# Patient Record
Sex: FEMALE | Race: WHITE | NOT HISPANIC OR LATINO | Employment: UNEMPLOYED | URBAN - METROPOLITAN AREA
[De-identification: names, ages, dates, MRNs, and addresses within clinical notes are randomized per-mention and may not be internally consistent; named-entity substitution may affect disease eponyms.]

---

## 2018-08-22 ENCOUNTER — HOSPITAL ENCOUNTER (EMERGENCY)
Facility: CLINIC | Age: 43
Discharge: HOME | End: 2018-08-22
Attending: EMERGENCY MEDICINE | Admitting: EMERGENCY MEDICINE
Payer: MEDICAID

## 2018-08-22 ENCOUNTER — APPOINTMENT (OUTPATIENT)
Dept: MRI IMAGING | Facility: CLINIC | Age: 43
End: 2018-08-22
Attending: EMERGENCY MEDICINE
Payer: MEDICAID

## 2018-08-22 ENCOUNTER — APPOINTMENT (OUTPATIENT)
Dept: CT IMAGING | Facility: CLINIC | Age: 43
End: 2018-08-22
Attending: EMERGENCY MEDICINE
Payer: MEDICAID

## 2018-08-22 VITALS
RESPIRATION RATE: 18 BRPM | OXYGEN SATURATION: 100 % | DIASTOLIC BLOOD PRESSURE: 101 MMHG | WEIGHT: 210 LBS | TEMPERATURE: 97.8 F | SYSTOLIC BLOOD PRESSURE: 144 MMHG

## 2018-08-22 DIAGNOSIS — Z86.73 H/O STROKE WITHIN LAST YEAR: ICD-10-CM

## 2018-08-22 DIAGNOSIS — R20.0 LEFT ARM NUMBNESS: ICD-10-CM

## 2018-08-22 DIAGNOSIS — I10 BENIGN ESSENTIAL HYPERTENSION: ICD-10-CM

## 2018-08-22 PROBLEM — I16.0 HYPERTENSIVE URGENCY: Status: ACTIVE | Noted: 2018-03-28

## 2018-08-22 PROBLEM — I63.9 ACUTE ISCHEMIC STROKE (H): Status: ACTIVE | Noted: 2018-08-07

## 2018-08-22 PROBLEM — N93.9 VAGINAL BLEEDING: Status: ACTIVE | Noted: 2018-03-31

## 2018-08-22 LAB
ANION GAP SERPL CALCULATED.3IONS-SCNC: 11 MMOL/L (ref 3–14)
APTT PPP: 30 SEC (ref 22–37)
BASOPHILS # BLD AUTO: 0.1 10E9/L (ref 0–0.2)
BASOPHILS NFR BLD AUTO: 0.6 %
BUN SERPL-MCNC: 12 MG/DL (ref 7–30)
CALCIUM SERPL-MCNC: 9 MG/DL (ref 8.5–10.1)
CHLORIDE SERPL-SCNC: 104 MMOL/L (ref 94–109)
CO2 SERPL-SCNC: 23 MMOL/L (ref 20–32)
CREAT SERPL-MCNC: 0.94 MG/DL (ref 0.52–1.04)
DIFFERENTIAL METHOD BLD: ABNORMAL
EOSINOPHIL NFR BLD AUTO: 2.5 %
ERYTHROCYTE [DISTWIDTH] IN BLOOD BY AUTOMATED COUNT: 14.6 % (ref 10–15)
GFR SERPL CREATININE-BSD FRML MDRD: 65 ML/MIN/1.7M2
GLUCOSE SERPL-MCNC: 114 MG/DL (ref 70–99)
HCT VFR BLD AUTO: 29.6 % (ref 35–47)
HGB BLD-MCNC: 9.4 G/DL (ref 11.7–15.7)
IMM GRANULOCYTES # BLD: 0.3 10E9/L (ref 0–0.4)
IMM GRANULOCYTES NFR BLD: 1.5 %
INR PPP: 1.05 (ref 0.86–1.14)
LYMPHOCYTES # BLD AUTO: 2.7 10E9/L (ref 0.8–5.3)
LYMPHOCYTES NFR BLD AUTO: 15.7 %
MCH RBC QN AUTO: 26.8 PG (ref 26.5–33)
MCHC RBC AUTO-ENTMCNC: 31.8 G/DL (ref 31.5–36.5)
MCV RBC AUTO: 84 FL (ref 78–100)
MONOCYTES # BLD AUTO: 1.1 10E9/L (ref 0–1.3)
MONOCYTES NFR BLD AUTO: 6.4 %
NEUTROPHILS # BLD AUTO: 12.4 10E9/L (ref 1.6–8.3)
NEUTROPHILS NFR BLD AUTO: 73.3 %
NRBC # BLD AUTO: 0 10*3/UL
NRBC BLD AUTO-RTO: 0 /100
PLATELET # BLD AUTO: 660 10E9/L (ref 150–450)
POTASSIUM SERPL-SCNC: 4.1 MMOL/L (ref 3.4–5.3)
RBC # BLD AUTO: 3.51 10E12/L (ref 3.8–5.2)
SODIUM SERPL-SCNC: 138 MMOL/L (ref 133–144)
TROPONIN I SERPL-MCNC: 0.02 UG/L (ref 0–0.04)
WBC # BLD AUTO: 16.9 10E9/L (ref 4–11)

## 2018-08-22 PROCEDURE — 84484 ASSAY OF TROPONIN QUANT: CPT | Performed by: EMERGENCY MEDICINE

## 2018-08-22 PROCEDURE — 70553 MRI BRAIN STEM W/O & W/DYE: CPT

## 2018-08-22 PROCEDURE — 25000128 H RX IP 250 OP 636: Performed by: EMERGENCY MEDICINE

## 2018-08-22 PROCEDURE — 85610 PROTHROMBIN TIME: CPT | Performed by: EMERGENCY MEDICINE

## 2018-08-22 PROCEDURE — 25000125 ZZHC RX 250: Performed by: EMERGENCY MEDICINE

## 2018-08-22 PROCEDURE — 85730 THROMBOPLASTIN TIME PARTIAL: CPT | Performed by: EMERGENCY MEDICINE

## 2018-08-22 PROCEDURE — 99285 EMERGENCY DEPT VISIT HI MDM: CPT | Mod: 25 | Performed by: EMERGENCY MEDICINE

## 2018-08-22 PROCEDURE — 93010 ELECTROCARDIOGRAM REPORT: CPT | Mod: Z6 | Performed by: EMERGENCY MEDICINE

## 2018-08-22 PROCEDURE — 80048 BASIC METABOLIC PNL TOTAL CA: CPT | Performed by: EMERGENCY MEDICINE

## 2018-08-22 PROCEDURE — 85025 COMPLETE CBC W/AUTO DIFF WBC: CPT | Performed by: EMERGENCY MEDICINE

## 2018-08-22 PROCEDURE — 93005 ELECTROCARDIOGRAM TRACING: CPT | Performed by: EMERGENCY MEDICINE

## 2018-08-22 PROCEDURE — 70496 CT ANGIOGRAPHY HEAD: CPT

## 2018-08-22 PROCEDURE — 96360 HYDRATION IV INFUSION INIT: CPT | Performed by: EMERGENCY MEDICINE

## 2018-08-22 PROCEDURE — 70450 CT HEAD/BRAIN W/O DYE: CPT | Mod: XS

## 2018-08-22 PROCEDURE — A9585 GADOBUTROL INJECTION: HCPCS | Performed by: EMERGENCY MEDICINE

## 2018-08-22 RX ORDER — ASPIRIN 325 MG
325 TABLET ORAL DAILY
COMMUNITY
Start: 2018-08-20

## 2018-08-22 RX ORDER — IOPAMIDOL 755 MG/ML
500 INJECTION, SOLUTION INTRAVASCULAR ONCE
Status: COMPLETED | OUTPATIENT
Start: 2018-08-22 | End: 2018-08-22

## 2018-08-22 RX ORDER — METOPROLOL TARTRATE 50 MG
50 TABLET ORAL
COMMUNITY
Start: 2018-03-31 | End: 2018-08-25

## 2018-08-22 RX ORDER — LISINOPRIL 20 MG/1
20 TABLET ORAL
COMMUNITY
Start: 2018-08-20 | End: 2018-09-17

## 2018-08-22 RX ORDER — GADOBUTROL 604.72 MG/ML
10 INJECTION INTRAVENOUS ONCE
Status: COMPLETED | OUTPATIENT
Start: 2018-08-22 | End: 2018-08-22

## 2018-08-22 RX ORDER — ATORVASTATIN CALCIUM 40 MG/1
40 TABLET, FILM COATED ORAL
COMMUNITY
Start: 2018-08-20 | End: 2018-09-17

## 2018-08-22 RX ADMIN — SODIUM CHLORIDE: 9 INJECTION, SOLUTION INTRAVENOUS at 19:15

## 2018-08-22 RX ADMIN — SODIUM CHLORIDE 100 ML: 9 INJECTION, SOLUTION INTRAVENOUS at 19:38

## 2018-08-22 RX ADMIN — IOPAMIDOL 70 ML: 755 INJECTION, SOLUTION INTRAVENOUS at 19:37

## 2018-08-22 RX ADMIN — SODIUM CHLORIDE, PRESERVATIVE FREE 100 ML: 5 INJECTION INTRAVENOUS at 19:35

## 2018-08-22 RX ADMIN — GADOBUTROL 10 ML: 604.72 INJECTION INTRAVENOUS at 20:18

## 2018-08-22 NOTE — ED AVS SNAPSHOT
[facility] Emergency Department    [address] [place] [address]    Phone:  [phone]    Fax:  [phone]                                       Alicia Penaloza   MRN: [id number]    Department:  [facility] Emergency Department   Date of Visit:  8/22/2018           After Visit Summary Signature Page     I have received my discharge instructions, and my questions have been answered. I have discussed any challenges I see with this plan with the nurse or doctor.    ..........................................................................................................................................  Patient/Patient Representative Signature      ..........................................................................................................................................  Patient Representative Print Name and Relationship to Patient    ..................................................               ................................................  Date                                            Time    ..........................................................................................................................................  Reviewed by Signature/Title    ...................................................              ..............................................  Date                                                            Time

## 2018-08-22 NOTE — ED PROVIDER NOTES
History     Chief Complaint   Patient presents with     Arm Pain     The history is provided by the patient.     Alicia Penaloza is a 43 year old female who presents to the emergency department with concerns of weakness. The patient recently had a stroke on 8/07/18 and was flown to [facility]. On the morning of her stroke she was having weakness and was not able to verbalize anything. She was trying to talk to someone and she was not able to get the words out. She had a clotting stroke and was given TPA. She was told that there was some hemorrhaging after the medication was administered. She was released from the hospital on the 20th and did not have any numbness, tingling, or weakness. The only residual symptoms she was having was some memory issues. The patient is currently having numbness in her left arm, from her elbow to her shoulder. The numbness started in her right arm about an hour ago while she was eating dinner. She was suddenly not able to  her fork so her sister decided to take her here. While they were driving here, her right arm wasn't bothering her as bad, but then her left arm started to become numb. The patient is currently taking 325 mg of aspirin daily. She is not on any other blood thinners. She notes that she has had her period for about 15 days now so she is losing a lot of blood. She is also taking Atorvastatin and Lisinopril. The patient notes that she is having heart burn, but states that she has had this for as long as she can remember.    Problem List:    Patient Active Problem List    Diagnosis Date Noted     Acute CVA (cerebrovascular accident) (H) 08/25/2018     Priority: Medium     Benign essential hypertension 08/22/2018     Priority: Medium     Acute ischemic stroke (H) 08/07/2018     Priority: Medium     Vaginal bleeding 03/31/2018     Priority: Medium     Hypertensive urgency 03/28/2018     Priority: Medium        Past Medical History:    Past Medical History:    Diagnosis Date     Hypertension      Stroke (H) 08/07/2018       Past Surgical History:    History reviewed. No pertinent surgical history.    Family History:    No family history on file.    Social History:  Marital Status:  Single [1]  Social History   Substance Use Topics     Smoking status: Former Smoker     Quit date: 8/7/2018     Smokeless tobacco: Not on file     Alcohol use No        Medications:      No current outpatient prescriptions on file.  Medications include metoprolol, lisinopril, atorvastatin, and 325 mg aspirin.    Review of Systems   All other systems reviewed and are negative.      Physical Exam   BP: (!) 170/102  Heart Rate: 118  Temp: 97.8  F (36.6  C)  Resp: 18  Weight: 95.3 kg (210 lb)  SpO2: 100 %      Physical Exam  BP (!) 144/101  Temp 97.8  F (36.6  C) (Oral)  Resp 18  Wt 95.3 kg (210 lb)  LMP 08/07/2018  SpO2 100%  General: alert, interactive, in no apparent distress.  Mood normal.  Able to give complete and accurate history.  Head: atraumatic  Nose: no rhinorrhea or epistaxis  Eyes: Sclera nonicteric. Conjunctiva noninjected. PERRL, EOMI  Mouth: no tonsillar erythema   Neck: supple, no palp LAD  Lungs: CTAB.  No wheezing.   CV: RRR, S1/S2; peripheral pulses palpable and symmetric  Abdomen: soft, nt, nd, no guarding.  Extremities: no cyanosis or edema  Skin: no rash or diaphoresis  Neuro: CN III-XII grossly intact, strength 5/5 in UE and LEs bilaterally, sensation intact to light touch in UE and LEs bilaterally; finger to nose is normal with no pronator drift.  Reflexes normal in BLE.        ED Course     ED Course     I reviewed hospital course from [facility]:    Hospital Course   LKW 8 AM, discovered 9 AM  CC: Transferred from [facility] after alteplase  HPI: She presented to [place] with aphasia. Administered alteplase after BP control. Still aphasic on arrival        8/8/2018: Developed left sided weakness reported by nursing early this morning. Patient remains mildly  aphasic.      8/9/2018: Sitting up in bed. Sister present with multiple questions. Moving all extremities. Very mild aphasia still present. Neuro stable, waiting for therapy eval and discharge recommendations     8/10/2018: Sitting up in bed. Alert, following commands. Moving all extremities. Neuro stable     8/11/2018: Sitting up in bed, ambulating in room. Neuro stable, waiting for discharge planning     8/12/2018: Sitting up in bed. Ambulating in room. Alert, following commands. Neuro stable     8/13/2018: Sitting up in bed. Increased headache started this am with elevated BP's. Sending for repeat CT head and adjusting BP meds     8/14/2018: Sitting up in bed. Headache improved. Neuro stable, waiting for discharge planning     8/15/2018: Resting in bed. Alert, following commands. Moving all extremities. Neuro stable, waiting for discharge plan     8/16/2018: Sitting up in chair in room, eating lunch. Alert, following commands. No headache now. Neuro stable     8/17/2018: Patient resting in bed. Wakes to verbal command. Moving all extremities and answering questions appropriately. Neuro stable. Waiting for discharge planning     8/18/18: Resting in bed. Neuro stable. She states she is quite tired. She plans to work with therapy soon. Mild headache.      8/19/18: Resting bed. Neuro stable. Family present and supportive. Denies headache     8/20/2018: Resting in bed. Alert, following commands. Neuro stable. Waiting for placement              STROKE REVIEW      Risk Factors:   Smoking status: Yes: Ongoing 1.5 pack-year smoker  Hypertension: treated  BP: 122/88  Diabetes Mellitus: no history  Hyperlipidemia: untreated    Lab Results   Component Value Date      08/08/2018         Sleep apnea: no history  Atrial Fibrillation: no history     Antithrombotic Meds:   Prior to Event: ?  Post Event: aspirin  Now: aspirin (24 hours post Tpa)        NIHSS 8/20/2018 10:26 AM   Level of conciousness: Alert; keenly  responsive = 0  Questions: Oriented to month and age: Answers Both = 0  Commands: Follows 2 commands: Both = 0  Gaze: Normal = 0  Visual fields: No visual loss = 0  Facial palsy: Normal = 0  Motor LUE: No drift = 0  Motor RUE: No drift = 0  Motor LLE: No drift = 0  Motor RLE: No drift = 0  Limb Ataxia: Absent = 0  Sensory: Normal = 0  Best Language: No aphasia = 0  Speech: Normal = 0  Extinction and inattention: No abnormality = 0  Total = 0           Workup:  Initial CT : waiting for upload     MRI :   1. Acute nonhemorrhagic multifocal infarcts within the left occipital,  parietal, and frontal lobes.  There is a focus of susceptibility within the left MCA branch at the sylvian fissure suggestive of thrombus within the MCA branch.  2. Hemorrhage centered within the right thalamus with mild restricted diffusion suggestive of hemorrhagic transformation of an acute infarct.  3. Small amount of intraventricular blood products and trace left temporal subarachnoid hemorrhage.  4. Background of chronic microangiopathic changes and a few chronic deep white matter lacunar infarcts.  5. Chronic mucosal sinus disease including most prominently the right maxillary sinus.     Large Vessel MRA :   1. Loss of normal signal within the left MCA proximal M2 segment along the inferior sylvian fissure likely represents a small amount of focal thrombus given the susceptibility on the MRI of the head in this region.  The left MCA branch vessels remain patent distally within the visualized portions.  2. Near fetal origins of the bilateral PCAs with dominant posterior  communicating arteries with infundibula.  3. Apparent mild narrowing at the proximal right P2 segment.  The right PCA remains patent distal to this.     Cardiac: TTE :   1. Hyperdynamic left ventricular systolic function estimated at 65-70%. No obvious regional wall motion abnormalities are visualized.    2. Mild concentric left ventricular hypertrophy.   3. Normal atrial  size.   4. No significant valvular abnormalities visualized.   5. No pericardial effusion.   6. Inferior vena cava is not well visualized on this study.   7. Negative agitated saline bubble study.    8. Compared to prior study from 3/29/18, there does not appear to be significant change in left ventricular systolic function.  BETH : Not done     EKG : Sinus Rhythm        Presumed Etiology: Cryptogenic Embolic (possible hypercoagulable, see assessment and plan)     Assessment and Plan:  Thrombolytic therapy (t-PA) was administered at another facility.      1. Acute Ischemic Stroke, Multifocal Bilateral Distribution, Embolic Appearing with Asymptomatic Hemorrhagic Transformation of Right BG Infarct and Asymptomatic Right Temporal Lobe SAH and IVH  Continue with neuro admission until discharge planning achieved   ASA daily  Blood pressure goal < 150/90  Hypercoagulable panel completed, see #6  Will defer LINQ at this time until further hematological work up, may consider LINQ if hematology has low suspicion for underlying hypercoag state after repeat testing     2. Right and Left Hemiparesis, Aphasia, Stable  PT/OT and ST     3. Hyperlipidemia  LDL with admission 150  Start Lipitor 40 mg daily for goal LDL < 70     4. Leukocytosis  CBC shows decreased WBC, still elevated  UA and repeat Blood cultures pending     5. Hypertension  BP goal < 150/90 acute phase  Long term SBP goal < 140  Lisinopril 20 mg daily  Resume PTA metoprolol and continue to monitor     6. Elevated Cardiolipin IGM  Resulted Weakly positive  Will plan to repeat at 6 weeks and if still positive, refer to hematology/rheumatology for further work up of underlying hypercoagulability  Continue ASA daily now     7. Headache, resolved  Norco prn  Amitriptyline 25 mg nightly, will discontinue now given increased fatigue and inability to participate      8. Anemia, Secondary to Mensis   Hgb stable  Patient reports this is an ongoing problem and chronic  PTA  Discussed follow up OP with OB to discuss further options         Procedures               EKG Interpretation:      Interpreted by Jose Steele  Time reviewed: 2050  Symptoms at time of EKG: numbness and h/o CVA   Rhythm: normal sinus   Rate: 107  Axis: normal  Ectopy: none  Conduction: normal  ST Segments/ T Waves: No ST-T wave changes  Comparison to prior: No old EKG available    Clinical Impression: normal EKG          Critical Care time:  none     The patient has stroke symptoms:           ED Stroke specific documentation           NIHSS PDF          Protocol PDF     Patient last known well time: 1700  ED Provider first to bedside at: 1835  CT Results received at: dictation  Patient was not treated with TPA due to the following reason(s):  Mild stroke symptoms ( NIHSS < 4 and not globally aphasic)  Isolated mild neurological deficits such as ataxia alone, sensory loss alone, dysarthria alone or minimal weakness ( NIHSS < 4 AND normal language AND visual fields )  Major stroke or head trauma within 3 months or minor stroke within 1 month    National Institutes of Health Stroke Scale (Baseline)  Time Performed: 1835      Score    Level of consciousness: (0)   Alert, keenly responsive    LOC questions: (0)   Answers both questions correctly    LOC commands: (0)   Performs both tasks correctly    Best gaze: (0)   Normal    Visual: (0)   No visual loss    Facial palsy: (0)   Normal symmetrical movements    Motor arm (left): (0)   No drift    Motor arm (right): (0)   No drift    Motor leg (left): (0)   No drift    Motor leg (right): (0)   No drift    Limb ataxia: (0)   Absent    Sensory: (0)   Normal- no sensory loss    Best language: (0)   Normal- no aphasia    Dysarthria: (0)   Normal    Extinction and inattention: (0)   No abnormality        Total Score:  0        Stroke Mimics were considered (including migraine headache, seizure disorder, hypoglycemia (or hyperglycemia), head or spinal trauma, CNS  infection, Toxin ingestion and shock state (e.g. sepsis) .                       No results found for this or any previous visit (from the past 24 hour(s)).    Medications   0.9% sodium chloride BOLUS (0 mLs Intravenous Stopped 8/22/18 2111)   sodium chloride (PF) 0.9% PF flush 3 mL (1,000 mLs Intravenous Given 8/22/18 1914)   iopamidol (ISOVUE-370) solution 500 mL (70 mLs Intravenous Given 8/22/18 1937)   Saline 100mL Bag (100 mLs Intravenous Given 8/22/18 1935)   sodium chloride 0.9 % bag 500mL for CT scan flush use (100 mLs Intravenous Given 8/22/18 1938)   gadobutrol (GADAVIST) injection 10 mL (10 mLs Intravenous Given 8/22/18 2018)       Assessments & Plan (with Medical Decision Making)  43 year old female with past medical history significant for recent acute ischemic stroke which was treated at [facility].  Patient had initially presented on August 8 at an outside facility, and had presented with acute a fascia, and received TPA.  Patient was transferred to [facility] via helicopter and was treated for acute ischemic stroke with multifocal bilateral distribution with embolic appearing hemorrhagic transformation.  Patient had been hospitalized until 2 days ago.  She had been discharged home with home health nursing.  She is currently on 325 mg aspirin daily.  No other blood thinning medication use.  Patient is also on lisinopril, metoprolol, atorvastatin.    Patient presents to the emergency department this evening with her sister.  They were eating dinner this evening when her right hand subsequently and suddenly went weak.  She had difficulty raising the fork to her mouth.  This had resolved, and patient was being driven to the emergency department when she had reports of left hand/arm weakness.  This has subsequently resolved once again, and patient's only complaints are left upper extremity numbness that extends from the shoulder to the elbow.  No distal extremity numbness.  No lower  extremity numbness, or weakness.  No current right-sided arm symptoms.    Patient denies chest pain, fevers, or other recent changes since hospital discharge.  I reviewed the hospital discharge documented above.  Patient with NIH stroke scale upon ED evaluation of 0.  She has recent TPA administration, with recent stroke, and given her low NIH stroke scale patient is not a TPA candidate.  CT with CT angiogram is performed that shows no acute findings of those studies.    MRI is ordered, and shows recent ischemic infarcts, however no acute infarcts are noted.  No significant changes compared to prior results based on my review of the previous radiology reports.    Patient with NIH stroke scale which remains at 0.  Symptoms are unchanged, and potentially improved.  Patient with EKG showing sinus tachycardia, with no other arrhythmia.  Has had prior embolic infarcts, and does have multiple clinic follow-up appointments pending to further evaluate for hypercoagulable cause, or other cause of patient's apparent embolic infarct for which she was recently hospitalized at [facility].    At this point, patient has normal neurologic exam, and will be discharged home with sister.  Encourage follow-up with clinic providers, and return if new, or worsening symptoms develop.         I have reviewed the nursing notes.    I have reviewed the findings, diagnosis, plan and need for follow up with the patient.       Discharge Medication List as of 8/22/2018  9:11 PM          Final diagnoses:   Left arm numbness   H/O stroke within last year   Benign essential hypertension     This document serves as a record of services personally performed by Jose Steele MD. It was created on their behalf by Noemi Bal, a trained medical scribe. The creation of this record is based on the provider's personal observations and the statements of the patient. This document has been checked and approved by the attending provider.  Note:  Chart documentation done in part with Dragon Voice Recognition software. Although reviewed after completion, some word and grammatical errors may remain.  8/22/2018   [facility] EMERGENCY DEPARTMENT     Jose Steele MD  08/22/18 2106       Jose Steele MD  08/27/18 0848

## 2018-08-22 NOTE — ED TRIAGE NOTES
Pt presents with concerns of left arm pain and neck pain.  Pt states that she had a stroke on 08/07/18 was in [facility] then flown to [facility].  Pt started having right sided arm pain at about 1730.  Pt had difficulty grasping a fork with her right hand.  Pt states that the pain shifted to the left arm and neck on the way to the hospital.  Pt is concerned with her recent history.  Pt denies any chest pain, shortness of breath, or nausea.

## 2018-08-22 NOTE — ED AVS SNAPSHOT
[facility]    [address] [place] [address]    Phone:  [phone]    Fax:  [phone]                                       Alicia Penaloza   MRN: [id number]    Department:  [facility] Emergency Department   Date of Visit:  8/22/2018           Patient Information     Date Of Birth          1975        Your diagnoses for this visit were:     Left arm numbness     H/O stroke within last year     Benign essential hypertension        You were seen by Jose Steele MD.        Discharge Instructions       Follow up with your providers as planned.   Return if new or worsening symptoms.    Continue home medications.          Understanding the Link Between High Blood Pressure and Stroke  Each day that your blood pressure is too high, your chances of having a stroke are increased. Normal blood pressure is considered to be less than 120 over less than 80 millimeters of mercury (mmHg) or 120/80 mmHg. A stroke is a loss of brain function caused by a lack of blood to the brain. Stroke can result from the damage that ongoing high blood pressure causes in your vessels. If the affected vessel stops supplying blood to the brain, a stroke results.  High blood pressure damages blood vessels    Vessels thicken  When blood presses against a vessel wall with too much force, muscles in the wall lose their ability to stretch. This causes the wall to thicken, which narrows the vessel passage and reduces blood flow.   Clots form  When blood pressure is too high, it can damage blood vessel walls which results in scar tissue. Fat and cholesterol (plaque) collect in the damaged spots. Blood cells stick to the plaque, forming a mass called a clot. A clot can block blood flow in the vessel.   Vessels break  Sometimes blood flows with enough force to weaken a vessel wall. If the vessel is small or damaged, the wall can break. When this happens, blood leaks into nearby tissue and  kills cells. Other cells may die because blood cannot reach them.   Know the symptoms of stroke  During a stroke, blood supply to the brain is cut off. But with prompt medical help, a better recovery is more likely. Don t wait. Call 911 if you have any of the symptoms below:    Sudden weakness or numbness on one side of the face or body, including a leg or an arm    Sudden trouble seeing with one or both eyes    Sudden double vision    Sudden trouble talking, such as slurred speech    Sudden severe headache    Sudden problems using or understanding words    Sudden dizziness or loss of balance    Seizures for the first time     Any of these symptoms that occur and then resolve    Date Last Reviewed: 5/1/2017 2000-2017 [employer]. [address] [place] [address], [place] [address]. All rights reserved. This information is not intended as a substitute for professional medical care. Always follow your healthcare professional's instructions.          Stroke and Heart Disease  Every part of your body, including your heart and brain, needs oxygen to work. Oxygen is carried in the blood. Blood vessels called arteries carry oxygen-rich blood throughout the body. Both heart attack and stroke are due to problems in the arteries. The same factors that cause heart disease can make you more likely to have a stroke.    Heart attack. A heart attack is caused by blockage in an artery that carries blood to the heart muscle. If blood is blocked, that part of the heart muscle is damaged or dies.    Stroke. If an artery supplying the brain is blocked, a stroke may result. This is called an ischemic stroke. It is caused by a piece of plaque breaking loose from an artery (such as a carotid artery in the neck) or from the heart and lodging in the brain. A stroke caused by the rupture of a weakened blood vessel is called a hemorrhagic stroke.  Both heart attack and stroke are medical emergencies that can lead to serious health  problems. They can even be fatal.      Healthy artery  A healthy artery is a tube with flexible walls and a smooth inner lining. Blood flows freely through it.  Unhealthy artery  Artery problems start when the inner lining gets damaged. This is often because of risk factors such as smoking and high blood pressure. These can make the artery walls stiff. Plaque, a fatty mix of cholesterol and other material, forms in the lining. This narrows the channel. Plaque can break, restricting blood flow further. It can also cause a blood clot to form. A blood clot may block the artery s channel completely.   Reducing your risk  Making changes that make your arteries healthier will help lower your risk for both heart attack and stroke. If you have heart disease, you may need to work on a few aspects of your lifestyle. But remember that the things that are good for your arteries, heart, and brain are also good for the rest of your body.  Your healthcare provider will work with you to make lifestyle changes as needed to help prevent progression of atherosclerotic cardiovascular disease. This can lead to heart attack or stroke. Factors you may need to work on include:    Diet. Your healthcare provider will give you information on dietary changes that you may need to make based on your situation. Your provider may recommend that you see a registered dietitian for help with diet changes. Changes may include:  ? Reducing fat and cholesterol intake  ? Reducing sodium (salt) intake, especially if you have high blood pressure  ? Increasing your intake of fresh vegetables and fruits  ? Eating lean proteins, such as fish, poultry, and legumes (beans and peas) and eating less red meat and processed meats  ? Using low- or no-fat dairy products  ? Using vegetable and nut oils in limited amounts  ? Limiting sweets and processed foods such as chips, cookies, and baked goods    Physical activity. Your healthcare provider may recommend that you  increase your physical activity if you have not been as active as possible. Depending on your situation, your provider may advise you to include moderate to vigorous intensity activity for at least 40 minutes each day for at least 3 to 4 days per week. Examples of moderate to vigorous activity include:  ? Walking at a brisk pace, about 3 to 4 miles per hour  ? Jogging or running  ? Swimming or water aerobics  ? Hiking  ? Dancing  ? Martial arts  ? Tennis  ? Riding a bike or a stationary bike    Weight management. If you are overweight or obese, your healthcare provider will work with you to lose weight and lower your BMI (body mass image) to a normal or near-normal level. Making dietary changes and increasing physical activity can help.    Smoking. If you smoke, break the smoking habit. Enroll in a stop-smoking program to improve your chances of success.    Stress. Learn stress management techniques to help you deal with stress in your home and work life.  Date Last Reviewed: 7/1/2017 2000-2017 [employer]. [address] [place] [address]. All rights reserved. This information is not intended as a substitute for professional medical care. Always follow your healthcare professional's instructions.          Your next 10 appointments already scheduled     Aug 27, 2018  4:00 PM CDT   Office Visit with Flori Beyer MD   [facility] ([address] [id number] [phone]           Bring a current list of meds and any records pertaining to this visit. For Physicals, please bring immunization records and any forms needing to be filled out. Please arrive 10 minutes early to complete paperwork.            Sep 05, 2018  1:00 PM CDT   Office Visit with Augusto Thomas MD   [facility] ([address] [address] [phone]           Bring a current list of meds  and any records pertaining to this visit. For Physicals, please bring immunization records and any forms needing to be filled out. Please arrive 10 minutes early to complete paperwork.              24 Hour Appointment Hotline       To make an appointment at any [facility], call [phone] ([phone]). If you don't have a family doctor or clinic, we will help you find one. [facility] are conveniently located to serve the needs of you and your family.             Review of your medicines      Our records show that you are taking the medicines listed below. If these are incorrect, please call your family doctor or clinic.        Dose / Directions Last dose taken    aspirin 325 MG tablet   Dose:  325 mg        Take 325 mg by mouth   Refills:  0        atorvastatin 40 MG tablet   Commonly known as:  LIPITOR   Dose:  40 mg        Take 40 mg by mouth   Refills:  0        lisinopril 20 MG tablet   Commonly known as:  PRINIVIL/ZESTRIL   Dose:  20 mg        Take 20 mg by mouth   Refills:  0        metoprolol tartrate 50 MG tablet   Commonly known as:  LOPRESSOR   Dose:  50 mg        Take 50 mg by mouth   Refills:  0                Procedures and tests performed during your visit     Activity: Bedrest    Basic metabolic panel    CBC with platelets differential    CT Head Neck Angio w/o & w Contrast    CT Head w/o Contrast    EKG 12-lead, tracing only    Glucose monitor nursing POCT    INR    MR Brain w/o & w Contrast    Partial thromboplastin time    Pulse oximetry nursing    Troponin I    Vital signs and neuro checks      Orders Needing Specimen Collection     None      Pending Results     Date and Time Order Name Status Description    8/22/2018 1935 MR Brain w/o & w Contrast Preliminary     8/22/2018 1845 CT Head Neck Angio w/o & w Contrast Preliminary     8/22/2018 1845 CT Head w/o Contrast Preliminary             Pending Culture Results     No orders found from 8/20/2018 to 8/23/2018.            Pending  "Results Instructions     If you had any lab results that were not finalized at the time of your Discharge, you can call the ED Lab Result RN at [phone]. You will be contacted by this team for any positive Lab results or changes in treatment. The nurses are available 7 days a week from 10A to 6:30P.  You can leave a message 24 hours per day and they will return your call.        Thank you for choosing [facility]       Thank you for choosing [facility] for your care. Our goal is always to provide you with excellent care. Hearing back from our patients is one way we can continue to improve our services. Please take a few minutes to complete the written survey that you may receive in the mail after you visit with us. Thank you!        [facility] Information     [facility] lets you send messages to your doctor, view your test results, renew your prescriptions, schedule appointments and more. To sign up, go to [email or web address] . Click on \"Log in\" on the left side of the screen, which will take you to the Welcome page. Then click on \"Sign up Now\" on the right side of the page.     You will be asked to enter the access code listed below, as well as some personal information. Please follow the directions to create your username and password.     Your access code is: [email or web address]  Expires: 2018  8:53 PM     Your access code will  in 90 days. If you need help or a new code, please call your [facility] clinic or [phone].        Care EveryWhere ID     This is your Care EveryWhere ID. This could be used by other organizations to access your [facility] medical records  [id number]        Equal Access to Services     [facility]: Hadii helio Noel, waaxda luqadaha, qaybta kaalanjelica pino. So [facility] [phone].    ATENCIÓN: Si habla español, tiene a finnegan disposición servicios gratuitos de asistencia lingüística. Llame al [phone].    We comply with " applicable federal civil rights laws and [place] laws. We do not discriminate on the basis of race, color, national origin, age, disability, sex, sexual orientation, or gender identity.            After Visit Summary       This is your record. Keep this with you and show to your community pharmacist(s) and doctor(s) at your next visit.

## 2018-08-23 NOTE — ED NOTES
Spoke with Gio MELENDEZ about pt and her symptoms in regards to calling a code stroke.  At this time provider stated that there was not a need to call a code stroke.

## 2018-08-23 NOTE — DISCHARGE INSTRUCTIONS
Follow up with your providers as planned.   Return if new or worsening symptoms.    Continue home medications.          Understanding the Link Between High Blood Pressure and Stroke  Each day that your blood pressure is too high, your chances of having a stroke are increased. Normal blood pressure is considered to be less than 120 over less than 80 millimeters of mercury (mmHg) or 120/80 mmHg. A stroke is a loss of brain function caused by a lack of blood to the brain. Stroke can result from the damage that ongoing high blood pressure causes in your vessels. If the affected vessel stops supplying blood to the brain, a stroke results.  High blood pressure damages blood vessels    Vessels thicken  When blood presses against a vessel wall with too much force, muscles in the wall lose their ability to stretch. This causes the wall to thicken, which narrows the vessel passage and reduces blood flow.   Clots form  When blood pressure is too high, it can damage blood vessel walls which results in scar tissue. Fat and cholesterol (plaque) collect in the damaged spots. Blood cells stick to the plaque, forming a mass called a clot. A clot can block blood flow in the vessel.   Vessels break  Sometimes blood flows with enough force to weaken a vessel wall. If the vessel is small or damaged, the wall can break. When this happens, blood leaks into nearby tissue and kills cells. Other cells may die because blood cannot reach them.   Know the symptoms of stroke  During a stroke, blood supply to the brain is cut off. But with prompt medical help, a better recovery is more likely. Don t wait. Call 911 if you have any of the symptoms below:    Sudden weakness or numbness on one side of the face or body, including a leg or an arm    Sudden trouble seeing with one or both eyes    Sudden double vision    Sudden trouble talking, such as slurred speech    Sudden severe headache    Sudden problems using or understanding words    Sudden  dizziness or loss of balance    Seizures for the first time     Any of these symptoms that occur and then resolve    Date Last Reviewed: 5/1/2017 2000-2017 [employer]. [address] [place] [address], [place] [address]. All rights reserved. This information is not intended as a substitute for professional medical care. Always follow your healthcare professional's instructions.          Stroke and Heart Disease  Every part of your body, including your heart and brain, needs oxygen to work. Oxygen is carried in the blood. Blood vessels called arteries carry oxygen-rich blood throughout the body. Both heart attack and stroke are due to problems in the arteries. The same factors that cause heart disease can make you more likely to have a stroke.    Heart attack. A heart attack is caused by blockage in an artery that carries blood to the heart muscle. If blood is blocked, that part of the heart muscle is damaged or dies.    Stroke. If an artery supplying the brain is blocked, a stroke may result. This is called an ischemic stroke. It is caused by a piece of plaque breaking loose from an artery (such as a carotid artery in the neck) or from the heart and lodging in the brain. A stroke caused by the rupture of a weakened blood vessel is called a hemorrhagic stroke.  Both heart attack and stroke are medical emergencies that can lead to serious health problems. They can even be fatal.      Healthy artery  A healthy artery is a tube with flexible walls and a smooth inner lining. Blood flows freely through it.  Unhealthy artery  Artery problems start when the inner lining gets damaged. This is often because of risk factors such as smoking and high blood pressure. These can make the artery walls stiff. Plaque, a fatty mix of cholesterol and other material, forms in the lining. This narrows the channel. Plaque can break, restricting blood flow further. It can also cause a blood clot to form. A blood clot may block the  artery s channel completely.   Reducing your risk  Making changes that make your arteries healthier will help lower your risk for both heart attack and stroke. If you have heart disease, you may need to work on a few aspects of your lifestyle. But remember that the things that are good for your arteries, heart, and brain are also good for the rest of your body.  Your healthcare provider will work with you to make lifestyle changes as needed to help prevent progression of atherosclerotic cardiovascular disease. This can lead to heart attack or stroke. Factors you may need to work on include:    Diet. Your healthcare provider will give you information on dietary changes that you may need to make based on your situation. Your provider may recommend that you see a registered dietitian for help with diet changes. Changes may include:  ? Reducing fat and cholesterol intake  ? Reducing sodium (salt) intake, especially if you have high blood pressure  ? Increasing your intake of fresh vegetables and fruits  ? Eating lean proteins, such as fish, poultry, and legumes (beans and peas) and eating less red meat and processed meats  ? Using low- or no-fat dairy products  ? Using vegetable and nut oils in limited amounts  ? Limiting sweets and processed foods such as chips, cookies, and baked goods    Physical activity. Your healthcare provider may recommend that you increase your physical activity if you have not been as active as possible. Depending on your situation, your provider may advise you to include moderate to vigorous intensity activity for at least 40 minutes each day for at least 3 to 4 days per week. Examples of moderate to vigorous activity include:  ? Walking at a brisk pace, about 3 to 4 miles per hour  ? Jogging or running  ? Swimming or water aerobics  ? Hiking  ? Dancing  ? Martial arts  ? Tennis  ? Riding a bike or a stationary bike    Weight management. If you are overweight or obese, your healthcare  provider will work with you to lose weight and lower your BMI (body mass image) to a normal or near-normal level. Making dietary changes and increasing physical activity can help.    Smoking. If you smoke, break the smoking habit. Enroll in a stop-smoking program to improve your chances of success.    Stress. Learn stress management techniques to help you deal with stress in your home and work life.  Date Last Reviewed: 7/1/2017 2000-2017 [employer]. [address] [place] [address], [place] [address]. All rights reserved. This information is not intended as a substitute for professional medical care. Always follow your healthcare professional's instructions.

## 2018-08-25 ENCOUNTER — HOSPITAL ENCOUNTER (EMERGENCY)
Facility: CLINIC | Age: 43
Discharge: ANOTHER HOSPITAL | End: 2018-08-25
Attending: EMERGENCY MEDICINE | Admitting: EMERGENCY MEDICINE
Payer: MEDICAID

## 2018-08-25 ENCOUNTER — APPOINTMENT (OUTPATIENT)
Dept: GENERAL RADIOLOGY | Facility: CLINIC | Age: 43
End: 2018-08-25
Attending: PSYCHIATRY & NEUROLOGY
Payer: MEDICAID

## 2018-08-25 ENCOUNTER — APPOINTMENT (OUTPATIENT)
Dept: MRI IMAGING | Facility: CLINIC | Age: 43
End: 2018-08-25
Attending: PSYCHIATRY & NEUROLOGY
Payer: MEDICAID

## 2018-08-25 ENCOUNTER — APPOINTMENT (OUTPATIENT)
Dept: CT IMAGING | Facility: CLINIC | Age: 43
End: 2018-08-25
Attending: NURSE PRACTITIONER
Payer: MEDICAID

## 2018-08-25 ENCOUNTER — HOSPITAL ENCOUNTER (INPATIENT)
Facility: CLINIC | Age: 43
LOS: 2 days | Discharge: HOME HEALTH | End: 2018-08-27
Attending: PSYCHIATRY & NEUROLOGY | Admitting: PSYCHIATRY & NEUROLOGY
Payer: MEDICAID

## 2018-08-25 VITALS
SYSTOLIC BLOOD PRESSURE: 151 MMHG | DIASTOLIC BLOOD PRESSURE: 102 MMHG | RESPIRATION RATE: 14 BRPM | OXYGEN SATURATION: 99 % | TEMPERATURE: 97.8 F

## 2018-08-25 DIAGNOSIS — I69.398 PAIN AFTER CEREBROVASCULAR ACCIDENT (CVA): ICD-10-CM

## 2018-08-25 DIAGNOSIS — I10 HYPERTENSION, UNSPECIFIED TYPE: ICD-10-CM

## 2018-08-25 DIAGNOSIS — I63.9 ACUTE ISCHEMIC STROKE (H): Primary | ICD-10-CM

## 2018-08-25 DIAGNOSIS — G45.9 TRANSIENT CEREBRAL ISCHEMIA, UNSPECIFIED TYPE: ICD-10-CM

## 2018-08-25 DIAGNOSIS — R52 PAIN AFTER CEREBROVASCULAR ACCIDENT (CVA): ICD-10-CM

## 2018-08-25 DIAGNOSIS — Z13.31 POSITIVE DEPRESSION SCREENING: ICD-10-CM

## 2018-08-25 LAB
ALBUMIN SERPL-MCNC: 3.3 G/DL (ref 3.4–5)
ALBUMIN UR-MCNC: NEGATIVE MG/DL
ALP SERPL-CCNC: 114 U/L (ref 40–150)
ALT SERPL W P-5'-P-CCNC: 18 U/L (ref 0–50)
ANION GAP SERPL CALCULATED.3IONS-SCNC: 10 MMOL/L (ref 3–14)
APPEARANCE UR: CLEAR
APTT PPP: 34 SEC (ref 22–37)
AST SERPL W P-5'-P-CCNC: 15 U/L (ref 0–45)
BASOPHILS # BLD AUTO: 0.1 10E9/L (ref 0–0.2)
BASOPHILS NFR BLD AUTO: 0.5 %
BILIRUB SERPL-MCNC: 0.2 MG/DL (ref 0.2–1.3)
BILIRUB UR QL STRIP: NEGATIVE
BUN SERPL-MCNC: 12 MG/DL (ref 7–30)
CALCIUM SERPL-MCNC: 8.5 MG/DL (ref 8.5–10.1)
CHLORIDE SERPL-SCNC: 105 MMOL/L (ref 94–109)
CO2 SERPL-SCNC: 25 MMOL/L (ref 20–32)
COLOR UR AUTO: ABNORMAL
CREAT SERPL-MCNC: 0.95 MG/DL (ref 0.52–1.04)
DIFFERENTIAL METHOD BLD: ABNORMAL
EOSINOPHIL NFR BLD AUTO: 2.8 %
ERYTHROCYTE [DISTWIDTH] IN BLOOD BY AUTOMATED COUNT: 14.8 % (ref 10–15)
GFR SERPL CREATININE-BSD FRML MDRD: 64 ML/MIN/1.7M2
GLUCOSE BLDC GLUCOMTR-MCNC: 112 MG/DL (ref 70–99)
GLUCOSE BLDC GLUCOMTR-MCNC: 75 MG/DL (ref 70–99)
GLUCOSE SERPL-MCNC: 95 MG/DL (ref 70–99)
GLUCOSE UR STRIP-MCNC: NEGATIVE MG/DL
HCT VFR BLD AUTO: 31.9 % (ref 35–47)
HGB BLD-MCNC: 9.7 G/DL (ref 11.7–15.7)
HGB UR QL STRIP: NEGATIVE
IMM GRANULOCYTES # BLD: 0.2 10E9/L (ref 0–0.4)
IMM GRANULOCYTES NFR BLD: 1.1 %
INR PPP: 1.03 (ref 0.86–1.14)
KETONES UR STRIP-MCNC: NEGATIVE MG/DL
LEUKOCYTE ESTERASE UR QL STRIP: NEGATIVE
LYMPHOCYTES # BLD AUTO: 2.4 10E9/L (ref 0.8–5.3)
LYMPHOCYTES NFR BLD AUTO: 14.7 %
MCH RBC QN AUTO: 26.2 PG (ref 26.5–33)
MCHC RBC AUTO-ENTMCNC: 30.4 G/DL (ref 31.5–36.5)
MCV RBC AUTO: 86 FL (ref 78–100)
MONOCYTES # BLD AUTO: 1.2 10E9/L (ref 0–1.3)
MONOCYTES NFR BLD AUTO: 7.2 %
NEUTROPHILS # BLD AUTO: 12 10E9/L (ref 1.6–8.3)
NEUTROPHILS NFR BLD AUTO: 73.7 %
NITRATE UR QL: NEGATIVE
NRBC # BLD AUTO: 0 10*3/UL
NRBC BLD AUTO-RTO: 0 /100
PH UR STRIP: 6 PH (ref 5–7)
PLATELET # BLD AUTO: 589 10E9/L (ref 150–450)
PLATELET # BLD AUTO: 683 10E9/L (ref 150–450)
POTASSIUM SERPL-SCNC: 4.2 MMOL/L (ref 3.4–5.3)
PROT SERPL-MCNC: 7.7 G/DL (ref 6.8–8.8)
RBC # BLD AUTO: 3.7 10E12/L (ref 3.8–5.2)
RBC #/AREA URNS AUTO: 2 /HPF (ref 0–2)
SODIUM SERPL-SCNC: 140 MMOL/L (ref 133–144)
SOURCE: ABNORMAL
SP GR UR STRIP: 1.05 (ref 1–1.03)
SQUAMOUS #/AREA URNS AUTO: 3 /HPF (ref 0–1)
TROPONIN I SERPL-MCNC: <0.015 UG/L (ref 0–0.04)
UROBILINOGEN UR STRIP-MCNC: NORMAL MG/DL (ref 0–2)
WBC # BLD AUTO: 16.3 10E9/L (ref 4–11)
WBC #/AREA URNS AUTO: 1 /HPF (ref 0–5)

## 2018-08-25 PROCEDURE — 99285 EMERGENCY DEPT VISIT HI MDM: CPT | Mod: 25 | Performed by: EMERGENCY MEDICINE

## 2018-08-25 PROCEDURE — 25000132 ZZH RX MED GY IP 250 OP 250 PS 637: Performed by: PSYCHIATRY & NEUROLOGY

## 2018-08-25 PROCEDURE — 70450 CT HEAD/BRAIN W/O DYE: CPT | Mod: XS

## 2018-08-25 PROCEDURE — 84484 ASSAY OF TROPONIN QUANT: CPT | Performed by: NURSE PRACTITIONER

## 2018-08-25 PROCEDURE — 12000008 ZZH R&B INTERMEDIATE UMMC

## 2018-08-25 PROCEDURE — 85730 THROMBOPLASTIN TIME PARTIAL: CPT | Performed by: NURSE PRACTITIONER

## 2018-08-25 PROCEDURE — 80053 COMPREHEN METABOLIC PANEL: CPT | Performed by: NURSE PRACTITIONER

## 2018-08-25 PROCEDURE — 25000128 H RX IP 250 OP 636: Performed by: RADIOLOGY

## 2018-08-25 PROCEDURE — 85610 PROTHROMBIN TIME: CPT | Performed by: NURSE PRACTITIONER

## 2018-08-25 PROCEDURE — 25000128 H RX IP 250 OP 636: Performed by: EMERGENCY MEDICINE

## 2018-08-25 PROCEDURE — 71046 X-RAY EXAM CHEST 2 VIEWS: CPT

## 2018-08-25 PROCEDURE — 70553 MRI BRAIN STEM W/O & W/DYE: CPT

## 2018-08-25 PROCEDURE — 93005 ELECTROCARDIOGRAM TRACING: CPT | Performed by: EMERGENCY MEDICINE

## 2018-08-25 PROCEDURE — 00000146 ZZHCL STATISTIC GLUCOSE BY METER IP

## 2018-08-25 PROCEDURE — 36415 COLL VENOUS BLD VENIPUNCTURE: CPT | Performed by: PSYCHIATRY & NEUROLOGY

## 2018-08-25 PROCEDURE — 25000128 H RX IP 250 OP 636: Performed by: PSYCHIATRY & NEUROLOGY

## 2018-08-25 PROCEDURE — 70496 CT ANGIOGRAPHY HEAD: CPT

## 2018-08-25 PROCEDURE — 85025 COMPLETE CBC W/AUTO DIFF WBC: CPT | Performed by: NURSE PRACTITIONER

## 2018-08-25 PROCEDURE — 93010 ELECTROCARDIOGRAM REPORT: CPT | Mod: Z6 | Performed by: EMERGENCY MEDICINE

## 2018-08-25 PROCEDURE — 85049 AUTOMATED PLATELET COUNT: CPT | Performed by: PSYCHIATRY & NEUROLOGY

## 2018-08-25 PROCEDURE — 81001 URINALYSIS AUTO W/SCOPE: CPT | Performed by: PSYCHIATRY & NEUROLOGY

## 2018-08-25 PROCEDURE — A9585 GADOBUTROL INJECTION: HCPCS | Performed by: RADIOLOGY

## 2018-08-25 RX ORDER — AMOXICILLIN 250 MG
1-2 CAPSULE ORAL 2 TIMES DAILY
Status: DISCONTINUED | OUTPATIENT
Start: 2018-08-25 | End: 2018-08-27 | Stop reason: HOSPADM

## 2018-08-25 RX ORDER — ACETAMINOPHEN 650 MG/1
650 SUPPOSITORY RECTAL EVERY 4 HOURS PRN
Status: DISCONTINUED | OUTPATIENT
Start: 2018-08-25 | End: 2018-08-27 | Stop reason: HOSPADM

## 2018-08-25 RX ORDER — ASPIRIN 325 MG
325 TABLET ORAL DAILY
Status: DISCONTINUED | OUTPATIENT
Start: 2018-08-25 | End: 2018-08-27 | Stop reason: HOSPADM

## 2018-08-25 RX ORDER — LABETALOL HYDROCHLORIDE 5 MG/ML
10 INJECTION, SOLUTION INTRAVENOUS EVERY 10 MIN PRN
Status: DISCONTINUED | OUTPATIENT
Start: 2018-08-25 | End: 2018-08-27 | Stop reason: HOSPADM

## 2018-08-25 RX ORDER — IOPAMIDOL 755 MG/ML
500 INJECTION, SOLUTION INTRAVASCULAR ONCE
Status: COMPLETED | OUTPATIENT
Start: 2018-08-25 | End: 2018-08-25

## 2018-08-25 RX ORDER — LIDOCAINE 40 MG/G
CREAM TOPICAL
Status: DISCONTINUED | OUTPATIENT
Start: 2018-08-25 | End: 2018-08-25 | Stop reason: HOSPADM

## 2018-08-25 RX ORDER — ATORVASTATIN CALCIUM 40 MG/1
40 TABLET, FILM COATED ORAL EVERY EVENING
Status: DISCONTINUED | OUTPATIENT
Start: 2018-08-25 | End: 2018-08-27 | Stop reason: HOSPADM

## 2018-08-25 RX ORDER — ACETAMINOPHEN 325 MG/1
650 TABLET ORAL EVERY 4 HOURS PRN
Status: DISCONTINUED | OUTPATIENT
Start: 2018-08-25 | End: 2018-08-27 | Stop reason: HOSPADM

## 2018-08-25 RX ORDER — NALOXONE HYDROCHLORIDE 0.4 MG/ML
.1-.4 INJECTION, SOLUTION INTRAMUSCULAR; INTRAVENOUS; SUBCUTANEOUS
Status: DISCONTINUED | OUTPATIENT
Start: 2018-08-25 | End: 2018-08-27 | Stop reason: HOSPADM

## 2018-08-25 RX ORDER — GADOBUTROL 604.72 MG/ML
0.1 INJECTION INTRAVENOUS ONCE
Status: COMPLETED | OUTPATIENT
Start: 2018-08-25 | End: 2018-08-25

## 2018-08-25 RX ADMIN — IOPAMIDOL 80 ML: 755 INJECTION, SOLUTION INTRAVENOUS at 12:38

## 2018-08-25 RX ADMIN — ENOXAPARIN SODIUM 40 MG: 100 INJECTION SUBCUTANEOUS at 20:26

## 2018-08-25 RX ADMIN — SODIUM CHLORIDE 100 ML: 9 INJECTION, SOLUTION INTRAVENOUS at 12:38

## 2018-08-25 RX ADMIN — SENNOSIDES AND DOCUSATE SODIUM 1 TABLET: 8.6; 5 TABLET ORAL at 20:26

## 2018-08-25 RX ADMIN — ATORVASTATIN CALCIUM 40 MG: 40 TABLET, FILM COATED ORAL at 20:26

## 2018-08-25 RX ADMIN — ASPIRIN 325 MG ORAL TABLET 325 MG: 325 PILL ORAL at 20:26

## 2018-08-25 RX ADMIN — GADOBUTROL 9.5 ML: 604.72 INJECTION INTRAVENOUS at 18:54

## 2018-08-25 ASSESSMENT — VISUAL ACUITY
OU: NORMAL ACUITY
OU: NORMAL ACUITY

## 2018-08-25 ASSESSMENT — ACTIVITIES OF DAILY LIVING (ADL): ADLS_ACUITY_SCORE: 12

## 2018-08-25 NOTE — IP AVS SNAPSHOT
STOP!!! DO NOT PRINT OR REFERENCE THIS AVS!!!  AVS displayed here is NOT the version that was given to the patient at discharge.  GO TO CHART REVIEW to print or review a copy of the AVS that was frozen/printed at time of discharge.                           MRN:[id number]                      After Visit Summary   8/25/2018    Alicia Penaloza    MRN: [id number]           Thank you!     Thank you for choosing [facility] for your care. Our goal is always to provide you with excellent care. Hearing back from our patients is one way we can continue to improve our services. Please take a few minutes to complete the written survey that you may receive in the mail after you visit with us. Thank you!        Patient Information     Date Of Birth          1975        Designated Caregiver       Most Recent Value    Caregiver    Will someone help with your care after discharge? yes    Name of designated caregiver Sharita    Phone number of caregiver     Caregiver address [facility]      About your hospital stay     You were admitted on:  August 25, 2018 You last received care in the:  [facility]    You were discharged on:  August 27, 2018        Reason for your hospital stay       With your developing left arm numbness and weakness, we evaluated you for any new stroke.  You do not have a new stroke.  You also underwent tests to see what could be changed to decrease your chances of having another stroke.                  Who to Call     For medical emergencies, please call 911.  For non-urgent questions about your medical care, please call your primary care provider or clinic, None          Attending Provider     Provider Specialty    Nirav Luna MD Neurology       Primary Care Provider Fax #    Physician No Ref-Primary [phone]      After Care Instructions     Activity       Your activity upon discharge: activity as tolerated.  Do not drive until tested by occupational therapy  about whether you are safe to drive.            Diet       Follow this diet upon discharge: Regular                  Follow-up Appointments     Adult [facility] Follow-up and recommended labs and tests       Please follow up with your primary care provider, Dr. Hawley, in 1-2 weeks for hospital follow-up.  Please clarify which blood pressure medications you are taking.  Please discuss your rheumatological blood tests (tested positive for anti-cardiolipin IgG) and whether you would like repeat testing three months from now.  You were prescribed a low dose of gabapentin: if you feel pain even after you increase to three capsules a day, then please ask your primary care provider whether to increase the dose.    Please keep your stroke neurology appointment with Alicia Langley as scheduled on 10/18/2018.    Please continue to follow up with [facility] for nursing and occupational therapy services.    Please follow up with hematology in 4-6 weeks to discuss the possibility of a blood condition contributing to your stroke.    Appointments on [place] and/or [facility] (with [facility] or [facility] provider or service). Call [phone] if you haven't heard regarding these appointments within 7 days of discharge.                  Your next 10 appointments already scheduled     Sep 05, 2018  1:00 PM CDT   Office Visit with Augusto Thomas MD   [facility] ([address] [id number] [phone]           Bring a current list of meds and any records pertaining to this visit. For Physicals, please bring immunization records and any forms needing to be filled out. Please arrive 10 minutes early to complete paperwork.            Sep 10, 2018  4:00 PM CDT   Office Visit with Flori Beyer MD   [facility] ([address] [id number] [phone]           Bring a current  list of meds and any records pertaining to this visit. For Physicals, please bring immunization records and any forms needing to be filled out. Please arrive 10 minutes early to complete paperwork.              Additional Services     Home care nursing referral       Home Care:  Agency:  [facility]  Tel: [phone]  Fax: [phone]    To RESUME providing: Skilled nurse visits - frequency per home care evaluation or previous orders    RN to assess --- hydration, nutrition and bowel status, signs/symptoms of infection, neurologic status, skin integrity, respiratory and cardiac status, pain level and activity tolerance, vital signs and weight, lab draws if ordered, home safety.     RN to teach/reinforce teaching ---medication, disease, and symptom management    RN to assist with communication to --- Primary Care Provider    ______________________________________________________      Your provider has ordered home care nursing services. If you have not been contacted within 2 days of your discharge please call the [facility] agency number listed above.            Onc/Heme Adult Referral       Hypercoagulability panel positive for anti-cardiolipin IgM.                  Further instructions from your care team       [facility] DISCHARGING RN:  Please fax discharge summary and orders to [facility] Fax: [phone] at time of discharge.      Stroke Education     Please review the items below to help with stroke prevention.  Additional prevention suggestions are in the Understanding Stroke Handbook that you received.    Stroke risk factor changes that can help with stroke prevention:      Blood Pressure: Maintain your blood pressure within the goal the doctor sets with you.    If you are diabetic, work with your doctor to control your blood sugar and monitor your hemoglobin A1C (HbA1c).     Your doctor may recommend a statin medication to help lower your cholesterol  "level.    If you have an irregular heartbeat, speak to your doctor about possible treatments.    Maintain a healthy weight.    Eat a healthy diet. We suggest a Mediterranean or heart-healthy diet, but discuss what's best for you with your doctor.    Limit alcohol consumption.    If you smoke - QUIT.  We encourage you to get support. Start by talking with your doctor. Another option is to call the Quit Plan Program at 1-347.237.8002.    Stroke  Warning Signs:     It s important to know the warning signs of stroke. Call 911 if you experience one or more warning signs like these:      Sudden numbness or weakness of the face, arm or leg, especially on one side of the body    Sudden confusion, trouble speaking or understanding    Sudden trouble seeing in one or both eyes    Sudden trouble walking, dizziness, loss of balance or coordination    Sudden severe headache with no known cause          Pending Results     Date and Time Order Name Status Description    8/27/2018 0000 Cardiolipin Jacqueline IgG and IgM In process             Statement of Approval     Ordered          08/27/18 3730  I have reviewed and agree with all the recommendations and orders detailed in this document.  EFFECTIVE NOW     Approved and electronically signed by:  Parul Rees MD             Admission Information     Date & Time Department Dept. Phone    8/25/2018 [address] [facility] [phone]      Your Vitals Were     Blood Pressure Pulse Temperature Respirations Height Weight    136/94 (BP Location: Left arm) 72 98.2  F (36.8  C) (Oral) 16 1.6 m (5' 3\") 95.6 kg (210 lb 12.2 oz)    Last Period Pulse Oximetry BMI (Body Mass Index)             08/07/2018 96% 37.33 kg/m2         [employer] Information     [employer] lets you send messages to your doctor, view your test results, renew your prescriptions, schedule appointments and more. To sign up, go to [email or web address] . Click on \"Log in\" on the left side of the screen, which will take you to the " "Welcome page. Then click on \"Sign up Now\" on the right side of the page.     You will be asked to enter the access code listed below, as well as some personal information. Please follow the directions to create your username and password.     Your access code is: [email or web address]  Expires: 2018  8:53 PM     Your access code will  in 90 days. If you need help or a new code, please call your [place] clinic or [phone].        Care EveryWhere ID     This is your Care EveryWhere ID. This could be used by other organizations to access your [place] medical records  [id number]        Equal Access to Services     YADI PITTS : Brenda Noel, mayelin infante, radha guerrero, anjelica mercado. So [facility] [phone].    ATENCIÓN: Si habla español, tiene a finnegan disposición servicios gratuitos de asistencia lingüística. Llame al [phone].    We comply with applicable federal civil rights laws and [place] laws. We do not discriminate on the basis of race, color, national origin, age, disability, sex, sexual orientation, or gender identity.               Review of your medicines      START taking        Dose / Directions    FLUoxetine 20 MG capsule   Commonly known as:  PROzac   Used for:  Acute ischemic stroke (H), Positive depression screening        Dose:  20 mg   Take 1 capsule (20 mg) by mouth daily   Quantity:  30 capsule   Refills:  0       gabapentin 300 MG capsule   Commonly known as:  NEURONTIN   Used for:  Acute ischemic stroke (H), Pain after cerebrovascular accident (CVA)        Take one capsule at bedtime for two more days ( and ).  Then take one capsule after lunch and one capsule at bedtime (two capsules per day) for three days (, , and ).  Then, increase to one capsule in the morning, one in the afternoon, and one capsule at bedtime (three capsules per day).  If side effects (sleepiness, dizziness, personality changes) are " worse than your pain, then please decrease the dose or discontinue.   Quantity:  90 capsule   Refills:  0       metoprolol tartrate 50 MG tablet   Commonly known as:  LOPRESSOR   Used for:  Acute ischemic stroke (H), Hypertension, unspecified type        Dose:  50 mg   Take 1 tablet (50 mg) by mouth 2 times daily   Quantity:  60 tablet   Refills:  0         CONTINUE these medicines which have NOT CHANGED        Dose / Directions    aspirin 325 MG tablet        Dose:  325 mg   Take 325 mg by mouth   Refills:  0       atorvastatin 40 MG tablet   Commonly known as:  LIPITOR        Dose:  40 mg   Take 40 mg by mouth   Refills:  0       lisinopril 20 MG tablet   Commonly known as:  PRINIVIL/ZESTRIL        Dose:  20 mg   Take 20 mg by mouth   Refills:  0            Where to get your medicines      Some of these will need a paper prescription and others can be bought over the counter. Ask your nurse if you have questions.     Bring a paper prescription for each of these medications     FLUoxetine 20 MG capsule    gabapentin 300 MG capsule    metoprolol tartrate 50 MG tablet                Protect others around you: Learn how to safely use, store and throw away your medicines at www.disposemymeds.org.             Medication List: This is a list of all your medications and when to take them. Check marks below indicate your daily home schedule. Keep this list as a reference.      Medications           Morning Afternoon Evening Bedtime As Needed    aspirin 325 MG tablet   Take 325 mg by mouth   Last time this was given:  325 mg on 8/26/2018  9:30 PM                                atorvastatin 40 MG tablet   Commonly known as:  LIPITOR   Take 40 mg by mouth   Last time this was given:  40 mg on 8/26/2018  9:30 PM                                FLUoxetine 20 MG capsule   Commonly known as:  PROzac   Take 1 capsule (20 mg) by mouth daily   Last time this was given:  20 mg on 8/27/2018  8:12 AM                                 gabapentin 300 MG capsule   Commonly known as:  NEURONTIN   Take one capsule at bedtime for two more days (8/27 and 8/28).  Then take one capsule after lunch and one capsule at bedtime (two capsules per day) for three days (8/29, 8/30, and 8/31).  Then, increase to one capsule in the morning, one in the afternoon, and one capsule at bedtime (three capsules per day).  If side effects (sleepiness, dizziness, personality changes) are worse than your pain, then please decrease the dose or discontinue.   Last time this was given:  300 mg on 8/26/2018  9:30 PM                                lisinopril 20 MG tablet   Commonly known as:  PRINIVIL/ZESTRIL   Take 20 mg by mouth                                metoprolol tartrate 50 MG tablet   Commonly known as:  LOPRESSOR   Take 1 tablet (50 mg) by mouth 2 times daily   Last time this was given:  50 mg on 8/27/2018  8:12 AM

## 2018-08-25 NOTE — ED PROVIDER NOTES
History     Chief Complaint   Patient presents with     Cerebrovascular Accident     left arm numbness     The history is provided by the patient and a relative.     Alicia Penaloza is a 43 year old female who presents to the emergency department with her sister for concerns of facial droop on the right and left upper extremity numbness along with expressive aphasia. This took place 20 min pta. The facial droop lasted 5 seconds and the aphasia improved. Left arm numbness remains.  Patients sister reports that the patient had a stroke 2 weeks ago (august 7th, was in her hometown of [place])  and her symptoms included aphasia and arm numbness/weakness. She got tpa and was transferred via helicopter to [facility].  She stayed 2 weeks and was discharged to her sisters house.   Her sister reports that patient came home on August 20, 2018 with her.  She explains that on Wednesday (3 days ago) the patient could not hold her fork with her right hand at the dinner table and had more difficulty with word finding..  She did come into the emergency department and had a normal work up.  Patient's sister reports that around 1150 today    From recent hospital stay at [facility]    MRI :   1. Acute nonhemorrhagic multifocal infarcts within the left occipital,  parietal, and frontal lobes.  There is a focus of susceptibility within the left MCA branch at the sylvian fissure suggestive of thrombus within the MCA branch.  2. Hemorrhage centered within the right thalamus with mild restricted diffusion suggestive of hemorrhagic transformation of an acute infarct.  3. Small amount of intraventricular blood products and trace left temporal subarachnoid hemorrhage.  4. Background of chronic microangiopathic changes and a few chronic deep white matter lacunar infarcts.  5. Chronic mucosal sinus disease including most prominently the right maxillary sinus.     Large Vessel MRA :   1. Loss of normal signal within the left MCA proximal M2  segment along the inferior sylvian fissure likely represents a small amount of focal thrombus given the susceptibility on the MRI of the head in this region.  The left MCA branch vessels remain patent distally within the visualized portions.  2. Near fetal origins of the bilateral PCAs with dominant posterior  communicating arteries with infundibula.  3. Apparent mild narrowing at the proximal right P2 segment.  The right PCA remains patent distal to this.     Cardiac: TTE :   1. Hyperdynamic left ventricular systolic function estimated at 65-70%. No obvious regional wall motion abnormalities are visualized.    2. Mild concentric left ventricular hypertrophy.   3. Normal atrial size.   4. No significant valvular abnormalities visualized.   5. No pericardial effusion.   6. Inferior vena cava is not well visualized on this study.   7. Negative agitated saline bubble study.    8. Compared to prior study from 3/29/18, there does not appear to be significant change in left ventricular systolic function.  BETH : Not done     EKG : Sinus Rhythm        Presumed Etiology: Cryptogenic Embolic (possible hypercoagulable, see assessment and plan)     Assessment and Plan:  Thrombolytic therapy (t-PA) was administered at another facility.      1. Acute Ischemic Stroke, Multifocal Bilateral Distribution, Embolic Appearing with Asymptomatic Hemorrhagic Transformation of Right BG Infarct and Asymptomatic Right Temporal Lobe SAH and IVH  Continue with neuro admission until discharge planning achieved   ASA daily  Blood pressure goal < 150/90  Hypercoagulable panel completed, see #6  Will defer LINQ at this time until further hematological work up, may consider LINQ if hematology has low suspicion for underlying hypercoag state after repeat testing     2. Right and Left Hemiparesis, Aphasia, Stable  PT/OT and ST     3. Hyperlipidemia  LDL with admission 150  Start Lipitor 40 mg daily for goal LDL < 70     4. Leukocytosis  CBC shows  decreased WBC, still elevated  UA and repeat Blood cultures pending     5. Hypertension  BP goal < 150/90 acute phase  Long term SBP goal < 140  Lisinopril 20 mg daily  Resume PTA metoprolol and continue to monitor     6. Elevated Cardiolipin IGM  Resulted Weakly positive  Will plan to repeat at 6 weeks and if still positive, refer to hematology/rheumatology for further work up of underlying hypercoagulability  Continue ASA daily now     7. Headache, resolved  Norco prn  Amitriptyline 25 mg nightly, will discontinue now given increased fatigue and inability to participate      8. Anemia, Secondary to Mensis   Hgb stable  Patient reports this is an ongoing problem and chronic PTA  Discussed follow up OP with OB to discuss further options      Problem List:    Patient Active Problem List    Diagnosis Date Noted     Benign essential hypertension 08/22/2018     Priority: Medium     Acute ischemic stroke (H) 08/07/2018     Priority: Medium     Vaginal bleeding 03/31/2018     Priority: Medium     Hypertensive urgency 03/28/2018     Priority: Medium        Past Medical History:    Past Medical History:   Diagnosis Date     Hypertension      Stroke (H) 08/07/2018       Past Surgical History:    No past surgical history on file.    Family History:    No family history on file.    Social History:  Marital Status:  Single [1]  Social History   Substance Use Topics     Smoking status: Former Smoker     Quit date: 8/7/2018     Smokeless tobacco: Not on file     Alcohol use No        Medications:      aspirin 325 MG tablet   atorvastatin (LIPITOR) 40 MG tablet   lisinopril (PRINIVIL/ZESTRIL) 20 MG tablet         Review of Systems   All other systems reviewed and are negative.      Physical Exam   BP: 121/87  Heart Rate: 101  Temp: 97.8  F (36.6  C)  Resp: 14  SpO2: 100 %      Physical Exam   Constitutional: She appears well-developed and well-nourished. No distress.   HENT:   Head: Normocephalic and atraumatic.   Nose: Nose  normal.   Mouth/Throat: Oropharynx is clear and moist. No oropharyngeal exudate.   Eyes: Conjunctivae and EOM are normal. Right eye exhibits no discharge. Left eye exhibits no discharge. No scleral icterus.   Neck: Normal range of motion. Neck supple.   Cardiovascular: Normal rate and normal heart sounds.  Exam reveals no gallop and no friction rub.    No murmur heard.  Pulmonary/Chest: Effort normal and breath sounds normal. No stridor. No respiratory distress.   Abdominal: Soft. She exhibits no distension. There is no tenderness. There is no rebound.   Musculoskeletal: Normal range of motion. She exhibits no edema.   Neurological: She is alert. No cranial nerve deficit. Coordination normal.   See NIH stroke scale   Skin: Skin is warm and dry. No rash noted. She is not diaphoretic. No erythema. No pallor.   Psychiatric: She has a normal mood and affect. Her behavior is normal.   Nursing note and vitals reviewed.    National Institutes of Health Stroke Scale  Exam Interval: on arrival   Score    Level of consciousness: (0)   Alert, keenly responsive    LOC questions: (0)   Answers both questions correctly    LOC commands: (0)   Performs both tasks correctly    Best gaze: (0)   Normal    Visual: (0)   No visual loss    Facial palsy: (0)   Normal symmetrical movements    Motor arm (left): (0)   No drift    Motor arm (right): (0)   No drift    Motor leg (left): (0)   No drift    Motor leg (right): (0)   No drift    Limb ataxia: (0)   Absent    Sensory: (1)   Mild to moderate sensory loss    Best language: (1)   Mild to moderate aphasia    Dysarthria: (0)   Normal    Extinction and inattention: (0)   No abnormality        Total Score:  2         ED Course     ED Course     Procedures               EKG Interpretation:      Interpreted by Segundo Gonzalez  Time reviewed: 1246  Symptoms at time of EK   Rhythm: normal sinus   Rate: normal  Axis: normal  Ectopy: none  Conduction: normal  ST Segments/ T Waves: No ST-T  wave changes  Q Waves: none  Comparison to prior: Unchanged    Clinical Impression: normal EKG                 Results for orders placed or performed during the hospital encounter of 08/25/18 (from the past 24 hour(s))   CBC with platelets differential   Result Value Ref Range    WBC 16.3 (H) 4.0 - 11.0 10e9/L    RBC Count 3.70 (L) 3.8 - 5.2 10e12/L    Hemoglobin 9.7 (L) 11.7 - 15.7 g/dL    Hematocrit 31.9 (L) 35.0 - 47.0 %    MCV 86 78 - 100 fl    MCH 26.2 (L) 26.5 - 33.0 pg    MCHC 30.4 (L) 31.5 - 36.5 g/dL    RDW 14.8 10.0 - 15.0 %    Platelet Count 683 (H) 150 - 450 10e9/L    Diff Method Automated Method     % Neutrophils 73.7 %    % Lymphocytes 14.7 %    % Monocytes 7.2 %    % Eosinophils 2.8 %    % Basophils 0.5 %    % Immature Granulocytes 1.1 %    Nucleated RBCs 0 0 /100    Absolute Neutrophil 12.0 (H) 1.6 - 8.3 10e9/L    Absolute Lymphocytes 2.4 0.8 - 5.3 10e9/L    Absolute Monocytes 1.2 0.0 - 1.3 10e9/L    Absolute Basophils 0.1 0.0 - 0.2 10e9/L    Abs Immature Granulocytes 0.2 0 - 0.4 10e9/L    Absolute Nucleated RBC 0.0    INR   Result Value Ref Range    INR 1.03 0.86 - 1.14   Partial thromboplastin time   Result Value Ref Range    PTT 34 22 - 37 sec   Troponin I   Result Value Ref Range    Troponin I ES <0.015 0.000 - 0.045 ug/L   Comprehensive metabolic panel   Result Value Ref Range    Sodium 140 133 - 144 mmol/L    Potassium 4.2 3.4 - 5.3 mmol/L    Chloride 105 94 - 109 mmol/L    Carbon Dioxide 25 20 - 32 mmol/L    Anion Gap 10 3 - 14 mmol/L    Glucose 95 70 - 99 mg/dL    Urea Nitrogen 12 7 - 30 mg/dL    Creatinine 0.95 0.52 - 1.04 mg/dL    GFR Estimate 64 >60 mL/min/1.7m2    GFR Estimate If Black 77 >60 mL/min/1.7m2    Calcium 8.5 8.5 - 10.1 mg/dL    Bilirubin Total 0.2 0.2 - 1.3 mg/dL    Albumin 3.3 (L) 3.4 - 5.0 g/dL    Protein Total 7.7 6.8 - 8.8 g/dL    Alkaline Phosphatase 114 40 - 150 U/L    ALT 18 0 - 50 U/L    AST 15 0 - 45 U/L   CT Head w/o Contrast    Narrative    CT OF THE HEAD WITHOUT  CONTRAST 8/25/2018 12:29 PM     COMPARISON: Brain MRI 8/22/2018.    HISTORY: Left arm weakness, right-sided facial droop dysarthria.    TECHNIQUE: 5 mm thick axial CT images of the head were acquired  without IV contrast material.    FINDINGS: There is mild diffuse cerebral volume loss. There are subtle  patchy areas of decreased density in the cerebral white matter  bilaterally that are consistent with sequela of chronic small vessel  ischemic disease. An evolving small subacute cortical ischemic infarct  at the temporal occipital junction on the left is again noted. No  definite new infarcts noted.    The ventricles and basal cisterns are within normal limits in  configuration given the degree of cerebral volume loss.  There is no  midline shift. There are no extra-axial fluid collections.    No intracranial hemorrhage or mass.    The visualized paranasal sinuses are well-aerated. There is no  mastoiditis. There are no fractures of the visualized bones.      Impression    IMPRESSION:   1. Evolving small cortical infarct at the temporal occipital junction  on the left again noted. No definite new infarcts noted.  2. Diffuse cerebral volume loss and cerebral white matter changes  consistent with chronic small vessel ischemic disease. No evidence for  acute intracranial pathology.        Radiation dose for this scan was reduced using automated exposure  control, adjustment of the mA and/or kV according to patient size, or  iterative reconstruction technique     CT Head Neck Angio w/o & w Contrast    Narrative    CT ANGIOGRAM OF THE HEAD AND NECK WITHOUT AND WITH CONTRAST  8/25/2018  12:38 PM     COMPARISON: Head and neck CT angiogram 8/22/2018.    HISTORY: Evaluate for dissection/thromboembolism.    TECHNIQUE:  Precontrast localizing scans were followed by CT  angiography with an injection of 80 mL Isovue-370 nonionic intravenous  contrast material with scans through the head and neck.  Images were  transferred to a  separate 3-D workstation where multiplanar  reformations and 3-D images were created.  Estimates of carotid  stenoses are made relative to the distal internal carotid artery  diameters except as noted.      FINDINGS:   Neck CTA: The bilateral common carotid arteries are patent and  unremarkable. There is minimal noncalcified atherosclerotic plaque at  the origin of the right internal carotid artery but does not result in  any stenosis. The cervical internal carotid arteries bilaterally are  patent without stenosis. The vertebral arteries bilaterally are patent  without stenosis.    Head CTA: Tortuosity and mild irregular in contour the basilar artery  persists without change. There is no significant stenosis of the  basilar artery. Minimal calcified atherosclerotic plaque of the  intracranial distal internal carotid arteries on both sides again  noted which does not result in any stenosis on either side. The  bilateral anterior cerebral, right middle cerebral, and left posterior  cerebral arteries remain patent and unremarkable. Short segment mild  narrowing of the P1 segment of the right posterior cerebral artery  persists without change. The right posterior cerebral arteries  otherwise patent and unremarkable. Moderate short segment narrowing of  the proximal aspect of the posterior division branch of the left  middle cerebral artery is again noted without change. The left middle  cerebral artery and its branches are otherwise patent and  unremarkable. The bilateral posterior communicating arteries are  patent and unremarkable.      Impression    IMPRESSION:  1. Atherosclerotic plaque of the proximal right internal carotid and  bilateral distal internal carotid arteries that does not result in any  stenosis.  2. Foci of presumed atherosclerotic narrowing involving the P1 segment  of the right posterior cerebral artery and proximal aspect of a large  branch of the left middle cerebral artery in the left sylvian  fissure  again noted without change.  3. Otherwise, normal neck and head CTA. No change from the recent  comparison study.      Radiation dose for this scan was reduced using automated exposure  control, adjustment of the mA and/or kV according to patient size, or  iterative reconstruction technique       Medications   0.9% sodium chloride BOLUS (not administered)   lidocaine 1 % 1 mL (not administered)   lidocaine (LMX4) kit (not administered)   sodium chloride (PF) 0.9% PF flush 3 mL (not administered)   sodium chloride (PF) 0.9% PF flush 3 mL (not administered)   sodium chloride (PF) 0.9% PF flush 3 mL (3 mLs Intracatheter Given 8/25/18 1237)   iopamidol (ISOVUE-370) solution 500 mL (80 mLs Intravenous Given 8/25/18 1238)   new 100 ml saline bag (100 mLs Intravenous Given 8/25/18 1238)          EKG Interpretation:      Interpreted by Segundo Gonzaelz  Time reviewed:1246   Symptoms at time of EKG: left arm weakness   Rhythm: normal sinus   Rate: normal  Axis: NORMAL  Ectopy: none  Conduction: normal  ST Segments/ T Waves: No ST-T wave changes  Q Waves: none  Comparison to prior: Unchanged    Clinical Impression: normal EKG      Assessments & Plan (with Medical Decision Making)  43-year-old with TIA symptoms that have essentially resolved.  She has had intermittent left arm numbness throughout this course so that is not really far off of baseline.  She no longer has facial droop.  The patient and her sister would like to be transferred to the [facility] if a transfer is indicated.  I do think a transfer is indicated given her stuttering TIA symptoms without distinct etiology I think she will need a neurologist involved.  I discussed the case with Dr. Luna who agrees with transfer and accepts the patient transfer to the neurology service.  He recommended no aspirin TPA or other anticoagulants at this point.  He may end up anticoagulating her given her mild elevation of her anticardiolipin IgM. The  differential diagnosis, treatment options, risks and follow up discussed with a competent patient and/or competent family member who agrees with the plan.       I have reviewed the nursing notes.    I have reviewed the findings, diagnosis, plan and need for follow up with the patient.      New Prescriptions    No medications on file       Final diagnoses:   Transient cerebral ischemia, unspecified type     This document serves as a record of services personally performed by Segundo Gonzalez MD. It was created on their behalf by Florecita Garcia, a trained medical scribe. The creation of this record is based on the provider's personal observations and the statements of the patient. This document has been checked and approved by the attending provider.  Note: Chart documentation done in part with Dragon Voice Recognition software. Although reviewed after completion, some word and grammatical errors may remain.  8/25/2018   [facility] EMERGENCY DEPARTMENT     Segundo Gonzalez MD  08/25/18 1322       Segundo Gonzalez MD  08/25/18 7851

## 2018-08-25 NOTE — IP AVS SNAPSHOT
[address] [address]    Phone:  [phone]                                       After Visit Summary   8/25/2018    Alicia Penaloza    MRN: [id number]           After Visit Summary Signature Page     I have received my discharge instructions, and my questions have been answered. I have discussed any challenges I see with this plan with the nurse or doctor.    ..........................................................................................................................................  Patient/Patient Representative Signature      ..........................................................................................................................................  Patient Representative Print Name and Relationship to Patient    ..................................................               ................................................  Date                                            Time    ..........................................................................................................................................  Reviewed by Signature/Title    ...................................................              ..............................................  Date                                                            Time          22EPIC Rev 08/18

## 2018-08-25 NOTE — ED TRIAGE NOTES
Code Stroke called when pt presented with reports of facial drooping and left arm numbness. Sister states she dropped a cup today, and watched her face droop-difficult time with word finding. PTA. Recent stroke and evaluated four days ago with stroke symptoms as well.  Dr. Gonzalez to evaluate pt upon rooming

## 2018-08-25 NOTE — H&P
[facility], [place]      Neurology Stroke Admission    Patient Name: Alicia Penaloza  : 1975 MRN#: [id number]    STROKE DATA    Stroke Code:  Stroke code not activated.  Time patient seen:  2018 1700  Last known normal (pt's baseline):  2018 1000     TPA treatment:  Not given due to outside the time window.      Stroke Scales      National Institutes of Health Stroke Scale (at presentation)   NIHSS done at:  time patient seen      Score    Level of consciousness:  (0)   Alert, keenly responsive    LOC questions:  (0)   Answers both questions correctly    LOC commands:  (0)   Performs both tasks correctly    Best gaze:  (0)   Normal    Visual:  (0)   No visual loss    Facial palsy:  (0)   Normal symmetrical movements    Motor arm (left):  (0)   No drift    Motor arm (right):  (0)   No drift    Motor leg (left):  (0)   No drift    Motor leg (right):  (0)   No drift    Limb ataxia:  (0)   Absent    Sensory:  (0)   Normal- no sensory loss    Best language:  (0)   Normal- no aphasia    Dysarthria:  (0)   Normal    Extinction and inattention:  (0)   No abnormality        NIHSS Total Score:  0          Dysphagia Screen  Time of screenin2018 1745  Screening results: Passed screening, no dysarthria - Regular Diet with thin liquids     ASSESSMENT & PLAN BY PROBLEM     Work-up for Ischemic Stroke (no TPA)     Acute Ischemic Stroke (without tPA) Plan  - Admit to Neurology  - Neurochecks  - Permissive HTN; labetalol PRN for SBP > 200  - Euthermia, Euglycemia  - Daily aspirin for secondary stroke prevention, may start anticoagulation soon, will need to investigate vaginal bleeding first   - Continue home atorvastatin 40 mg daily   - MRI/MRA Stroke Protocol  - BETH on Monday  - Telemetry, EKG  - Bedside Glucose Monitoring  - A1c, Lipid Panel for the AM  - PM&R  - Stroke Education  - Depression Screen  - Apnea Screen  - Baseline CXR  - Baseline U/A    During initial physical  assessment, the plan of care was discussed and developed with patient.  Plan of care includes: admission for stroke work up.    Patient was admitted via transfer from [facility] ED.    The patient will be admitted to the Neuro Critical Care/Stroke team..     Other Medical Problems:  MRI 8/22 :   1. Acute nonhemorrhagic multifocal infarcts within the left occipital,  parietal, and frontal lobes.  There is a focus of susceptibility within the left MCA branch at the sylvian fissure suggestive of thrombus within the MCA branch.  2. Hemorrhage centered within the right thalamus with mild restricted diffusion suggestive of hemorrhagic transformation of an acute infarct.  3. Small amount of intraventricular blood products and trace left temporal subarachnoid hemorrhage.  4. Background of chronic microangiopathic changes and a few chronic deep white matter lacunar infarcts.  5. Chronic mucosal sinus disease including most prominently the right maxillary sinus.     Large Vessel CTA 8/22 :   1. Normal neck CTA.  2. Short segment foci of vascular narrowing involving the posterior  division branch of the left middle cerebral artery and the right  posterior cerebral artery at the P1-P2 junction. These areas of  narrowing could represent areas of atherosclerotic narrowing, but  narrowing due to vasospasm or vasculitis cannot be excluded.  3. Otherwise, normal [place] CTA.     Cardiac: TTE 8/7 :   1. Hyperdynamic left ventricular systolic function estimated at 65-70%. No obvious regional wall motion abnormalities are visualized.    2. Mild concentric left ventricular hypertrophy.   3. Normal atrial size.   4. No significant valvular abnormalities visualized.   5. No pericardial effusion.   6. Inferior vena cava is not well visualized on this study.   7. Negative agitated saline bubble study.    8. Compared to prior study from 3/29/18, there does not appear to be significant change in left ventricular systolic  function.  BETH : Not done        #Hyperlipidemia  Cont Lipitor 40 mg daily for goal LDL < 70       #Hypertension  Long term SBP goal < 140  Hold off Lisinopril 20 mg daily  Cardiac monitoring      # Elevated Cardiolipin IGM and prolonged menstrual bleeding.   Resulted Weakly positive. APS could be a possibility espeically the distribution of the stroke would suggest either cardio embolic vs hypercoagulable process. She needs further work up for that and possible hematology work up.   -BETH on Monday   -potential need to start warfarin     #Headache, resolved    #Anemia, Secondary to Mensis   Hgb stable        Fluids/Electrolytes/Nutrition  0.9% sodium chloride TKO  Avoid hypotonic fluids.    Nutrition: None      Prophylaxis            For VTE Prevention:  - enoxaparin    For Acid Suppression:  - GI prophylaxis is not indicated    Code Status  Full Code     HPI  Alicia Penaloza is a 43 year old female with PMH sig for HTN on lisinopril, HLD on atorva 40 mg daily, prolonged menstrual bleeding in 3/2018, s/p blood transfusion, stroke 8/7/2018 on asa 81 pw left facial droop, left hand grib weakness, word finding difficulty started between 10-12 this morning, presented to [facility], did CTH with no acute IC pathology, while she was doing CTH was back totally to normal. she was found to have Elevated Cardiolipin IGM.     Noting that, 8/22 the pt pw left arm numbness, dropping things from the right hand, went to ER, CTH normal. Head MRI showed old stroke of right thalamic stroke, left frontal, parietal, occipital old strokes. Hemorrhage centered within the right thalamus with mild restricted diffusion suggestive of hemorrhagic transformation. She was discharged home, continued to have confusion, the sister reported that she received a call from  medical record and she was not able to answer any question. Note ably, the pt was admitted at [facility] in 8/7 when she pw expressive aphagia, MRI showed multifocal strokes,  received tpa. She was admitted from 8/7-8/20 at [facility], then she was discharged for outpatient PT.     She was doing fine till 8/22. Look to the previous paragraph.     The pt reported headache that has been going on for long time, on and off headache, resolves with tylenol. No double vision, no dysphagia, no gait instability, now no numbness, weakness, facial droop. No h/o miscarriages.          Pertinent Past Medical/Surgical History  Past Medical History:   Diagnosis Date     Hypertension      Stroke (H) 08/07/2018       No past surgical history on file.    Medications:   Prescriptions Prior to Admission   Medication Sig Dispense Refill Last Dose     aspirin 325 MG tablet Take 325 mg by mouth   8/24/2018 at 2000     atorvastatin (LIPITOR) 40 MG tablet Take 40 mg by mouth   8/24/2018 at 2000     lisinopril (PRINIVIL/ZESTRIL) 20 MG tablet Take 20 mg by mouth   8/24/2018 at 2000   .    Allergies: No Known Allergies.    Family History: Mother had miscarriage once and sister had history of miscarriage .    Social History: I have reviewed this patient's social history.    Tobacco use: Former smoker.    ROS:  Per HPI.     PHYSICAL EXAMINATION  Vital Signs:  B/P: 151/96,  T: 98.7,  P: Data Unavailable,  R: 16    General:  patient lying in bed without any acute distress    HEENT:  normocephalic/atraumatic  Cardio:  RRR  Pulmonary:  no respiratory distress  Abdomen:  soft  Extremities:  no edema  Skin:  intact     Neurologic  Mental Status:  fully alert, attentive and oriented, follows commands, speech clear and fluent  Cranial Nerves:  visual fields intact, PERRL, EOMI with normal smooth pursuit, facial sensation intact and symmetric, facial movements symmetric, hearing not formally tested but intact to conversation, palate elevation symmetric and uvula midline, no dysarthria, shoulder shrug strong bilaterally, tongue protrusion midline, unable to test (telestroke)  Motor:  no abnormal movements, normal tone throughout,  normal muscle bulk, no pronator drift, normal and symmetric rapid finger tapping, able to move all limbs spontaneously, strength 5/5 throughout upper and lower extremities  Reflexes:  2+ and symmetric throughout, no clonus, toes downgoing  Sensory:  intact/symmetric to light touch and pin prick throughout upper and lower extremities  Coordination:  FNF and HS intact without dysmetria  Station/Gait:  unable to test    Labs  Labs and Imaging reviewed and used in developing the plan; pertinent results included.     Lab Results   Component Value Date    GLC 95 08/25/2018       The patient was discussed with the fellow, Dr. Knight.  The staff is Dr. Luna.    [facility]  Neurology resident   Pager: [phone]

## 2018-08-26 ENCOUNTER — APPOINTMENT (OUTPATIENT)
Dept: OCCUPATIONAL THERAPY | Facility: CLINIC | Age: 43
End: 2018-08-26
Attending: PSYCHIATRY & NEUROLOGY
Payer: MEDICAID

## 2018-08-26 LAB
CHOLEST SERPL-MCNC: 123 MG/DL
GLUCOSE BLDC GLUCOMTR-MCNC: 113 MG/DL (ref 70–99)
GLUCOSE BLDC GLUCOMTR-MCNC: 126 MG/DL (ref 70–99)
GLUCOSE BLDC GLUCOMTR-MCNC: 86 MG/DL (ref 70–99)
GLUCOSE BLDC GLUCOMTR-MCNC: 89 MG/DL (ref 70–99)
GLUCOSE BLDC GLUCOMTR-MCNC: 89 MG/DL (ref 70–99)
GLUCOSE BLDC GLUCOMTR-MCNC: 91 MG/DL (ref 70–99)
HBA1C MFR BLD: 4.8 % (ref 0–5.6)
HDLC SERPL-MCNC: 34 MG/DL
LACTATE BLD-SCNC: 0.9 MMOL/L (ref 0.7–2)
LDLC SERPL CALC-MCNC: 53 MG/DL
NONHDLC SERPL-MCNC: 89 MG/DL
PLATELET # BLD AUTO: 569 10E9/L (ref 150–450)
T4 FREE SERPL-MCNC: 1.11 NG/DL (ref 0.76–1.46)
TRIGL SERPL-MCNC: 183 MG/DL
TSH SERPL DL<=0.005 MIU/L-ACNC: 0.87 MU/L (ref 0.4–4)

## 2018-08-26 PROCEDURE — 25000132 ZZH RX MED GY IP 250 OP 250 PS 637: Performed by: STUDENT IN AN ORGANIZED HEALTH CARE EDUCATION/TRAINING PROGRAM

## 2018-08-26 PROCEDURE — 97165 OT EVAL LOW COMPLEX 30 MIN: CPT | Mod: GO | Performed by: OCCUPATIONAL THERAPIST

## 2018-08-26 PROCEDURE — 12000008 ZZH R&B INTERMEDIATE UMMC

## 2018-08-26 PROCEDURE — 36415 COLL VENOUS BLD VENIPUNCTURE: CPT | Performed by: INTERNAL MEDICINE

## 2018-08-26 PROCEDURE — 40000133 ZZH STATISTIC OT WARD VISIT: Performed by: OCCUPATIONAL THERAPIST

## 2018-08-26 PROCEDURE — 25000128 H RX IP 250 OP 636: Performed by: PSYCHIATRY & NEUROLOGY

## 2018-08-26 PROCEDURE — 83036 HEMOGLOBIN GLYCOSYLATED A1C: CPT | Performed by: PSYCHIATRY & NEUROLOGY

## 2018-08-26 PROCEDURE — 85049 AUTOMATED PLATELET COUNT: CPT | Performed by: PSYCHIATRY & NEUROLOGY

## 2018-08-26 PROCEDURE — 84439 ASSAY OF FREE THYROXINE: CPT | Performed by: PSYCHIATRY & NEUROLOGY

## 2018-08-26 PROCEDURE — 83605 ASSAY OF LACTIC ACID: CPT | Performed by: INTERNAL MEDICINE

## 2018-08-26 PROCEDURE — 84443 ASSAY THYROID STIM HORMONE: CPT | Performed by: PSYCHIATRY & NEUROLOGY

## 2018-08-26 PROCEDURE — 36415 COLL VENOUS BLD VENIPUNCTURE: CPT | Performed by: PSYCHIATRY & NEUROLOGY

## 2018-08-26 PROCEDURE — 97535 SELF CARE MNGMENT TRAINING: CPT | Mod: GO | Performed by: OCCUPATIONAL THERAPIST

## 2018-08-26 PROCEDURE — 00000146 ZZHCL STATISTIC GLUCOSE BY METER IP

## 2018-08-26 PROCEDURE — 80061 LIPID PANEL: CPT | Performed by: PSYCHIATRY & NEUROLOGY

## 2018-08-26 PROCEDURE — 25000132 ZZH RX MED GY IP 250 OP 250 PS 637: Performed by: PSYCHIATRY & NEUROLOGY

## 2018-08-26 RX ORDER — GABAPENTIN 300 MG/1
300 CAPSULE ORAL AT BEDTIME
Status: DISCONTINUED | OUTPATIENT
Start: 2018-08-26 | End: 2018-08-27 | Stop reason: HOSPADM

## 2018-08-26 RX ORDER — METOPROLOL TARTRATE 50 MG
50 TABLET ORAL 2 TIMES DAILY
Status: DISCONTINUED | OUTPATIENT
Start: 2018-08-26 | End: 2018-08-27 | Stop reason: HOSPADM

## 2018-08-26 RX ADMIN — ENOXAPARIN SODIUM 40 MG: 100 INJECTION SUBCUTANEOUS at 21:30

## 2018-08-26 RX ADMIN — ATORVASTATIN CALCIUM 40 MG: 40 TABLET, FILM COATED ORAL at 21:30

## 2018-08-26 RX ADMIN — FLUOXETINE 20 MG: 20 CAPSULE ORAL at 16:31

## 2018-08-26 RX ADMIN — METOPROLOL TARTRATE 50 MG: 50 TABLET, FILM COATED ORAL at 21:30

## 2018-08-26 RX ADMIN — GABAPENTIN 300 MG: 300 CAPSULE ORAL at 21:30

## 2018-08-26 RX ADMIN — SENNOSIDES AND DOCUSATE SODIUM 1 TABLET: 8.6; 5 TABLET ORAL at 08:13

## 2018-08-26 RX ADMIN — ASPIRIN 325 MG ORAL TABLET 325 MG: 325 PILL ORAL at 21:30

## 2018-08-26 ASSESSMENT — ACTIVITIES OF DAILY LIVING (ADL)
ADLS_ACUITY_SCORE: 12
ADLS_ACUITY_SCORE: 12
PREVIOUS_RESPONSIBILITIES: MEAL PREP;HOUSEKEEPING;LAUNDRY;SHOPPING;YARDWORK;MEDICATION MANAGEMENT;FINANCES;DRIVING;WORK
ADLS_ACUITY_SCORE: 12

## 2018-08-26 ASSESSMENT — VISUAL ACUITY
OU: NORMAL ACUITY

## 2018-08-26 NOTE — CONSULTS
Rheumatology Faculty Consult Holding Note:    Received page from [facility] for routine consult.  EPIC reviewed.     Likelihood of underlying APLS as etiology for CVA with isolated IgM Anti Cardiolipin Ab noted at prior facility is low.  She has other potential etiologies based on medical history for her new problem resulting in transfer here.    Appropriate labs for most part already ordered for AM.  Would add RASHEL, C3, C4.    Will staff formally tomorrow.    Ramon Garcia MD

## 2018-08-26 NOTE — PROGRESS NOTES
Care Coordinator Progress Note    Admission Date/Time:  8/25/2018  Attending MD:  Nirav Luna*    Data  Chart reviewed, discussed with interdisciplinary team.   Patient was admitted for: Suspected stroke    Concerns with insurance coverage for discharge needs: No concerns - [place] Medical Assistance.    Current Living Situation: Patient lives with family - sister, in single family home in [place], [place].  Will continue to live with her sister for at least several more weeks.  Eager to return to her home in [place], [place] as soon as possible, but challenging d/t stairs and lack of adequate supervision and assistance.    Support System: Supportive and Involved     Services Involved: Home Care     Agency:  [facility]  Tel: [phone]  Fax: [phone]    Transportation at Discharge: Car and Family or friend will provide  Transportation to Medical Appointments:   - Name of caregiver: Sharita Pino,   Barriers to Discharge: cognitively impaired    Coordination of Care and Referrals:    I met with Alicia and her sister, Sharita, for assessment and discharge planning after receiving RNCC referral as part of stroke order set.  Alicia had an ischemic stroke on 8/7 and was hospitalized at [facility]. Discharged to her sister's home and returned to the hospital with new symptoms yesterday.  No new findings on imaging and symptoms resolved.  Anticipated discharge tomorrow. Patient was tearful at times during our conversation.  She has had difficulty adjusting to loss of independence due to relatively new cognitive deficits.     Home care resumption order entered in Epic.      Assessment  Excellent family support and home care in place. Appropriate for resumption of home care on discharge.      Plan  Anticipated Discharge Date:  8/26/2018  Anticipated Discharge Plan:  Sister's home with home care resumption and family assistance.    Quita Muñiz, RN, PHN, ACM  RN Care  Coordinator   [address] [address]   [email or web address]  [email or web address]   Casual Employee - Weekend Coverage only  To contact weekend RNCC, dial [phone] and enter pager number [phone] at prompt.  This pager can not be contacted by text page or outside line.

## 2018-08-26 NOTE — PLAN OF CARE
Problem: Patient Care Overview  Goal: Plan of Care/Patient Progress Review  ST 6A: Deferral--Orders received on admission. Chart reviewed and spoke with RN. Pt passed swallow screen and is reportedly tolerating a regular diet with thin liquids without difficulties. Word finding difficulties have reportedly resolved. Formal ST evaluation is not indicated at this time. Will sigh off. Please re-consult if change in swallow function or language skills is noted.

## 2018-08-26 NOTE — PLAN OF CARE
Problem: Patient Care Overview  Goal: Plan of Care/Patient Progress Review  Outcome: Improving  Status: Admitted for stroke workup.     VS: Stable on RA. On CCM: NSR this shift. BG stable.  Neuros: Numbness L shoulder to elbow(improving), Left toes (improving), short term memory.  GI: Regular diet  : No BM for about 2 days per pt but said this is her routine. Voiding spontaneously?  IV: R PIV SL'd  Activity: SBA w/ GB, on fall's precaution.  Pain: Denies  Respiratory/Trach: No SOB reported. LS clear.   Skin: dry & intact. Pt refused PCDs overnight, said it keeps her up at night from past experience.  Social: Sister was here during the day.   Plan of care: BETH planned on Monday. BG check every 4 hrs. Stroke education. On CCM. Cont to monitor & with POC.

## 2018-08-26 NOTE — PROGRESS NOTES
NEUROLOGY STROKE PROGRESS NOTE   2018  Alicia Penaloza  : 1975 MRN# [id number]      Subjective :     History is obtained from the patient and/or family and medical record      Alicia Penaloza is a 43 year old female with past medical history including stroke on 2018, hypertension, hyperlipidemia, former smoker (quit 2018), prolonged menstrual bleeding s/p blood transfusion 2018.  Stroke on 2018 presented with aphasia and speech difficulties, no numbness, improved after tPA administration, found to have nonhemorrhagic lesoins in the left MCA territory and hemorrhagic transformation of a right thalamic lesion.  On 2018, presented with left arm numbness and right hand clumsiness.  MRI on 2018 was negative for new stroke, did demonstrate ischemic infarcts again in posterior medial right thalamus and at left frontal lobe at temporal occipital junction.  On 2018, presented with right facial droop, left upper extremity numbness and weakness, and expressive aphasia.    No acute overnight events.  Patient reports that her left upper extremity numbness is about the same.  Reports left occipital neck pain and left shoulder discomfort in the affected area.  She feels like she cannot do as many cognitive tasks as before her stroke.  Endorses feeling depressed, denies suicidal ideation.            Medications:     Current Facility-Administered Medications:      acetaminophen (TYLENOL) tablet 650 mg, 650 mg, Oral, Q4H PRN **OR** acetaminophen (TYLENOL) solution 650 mg, 650 mg, Per NG tube, Q4H PRN **OR** acetaminophen (TYLENOL) Suppository 650 mg, 650 mg, Rectal, Q4H PRN, David Knight MD     aspirin tablet 325 mg, 325 mg, Oral, Daily, David Knight MD, 325 mg at 18     atorvastatin (LIPITOR) tablet 40 mg, 40 mg, Oral, QPM, David Knight MD, 40 mg at 18     enoxaparin (LOVENOX) injection 40 mg, 40 mg, Subcutaneous, Q24H, David Knight MD,  "40 mg at 08/25/18 2026     labetalol (NORMODYNE/TRANDATE) injection 10 mg, 10 mg, Intravenous, Q10 Min PRN, David Knight MD     naloxone (NARCAN) injection 0.1-0.4 mg, 0.1-0.4 mg, Intravenous, Q2 Min PRN, David Knight MD     senna-docusate (SENOKOT-S;PERICOLACE) 8.6-50 MG per tablet 1-2 tablet, 1-2 tablet, Oral, BID, David Knight MD, 1 tablet at 08/26/18 0813  Prescriptions Prior to Admission   Medication Sig Dispense Refill Last Dose     aspirin 325 MG tablet Take 325 mg by mouth   8/24/2018 at 2000     atorvastatin (LIPITOR) 40 MG tablet Take 40 mg by mouth   8/24/2018 at 2000     lisinopril (PRINIVIL/ZESTRIL) 20 MG tablet Take 20 mg by mouth   8/24/2018 at 2000             Physical Exam:   BP (!) 137/109 (BP Location: Left arm)  Pulse 94  Temp 98.2  F (36.8  C) (Oral)  Resp 18  Ht 1.6 m (5' 3\")  Wt 95.6 kg (210 lb 12.2 oz)  LMP 08/07/2018  SpO2 98%  BMI 37.33 kg/m2   Physical Exam:   General: NAD, sitting in bed comfortably  HEENT: sclera anicteric  Resp: Breathing comfortably, no respiratory distress  Skin: warm and dry  Extremities: No edema  Psych: tearful at times, affect full and matches mood    Mental status:  Awake, alert, attentive, oriented to p/p/t. Speech is fluent, comprehension intact, naming intact.  Unable to subtract seven from 100, unable to spell \"world\" backwards.   Cranial nerves: Eyes conjugate, PERRLA, EOMI, visual fields full, face symmetric, facial sensation intact, shoulder shrug strong, palate rise symmetric, tongue/uvula midline, hearing intact to conversation, no dysarthria.  Motor: Tone normal. 5/5 strength in all 4 extremities. No pronator drift.  Reflexes: 3+ reflexic and symmetric biceps, triceps, brachioradialis, patellar, and achilles.  No clonus.  Sensory: Diminished sensation at lateral left shoulder and upper arm.  Otherwise intact to light touch.  No extinction.  Coordination: FNF no dysmetria, HTS normal  Gait: Not assessed    NIHSS - 1         " "Data:   CBC:  Lab Results   Component Value Date    WBC 16.3 08/25/2018     Lab Results   Component Value Date    HGB 9.7 08/25/2018     Lab Results   Component Value Date    HCT 31.9 08/25/2018     Lab Results   Component Value Date     08/26/2018       Last Basic Metabolic Panel:  Lab Results   Component Value Date     08/25/2018      Lab Results   Component Value Date    POTASSIUM 4.2 08/25/2018     Lab Results   Component Value Date    CHLORIDE 105 08/25/2018     Lab Results   Component Value Date    LULY 8.5 08/25/2018     Lab Results   Component Value Date    CO2 25 08/25/2018     Lab Results   Component Value Date    BUN 12 08/25/2018     Lab Results   Component Value Date    CR 0.95 08/25/2018     Lab Results   Component Value Date    GLC 95 08/25/2018       Head CT 8/25/2018:  \"FINDINGS: There is mild diffuse cerebral volume loss. There are subtle  patchy areas of decreased density in the cerebral white matter  bilaterally that are consistent with sequela of chronic small vessel  ischemic disease. An evolving small subacute cortical ischemic infarct  at the temporal occipital junction on the left is again noted. No  definite new infarcts noted.     The ventricles and basal cisterns are within normal limits in  configuration given the degree of cerebral volume loss.  There is no  midline shift. There are no extra-axial fluid collections.     No intracranial hemorrhage or mass.     The visualized paranasal sinuses are well-aerated. There is no  mastoiditis. There are no fractures of the visualized bones.     IMPRESSION:   1. Evolving small cortical infarct at the temporal occipital junction  on the left again noted. No definite new infarcts noted.  2. Diffuse cerebral volume loss and cerebral white matter changes  consistent with chronic small vessel ischemic disease. No evidence for  acute intracranial pathology.\"    Head CTA 8/25/2018:  \"Head CTA: Tortuosity and mild irregular in contour the " "basilar artery  persists without change. There is no significant stenosis of the  basilar artery. Minimal calcified atherosclerotic plaque of the  intracranial distal internal carotid arteries on both sides again  noted which does not result in any stenosis on either side. The  bilateral anterior cerebral, right middle cerebral, and left posterior  cerebral arteries remain patent and unremarkable. Short segment mild  narrowing of the P1 segment of the right posterior cerebral artery  persists without change. The right posterior cerebral arteries  otherwise patent and unremarkable. Moderate short segment narrowing of  the proximal aspect of the posterior division branch of the left  middle cerebral artery is again noted without change. The left middle  cerebral artery and its branches are otherwise patent and  unremarkable. The bilateral posterior communicating arteries are  patent and unremarkable.     IMPRESSION:  1. Atherosclerotic plaque of the proximal right internal carotid and  bilateral distal internal carotid arteries that does not result in any  stenosis.  2. Foci of presumed atherosclerotic narrowing involving the P1 segment  of the right posterior cerebral artery and proximal aspect of a large  branch of the left middle cerebral artery in the left sylvian fissure  again noted without change.  3. Otherwise, normal neck and head CTA. No change from the recent  comparison study.\"    MRI brain 8/25/2018:  \"Findings:   Brain MRI: No significant change in enhancing subacute infarcts within  the posterior left cerebral hemispheric internal/external border  zones, which demonstrate associated mild petechial hemorrhage. There  is associated localized mass effect and sulcal effacement. Stable  additional punctate subacute infarct within the left temporal lobe,  within the left middle cerebral artery territory.     Expected evolution of early infarct in the medial right thalamic  nucleus with new associated " "petechial hemorrhage and diminished  restricted diffusion since 8/22/2018.     No new focus of acute infarction. Chronic lacunar infarcts in the  bilateral basal ganglia, right thalamic nucleus and bilateral frontal  deep white matter. Stable mild-to-moderate leukoaraiosis few scattered  foci of chronic microhemorrhage predominantly centered in the the deep  gray nuclei, brainstem and cerebellar hemispheres, suggestive of  sequelae of chronic hypertension. Patent major intracranial vascular  flow voids.     Stable mild generalized parenchymal volume loss. Ventricles are not  enlarged out of proportion to the cerebral sulci. No midline shift or  herniation. No abnormal extra axial fluid collection. Tiny capillary  telangiectasia within the central edda.     Right maxillary sinus opacification with complex signal debris and  associated osteitis, suggestive of chronic sinusitis. Trace mastoid  effusions. Normal marrow signal. Orbits are unremarkable.     Head MRV: Widely patent major dural sinuses and deep cerebral veins.  Normal variant hypoplastic left transverse sinus. No evidence of sinus  thrombosis or stenosis.     Impression:  1. Normal head MRV. No evidence of sinus thrombosis.   2. Expected evolution of early subacute right thalamic infarct with  associated developing petechial hemorrhage since 8/22/2018.  3. Stable subacute infarcts within the posterior left cerebral  hemispheric internal/external border zones.  4. Moderate chronic small vessel ischemic disease.  5. Chronic right maxillary sinusitis.\"    ASSESSMENT/PLAN  Ms. Penaloza is a 43 year old lady with past medical history including prior stroke on 8/7/2018, hypertension, hyperlipidemia, and history of blood transfusion 3/2018 after prolonged menstrual bleeding.  Prior stroke on 8/7/2018 with admission at [facility] presented with isolated aphasia, treated with tPA.  MRI at that time showed lesions in the left MCA territory and right thalamic " infarct with hemorrhagic transformation.  Hypercoagulability workup on 8/8/2018 was positive for anticardiolipin IgM antibody.  She then presented 8/22/2018 to [facility] emergency department with a transient episode of left arm numbness and weakness along with right arm clumsiness.  MRI at that time snowed no new infarcts.  Presented 8/25/2018 to [facility] emergency department again with right facial droop, left upper extremity numbness, and expressive aphasia.  Given evolution of her symptoms, she was admitted here for further stroke workup.    # Multifocal stroke, unclear etiology  # pain/discomfort at left shoulder likely due to central post-stroke pain  # depression  Stroke risk factors include recent smoker, hypertension, hyperlipidemia.  CTA positive for atherosclerosis in proximal right ICA and bilateral distal ICA.  Prior MRI on 8/7/2018 demonstrated hemorrhagic transformation of right thalamic infarct and was suggestive of thrombus at posterior left MCA branch, noted multifocal infarcts throughout left MCA territory.  MRI 8/25/2018 noted expected evolution of medial right thalamic infarct with new petechial hemorrhage.  LDL 53, A1C 4.8.  Prior hypercoagulability investigation 8/8/2018 was positive for anticardiolipin IgM.  PHQ-9 score 12, sleep apnea screen negative.  - continuous telemetry  - BETH tomorrow  - aspirin 325 mg daily  - continue home atorvastatin 40 mg daily  - restart home metoprolol 50 mg twice daily, will restart home lisinopril 20 tomorrow  - hypercoagulable workup including anticardiolipin antibody, lupus anticoagulant panel, beta 2 glycoprotein, RASHEL, C3, C4  - rheumatology consult given prior positive anticardiolipin IgM level, appreciate recs  - start fluoxetine 20 mg daily for depression  - start gabapentin 300 mg at bedtime for pain, will increase dose in three days if tolerated well  - stroke education tomorrow  - neurochecks q4h    # Hyperlipidemia  - continue home  lipitor 40 mg daily for goal LDL < 70    # Hypertension  - long-term SBP goal < 140  - home meds were held temporarily for permissive hypertension  - Resumed home metoprolol 50mg BID, will restart home lisinopril tomorrow    # Anemia, stable: hemoglobin 9.7, likely due to recent prolonged menstrual bleeding  - monitor CBC    Patient discussed with Dr. Luna, who agrees with the plan.    # FEN    - Diet: regular, NPO after midnight for BETH  # PPX: fully ambulatory  # Code Status: full  #Dispo: likely home tomorrow pending stroke workup    Parul Rees  PGY-1  P: [phone]

## 2018-08-26 NOTE — PROGRESS NOTES
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 [place] following previous CVA 8/7/18, pt discharged to sister's home for easier access to medical care and home care. Sister's home is split entry, sister works during the day, however, parents are also able to stay and provide assist as needed   Self-Care   Dominant Hand right   Usual Activity Tolerance good   Current Activity Tolerance moderate   Regular Exercise no   Equipment Currently Used at Home none   Activity/Exercise/Self-Care Comment Pt requiring no AD following CVA 8/7/18   Functional Level Prior   Ambulation 0-->independent   Transferring 0-->independent   Toileting 0-->independent   Bathing 0-->independent   Dressing 0-->independent   Eating 0-->independent   Communication 2-->difficulty speaking (not related to language barrier)  (word finding)   Swallowing 0-->swallows foods/liquids without difficulty   Cognition 1 - attention or memory deficits   Fall history within last six months no   Which of the above functional risks had a recent onset or change? cognition   Prior Functional Level Comment Prior to CVA 8/7/18, pt ind with all ADLs/IADLs, following CVA, pts sister assisting with IADLs due to cognitive impairment, pt not working following CVA   General Information   Onset of Illness/Injury or Date of Surgery - Date 08/25/18   Referring Physician David Knight MD   Patient/Family Goals Statement pt want to 'get back to normal'   Additional Occupational Profile Info/Pertinent History of Current Problem Per chart: Alicia Penaloza is a 43 year  old female with PMH sig for HTN on lisinopril, HLD on atorva 40 mg daily, prolonged menstrual bleeding in 3/2018, s/p blood transfusion, stroke 8/7/2018 on asa 81 pw left facial droop, left hand grib weakness, word finding difficulty started between 10-12 this morning, presented to [facility], did CTH with no acute IC pathology, while she was doing CTH was back totally to normal. she was found to have Elevated Cardiolipin IGM. Alicia Penaloza is a 43 year old female with PMH sig for HTN on lisinopril, HLD on atorva 40 mg daily, prolonged menstrual bleeding in 3/2018, s/p blood transfusion, stroke 8/7/2018 on asa 81 pw left facial droop, left hand grib weakness, word finding difficulty started between 10-12 this morning, presented to [facility], did CTH with no acute IC pathology, while she was doing CTH was back totally to normal. she was found to have Elevated Cardiolipin IGM.    Precautions/Limitations (tele)   Weight-Bearing Status - LUE full weight-bearing   Weight-Bearing Status - RUE full weight-bearing   Weight-Bearing Status - LLE full weight-bearing   Weight-Bearing Status - RLE full weight-bearing   General Info Comments Activity: bathroom priviledges   Cognitive Status Examination   Orientation orientation to person, place and time   Level of Consciousness alert   Cognitive Comment MoCA 14/30   Visual Perception   Visual Perception Wears glasses   Sensory Examination   Sensory Comments LUE n/t   Pain Assessment   Patient Currently in Pain Yes, see Vital Sign flowsheet  (neck)   Range of Motion (ROM)   ROM Comment BUE WNL   Strength   Strength Comments RUE 5/5, LUE 4+/5   Hand Strength   Hand Strength Comments Slightly decreased on L    Coordination   Upper Extremity Coordination No deficits were identified   Mobility   Bed Mobility Bed mobility skill: Supine to sit   Bed Mobility Skill: Supine to Sit   Level of Jenkins: Supine/Sit independent   Transfer Skill: Sit to Stand   Level of  "Wood: Sit/Stand independent   Transfer Skill: Toilet Transfer   Level of Wood: Toilet independent   Toileting   Level of Wood: Toilet independent   Grooming   Level of Wood: Grooming independent   Eating/Self Feeding   Level of Wood: Eating independent   Instrumental Activities of Daily Living (IADL)   Previous Responsibilities meal prep;housekeeping;laundry;shopping;yardwork;medication management;finances;driving;work  (Prior to CVA 8/7/18)   Activities of Daily Living Analysis   Impairments Contributing to Impaired Activities of Daily Living cognition impaired;pain;sensation decreased   General Therapy Interventions   Planned Therapy Interventions IADL retraining;cognition;neuromuscular re-education;risk factor education   Clinical Impression   Criteria for Skilled Therapeutic Interventions Met yes, treatment indicated   OT Diagnosis Decreased IADL indep   Influenced by the following impairments cognition   Assessment of Occupational Performance 5 or more Performance Deficits   Identified Performance Deficits meal prep, home mgmt, work, driving, med mgmt   Clinical Decision Making (Complexity) Moderate complexity   Therapy Frequency daily   Predicted Duration of Therapy Intervention (days/wks) 3 days   Anticipated Equipment Needs at Discharge (med box)   Anticipated Discharge Disposition Home with Assist;Home with Home Therapy   Risks and Benefits of Treatment have been explained. Yes   Patient, Family & other staff in agreement with plan of care Yes   [facility]-P[facility] TM \"6 Clicks\"   2016, Trustees of [facility], under license to [employer].  All rights reserved.   6 Clicks Short Forms Daily Activity Inpatient Short Form   [facility] AM-PAC  \"6 Clicks\" Daily Activity Inpatient Short Form   1. Putting on and taking off regular lower body clothing? 4 - None   2. Bathing (including washing, rinsing, drying)? 4 - None   3. Toileting, which includes using " toilet, bedpan or urinal? 4 - None   4. Putting on and taking off regular upper body clothing? 4 - None   5. Taking care of personal grooming such as brushing teeth? 4 - None   6. Eating meals? 4 - None   Daily Activity Raw Score (Score out of 24.Lower scores equate to lower levels of function) 24   Total Evaluation Time   Total Evaluation Time (Minutes) 10

## 2018-08-26 NOTE — DISCHARGE INSTRUCTIONS
UNIT 6A DISCHARGING RN:  Please fax discharge summary and orders to [facility] Fax: [phone] at time of discharge.

## 2018-08-26 NOTE — PLAN OF CARE
Problem: Patient Care Overview  Goal: Plan of Care/Patient Progress Review  PT6A: PT holding- pt on bathroom privileges all day today per activity orders and just recently admitted to hospital last night. Will initiate PT eval tomorrow if updated activity orders for increased mobility to allow for comprehensive PT eval.

## 2018-08-26 NOTE — PLAN OF CARE
Problem: Patient Care Overview  Goal: Plan of Care/Patient Progress Review  PT6A: defer- per discussion with OT this pm the pt has no balance deficits and was up SBA to bathroom. OT to continue seeing pt for mobility and cognitive needs during hospitalization as pt with no acute care PT needs at this time. Please re-consult if new needs arise of there is a change in functional mobility.

## 2018-08-26 NOTE — PLAN OF CARE
Problem: Patient Care Overview  Goal: Plan of Care/Patient Progress Review  Outcome: Improving  Status: Admitted last night for stroke workup. R facial droop and L hand weakness has since resolved. Recent hx of stroke on 8/7.   VS: Afebrile. BP elevated but has remained below parameter. CCM NSR. BG WNL.   Neuros: Alert and orientedx4. Speech is clear and logical. Expresses sadness, frustration, and is often tearful throughout the day. EOM intact. Pupils PERRLA. Face symmetric. No pronator drift. Full and equal strength all extremities. Denies dizziness. Forgetfulness not demonstrated, but pt and sister confirm frequent forgetfulness and mild confusion. OT has evaluated and confirmed this. Numbness to left upper arm persists. Pt c/o mild headache and stiff neck this morning.   GI: Denies nausea. Had 2 bowel movements (hold stool softeners). Tolerating regular diet with good appetite. No swallowing difficulty.   : Voiding without difficulty. Pt has recent history of prolonged menstrual bleeding and was bleeding as recently as yesterday, but no bleeding today. ?  IV: PIV SL  Activity: Up with SBA.   Pain: Stiff L neck and LUE/shoulder paresthesia and pain.  Respiratory/Trach: Lung sounds clear. No SOB reported.   Skin: Skin intact. No rash.  Social: Sister is at the bedside. Pt staying with sister who has been advocate and caretaker after stroke on 8/7.   Plan of care: No evidence of new acute stroke. BETH on Monday. Left shoulder paresthesia and pain thought to be thalamic pain syndrome. Rheumatology consult for previous abnormal antibody. Depression screen and treatment. PT and OT evaluation. Stroke class tomorrow at 9am.

## 2018-08-26 NOTE — PLAN OF CARE
Problem: Patient Care Overview  Goal: Plan of Care/Patient Progress Review  Discharge Planner OT   Patient plan for discharge: Sister's home with assist, Home OT/RN  Current status: OT evaluation completed. Pt presenting with moderate cognitive impairment per MoCA 7.2 with score of 14/30 (26/30 typical performance). LUE sensation impaired with slight decrease in strength 4+/5. Pt very tearful and frustrated with current situation. Recently staying at sister's home to have access to home therapy and medical appointments. Educated in current functional performance and need for assist with IADLs given cognitive impairment. Of note: sister reports pt completed MoCA 7.1 with previous CVA scoring 9/30. Pt ambulating bed>bathroom indep with no assistive device, no functional mobility concerns.   Barriers to return to prior living situation: cognition, medical readiness  Recommendations for discharge: Return to sister's home with intermittent supervision, assist with med management, finances, cooking and no driving at this time.  Continue HHOT/RN services.   Rationale for recommendations: Moderate cognitive impairment impacting safety with IADLs.        Entered by: Marva Robles 08/26/2018 12:24 PM

## 2018-08-26 NOTE — PROGRESS NOTES
D/I: Sw order for stroke. Chart reviewed. PT and OT ordered, but on hold d/t BRP's only.   A: No needs identified at this time. Unable to determine d/t no therapy progress made yet.  P: SW will follow peripherally. Please re-consult as needed if needs/concerns arise while hospitalized or needing rehab placement. Please consult RN Care Coordinator if needing home care

## 2018-08-26 NOTE — PLAN OF CARE
Problem: Stroke (Ischemic) (Adult)  Goal: Signs and Symptoms of Listed Potential Problems Will be Absent, Minimized or Managed (Stroke)  Signs and symptoms of listed potential problems will be absent, minimized or managed by discharge/transition of care (reference Stroke (Ischemic) (Adult) CPG).  Outcome: Improving  Admitted from OSH with episode of word finding, right facial droop and numbness of left arm.  Numbness remains left shoulder to elbow.  Facial droop and word finding are resolved.  Known stroke 8/7/2018 with TPA administration.  Neuros intact with exception of short term memory and some cognitive deficits.  MRI and CXR done.  NSR on cardiac monitor.  Passed dysphagia screen and tolerating regular diet and oral fluids.  Plan for BETH with bubble study tomorrow.  Continue POC.

## 2018-08-27 ENCOUNTER — APPOINTMENT (OUTPATIENT)
Dept: CARDIOLOGY | Facility: CLINIC | Age: 43
End: 2018-08-27
Attending: PSYCHIATRY & NEUROLOGY
Payer: MEDICAID

## 2018-08-27 VITALS
WEIGHT: 210.76 LBS | BODY MASS INDEX: 37.34 KG/M2 | DIASTOLIC BLOOD PRESSURE: 94 MMHG | HEART RATE: 72 BPM | TEMPERATURE: 98.2 F | OXYGEN SATURATION: 96 % | SYSTOLIC BLOOD PRESSURE: 136 MMHG | RESPIRATION RATE: 16 BRPM | HEIGHT: 63 IN

## 2018-08-27 LAB
B2 GLYCOPROT1 IGG SERPL IA-ACNC: 0.8 U/ML
B2 GLYCOPROT1 IGM SERPL IA-ACNC: 2.8 U/ML
C3 SERPL-MCNC: 129 MG/DL (ref 76–169)
C4 SERPL-MCNC: 35 MG/DL (ref 15–50)
ERYTHROCYTE [DISTWIDTH] IN BLOOD BY AUTOMATED COUNT: 15.2 % (ref 10–15)
HCT VFR BLD AUTO: 31.2 % (ref 35–47)
HGB BLD-MCNC: 9.5 G/DL (ref 11.7–15.7)
MCH RBC QN AUTO: 26 PG (ref 26.5–33)
MCHC RBC AUTO-ENTMCNC: 30.4 G/DL (ref 31.5–36.5)
MCV RBC AUTO: 86 FL (ref 78–100)
PLATELET # BLD AUTO: 609 10E9/L (ref 150–450)
RBC # BLD AUTO: 3.65 10E12/L (ref 3.8–5.2)
WBC # BLD AUTO: 10.8 10E9/L (ref 4–11)

## 2018-08-27 PROCEDURE — 85730 THROMBOPLASTIN TIME PARTIAL: CPT | Performed by: PSYCHIATRY & NEUROLOGY

## 2018-08-27 PROCEDURE — 86146 BETA-2 GLYCOPROTEIN ANTIBODY: CPT | Performed by: PSYCHIATRY & NEUROLOGY

## 2018-08-27 PROCEDURE — 27210995 ZZH RX 272: Performed by: INTERNAL MEDICINE

## 2018-08-27 PROCEDURE — 86160 COMPLEMENT ANTIGEN: CPT | Performed by: PSYCHIATRY & NEUROLOGY

## 2018-08-27 PROCEDURE — 93312 ECHO TRANSESOPHAGEAL: CPT | Mod: 26 | Performed by: INTERNAL MEDICINE

## 2018-08-27 PROCEDURE — 25000128 H RX IP 250 OP 636: Performed by: INTERNAL MEDICINE

## 2018-08-27 PROCEDURE — 86147 CARDIOLIPIN ANTIBODY EA IG: CPT | Performed by: PSYCHIATRY & NEUROLOGY

## 2018-08-27 PROCEDURE — 93325 DOPPLER ECHO COLOR FLOW MAPG: CPT | Mod: 26 | Performed by: INTERNAL MEDICINE

## 2018-08-27 PROCEDURE — B24BZZ4 ULTRASONOGRAPHY OF HEART WITH AORTA, TRANSESOPHAGEAL: ICD-10-PCS | Performed by: PSYCHIATRY & NEUROLOGY

## 2018-08-27 PROCEDURE — 85027 COMPLETE CBC AUTOMATED: CPT | Performed by: PSYCHIATRY & NEUROLOGY

## 2018-08-27 PROCEDURE — 93320 DOPPLER ECHO COMPLETE: CPT | Mod: 26 | Performed by: INTERNAL MEDICINE

## 2018-08-27 PROCEDURE — 25000132 ZZH RX MED GY IP 250 OP 250 PS 637: Performed by: STUDENT IN AN ORGANIZED HEALTH CARE EDUCATION/TRAINING PROGRAM

## 2018-08-27 PROCEDURE — 00000401 ZZHCL STATISTIC THROMBIN TIME NC: Performed by: PSYCHIATRY & NEUROLOGY

## 2018-08-27 PROCEDURE — 36415 COLL VENOUS BLD VENIPUNCTURE: CPT | Performed by: PSYCHIATRY & NEUROLOGY

## 2018-08-27 PROCEDURE — 86038 ANTINUCLEAR ANTIBODIES: CPT | Performed by: PSYCHIATRY & NEUROLOGY

## 2018-08-27 PROCEDURE — 25000132 ZZH RX MED GY IP 250 OP 250 PS 637: Performed by: PSYCHIATRY & NEUROLOGY

## 2018-08-27 PROCEDURE — 85613 RUSSELL VIPER VENOM DILUTED: CPT | Performed by: PSYCHIATRY & NEUROLOGY

## 2018-08-27 PROCEDURE — 99152 MOD SED SAME PHYS/QHP 5/>YRS: CPT

## 2018-08-27 PROCEDURE — 25000125 ZZHC RX 250: Performed by: INTERNAL MEDICINE

## 2018-08-27 PROCEDURE — 93320 DOPPLER ECHO COMPLETE: CPT

## 2018-08-27 PROCEDURE — 00000167 ZZHCL STATISTIC INR NC: Performed by: PSYCHIATRY & NEUROLOGY

## 2018-08-27 RX ORDER — LIDOCAINE 40 MG/G
CREAM TOPICAL
Status: DISCONTINUED | OUTPATIENT
Start: 2018-08-27 | End: 2018-08-27 | Stop reason: HOSPADM

## 2018-08-27 RX ORDER — GABAPENTIN 300 MG/1
CAPSULE ORAL
Qty: 90 CAPSULE | Refills: 0 | Status: SHIPPED | OUTPATIENT
Start: 2018-08-27 | End: 2018-09-17

## 2018-08-27 RX ORDER — NALOXONE HYDROCHLORIDE 0.4 MG/ML
.1-.4 INJECTION, SOLUTION INTRAMUSCULAR; INTRAVENOUS; SUBCUTANEOUS
Status: DISCONTINUED | OUTPATIENT
Start: 2018-08-27 | End: 2018-08-27 | Stop reason: HOSPADM

## 2018-08-27 RX ORDER — LISINOPRIL 20 MG/1
20 TABLET ORAL DAILY
Status: DISCONTINUED | OUTPATIENT
Start: 2018-08-28 | End: 2018-08-27 | Stop reason: HOSPADM

## 2018-08-27 RX ORDER — ACYCLOVIR 200 MG/1
3 CAPSULE ORAL ONCE
Status: COMPLETED | OUTPATIENT
Start: 2018-08-27 | End: 2018-08-27

## 2018-08-27 RX ORDER — FENTANYL CITRATE 50 UG/ML
50-100 INJECTION, SOLUTION INTRAMUSCULAR; INTRAVENOUS
Status: COMPLETED | OUTPATIENT
Start: 2018-08-27 | End: 2018-08-27

## 2018-08-27 RX ORDER — METOPROLOL TARTRATE 50 MG
50 TABLET ORAL 2 TIMES DAILY
Qty: 60 TABLET | Refills: 0 | Status: SHIPPED | OUTPATIENT
Start: 2018-08-27 | End: 2018-09-05

## 2018-08-27 RX ORDER — FLUMAZENIL 0.1 MG/ML
0.2 INJECTION, SOLUTION INTRAVENOUS
Status: DISCONTINUED | OUTPATIENT
Start: 2018-08-27 | End: 2018-08-27 | Stop reason: HOSPADM

## 2018-08-27 RX ORDER — SODIUM CHLORIDE 9 MG/ML
INJECTION, SOLUTION INTRAVENOUS CONTINUOUS PRN
Status: DISCONTINUED | OUTPATIENT
Start: 2018-08-27 | End: 2018-08-27 | Stop reason: HOSPADM

## 2018-08-27 RX ORDER — FENTANYL CITRATE 50 UG/ML
25-50 INJECTION, SOLUTION INTRAMUSCULAR; INTRAVENOUS
Status: DISCONTINUED | OUTPATIENT
Start: 2018-08-27 | End: 2018-08-27 | Stop reason: HOSPADM

## 2018-08-27 RX ADMIN — SODIUM CHLORIDE 3 ML: 9 INJECTION, SOLUTION INTRAMUSCULAR; INTRAVENOUS; SUBCUTANEOUS at 12:02

## 2018-08-27 RX ADMIN — FENTANYL CITRATE 100 MCG: 50 INJECTION INTRAMUSCULAR; INTRAVENOUS at 11:57

## 2018-08-27 RX ADMIN — FLUOXETINE 20 MG: 20 CAPSULE ORAL at 08:12

## 2018-08-27 RX ADMIN — METOPROLOL TARTRATE 50 MG: 50 TABLET, FILM COATED ORAL at 08:12

## 2018-08-27 RX ADMIN — ACETAMINOPHEN 650 MG: 325 TABLET, FILM COATED ORAL at 04:24

## 2018-08-27 RX ADMIN — ACETAMINOPHEN 650 MG: 325 TABLET, FILM COATED ORAL at 08:12

## 2018-08-27 RX ADMIN — TOPICAL ANESTHETIC 0.5 ML: 200 SPRAY DENTAL; PERIODONTAL at 11:38

## 2018-08-27 RX ADMIN — LIDOCAINE HYDROCHLORIDE 30 ML: 20 SOLUTION ORAL; TOPICAL at 11:38

## 2018-08-27 RX ADMIN — MIDAZOLAM 3 MG: 1 INJECTION INTRAMUSCULAR; INTRAVENOUS at 11:57

## 2018-08-27 ASSESSMENT — PATIENT HEALTH QUESTIONNAIRE - PHQ9: SUM OF ALL RESPONSES TO PHQ QUESTIONS 1-9: 12

## 2018-08-27 ASSESSMENT — VISUAL ACUITY
OU: NORMAL ACUITY

## 2018-08-27 ASSESSMENT — ACTIVITIES OF DAILY LIVING (ADL)
ADLS_ACUITY_SCORE: 12

## 2018-08-27 NOTE — PROGRESS NOTES
Social Work was consulted to assess needs of patient following stroke. Chart was reviewed and discussed with interdisciplinary team. Social work needs are not identified at this time and [facility] will continue to follow. Please re-consult social work if additional needs are identified.     ELOY Dhillon  Social Work, 6A  Phone:  [phone]  Pager:  [phone]  8/27/2018

## 2018-08-27 NOTE — CONSULTS
Rheumatology Consultation    Alicia Penaloza MRN# [id number]   Age: 43 year old YOB: 1975     Date of Admission:  8/25/2018    Reason for consult: Prior CVA with hypercoagulable work up positive for anticardiolipin IgM.        Requesting physician: Dr. Rees        Level of consult: Consult and follow for daily recommendations           Impression and Recommendation:   Impression:     Ms. Penaloza is a 43 year female with history of ischemic stroke, found to have positive IgG to cardiolipin, concern for possible anti-phospholipid syndrome.      In the case of this patient anti-phospholipid is a possibility to explain her stroke. Given the positive IgM cardiolipin Ab and the finding of thrombotic event, as well as her symptoms of memory loss. However, she also has more conventional risk factors for stroke including hypertension, smoking, and hyperlipidemia. Furthermore, with anti-cardiolipin IgM positive and not IgG there is a high occurrence of false positives.  Antiphospholipid syndrome is commonly associated with lupus and for this reason it is beneficial to test for RASHEL as well and C3 and C4. Nonetheless, this patient doesn't have any symptoms to suggest that she has an underlying autoimmune disease. Her exam was unrevealing for any rashes (specifically levido reticularis or racemosa, no livedoid vasculopathy), joint pain effusion, erythema, she had no oral lesions or conjunctival irritation. Her echocardiogram was without valvular abnormalities(can be seen in apld). Moreover, the IgG anticardiolipin cancan be positive in other scenarios including secondary to viral or bacterial infections. Medications can also cause this test to be postive. In most settings ot confirm the diagnoiss the tests should be repeated at least 12 weeks from the initial testings. At this time we cannot definitively saw this patient has antiphospholipid syndrome. We will test for possible autoimmune disease  such as lupus. We recommend hematology oncology consult for further recommendations on stroke management in the setting of this mildly positive anticardiolipin IgM titer.             Recommendations:   -management of ischemic stroke per neurology   -recommend inpatient or outpatient hematology consult for further guidance for further management and other testing needed in the setting of positive anti-cardiolipin IgG and CVA   -add on CRP and ESR (given thrombocytosis and to assess for inflammation)   -follow up on results of antiphospholipid lab testing:  RASHEL  C3  C4  Beta-2 Glycoprotein IgG, IgM  Cardiolipin PATRIC IgG, IgM  Lupus Anticoagulant Panel      Patient seen and discussed with Dr. Garcia.     Thank you for the consult, please call or page if you have any questions.    Dany Peng MD  PGY3 Internal Medicine     ATTENDING TIE IN NOTE:    I saw the patient with the resident and fellow.  My exam and recommendations are as described.  She does not fulfill criteria for anti phospholipid antibody syndrome at this time, and I think it is unlikely this isolated lab finding currently is related to her ischemic CVA.    Ramon Garcia MD FACP    Rheumatic and Autoimmune Diseases            Chief Complaint:      History is obtained from the patient and review of the chart          History of Present Illness:   This patient is a 43 year old with a  PMH significant for HTN and recent ischemic stroke, and positive anticardiolipin IgM who presented on 8/25/2018 with reports of word finding difficulties and left arm weakness.    On 8/25/2018, she presented after she had developed word finding difficulties and left arm weakness. She was seen by neurology when she presented and her NIHSS score was zero. She had a CT  Head with which didn't demonstrate any new infarcts, but an evolving small cortical infarct at the temporal occipital junction. She had a CT head and neck demonstrating atherosclerotic plaque  in the right proximal internal carotid artery and foci of arthrosclerotic narrowing involving the P1 segment of the right posterior cerebral artery. MRI of the head w/wo contrast demonstrated similarly to the subacute right thalamic infarct with associated petechial hemorrhage.     Of note, Ms. Penaloza had recent ischemic stroke on 8/7/2018. She presented to OSH with aphasia, left arm numbness. There she had MRI head there demonstrating non--hemorrhagic multifocal infarcts within the left occipital, parietal and frontal lobes as well as hemorrhage in the right thalamus representing hemorrhagic conversion of an acute infarct. Also a left temporal SAH was noted. She was treated with TPA. She had an echocardiogram with a negative bubble study. She subsequently had hypercoagulable work up which demonstrated a positive anti-cardiolipin IgG. She was not started on anticoagulation, there was a plan to recheck anti-cardiolipin testing in 6 weeks and in the mean time to continue aspirin.     She subsequently represented on 8/22/2018 to the ED with reports of numbness of her right left arm, as well as weakness of the right arm. NIH stroke score 0. She had a CT head which was negative for a new stroke. Given her exam and reassuring imaging she was discharged home.     On consultation, she denies inflammation of the eyes or dry eyes, she denies having oral ulcers, she denies having ever had joint swelling, erythema or pain, (exception of back pain while on the hospital bed), no new rashes , as well. She has not had prior pregnancy.        Past Medical History:     Past Medical History:   Diagnosis Date     Hypertension      Stroke (H) 08/07/2018             Past Surgical History:   No past surgical history on file.          Social History:     Social History   Substance Use Topics     Smoking status: Former Smoker     Quit date: 8/7/2018     Smokeless tobacco: Not on file     Alcohol use No   LIves in [place], Prior smoker, since  14 years of age. Alcohol use 2 times monthly 5-7 drinks at a time. Denies other drug use.           Family History:   No family history of lupus, rheumatoid arthritis, thyroid disease, inflammatory bowel disease    Grandmother:prior stroke    Some female family members have had miscarriages.        Immunizations:     There is no immunization history on file for this patient.          Allergies:   No Known Allergies          Medications:     Prescriptions Prior to Admission   Medication Sig Dispense Refill Last Dose     aspirin 325 MG tablet Take 325 mg by mouth   8/24/2018 at 2000     atorvastatin (LIPITOR) 40 MG tablet Take 40 mg by mouth   8/24/2018 at 2000     lisinopril (PRINIVIL/ZESTRIL) 20 MG tablet Take 20 mg by mouth   8/24/2018 at 2000             Review of Systems:   CONSTITUTIONAL:  No fever or chills   EYES:  No dry eyes   HEENT:  No dry mouth, oral ulcers   RESPIRATORY: has a cough occasionally at night when laying in bed   CARDIOVASCULAR:  No chest pain occasional flutters of the heart   GASTROINTESTINAL:  No nausea or vomiting   GENITOURINARY:  No hematuria   HEMATOLOGIC/LYMPHATIC:  No hematuira  MUSCULOSKELETAL:  Unable to say if she has new weakness,but reports she is generally weak because she doesn't exercise   NEUROLOGICAL:  Numbness in her arm, from the elbow to the left shoulder, as well as her left thigh  SKIN: no new rashes           Physical Exam:   Temp: 98.3  F (36.8  C) Temp src: Oral BP: (!) 131/94 Pulse: 90 Heart Rate: 75 Resp: 16 SpO2: 97 % O2 Device: None (Room air)    Eyes:   Pupils equal and reactive, no conjunctival erythema      ENT: no oral lesions appreciated, moist mucous membrnaes   Neck:   Supple, no lymphadenopathy present      Lungs:   {clear lungs no wheezing , rales or rhonchi      Cardiovascular:   R/r/r, no murmurs, rubs or gallops      Abdomen:   Soft, nontender, bowel sounds appreciated,      Skin:   No appreciable rash or lesions on the exposed skin      Joint  Exam:  Hand:   R:normal range of motion, no swelling, erythema or pain in the joints of the hand   L: normal range of motion, no swelling, erythema or pain in the joints of the hand   Wrist:   R:normal passive range of motion,no pain to palpation, no swelling of the wrist or erythema   L: normal passive range or motion, no pain to palpation, no swelling of the wrist or erythema   Elbow:  R: normal passive range of motion, nontender, nonerythematous   L: normal passive range or motion, nontender, nonerythematous not warm   Knee:   R:non erythematous, non-warm, no effusion, normal flexion and extension   L:non erythematous, non-warm, no effusion, normal flexion and extension   Ankle :  R: normal range of motion, no pain to palpation, not warm or erythematous, no edema    L:normal range of motion, no pain to palpation, not warm or erythematous, no edema            Data:     Lab Results   Component Value Date    WBC 10.8 08/27/2018    HGB 9.5 (L) 08/27/2018    HCT 31.2 (L) 08/27/2018     (H) 08/27/2018     08/25/2018    POTASSIUM 4.2 08/25/2018    CHLORIDE 105 08/25/2018    CO2 25 08/25/2018    BUN 12 08/25/2018    CR 0.95 08/25/2018    GLC 95 08/25/2018    TROPI <0.015 08/25/2018    AST 15 08/25/2018    ALT 18 08/25/2018    ALKPHOS 114 08/25/2018    BILITOTAL 0.2 08/25/2018    INR 1.03 08/25/2018 8/8/2018 OSH  Cardiolipin IgM: 15.00  Cardiolipin IgG: <0.50  Homocysteine: 11  MMA: 0.17  Anti-thrombin :> 126 H (%)  Factor V leidin:  negative   Protein S activity: 130 ()  Prothrombin mutation: negative     Imaging :  Reviewed in the Chart     Attestation:  I have reviewed today's vital signs, notes, medications, labs and imaging.    Dany Pegn MD

## 2018-08-27 NOTE — PLAN OF CARE
Problem: Patient Care Overview  Goal: Plan of Care/Patient Progress Review  Outcome: Improving  Status: Admitted for stroke workup. ?   VS: Stable on RA. On CCM: SR/ST, R PVC, R PAC  Neuros: Numbness L shoulder to elbow, Left toes (improved per pt at 0500), short term memory.   GI: NPO since midnight  : No BM for about 2 days per pt but said this is her routine. Voiding spontaneously?  IV: R PIV SL'd  Activity: SBA   Pain: prn Tylenol x1 for c/o of lower back pain  Respiratory/Trach: No SOB reported. LS clear.?  Skin: dry & intact. Pt refused PCDs, said it keeps her up at night from past experience.   Plan of care: BETH & Bubble study today. Stroke education. On CCM. Cont to monitor & with POC

## 2018-08-27 NOTE — PLAN OF CARE
Problem: Patient Care Overview  Goal: Plan of Care/Patient Progress Review  Outcome: Improving  Status: Pt admitted for ischemic stroke  VS: VSS on RA  Neuros: AO4, L shoulder numbness from to elbow. Pt denied L toe numbness. Strength 5/5 in all extremities.    GI: Pt tolerating regular diet well. Last BM yesterday  : Voiding spontaneously  IV: R PIV SL  Activity: Pt up SBA with GB  Pain: Pt has chronic back pain, PRN Tylenol given x 1  Social: Pt sister present to receive stroke education. Pt received PLC this AM at 0900.   Plan of care: Possible DC today pending Rheum consult and BETH results. Continue to monitor    Pts Home [facility] Agency would like to be updated when pt is dc-ing.   [facility] (Alicia)  [phone] phone  [phone] fax

## 2018-08-27 NOTE — PROGRESS NOTES
"PIV removed. Vitals and Neuros unchanged. Discharge education reviewed. All questions answered. Pt discharged home with family. All posessions including clothing and electronics with pt. Pt walked off unit to  prescriptions at pharmacy, to be driven home by family. Wheelchair offered, pt stated \"I would like to walk\". Pt encouraged to maintain follow up appointments.   "

## 2018-08-27 NOTE — PROGRESS NOTES
Pt arrived in ECHO department  for scheduled BETH.   Procedure explained, questions answered and consent signed. Pt's throat sprayed at 1140, therefore pt will not be able to eat or drink until 2 hours after at 1340.  Informed pt of this time and encouraged to start with warm fluids and soft foods.    Pt tolerated procedure well, and was given a total of 100 mcg IV fentanyl and 3 mg IV versed for conscious sedation.  Pt denied throat or chest pain after BETH complete.   BETH probe 51 used for procedure.  Pt denied C.P or throat pain after procedure and was transferred back to 6A after awake and VSS.  Escorted back to 6A on litter.  Report given to 6A RN.

## 2018-08-27 NOTE — PLAN OF CARE
Problem: Patient Care Overview  Goal: Plan of Care/Patient Progress Review  Outcome: No Change  Short term memory and cognitive deficits.  Neuros unchanged.  NPO after midnight for BETH with bubble study.  Also PLC aptointment for stroke teaching tomorrow.  No A-fib on cardiac monitor.  Tolerating regular diet and oral fluids.  Continue POC.

## 2018-08-27 NOTE — PLAN OF CARE
Problem: Patient Care Overview  Goal: Discharge Needs Assessment  08/27/18 6933     Gertrudis Tillman-Registered Nurse (Nursing)  Patient wanted sister to attend class once she arrived. States she forgets information easily since stroke. Reviewed with pt and sister stroke information, specifically controllable risk factors and what she is willing to change. Pt. Identified changing and improving diet, increase exercise, following up with PCP for sleep apnea, and managing B/P, priorities for her.  Will be staying with sister for a month. Is very concerned about obtaining health insurance and losing her job if she can't drive since stroke.

## 2018-08-27 NOTE — DISCHARGE SUMMARY
[facility], [place]    Neurology Stroke Discharge Summary    Date of Admission: 8/25/2018  Date of Discharge: 08/27/2018    Disposition: Discharged to home  Primary Care Physician: Dr. Ward Hawley    Admission Diagnosis:   Ischemic stroke  Hyperlipidemia  Hypertension  Anemia  Leukocytosis    Discharge Diagnosis:   Ischemic Stroke due to undetermined etiology, differential includes vasculitis vs hypercoagulable state vs embolic stroke of unknown source  Hyperlipidemia  Hypertension  Anemia, stable  Leukocytosis, resolved    Problem Leading to Hospitalization (from hospitals):   Ms. Penaloza is a 43 year old female with past medical history including stroke on 8/7/2018 s/p tPA administration, hypertension, hyperlipidemia, former smoker (quit 8/7/2018) who presented 8/25/2018 after an episode of right-sided facial droop, left-sided numbness and weakness.  Workup after her previous stroke on 8/7/2018 demonstrated acute infarcts in the left MCA territory and hemorrhagic transformation of an ischemic region in the right thalamus. Labs were notable for slightly positive anti-cardiolipin IgM antibody levels.  She presented again on 8/22/2018 with left sided numbness, was discharged from the ED after no new stroke was seen on MRI.  She was admitted after the 8/25/2018 episode for further stroke workup.    Please see H&P dated 8/25/2018 for further details about presentation.    Brief Hospital Course:   Patient presented with sudden-onset right-sided facial droop and left-sided numbness.  This presentation was similar to her prior stroke presentation.  MRI imaging was obtained 8/25/2018 to rule out any new stroke.  Imaging was negative for new stroke, did display evidence of prior strokes noted on 8/7/2018.  Rheumatology was consulted given her prior hypercoagulability workup.  Hypercoagulability workup was repeated including anti-cardiolipin antibody panel, lupus anticoagulant panel, beta-2  glycoprotein antibody panel, C3, C4, and RASHEL.  She will need a second test at least twelve weeks apart from the first test in order to diagnose antiphospholipid syndrome.  She will follow up with hematology as an outpatient given her anti-cardiolipin result.  She also underwent a transesophageal echocardiogram to closely evaluate any cardiac sources of stroke, echocardiogram was negative.    Found to have no new stroke.  Imaging demonstrated expected evolution of prior strokes.    IV tPA was not given due to presentation outside of the time window.      Work-up as stated below under Pertinent Investigations.    Etiology is thought to be embolic from unknown source vs vasculitis vs hypercoagulable state.    Rehab evaluation: OT.  Recommended home health OT services.    Smoking Cessation: already quit smoking    BP Long-term Goal: 140/90 or less    Antithrombotic/Anticoagulant Agent: aspirin 325 mg    Statins: Continued home atorvastatin 40 mg daily       Hgb A1C Goal: < 7.0    Complications: None.     Other problems addressed during this hospitalization:  # Hyperlipidemia: continued home lipitor 40 mg daily for goal LDL < 70.  Measured LDL 53 this hospitalization.     # Hypertension: long-term SBP goal < 140.  Home medications were held temporarily for permissive hypertension prior to determination that there was no new stroke.  Resumed home metoprolol 50mg BID and lisinopril 20 mg daily.     # Anemia, stable: hemoglobin 9.5-9.7.  No significant change was seen.    # Leukocytosis, resolved: WBC 16.3 on admission, decreased to 10.8 on day of discharge.  Patient reported that she had a head cold.  MRI brain incidentally noted maxillary sinusitis.  CXR negative, UA negative for infection.    PERTINENT INVESTIGATIONS    Labs  Lipid Panel:   Recent Labs   Lab Test  08/26/18   0543   CHOL  123   HDL  34*   LDL  53   TRIG  183*     A1C:   Lab Results   Component Value Date    A1C 4.8 08/26/2018     INR:     Recent Labs  Lab  "08/25/18  1211 08/22/18  1907   INR 1.03 1.05         Ref. Range 8/27/2018 07:50   Beta 2 Glycoprotein 1 Antibody IgG Latest Ref Range: <7 U/mL 0.8   Beta 2 Glycoprotein 1 Antibody IgM Latest Ref Range: <7 U/mL 2.8   Complement C3 Latest Ref Range: 76 - 169 mg/dL 129   Complement C4 Latest Ref Range: 15 - 50 mg/dL 35       Coag Panel / Hypercoag Workup: Labs sent and pending  Pending test results: antinuclear antibody panel, cardiolipin antibody panel, lupus anticoagulant panel    Transesophageal echocardiogram on 8/27/2018:  \"Interpretation Summary  No cardiac source for embolus identified. The atrial septum is intact as  assessed by color Doppler and agitated dextrose bubble study .  _____________________________________________________________________________  __        Procedure  Transesophageal Echocardiogram with color and spectral Doppler performed.  Procedure location Echo Lab. The procedure was performed in the Echo Lab.  Informed consent for Transesophegeal echo obtained. BETH Probe #51 was used  during the procedure. Patient was sedated using Fentanyl 100 mcg. Patient was  sedated using Versed 3 mg. I determined this patient to be an appropriate  candidate for the planned sedation and procedure and have reassessed the  patient immediately prior to sedation and procedure. Total sedation time: 20  minutes of continuous bedside 1:1 monitoring. The Transducer was inserted  without difficulty . Complications None. ECG shows normal sinus rhythm.     Left Ventricle  LVEF is normal 60-65%. The Ejection Fraction was visually estimated. No  regional wall motion abnormalities are seen. No LV thrombus.     Right Ventricle  Right ventricular function, chamber size, wall motion, and thickness are  normal.     Atria  Both atria appear normal. The left atrial appendage is normal. It is free of  spontaneous echo contrast and thrombus. The atrial septum is intact as  assessed by color Doppler and agitated dextrose bubble " "study .        Mitral Valve  The mitral valve is normal.     Aortic Valve  Aortic valve is normal in structure and function. The aortic valve is  tricuspid.     Tricuspid Valve  The tricuspid valve is normal.     Pulmonic Valve  The pulmonic valve is normal.     Vessels  The thoracic aorta is normal. The aorta root is normal.     Pericardium  No pericardial effusion is present.\"    Imaging:  CT head without contrast 8/25/2018:  \"FINDINGS: There is mild diffuse cerebral volume loss. There are subtle  patchy areas of decreased density in the cerebral white matter  bilaterally that are consistent with sequela of chronic small vessel  ischemic disease. An evolving small subacute cortical ischemic infarct  at the temporal occipital junction on the left is again noted. No  definite new infarcts noted.     The ventricles and basal cisterns are within normal limits in  configuration given the degree of cerebral volume loss.  There is no  midline shift. There are no extra-axial fluid collections.     No intracranial hemorrhage or mass.     The visualized paranasal sinuses are well-aerated. There is no  mastoiditis. There are no fractures of the visualized bones.     IMPRESSION:   1. Evolving small cortical infarct at the temporal occipital junction  on the left again noted. No definite new infarcts noted.  2. Diffuse cerebral volume loss and cerebral white matter changes  consistent with chronic small vessel ischemic disease. No evidence for  acute intracranial pathology.\"    CT head and neck angiogram 8/25/2018:  \"FINDINGS:   Neck CTA: The bilateral common carotid arteries are patent and  unremarkable. There is minimal noncalcified atherosclerotic plaque at  the origin of the right internal carotid artery but does not result in  any stenosis. The cervical internal carotid arteries bilaterally are  patent without stenosis. The vertebral arteries bilaterally are patent  without stenosis.     Head CTA: Tortuosity and mild " "irregular in contour the basilar artery  persists without change. There is no significant stenosis of the  basilar artery. Minimal calcified atherosclerotic plaque of the  intracranial distal internal carotid arteries on both sides again  noted which does not result in any stenosis on either side. The  bilateral anterior cerebral, right middle cerebral, and left posterior  cerebral arteries remain patent and unremarkable. Short segment mild  narrowing of the P1 segment of the right posterior cerebral artery  persists without change. The right posterior cerebral arteries  otherwise patent and unremarkable. Moderate short segment narrowing of  the proximal aspect of the posterior division branch of the left  middle cerebral artery is again noted without change. The left middle  cerebral artery and its branches are otherwise patent and  unremarkable. The bilateral posterior communicating arteries are  patent and unremarkable.         IMPRESSION:  1. Atherosclerotic plaque of the proximal right internal carotid and  bilateral distal internal carotid arteries that does not result in any  stenosis.  2. Foci of presumed atherosclerotic narrowing involving the P1 segment  of the right posterior cerebral artery and proximal aspect of a large  branch of the left middle cerebral artery in the left sylvian fissure  again noted without change.  3. Otherwise, normal neck and head CTA. No change from the recent  comparison study.\"    MRI/MRV  brain with and without contrast 8/25/2018:  \"Findings:   Brain MRI: No significant change in enhancing subacute infarcts within  the posterior left cerebral hemispheric internal/external border  zones, which demonstrate associated mild petechial hemorrhage. There  is associated localized mass effect and sulcal effacement. Stable  additional punctate subacute infarct within the left temporal lobe,  within the left middle cerebral artery territory.     Expected evolution of early infarct in the " "medial right thalamic  nucleus with new associated petechial hemorrhage and diminished  restricted diffusion since 8/22/2018.     No new focus of acute infarction. Chronic lacunar infarcts in the  bilateral basal ganglia, right thalamic nucleus and bilateral frontal  deep white matter. Stable mild-to-moderate leukoaraiosis few scattered  foci of chronic microhemorrhage predominantly centered in the the deep  gray nuclei, brainstem and cerebellar hemispheres, suggestive of  sequelae of chronic hypertension. Patent major intracranial vascular  flow voids.     Stable mild generalized parenchymal volume loss. Ventricles are not  enlarged out of proportion to the cerebral sulci. No midline shift or  herniation. No abnormal extra axial fluid collection. Tiny capillary  telangiectasia within the central edda.     Right maxillary sinus opacification with complex signal debris and  associated osteitis, suggestive of chronic sinusitis. Trace mastoid  effusions. Normal marrow signal. Orbits are unremarkable.     Head MRV: Widely patent major dural sinuses and deep cerebral veins.  Normal variant hypoplastic left transverse sinus. No evidence of sinus  thrombosis or stenosis.       Impression:  1. Normal head MRV. No evidence of sinus thrombosis.   2. Expected evolution of early subacute right thalamic infarct with  associated developing petechial hemorrhage since 8/22/2018.  3. Stable subacute infarcts within the posterior left cerebral  hemispheric internal/external border zones.  4. Moderate chronic small vessel ischemic disease.  5. Chronic right maxillary sinusitis.\"    Endovascular procedure: None     Cardiac Monitoring: Patient had > 24 hrs of cardiac monitor while in hospital.    Findings: No atrial fibrillation was found.    Sleep Apnea Screen:   Questions/Answers      1. Prior to your stroke, have you been told that you snore? Yes.    2. Prior to your stroke, have you been told that you struggle to breath while you " "are sleeping? No.    3. Prior to your stroke, do you feel tired and sleepy even after getting a normal night of sleep? No.    Sleep Apnea Screen Findings: Patient has 0-1 symptoms of sleep apnea.  Further sleep study is not recommended at this time.    PHQ-9 Depression Screen Score: 12, patient was started on fluoxetine 20 mg daily for depression.    Education discussed with: patient and family on cholesterol management, medical management, driving recommendation (NO driving) and follow-up recommendations/plan.    During daily rounds, the plan of care was discussed and developed with patient and family.  Plan of care includes: gabapentin for central post-stroke pain, fluoxetine after positive depression screen, antihypertension medications.    PHYSICAL EXAMINATION  Vital Signs:  B/P: 131/94, T: 98.3, P: 90, R: 16    General:  patient sitting on bed with no acute distress     HEENT:  normocephalic/atraumatic  Pulmonary:  no respiratory distress  Abdomen:  soft, non-tender  Extremities:  no edema  Skin:  intact, warm/dry     Neurologic  Mental Status: Deficit in spatial cognition noted: could not subtract 20-6, could not spell \"earth\" backwards. fully alert, attentive and oriented, follows commands, speech clear and fluent  Cranial Nerves:  visual fields intact, EOMI with normal smooth pursuit, facial sensation intact and symmetric, facial movements symmetric, hearing not formally tested but intact to conversation  Motor:  no abnormal movements, strength 5/5 throughout upper and lower extremities  Sensory: Diminished sensation at lateral left shoulder to light touch.  Otherwise intact/symmetric to light touch in upper and lower extremities without extinction.  Coordination:  FNF and HS intact without dysmetria  Station/Gait:  normal width, stride length, and turn    National Institutes of Health Stroke Scale (on day of discharge)  NIHSS Total Score: 0    Modified Jake Scale (on day of discharge): 2-Slight " disability; unable to carry out all previous activities, but able to look after own affairs    Medications    Current Discharge Medication List      START taking these medications    Details   FLUoxetine (PROZAC) 20 MG capsule Take 1 capsule (20 mg) by mouth daily  Qty: 30 capsule, Refills: 0    Associated Diagnoses: Acute ischemic stroke (H); Positive depression screening      gabapentin (NEURONTIN) 300 MG capsule Take one capsule at bedtime for two more days (8/27 and 8/28).  Then take one capsule after lunch and one capsule at bedtime (two capsules per day) for three days (8/29, 8/30, and 8/31).  Then, increase to one capsule in the morning, one in the afternoon, and one capsule at bedtime (three capsules per day).  If side effects (sleepiness, dizziness, personality changes) are worse than your pain, then please decrease the dose or discontinue.  Qty: 90 capsule, Refills: 0    Associated Diagnoses: Acute ischemic stroke (H); Pain after cerebrovascular accident (CVA)      metoprolol tartrate (LOPRESSOR) 50 MG tablet Take 1 tablet (50 mg) by mouth 2 times daily  Qty: 60 tablet, Refills: 0    Associated Diagnoses: Acute ischemic stroke (H); Hypertension, unspecified type         CONTINUE these medications which have NOT CHANGED    Details   aspirin 325 MG tablet Take 325 mg by mouth      atorvastatin (LIPITOR) 40 MG tablet Take 40 mg by mouth      lisinopril (PRINIVIL/ZESTRIL) 20 MG tablet Take 20 mg by mouth             Additional recommendations and follow up:      Home care nursing referral     Onc/Heme Adult Referral     Reason for your hospital stay   With your developing left arm numbness and weakness, we evaluated you for any new stroke.  You do not have a new stroke.  You also underwent tests to see what could be changed to decrease your chances of having another stroke.     Adult [facility] Follow-up and recommended labs and tests   Please follow up with your primary care provider, Dr. Hawley, in 1-2 weeks  for hospital follow-up.  Please clarify which blood pressure medications you are taking.  Please discuss your rheumatological blood tests (tested positive for anti-cardiolipin IgG) and whether you would like repeat testing three months from now.  You were prescribed a low dose of gabapentin: if you feel pain even after you increase to three capsules a day, then please ask your primary care provider whether to increase the dose.    Please keep your stroke neurology appointment with Alicia Langley as scheduled on 10/18/2018.    Please continue to follow up with [facility] for nursing and occupational therapy services.    Please follow up with hematology in 4-6 weeks to discuss the possibility of a blood condition contributing to your stroke.    Appointments on [place] and/or [facility] (with [facility] or [facility] provider or service). Call [phone] if you haven't heard regarding these appointments within 7 days of discharge.     Activity   Your activity upon discharge: activity as tolerated.  Do not drive until tested by occupational therapy about whether you are safe to drive.     Full Code     Diet   Follow this diet upon discharge: Regular         Patient was seen and discussed with the Attending, Dr. Moeller.    Parul Rees  Pager: [phone]

## 2018-08-28 ENCOUNTER — PATIENT OUTREACH (OUTPATIENT)
Dept: CARE COORDINATION | Facility: CLINIC | Age: 43
End: 2018-08-28

## 2018-08-28 LAB
ANA SER QL IF: NEGATIVE
CARDIOLIPIN ANTIBODY IGG: 1.7 GPL-U/ML (ref 0–19.9)
CARDIOLIPIN ANTIBODY IGM: 13.9 MPL-U/ML (ref 0–19.9)
LA PPP-IMP: NEGATIVE

## 2018-08-28 NOTE — PROGRESS NOTES
Care Coordinator Progress Note    Data  Received call from Alicia at [facility]. Pt discharged last evening; they did not receive DC orders. PR requested I fax DC orders & DC Summary. Fax: [phone]    Coordination of Care & Referrals  Faxed H&P; AVS; DC Summary to [facility].     At 4:25pm called Alicia at [facility] to see if she received info. She did not because their fax is down. She gave me fax# to their Corporate office, fax# [phone]. Faxed info to that number.         Desirae Muse, RN Care Coordinator  [address], [facility]

## 2018-08-28 NOTE — PLAN OF CARE
Problem: Patient Care Overview  Goal: Plan of Care/Patient Progress Review  Occupational Therapy Discharge Summary    Reason for therapy discharge:    Discharged to home with home therapy.    Progress towards therapy goal(s). See goals on Care Plan in [facility] electronic health record for goal details.  Goals partially met.  Barriers to achieving goals:   discharge from facility.    Therapy recommendation(s):    Continued therapy is recommended.  Rationale/Recommendations:  To maximize safety and indep with ADLs .

## 2018-09-05 ENCOUNTER — OFFICE VISIT (OUTPATIENT)
Dept: INTERNAL MEDICINE | Facility: CLINIC | Age: 43
End: 2018-09-05
Payer: MEDICAID

## 2018-09-05 VITALS
DIASTOLIC BLOOD PRESSURE: 74 MMHG | RESPIRATION RATE: 16 BRPM | OXYGEN SATURATION: 100 % | HEART RATE: 60 BPM | BODY MASS INDEX: 37.55 KG/M2 | WEIGHT: 212 LBS | SYSTOLIC BLOOD PRESSURE: 128 MMHG | TEMPERATURE: 97.6 F

## 2018-09-05 DIAGNOSIS — I10 HYPERTENSION, UNSPECIFIED TYPE: ICD-10-CM

## 2018-09-05 DIAGNOSIS — N93.9 VAGINAL BLEEDING: ICD-10-CM

## 2018-09-05 DIAGNOSIS — R40.4 TRANSIENT ALTERATION OF AWARENESS: ICD-10-CM

## 2018-09-05 DIAGNOSIS — D50.0 IRON DEFICIENCY ANEMIA DUE TO CHRONIC BLOOD LOSS: ICD-10-CM

## 2018-09-05 DIAGNOSIS — I69.359 HEMIPARESIS AND SPEECH AND LANGUAGE DEFICIT AS LATE EFFECTS OF CEREBROVASCULAR ACCIDENT (H): Primary | ICD-10-CM

## 2018-09-05 DIAGNOSIS — I69.328 HEMIPARESIS AND SPEECH AND LANGUAGE DEFICIT AS LATE EFFECTS OF CEREBROVASCULAR ACCIDENT (H): Primary | ICD-10-CM

## 2018-09-05 PROCEDURE — 99495 TRANSJ CARE MGMT MOD F2F 14D: CPT | Performed by: INTERNAL MEDICINE

## 2018-09-05 RX ORDER — METOPROLOL TARTRATE 50 MG
50 TABLET ORAL 2 TIMES DAILY
Qty: 180 TABLET | Refills: 3 | Status: SHIPPED | OUTPATIENT
Start: 2018-09-05 | End: 2019-07-01

## 2018-09-05 ASSESSMENT — PAIN SCALES - GENERAL: PAINLEVEL: MODERATE PAIN (5)

## 2018-09-05 NOTE — MR AVS SNAPSHOT
"              After Visit Summary   9/5/2018    Alicia Penaloza    MRN: [id number]           Patient Information     Date Of Birth          1975        Visit Information        Provider Department      9/5/2018 1:00 PM Augusto Thomas MD [facility]         Follow-ups after your visit        Your next 10 appointments already scheduled     Sep 10, 2018  4:00 PM CDT   Office Visit with Flori Beyer [facility]   [facility]    [address] [place] [id number] [phone]           Bring a current list of meds and any records pertaining to this visit. For Physicals, please bring immunization records and any forms needing to be filled out. Please arrive 10 minutes early to complete paperwork.              Who to contact     If you have questions or need follow up information about today's clinic visit or your schedule please contact [facility] directly at [phone].  Normal or non-critical lab and imaging results will be communicated to you by [employer], letter or phone within 4 business days after the clinic has received the results. If you do not hear from us within 7 days, please contact the clinic through [employer] or phone. If you have a critical or abnormal lab result, we will notify you by phone as soon as possible.  Submit refill requests through [employer] or call your pharmacy and they will forward the refill request to us. Please allow 3 business days for your refill to be completed.          Additional Information About Your Visit        [employer] Information     [employer] lets you send messages to your doctor, view your test results, renew your prescriptions, schedule appointments and more. To sign up, go to [email or web address] . Click on \"Log in\" on the left side of the screen, which will take you to the Welcome page. Then click on \"Sign up Now\" on the right side of the page.     You will be asked to enter the access " code listed below, as well as some personal information. Please follow the directions to create your username and password.     Your access code is: [email or web address]  Expires: 2018  8:53 PM     Your access code will  in 90 days. If you need help or a new code, please call your [facility] clinic or [phone].        Care EveryWhere ID     This is your Care EveryWhere ID. This could be used by other organizations to access your [facility] medical records  [id number]        Your Vitals Were     Pulse Temperature Respirations Last Period Pulse Oximetry BMI (Body Mass Index)    60 97.6  F (36.4  C) (Temporal) 16 2018 100% 37.55 kg/m2       Blood Pressure from Last 3 Encounters:   18 128/74   18 (!) 136/94   18 (!) 151/102    Weight from Last 3 Encounters:   18 212 lb (96.2 kg)   18 210 lb 12.2 oz (95.6 kg)   18 210 lb (95.3 kg)              Today, you had the following     No orders found for display       Primary Care Provider Fax #    Physician No Ref-Primary [phone]       No address on file        Equal Access to Services     YADI PITTS : Hadii helio yusufo Sojohannali, waaxda luqadaha, qaybta kaalmada adeegyada, anjelica ariza . So [facility] [phone].    ATENCIÓN: Si habla español, tiene a finnegan disposición servicios gratuitos de asistencia lingüística. Llame al [phone].    We comply with applicable federal civil rights laws and [place] laws. We do not discriminate on the basis of race, color, national origin, age, disability, sex, sexual orientation, or gender identity.            Thank you!     Thank you for choosing [facility]  for your care. Our goal is always to provide you with excellent care. Hearing back from our patients is one way we can continue to improve our services. Please take a few minutes to complete the written survey that you may receive in the mail after your visit with us. Thank you!              Your Updated Medication List - Protect others around you: Learn how to safely use, store and throw away your medicines at www.disposemymeds.org.          This list is accurate as of 9/5/18  1:10 PM.  Always use your most recent med list.                   Brand Name Dispense Instructions for use Diagnosis    aspirin 325 MG tablet      Take 325 mg by mouth        atorvastatin 40 MG tablet    LIPITOR     Take 40 mg by mouth        FLUoxetine 20 MG capsule    PROzac    30 capsule    Take 1 capsule (20 mg) by mouth daily    Acute ischemic stroke (H), Positive depression screening       gabapentin 300 MG capsule    NEURONTIN    90 capsule    Take one capsule at bedtime for two more days (8/27 and 8/28).  Then take one capsule after lunch and one capsule at bedtime (two capsules per day) for three days (8/29, 8/30, and 8/31).  Then, increase to one capsule in the morning, one in the afternoon, and one capsule at bedtime (three capsules per day).  If side effects (sleepiness, dizziness, personality changes) are worse than your pain, then please decrease the dose or discontinue.    Acute ischemic stroke (H), Pain after cerebrovascular accident (CVA)       lisinopril 20 MG tablet    PRINIVIL/ZESTRIL     Take 20 mg by mouth        metoprolol tartrate 50 MG tablet    LOPRESSOR    60 tablet    Take 1 tablet (50 mg) by mouth 2 times daily    Acute ischemic stroke (H), Hypertension, unspecified type

## 2018-09-05 NOTE — LETTER
[address] [address]  Phone: [phone]    September 5, 2018        Alicia Penaloza  [address] [address] [place] [id number]          To whom it may concern:    RE: Alicia Penaloza    Patient was seen and treated today at our clinic and missed work.  She had a severe health injury on August 25th.  She needs another two weeks without work to improve her strength and coordination.  Re-evaluate with therapy input at that time.    Please contact me for questions or concerns.      Sincerely,        Augusto Thomas MD

## 2018-09-05 NOTE — PROGRESS NOTES
SUBJECTIVE:   Alicia Penaloza is a 43 year old female who presents to clinic today for the following health issues:      Hospital Follow-up Visit:    Hospital/Nursing Home/IP Rehab Facility: [facility]  Date of Admission: 8/25/18  Date of Discharge: 8/27/18  Reason(s) for Admission: Ischemic stroke            Problems taking medications regularly:  None       Medication changes since discharge: None       Problems adhering to non-medication therapy:  None    Summary of hospitalization:  [facility] discharge summary reviewed  Diagnostic Tests/Treatments reviewed.  Follow up needed: none  Other Healthcare Providers Involved in Patient s Care:         Homecare  Update since discharge: improved.     Post Discharge Medication Reconciliation: discharge medications reconciled and changed, per note/orders (see AVS).  Plan of care communicated with patient and family        Left sided weakness from a stroke.  [place]ives [address] and staying here now. 8/7 got clot buster TPA.      History of HTN for a long time.      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      Appears to have some recurrence two days ago, some right side drooping face.    Intellectual aspect has improved in the last week.      Left arm is numb for over a year but not pain.      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  She has OT ordered or set up as well.     Past Medical History:   Diagnosis Date     Hypertension      Stroke (H) 08/07/2018     Current Outpatient Prescriptions   Medication     aspirin 325 MG tablet     atorvastatin (LIPITOR) 40 MG tablet     FLUoxetine (PROZAC) 20 MG capsule     gabapentin (NEURONTIN) 300 MG capsule     lisinopril (PRINIVIL/ZESTRIL) 20 MG tablet     metoprolol tartrate (LOPRESSOR) 50 MG tablet     [DISCONTINUED] metoprolol tartrate (LOPRESSOR) 50 MG tablet     No current facility-administered medications for this visit.      Social History    Substance Use Topics     Smoking status: Former Smoker     Quit date: 8/7/2018     Smokeless tobacco: Never Used     Alcohol use No     Review of Systems  Constitutional-No fevers, chills, or weight changes..  ENT-No earpain, sore throat, voice changes or rhinitis.  Cardiac-No chest pain or palpitations.  Respiratory-No cough, sob, or hemoptysis.  GI-No nausea, vomitting, diarrhea, constipation, or blood in the stool.  Psych-easily emotional.  Neuro-Weakness and thinking is slowed.    Physical Exam  /74  Pulse 60  Temp 97.6  F (36.4  C) (Temporal)  Resp 16  Wt 212 lb (96.2 kg)  LMP 08/07/2018  SpO2 100%  BMI 37.55 kg/m2  General Appearance-healthy, alert, no distress  Cardiac-regular rate and rhythm  with normal S1, S2 ; no murmur, rub or gallops  Lungs-clear to auscultation  Extremities-no peripheral edema, peripheral pulses normal  Neurological-Cranial nerves 2-12 intact, motor strength intact, Romberg negative, sensation intact, normal gait    ASSESSMENT:    This is a 43-year-old woman who had a history of hypertension previously she had a stroke in the right side of the brain thalamus which affected her left arm and leg on August 7.  She was flown from [facility] to [facility] and received TPA.  She subsequently had another event on August 22 was seen in the emergency room here.  She was then released home.  On the 25th she had another event came to the emergency room was sent down the [facility] and followed by stroke team.  Her MRI, BETH were normal or negative.  There is question of whether she had a vasculitis as she did not have anything showing to be embolic or ischemic strokes.    The patient is much better she is having good function out of her cranial nerves on the left and right side, her left arm is very similar to her right arm with strength and coordination and left leg is strong as well.  She is mainly concerned about her cognition, lack of memory.  I do think  there is some medication effect since she is on gabapentin 300 mg 3 times a day and was just started on this.  Per the chart it says it was for pain after her stroke but the patient denies pain she has had some chronic numbness in her left arm but no pain due to the lack of pain we will stop the gabapentin to see if this helps to clarify her thinking.    The patient should stay off work for the next 2 weeks to try to get stronger and help her mind clear.  We will reevaluate if she can return to we work at that time.    She also has some anemia and she has had some excessive menstruation she will be seeing OB/GYN on September 10.    Electronically signed by Augusto Thomas MD

## 2018-09-10 ENCOUNTER — OFFICE VISIT (OUTPATIENT)
Dept: OBGYN | Facility: CLINIC | Age: 43
End: 2018-09-10
Payer: MEDICAID

## 2018-09-10 VITALS
DIASTOLIC BLOOD PRESSURE: 88 MMHG | SYSTOLIC BLOOD PRESSURE: 126 MMHG | HEART RATE: 72 BPM | WEIGHT: 210.7 LBS | BODY MASS INDEX: 37.32 KG/M2

## 2018-09-10 DIAGNOSIS — I63.9 ACUTE CVA (CEREBROVASCULAR ACCIDENT) (H): ICD-10-CM

## 2018-09-10 DIAGNOSIS — I10 BENIGN ESSENTIAL HYPERTENSION: ICD-10-CM

## 2018-09-10 DIAGNOSIS — N92.0 EXCESSIVE OR FREQUENT MENSTRUATION: Primary | ICD-10-CM

## 2018-09-10 LAB
FSH SERPL-ACNC: 7.7 IU/L
LH SERPL-ACNC: 7.9 IU/L

## 2018-09-10 PROCEDURE — 36415 COLL VENOUS BLD VENIPUNCTURE: CPT | Performed by: OBSTETRICS & GYNECOLOGY

## 2018-09-10 PROCEDURE — 83002 ASSAY OF GONADOTROPIN (LH): CPT | Performed by: OBSTETRICS & GYNECOLOGY

## 2018-09-10 PROCEDURE — 83001 ASSAY OF GONADOTROPIN (FSH): CPT | Performed by: OBSTETRICS & GYNECOLOGY

## 2018-09-10 PROCEDURE — 99203 OFFICE O/P NEW LOW 30 MIN: CPT | Performed by: OBSTETRICS & GYNECOLOGY

## 2018-09-10 NOTE — MR AVS SNAPSHOT
After Visit Summary   9/10/2018    Alicia Penaloza    MRN: [id number]           Patient Information     Date Of Birth          1975        Visit Information        Provider Department      9/10/2018 4:00 PM Flori Beyer MD [facility]        Today's Diagnoses     Excessive or frequent menstruation    -  1    Morbid obesity (H)           Follow-ups after your visit        Your next 10 appointments already scheduled     Sep 12, 2018  2:50 PM CDT   US PELVIC COMPLETE W TRANSVAGINAL with [id number]   [facility]    [address]  [facility] [address] [phone]           How do I prepare for my exam? (Food and drink instructions) Adults: Drink four 8-ounce glasses of fluid an hour before your exam. If you need to empty your bladder before your exam, try to release only a little urine. Then, drink another glass of fluid.  Children: * Children who are potty trained up to 6 years old should drink at least 2 cups (16 oz) of water/non-carbonated beverage 30 minutes prior to the exam. * Children who are 6-10 years should drink at least 3 cups (24 oz) of water/non-carbonated beverage 45 minutes prior to the exam. * Children who are 10 years or older should drink at least 4 cups (32 oz) of water/non-carbonated beverage 45 minutes prior to the exam.  If your child is very uncomfortable or has an urgent need to pee, please notify a technologist; they will try to find out how much longer the wait may be and provide instructions to help relieve the pressure.  What should I wear: Wear comfortable clothes.  How long does the exam take: Most ultrasounds take 30 to 60 minutes.  What should I bring: Bring a list of your medicines, including vitamins, minerals and over-the-counter drugs. It is safest to leave personal items at home.  Do I need a :  No  is needed.  What do I need to tell my doctor: Tell your doctor about any allergies  "you may have.  What should I do after the exam: No restrictions, You may resume normal activities.  What is this test: An ultrasound uses sound waves to make pictures of the body. Sound waves do not cause pain. The only discomfort may be the pressure of the wand against your skin or full bladder.  Who should I call with questions: If you have any questions, please call the Imaging Department where you will have your exam. Directions, parking instructions, and other information is available on our website, [email or web address].              Future tests that were ordered for you today     Open Future Orders        Priority Expected Expires Ordered    Hemoglobin A1c Routine 10/10/2018 11/9/2018 9/10/2018    US Pelvic Complete w Transvaginal Routine  9/10/2019 9/10/2018            Who to contact     If you have questions or need follow up information about today's clinic visit or your schedule please contact [facility] directly at [phone].  Normal or non-critical lab and imaging results will be communicated to you by MyChart, letter or phone within 4 business days after the clinic has received the results. If you do not hear from us within 7 days, please contact the clinic through [employer] or phone. If you have a critical or abnormal lab result, we will notify you by phone as soon as possible.  Submit refill requests through [employer] or call your pharmacy and they will forward the refill request to us. Please allow 3 business days for your refill to be completed.          Additional Information About Your Visit        [employer] Information     [employer] lets you send messages to your doctor, view your test results, renew your prescriptions, schedule appointments and more. To sign up, go to [email or web address] . Click on \"Log in\" on the left side of the screen, which will take you to the Welcome page. Then click on \"Sign up Now\" on the right side of the page.     You will be asked to enter the access " code listed below, as well as some personal information. Please follow the directions to create your username and password.     Your access code is: [email or web address]  Expires: 2018  8:53 PM     Your access code will  in 90 days. If you need help or a new code, please call your [facility] clinic or [phone].        Care EveryWhere ID     This is your Care EveryWhere ID. This could be used by other organizations to access your [facility] medical records  [id number]        Your Vitals Were     Pulse Last Period BMI (Body Mass Index)             72 2018 37.32 kg/m2          Blood Pressure from Last 3 Encounters:   09/10/18 126/88   18 128/74   18 (!) 136/94    Weight from Last 3 Encounters:   09/10/18 210 lb 11.2 oz (95.6 kg)   18 212 lb (96.2 kg)   18 210 lb 12.2 oz (95.6 kg)              We Performed the Following     Follicle stimulating hormone     Lutropin        Primary Care Provider Fax #    Physician No Ref-Primary [phone]       No address on file        Equal Access to Services     LIZANDRO PITTS : Hadii helio yusufo Bert, waaxda arelis, qaybta kaalmada adesandi, anjelica ariza . So [facility] [phone].    ATENCIÓN: Si habla español, tiene a finnegan disposición servicios gratuitos de asistencia lingüística. Llame al [phone].    We comply with applicable federal civil rights laws and [place] laws. We do not discriminate on the basis of race, color, national origin, age, disability, sex, sexual orientation, or gender identity.            Thank you!     Thank you for choosing [facility]  for your care. Our goal is always to provide you with excellent care. Hearing back from our patients is one way we can continue to improve our services. Please take a few minutes to complete the written survey that you may receive in the mail after your visit with us. Thank you!             Your Updated Medication List - Protect others  around you: Learn how to safely use, store and throw away your medicines at www.disposemymeds.org.          This list is accurate as of 9/10/18  4:26 PM.  Always use your most recent med list.                   Brand Name Dispense Instructions for use Diagnosis    aspirin 325 MG tablet      Take 325 mg by mouth        atorvastatin 40 MG tablet    LIPITOR     Take 40 mg by mouth        FLUoxetine 20 MG capsule    PROzac    30 capsule    Take 1 capsule (20 mg) by mouth daily    Acute ischemic stroke (H), Positive depression screening       gabapentin 300 MG capsule    NEURONTIN    90 capsule    Take one capsule at bedtime for two more days (8/27 and 8/28).  Then take one capsule after lunch and one capsule at bedtime (two capsules per day) for three days (8/29, 8/30, and 8/31).  Then, increase to one capsule in the morning, one in the afternoon, and one capsule at bedtime (three capsules per day).  If side effects (sleepiness, dizziness, personality changes) are worse than your pain, then please decrease the dose or discontinue.    Acute ischemic stroke (H), Pain after cerebrovascular accident (CVA)       lisinopril 20 MG tablet    PRINIVIL/ZESTRIL     Take 20 mg by mouth        metoprolol tartrate 50 MG tablet    LOPRESSOR    180 tablet    Take 1 tablet (50 mg) by mouth 2 times daily    Hypertension, unspecified type

## 2018-09-11 ENCOUNTER — TELEPHONE (OUTPATIENT)
Dept: INTERNAL MEDICINE | Facility: CLINIC | Age: 43
End: 2018-09-11

## 2018-09-11 NOTE — PROGRESS NOTES
SUBJECTIVE:                                                   Alicia Penaloza is a 43 year old female who presents to clinic today for the following health issue(s):  Patient presents with:  Consult: excessive vaginal bleeding      HPI:  Patient is a guerline 43 y.o. G0 who was referred because of menometrorrhagia of long standing but which has become worse in the last two months.  She was told years ago at [facility] practice that her bleeding is probably secondary to anovulation.  Currently she is bleeding about 21 days per month for the last 2 cycles.  Her hx is notable for recent strokes secondary to hypertension and smoking.  She is only on ASA and additional anti-hypertensive medications.  No anticoagulation otherwise.  Recent thyroid lab normal.  Patient is a very poor historian and much of hx obtained from patient's sister.    Patient's last menstrual period was 2018..   Patient is not sexually active, .  Using none for contraception.    reports that she quit smoking about 5 weeks ago. She has never used smokeless tobacco.    STD testing offered?  Declined - not sexually active      Problem list and histories reviewed & adjusted, as indicated.  Additional history: as documented.    Patient Active Problem List   Diagnosis     Acute ischemic stroke (H)     Hypertensive urgency     Vaginal bleeding     Benign essential hypertension     Acute CVA (cerebrovascular accident) (H)     History reviewed. No pertinent surgical history.   Social History   Substance Use Topics     Smoking status: Former Smoker     Quit date: 2018     Smokeless tobacco: Never Used     Alcohol use No      No data available            Current Outpatient Prescriptions   Medication Sig     aspirin 325 MG tablet Take 325 mg by mouth     atorvastatin (LIPITOR) 40 MG tablet Take 40 mg by mouth     FLUoxetine (PROZAC) 20 MG capsule Take 1 capsule (20 mg) by mouth daily     lisinopril (PRINIVIL/ZESTRIL) 20 MG tablet Take 20 mg by  mouth     metoprolol tartrate (LOPRESSOR) 50 MG tablet Take 1 tablet (50 mg) by mouth 2 times daily     gabapentin (NEURONTIN) 300 MG capsule Take one capsule at bedtime for two more days (8/27 and 8/28).  Then take one capsule after lunch and one capsule at bedtime (two capsules per day) for three days (8/29, 8/30, and 8/31).  Then, increase to one capsule in the morning, one in the afternoon, and one capsule at bedtime (three capsules per day).  If side effects (sleepiness, dizziness, personality changes) are worse than your pain, then please decrease the dose or discontinue. (Patient not taking: Reported on 9/10/2018)     No current facility-administered medications for this visit.      No Known Allergies    ROS:  12 point review of systems negative other than symptoms noted below.    OBJECTIVE:     /88 (BP Location: Right arm, Cuff Size: Adult Regular)  Pulse 72  Wt 210 lb 11.2 oz (95.6 kg)  LMP 08/06/2018  BMI 37.32 kg/m2  Body mass index is 37.32 kg/(m^2).    Exam:  Female appearing older than stated age, in NAD  Remainder of exam deferred at this time.    In-Clinic Test Results:  Results for orders placed or performed in visit on 09/10/18 (from the past 24 hour(s))   Follicle stimulating hormone   Result Value Ref Range    FSH 7.7 IU/L   Lutropin   Result Value Ref Range    Lutropin 7.9 IU/L       ASSESSMENT/PLAN:                                                        ICD-10-CM    1. Excessive or frequent menstruation N92.0 Follicle stimulating hormone     Lutropin     US Pelvic Complete w Transvaginal     Hemoglobin A1c   2. Morbid obesity (H) E66.01 Hemoglobin A1c     Will do labs today: LH, FSH, hgA1c. Pelvic ultrasound. Will rtc after ultrasound for endometrial biopsy and to discuss lab results.  Patient with significant risk factors for endometrial hyperplasia/adenocarcinoma.    I spent 30 minutes with patient, greater than one half devoted to coordination of care for diagnosis and plan  above.      Flori Beyer MD  [facility]

## 2018-09-11 NOTE — TELEPHONE ENCOUNTER
Reason for Call: Request for an order or referral:    Order or referral being requested: Verbal order for Speech Therapy.  Also, OT would like to increase visits to 2-3 times a week.    Date needed: as soon as possible    Has the patient been seen by the PCP for this problem? Not Applicable    Additional comments: NA    Phone number Patient can be reached at:  Home number on file [phone] (home)    Best Time:  any    Can we leave a detailed message on this number?  YES    Call taken on 9/11/2018 at 1:06 PM by Marva Elder

## 2018-09-12 ENCOUNTER — RADIANT APPOINTMENT (OUTPATIENT)
Dept: ULTRASOUND IMAGING | Facility: OTHER | Age: 43
End: 2018-09-12
Attending: OBSTETRICS & GYNECOLOGY
Payer: MEDICAID

## 2018-09-12 ENCOUNTER — HOSPITAL ENCOUNTER (EMERGENCY)
Facility: CLINIC | Age: 43
Discharge: HOME | End: 2018-09-12
Attending: FAMILY MEDICINE | Admitting: FAMILY MEDICINE
Payer: MEDICAID

## 2018-09-12 VITALS
WEIGHT: 210 LBS | RESPIRATION RATE: 24 BRPM | BODY MASS INDEX: 37.2 KG/M2 | DIASTOLIC BLOOD PRESSURE: 87 MMHG | HEART RATE: 62 BPM | SYSTOLIC BLOOD PRESSURE: 136 MMHG | OXYGEN SATURATION: 96 % | TEMPERATURE: 97.8 F

## 2018-09-12 DIAGNOSIS — N92.0 EXCESSIVE OR FREQUENT MENSTRUATION: ICD-10-CM

## 2018-09-12 DIAGNOSIS — I10 ESSENTIAL HYPERTENSION: ICD-10-CM

## 2018-09-12 DIAGNOSIS — R20.0 LEFT ARM NUMBNESS: ICD-10-CM

## 2018-09-12 PROCEDURE — 76830 TRANSVAGINAL US NON-OB: CPT

## 2018-09-12 PROCEDURE — 93005 ELECTROCARDIOGRAM TRACING: CPT | Performed by: FAMILY MEDICINE

## 2018-09-12 PROCEDURE — 76856 US EXAM PELVIC COMPLETE: CPT

## 2018-09-12 PROCEDURE — 25000132 ZZH RX MED GY IP 250 OP 250 PS 637: Performed by: FAMILY MEDICINE

## 2018-09-12 PROCEDURE — 93010 ELECTROCARDIOGRAM REPORT: CPT | Mod: Z6 | Performed by: FAMILY MEDICINE

## 2018-09-12 PROCEDURE — 99283 EMERGENCY DEPT VISIT LOW MDM: CPT | Performed by: FAMILY MEDICINE

## 2018-09-12 PROCEDURE — 99284 EMERGENCY DEPT VISIT MOD MDM: CPT | Mod: 25 | Performed by: FAMILY MEDICINE

## 2018-09-12 RX ORDER — METOPROLOL TARTRATE 50 MG
50 TABLET ORAL ONCE
Status: COMPLETED | OUTPATIENT
Start: 2018-09-12 | End: 2018-09-12

## 2018-09-12 RX ADMIN — METOPROLOL TARTRATE 50 MG: 50 TABLET ORAL at 20:07

## 2018-09-12 NOTE — ED AVS SNAPSHOT
[facility]    [address] [place] [address]    Phone:  [phone]    Fax:  [phone]                                       Alicia Penaloza   MRN: [id number]    Department:  [facility]   Date of Visit:  9/12/2018           After Visit Summary Signature Page     I have received my discharge instructions, and my questions have been answered. I have discussed any challenges I see with this plan with the nurse or doctor.    ..........................................................................................................................................  Patient/Patient Representative Signature      ..........................................................................................................................................  Patient Representative Print Name and Relationship to Patient    ..................................................               ................................................  Date                                   Time    ..........................................................................................................................................  Reviewed by Signature/Title    ...................................................              ..............................................  Date                                               Time          22EPIC Rev 08/18

## 2018-09-12 NOTE — ED AVS SNAPSHOT
[facility] Emergency Department    [address] [place] [address]    Phone:  [phone]    Fax:  [phone]                                       Alicia Penaloza   MRN: [id number]    Department:  [facility] Emergency Department   Date of Visit:  9/12/2018           Patient Information     Date Of Birth          1975        Your diagnoses for this visit were:     Left arm numbness     Essential hypertension        You were seen by Gio Leonard MD.      Follow-up Information     Follow up with Augusto Thomas MD In 2 days.    Specialty:  Internal Medicine    Contact information:    [address] [place] [address]  [phone]          Follow up with [facility] Emergency Department.    Specialty:  EMERGENCY MEDICINE    Why:  If symptoms worsen    Contact information:    [address] [place] [address] [address] [place] [address] [phone]    Additional information:    From [address]: Exit at [address] on south side of [place]. Turn right on [address]. Turn left at stoplight on [address]. [facility] will be in view two blocks ahead        Discharge Instructions       Thank you for giving us the opportunity to see you. The impression is that you are having left arm and left thigh numbness and tingling.  This does not seem to be associated with a new stroke, but may be related to your high blood pressure.    Your symptoms seem to be reproduced with the blood pressure cuff squeezing her arm and this suggests more of a peripheral nerve.    Your blood pressure was definitely elevated, and you received an extra dose of metoprolol tonight.    Please purchase a blood pressure monitor and check your blood pressure and heart rate a couple of times a day.    We will try to get you a work in appointment with Dr. Thomas for Friday.    After discharge, please closely monitor for any new or worsening symptoms. Return to the Emergency Department at any time  if your symptoms worsen.        Discharge References/Attachments     PARAESTHESIAS (ENGLISH)      Your next 10 appointments already scheduled     Sep 14, 2018  8:00 AM CDT   Office Visit with Flori Beyer MD   [facility] ([facility]    [address] [address] [phone]           Bring a current list of meds and any records pertaining to this visit. For Physicals, please bring immunization records and any forms needing to be filled out. Please arrive 10 minutes early to complete paperwork.            Sep 14, 2018 12:00 PM CDT   SHORT with Augusto Thomas MD   [facility]    [address] [address] [phone]              24 Hour Appointment Hotline       To make an appointment at any [facility], call [phone] ([phone]). If you don't have a family doctor or clinic, we will help you find one. [facility] are conveniently located to serve the needs of you and your family.             Review of your medicines      Our records show that you are taking the medicines listed below. If these are incorrect, please call your family doctor or clinic.        Dose / Directions Last dose taken    aspirin 325 MG tablet   Dose:  325 mg        Take 325 mg by mouth   Refills:  0        atorvastatin 40 MG tablet   Commonly known as:  LIPITOR   Dose:  40 mg        Take 40 mg by mouth   Refills:  0        FLUoxetine 20 MG capsule   Commonly known as:  PROzac   Dose:  20 mg   Quantity:  30 capsule        Take 1 capsule (20 mg) by mouth daily   Refills:  0        gabapentin 300 MG capsule   Commonly known as:  NEURONTIN   Quantity:  90 capsule        Take one capsule at bedtime for two more days (8/27 and 8/28).  Then take one capsule after lunch and one capsule at bedtime (two capsules per day) for three days (8/29, 8/30, and 8/31).  Then, increase to one capsule in the morning, one in  "the afternoon, and one capsule at bedtime (three capsules per day).  If side effects (sleepiness, dizziness, personality changes) are worse than your pain, then please decrease the dose or discontinue.   Refills:  0        lisinopril 20 MG tablet   Commonly known as:  PRINIVIL/ZESTRIL   Dose:  20 mg        Take 20 mg by mouth   Refills:  0        metoprolol tartrate 50 MG tablet   Commonly known as:  LOPRESSOR   Dose:  50 mg   Quantity:  180 tablet        Take 1 tablet (50 mg) by mouth 2 times daily   Refills:  3                Procedures and tests performed during your visit     EKG 12-lead      Orders Needing Specimen Collection     None      Pending Results     No orders found from 9/10/2018 to 9/13/2018.            Pending Culture Results     No orders found from 9/10/2018 to 9/13/2018.            Pending Results Instructions     If you had any lab results that were not finalized at the time of your Discharge, you can call the ED Lab Result RN at [phone]. You will be contacted by this team for any positive Lab results or changes in treatment. The nurses are available 7 days a week from 10A to 6:30P.  You can leave a message 24 hours per day and they will return your call.        Thank you for choosing [facility]       Thank you for choosing [facility] for your care. Our goal is always to provide you with excellent care. Hearing back from our patients is one way we can continue to improve our services. Please take a few minutes to complete the written survey that you may receive in the mail after you visit with us. Thank you!        [employer] Information     [employer] lets you send messages to your doctor, view your test results, renew your prescriptions, schedule appointments and more. To sign up, go to [email or web address] . Click on \"Log in\" on the left side of the screen, which will take you to the Welcome page. Then click on \"Sign up Now\" on the right side of the page.     You will be asked to enter the " access code listed below, as well as some personal information. Please follow the directions to create your username and password.     Your access code is: [email or web address]  Expires: 2018  8:53 PM     Your access code will  in 90 days. If you need help or a new code, please call your [place] clinic or [phone].        Care EveryWhere ID     This is your Care EveryWhere ID. This could be used by other organizations to access your [place] medical records  [id number]        Equal Access to Services     [facility]: Haddennis Noel, mayelin infante, radha uribealbutch guerrero, anjelica ariza . So [facility] [phone].    ATENCIÓN: Si habla español, tiene a finnegan disposición servicios gratuitos de asistencia lingüística. Llame al [phone].    We comply with applicable federal civil rights laws and [place] laws. We do not discriminate on the basis of race, color, national origin, age, disability, sex, sexual orientation, or gender identity.            After Visit Summary       This is your record. Keep this with you and show to your community pharmacist(s) and doctor(s) at your next visit.

## 2018-09-13 NOTE — ED TRIAGE NOTES
Pt has hx of stroke , pt crying, anxious, states onset of left arm pain from shoulder to elbow, started ten minutes before arrival

## 2018-09-13 NOTE — DISCHARGE INSTRUCTIONS
Thank you for giving us the opportunity to see you. The impression is that you are having left arm and left thigh numbness and tingling.  This does not seem to be associated with a new stroke, but may be related to your high blood pressure.    Your symptoms seem to be reproduced with the blood pressure cuff squeezing her arm and this suggests more of a peripheral nerve.    Your blood pressure was definitely elevated, and you received an extra dose of metoprolol tonight.    Please purchase a blood pressure monitor and check your blood pressure and heart rate a couple of times a day.    We will try to get you a work in appointment with Dr. Thomas for Friday.    After discharge, please closely monitor for any new or worsening symptoms. Return to the Emergency Department at any time if your symptoms worsen.

## 2018-09-13 NOTE — ED PROVIDER NOTES
[facility] ED Provider Note   CC:     Chief Complaint   Patient presents with     Arm Pain     HPI:  Alicia Penaloza is a 43 year old female with a history of recent acute ischemic stroke at the beginning of August who presented to the emergency department with recurrence of left arm numbness and tingling.  Most of the story is obtained from the patient's sister.  Patient had an acute stroke in early August.  She was airlifted to [facility].  She received TPA and had a prolonged hospitalization of about 13 days.  She was discharged home to receive some therapy, and has been living with her sister during this time.  She came back to the emergency department a couple of weeks ago with some possible neuro symptoms and was sent to the [facility].  While she was there she developed the same symptoms of intermittent numbness and tingling in the left arm.  She eventually was sent home with a muscle relaxer, but it made her too sleepy.  She had this medication discontinued but she did not have any further complaints.  Today she has been extremely emotional which is not unusual since she has had her stroke.  She was started on Prozac a week ago for the emotional lability.  She has a history of hypertension and is on 2 blood pressure medications.  She is on a medication for cholesterol and full dose aspirin.  She has not had any new neuro symptoms such as vision disturbance, focal weakness, facial numbness.  She does however have slight numbness in the left thigh which she has never had before.  There is been no change in her gait, swallowing, or speech.    Problem List:  Patient Active Problem List    Diagnosis     Acute CVA (cerebrovascular accident) (H)     Benign essential hypertension     Acute ischemic stroke (H)     Vaginal bleeding     Hypertensive urgency       MEDS:   Discharge Medication List as of 9/12/2018  9:31 PM       CONTINUE these medications which have NOT CHANGED    Details   aspirin 325 MG tablet Take 325 mg by mouth, Historical      atorvastatin (LIPITOR) 40 MG tablet Take 40 mg by mouth, Historical      FLUoxetine (PROZAC) 20 MG capsule Take 1 capsule (20 mg) by mouth daily, Disp-30 capsule, R-0, Local Print      gabapentin (NEURONTIN) 300 MG capsule Take one capsule at bedtime for two more days (8/27 and 8/28).  Then take one capsule after lunch and one capsule at bedtime (two capsules per day) for three days (8/29, 8/30, and 8/31).  Then, increase to one capsule in the morning, one in the afternoon , and one capsule at bedtime (three capsules per day).  If side effects (sleepiness, dizziness, personality changes) are worse than your pain, then please decrease the dose or discontinue., Disp-90 capsule, R-0, Local Print      lisinopril (PRINIVIL/ZESTRIL) 20 MG tablet Take 20 mg by mouth, Historical      metoprolol tartrate (LOPRESSOR) 50 MG tablet Take 1 tablet (50 mg) by mouth 2 times daily, Disp-180 tablet, R-3, E-PrescribeProfile Rx: patient will contact pharmacy when needed             ALLERGIES:  No Known Allergies    History reviewed. No pertinent surgical history.    Social History   Substance Use Topics     Smoking status: Former Smoker     Quit date: 8/7/2018     Smokeless tobacco: Never Used     Alcohol use No         Review of Systems   Except as noted in HPI, all other systems were reviewed and are negative    Physical Exam     Vitals were reviewed  Patient Vitals for the past 8 hrs:   BP Temp Temp src Pulse Resp SpO2 Weight   09/12/18 2040 136/87 - - 62 - 96 % -   09/12/18 2038 (!) 145/97 - - 60 - 98 % -   09/12/18 2000 - - - 66 - - -   09/12/18 1957 (!) 159/109 - - 63 - - -   09/12/18 1956 (!) 181/111 - - - - - -   09/12/18 1919 (!) 182/167 97.8  F (36.6  C) Oral 69 24 100 % 95.3 kg (210 lb)     GENERAL APPEARANCE: Alert and oriented, patient is tearful; she is asking her sister to answer questions for  me  FACE: normal facies; no asymmetry  EYES: Pupils are equal some: Extraocular muscles are intact and appear normal  HENT: normal external exam; tongue protrudes in the midline.  NECK: no adenopathy or asymmetry; no carotid bruits  RESP: normal respiratory effort; clear breath sounds bilaterally  CV: regular rate and rhythm; no significant murmurs, gallops or rubs  ABD: soft, no tenderness; no rebound or guarding; bowel sounds are normal  MS: no gross deformities noted; normal muscle tone.  EXT: No calf tenderness or pitting edema  SKIN: no worrisome rash  NEURO: no facial droop; no focal deficits, speech is normal; patient reports very minimal difference in sensation from right arm to the left arm.        Available Lab/Imaging Results   Labs were deferred    EKG reviewed by me: Sinus bradycardia with heart rate of 58.  Normal WA, QT and QRS intervals.  Normal axis.  No acute ST-T wave changes.             Impression     Final diagnoses:   Left arm numbness   Essential hypertension         ED Course & Medical Decision Making   Alicia Penaloza is a 43 year old female who presented to the emergency department with recurrence of left arm numbness and tingling, and reported new left thigh numbness.  Patient had a bilateral ischemic stroke in August.  There is no obvious source or cause for her ischemic stroke.  She did receive TPA, and a complete neurologic workup at [facility].  She was subsequently discharged home to receive speech and physical therapy, and has been staying with her sister during this time.  She had some intermittent numbness and tingling when she was in the hospital involving the left arm, but it seemed to resolve.  There was some thought that it was due to some muscle tension and she was taking a muscle relaxer but stopped the medication because it was making her sleepy.  Her symptoms seem to have returned over the last 1-2 days but not accompanied by any other neurologic symptom.  Patient was seen  shortly after arrival.  Blood pressure was noted to be quite high at 181/111.  This was checked in the left arm, and in the process of checking her blood pressure, patient reported reproduction of the numbness and tingling.  Her blood pressure was 159/109 in the right arm.  Due to her high blood pressure, I was concerned that this was the possible cause for her paresthesia.  An EKG was done and reveals no acute changes.  Patient received a dose of metoprolol 50 mg, and her blood pressure did improve to 136/87.  I recommended that she purchase a blood pressure monitor and check this more frequently.  Record her blood pressure and heart rate and follow-up with her primary care provider in the next 2-3 days.  We will try to get a work in appointment for her.           Written after-visit summary and instructions were given at the time of discharge.      Discharge Medication List as of 9/12/2018  9:31 PM               This note was dictated using electronic voice recognition software and although reviewed, may contain grammatical and spelling errors.        Gio Leonard MD  09/13/18 0242

## 2018-09-14 ENCOUNTER — OFFICE VISIT (OUTPATIENT)
Dept: OBGYN | Facility: OTHER | Age: 43
End: 2018-09-14
Payer: MEDICAID

## 2018-09-14 VITALS
BODY MASS INDEX: 37.16 KG/M2 | DIASTOLIC BLOOD PRESSURE: 80 MMHG | HEART RATE: 64 BPM | WEIGHT: 209.8 LBS | SYSTOLIC BLOOD PRESSURE: 116 MMHG

## 2018-09-14 DIAGNOSIS — Z12.4 SCREENING FOR CERVICAL CANCER: ICD-10-CM

## 2018-09-14 DIAGNOSIS — N92.0 EXCESSIVE OR FREQUENT MENSTRUATION: Primary | ICD-10-CM

## 2018-09-14 PROBLEM — E66.01 MORBID OBESITY (H): Status: ACTIVE | Noted: 2018-09-14

## 2018-09-14 PROCEDURE — G0145 SCR C/V CYTO,THINLAYER,RESCR: HCPCS | Performed by: OBSTETRICS & GYNECOLOGY

## 2018-09-14 PROCEDURE — 58100 BIOPSY OF UTERUS LINING: CPT | Mod: 52 | Performed by: OBSTETRICS & GYNECOLOGY

## 2018-09-14 PROCEDURE — G0476 HPV COMBO ASSAY CA SCREEN: HCPCS | Performed by: OBSTETRICS & GYNECOLOGY

## 2018-09-14 NOTE — PROGRESS NOTES
INDICATIONS:                                                    Is a pregnancy test required: No.  Was a consent obtained?  Yes    Having endometrial biopsy for abnormal uterine bleeding       PROCEDURE;                                                      A speculum was placed in the vagina, a pap smear was performed, then the cervix was prepped with betadine. A tenaculum was attached to the cervix. A small plastic 5 mm Pipelle syringe curette was inserted into the cervical canal and was unable to pass the internal cervical os even with dilation.  Cervix sounded to 4 cm at that point.  The procedure was aborted. The speculum was removed.    The patient tolerated the attempted endometrial biopsy well and she reported there was no cramping.      POST PROCEDURE;                                                      There  was no cramping at the time of discharge. She  tolerated the procedure well. There were no complications. Patient was discharged in stable condition.    Patient advised to call the clinic if severe pelvic pain, fever or heavy bleeding.  She has been set up for a pre-op consult for hysteroscopy, D & C.  Patient would like to pursue definitive treatment for her menometrorrhagia with a hysterectomy with BSO as she is having a lot of mood lability in moe-menopause.    Flori Beyer MD

## 2018-09-14 NOTE — MR AVS SNAPSHOT
After Visit Summary   9/14/2018    Alicia Penaloza    MRN: [id number]           Patient Information     Date Of Birth          1975        Visit Information        Provider Department      9/14/2018 8:00 AM Flori Beyer MD [facility]        Today's Diagnoses     Pregnancy examination or test, pregnancy unconfirmed    -  1    Screening for cervical cancer           Follow-ups after your visit        Your next 10 appointments already scheduled     Sep 17, 2018  2:45 PM CDT   [facility]    [address] [address] [phone]            Sep 24, 2018  8:00 AM CDT   Office Visit with Yosef Garg MD   [facility]    [address] [address] [phone]           Bring a current list of meds and any records pertaining to this visit. For Physicals, please bring immunization records and any forms needing to be filled out. Please arrive 10 minutes early to complete paperwork.              Who to contact     If you have questions or need follow up information about today's clinic visit or your schedule please contact [facility] directly at [phone].  Normal or non-critical lab and imaging results will be communicated to you by [employer], letter or phone within 4 business days after the clinic has received the results. If you do not hear from us within 7 days, please contact the clinic through [employer] or phone. If you have a critical or abnormal lab result, we will notify you by phone as soon as possible.  Submit refill requests through [employer] or call your pharmacy and they will forward the refill request to us. Please allow 3 business days for your refill to be completed.          Additional Information About Your Visit        [employer] Information     [employer] lets you send messages to your  "doctor, view your test results, renew your prescriptions, schedule appointments and more. To sign up, go to [email or web address] . Click on \"Log in\" on the left side of the screen, which will take you to the Welcome page. Then click on \"Sign up Now\" on the right side of the page.     You will be asked to enter the access code listed below, as well as some personal information. Please follow the directions to create your username and password.     Your access code is: [email or web address]  Expires: 2018  8:53 PM     Your access code will  in 90 days. If you need help or a new code, please call your [place] clinic or [phone].        Care EveryWhere ID     This is your Care EveryWhere ID. This could be used by other organizations to access your [place] medical records  [id number]        Your Vitals Were     Pulse Last Period BMI (Body Mass Index)             64 2018 (Approximate) 37.16 kg/m2          Blood Pressure from Last 3 Encounters:   18 116/80   18 136/87   09/10/18 126/88    Weight from Last 3 Encounters:   18 209 lb 12.8 oz (95.2 kg)   18 210 lb (95.3 kg)   09/10/18 210 lb 11.2 oz (95.6 kg)              We Performed the Following     Pap imaged thin layer screen with HPV - recommended age 30 - 65 years (select HPV order below)        Primary Care Provider Office Phone # Fax #    Augusto Thomas [phone]       [address] [address]        Equal Access to Services     [facility]: Hadii helio Noel, mayelin infante, qaanjelica hudson. So [facility] [phone].    ATENCIÓN: Si habla español, tiene a finnegan disposición servicios gratuitos de asistencia lingüística. Llame al [phone].    We comply with applicable federal civil rights laws and [place] laws. We do not discriminate on the basis of race, color, national origin, age, disability, sex, sexual orientation, or " gender identity.            Thank you!     Thank you for choosing [facility]  for your care. Our goal is always to provide you with excellent care. Hearing back from our patients is one way we can continue to improve our services. Please take a few minutes to complete the written survey that you may receive in the mail after your visit with us. Thank you!             Your Updated Medication List - Protect others around you: Learn how to safely use, store and throw away your medicines at www.disposemymeds.org.          This list is accurate as of 9/14/18  8:39 AM.  Always use your most recent med list.                   Brand Name Dispense Instructions for use Diagnosis    aspirin 325 MG tablet      Take 325 mg by mouth        atorvastatin 40 MG tablet    LIPITOR     Take 40 mg by mouth        FLUoxetine 20 MG capsule    PROzac    30 capsule    Take 1 capsule (20 mg) by mouth daily    Acute ischemic stroke (H), Positive depression screening       gabapentin 300 MG capsule    NEURONTIN    90 capsule    Take one capsule at bedtime for two more days (8/27 and 8/28).  Then take one capsule after lunch and one capsule at bedtime (two capsules per day) for three days (8/29, 8/30, and 8/31).  Then, increase to one capsule in the morning, one in the afternoon, and one capsule at bedtime (three capsules per day).  If side effects (sleepiness, dizziness, personality changes) are worse than your pain, then please decrease the dose or discontinue.    Acute ischemic stroke (H), Pain after cerebrovascular accident (CVA)       lisinopril 20 MG tablet    PRINIVIL/ZESTRIL     Take 20 mg by mouth        metoprolol tartrate 50 MG tablet    LOPRESSOR    180 tablet    Take 1 tablet (50 mg) by mouth 2 times daily    Hypertension, unspecified type

## 2018-09-14 NOTE — LETTER
September 24, 2018    Alicia Penaloza  [address] [address] [address]    Dear Alicia,  We are happy to inform you that your recent Pap smear was normal and the HPV test was negative. No HPV was found at this time.   Therefore we recommend you return in 5 years for your next pap smear and HPV test.  It is still recommended that you return for your annual exam and other preventative testing as directed by your provider.   Please contact the clinic at [phone] with any questions.  Sincerely,    Flori Beyer MD/cmc

## 2018-09-17 ENCOUNTER — OFFICE VISIT (OUTPATIENT)
Dept: INTERNAL MEDICINE | Facility: CLINIC | Age: 43
End: 2018-09-17
Payer: MEDICAID

## 2018-09-17 VITALS
RESPIRATION RATE: 16 BRPM | BODY MASS INDEX: 37.02 KG/M2 | WEIGHT: 209 LBS | OXYGEN SATURATION: 100 % | DIASTOLIC BLOOD PRESSURE: 84 MMHG | SYSTOLIC BLOOD PRESSURE: 126 MMHG | TEMPERATURE: 97.3 F | HEART RATE: 56 BPM

## 2018-09-17 DIAGNOSIS — F43.22 ADJUSTMENT DISORDER WITH ANXIOUS MOOD: ICD-10-CM

## 2018-09-17 DIAGNOSIS — I63.9 ACUTE ISCHEMIC STROKE (H): ICD-10-CM

## 2018-09-17 DIAGNOSIS — N93.9 VAGINAL BLEEDING: ICD-10-CM

## 2018-09-17 DIAGNOSIS — Z13.31 POSITIVE DEPRESSION SCREENING: ICD-10-CM

## 2018-09-17 DIAGNOSIS — I63.9 ACUTE CVA (CEREBROVASCULAR ACCIDENT) (H): Primary | ICD-10-CM

## 2018-09-17 DIAGNOSIS — I10 BENIGN ESSENTIAL HYPERTENSION: ICD-10-CM

## 2018-09-17 PROCEDURE — 99214 OFFICE O/P EST MOD 30 MIN: CPT | Performed by: INTERNAL MEDICINE

## 2018-09-17 RX ORDER — LORAZEPAM 1 MG/1
0.5-1 TABLET ORAL EVERY 8 HOURS PRN
Qty: 12 TABLET | Refills: 0 | Status: SHIPPED | OUTPATIENT
Start: 2018-09-17 | End: 2020-02-18

## 2018-09-17 RX ORDER — LISINOPRIL 20 MG/1
20 TABLET ORAL DAILY
Qty: 90 TABLET | Refills: 1 | Status: SHIPPED | OUTPATIENT
Start: 2018-09-17

## 2018-09-17 RX ORDER — ATORVASTATIN CALCIUM 40 MG/1
40 TABLET, FILM COATED ORAL DAILY
Qty: 90 TABLET | Refills: 1 | Status: SHIPPED | OUTPATIENT
Start: 2018-09-17

## 2018-09-17 ASSESSMENT — PAIN SCALES - GENERAL: PAINLEVEL: NO PAIN (0)

## 2018-09-17 NOTE — MR AVS SNAPSHOT
After Visit Summary   9/17/2018    Alicia Penaloza    MRN: [id number]           Patient Information     Date Of Birth          1975        Visit Information        Provider Department      9/17/2018 2:45 PM Augusto Thomas MD [facility]        Today's Diagnoses     Adjustment disorder with anxious mood    -  1    Vaginal bleeding        Benign essential hypertension        Acute CVA (cerebrovascular accident) (H)        Acute ischemic stroke (H)        Positive depression screening           Follow-ups after your visit        Your next 10 appointments already scheduled     Sep 20, 2018  8:00 AM CDT   CT SOFT TISSUE NECK W CONTRAST with PHCT1   [facility] CT Scan ([facility])    [address] [place] [address] [phone]           How do I prepare for my exam? (Food and drink instructions) **You will have contrast for this exam.** Do not eat or drink for 2 hours before your exam. If you need to take medicine, you may take it with small sips of water. (We may ask you to take liquid medicine as well.)  The day before your exam, drink extra fluids at least six 8-ounce glasses (unless your doctor tells you to restrict your fluids).  How do I prepare for my exam? (Other instructions) Patients over 70 or patients with diabetes or kidney problems: If you haven t had a blood test (creatinine test) within the last 30 days, the Cardiologist/Radiologist may require you to get this test prior to your exam.  What should I wear: Please wear loose clothing, such as a sweat suit or jogging clothes.  Avoid snaps, zippers and other metal. We may ask you to undress and put on a hospital gown.  How long does the exam take: Most scans take less than 20 minutes.  What should I bring: Please bring any scans or X-rays taken at other hospitals, if similar tests were done. Also bring a list of your medicines, including vitamins, minerals and over-the-counter drugs.  It is safest to leave personal items at home.  Do I need a :  No  is needed.  What do I need to tell my doctor? Be sure to tell your doctor: * If you have any allergies. * If there s any chance you are pregnant. * If you are breastfeeding. * If you have diabetes as your medication may need to be adjusted for this exam.  What should I do after the exam: No restrictions, You may resume normal activities.  What is this test: A CT (computed tomography) scan is a series of pictures that allows us to look inside your body. The scanner creates images of the body in cross sections, much like slices of bread. This helps us see any problems more clearly. You may receive contrast (X-ray dye) before or during your scan. Contrast is given through an IV (small needle in your arm).  Who should I call with questions: If you have any questions, please call the Imaging Department where you will have your exam. Directions, parking instructions, and other information is available on our website, [email or web address].            Sep 24, 2018  8:00 AM CDT   Office Visit with [facility]osef [facility]arg MD   [facility]    [address] [facility] [address] [phone]           Bring a current list of meds and any records pertaining to this visit. For Physicals, please bring immunization records and any forms needing to be filled out. Please arrive 10 minutes early to complete paperwork.              Future tests that were ordered for you today     Open Future Orders        Priority Expected Expires Ordered    CT Soft Tissue Neck w Contrast Routine  9/17/2019 9/17/2018            Who to contact     If you have questions or need follow up information about today's clinic visit or your schedule please contact [facility] directly at [phone].  Normal or non-critical lab and imaging results will be communicated to you by MyChart, letter or phone within 4  "business days after the clinic has received the results. If you do not hear from us within 7 days, please contact the clinic through [employer] or phone. If you have a critical or abnormal lab result, we will notify you by phone as soon as possible.  Submit refill requests through [employer] or call your pharmacy and they will forward the refill request to us. Please allow 3 business days for your refill to be completed.          Additional Information About Your Visit        [employer] Information     [employer] lets you send messages to your doctor, view your test results, renew your prescriptions, schedule appointments and more. To sign up, go to [email or web address] . Click on \"Log in\" on the left side of the screen, which will take you to the Welcome page. Then click on \"Sign up Now\" on the right side of the page.     You will be asked to enter the access code listed below, as well as some personal information. Please follow the directions to create your username and password.     Your access code is: [email or web address]  Expires: 2018  8:53 PM     Your access code will  in 90 days. If you need help or a new code, please call your [facility] clinic or [phone].        Care EveryWhere ID     This is your Care EveryWhere ID. This could be used by other organizations to access your [facility] medical records  [id number]        Your Vitals Were     Pulse Temperature Respirations Pulse Oximetry BMI (Body Mass Index)       56 97.3  F (36.3  C) (Temporal) 16 100% 37.02 kg/m2        Blood Pressure from Last 3 Encounters:   18 126/84   18 116/80   18 136/87    Weight from Last 3 Encounters:   18 209 lb (94.8 kg)   18 209 lb 12.8 oz (95.2 kg)   18 210 lb (95.3 kg)                 Today's Medication Changes          These changes are accurate as of 18  3:27 PM.  If you have any questions, ask your nurse or doctor.               Start taking these medicines.        Dose/Directions    " LORazepam 1 MG tablet   Commonly known as:  ATIVAN   Used for:  Adjustment disorder with anxious mood   Started by:  Augusto Thomas MD        Dose:  0.5-1 mg   Take 0.5-1 tablets (0.5-1 mg) by mouth every 8 hours as needed for anxiety Take 30 minutes prior to departure.  Do not operate a vehicle after taking this medication   Quantity:  12 tablet   Refills:  0         These medicines have changed or have updated prescriptions.        Dose/Directions    atorvastatin 40 MG tablet   Commonly known as:  LIPITOR   This may have changed:  when to take this   Used for:  Benign essential hypertension, Acute CVA (cerebrovascular accident) (H)   Changed by:  Augusto Thomas MD        Dose:  40 mg   Take 1 tablet (40 mg) by mouth daily   Quantity:  90 tablet   Refills:  1       lisinopril 20 MG tablet   Commonly known as:  PRINIVIL/ZESTRIL   This may have changed:  when to take this   Used for:  Benign essential hypertension, Acute CVA (cerebrovascular accident) (H)   Changed by:  Augusto Thomas MD        Dose:  20 mg   Take 1 tablet (20 mg) by mouth daily   Quantity:  90 tablet   Refills:  1            Where to get your medicines      These medications were sent to [facility] - [address]   [address] [place] [address]     Phone:  [phone]     atorvastatin 40 MG tablet    FLUoxetine 20 MG capsule    lisinopril 20 MG tablet         Some of these will need a paper prescription and others can be bought over the counter.  Ask your nurse if you have questions.     Bring a paper prescription for each of these medications     LORazepam 1 MG tablet                Primary Care Provider Office Phone # Fax #    Augusto Thomas [phone] [address] [facility] [address]        Equal Access to Services     YADI PITTS AH: rBenda Noel, mayelin infante, anjelica durant. So [facility]  [phone].    ATENCIÓN: Si anitha kelley, tiene a finnegan disposición servicios gratuitos de asistencia lingüística. Jean Carlos dinero [phone].    We comply with applicable federal civil rights laws and [place] laws. We do not discriminate on the basis of race, color, national origin, age, disability, sex, sexual orientation, or gender identity.            Thank you!     Thank you for choosing [facility]  for your care. Our goal is always to provide you with excellent care. Hearing back from our patients is one way we can continue to improve our services. Please take a few minutes to complete the written survey that you may receive in the mail after your visit with us. Thank you!             Your Updated Medication List - Protect others around you: Learn how to safely use, store and throw away your medicines at www.disposemymeds.org.          This list is accurate as of 9/17/18  3:27 PM.  Always use your most recent med list.                   Brand Name Dispense Instructions for use Diagnosis    aspirin 325 MG tablet      Take 325 mg by mouth        atorvastatin 40 MG tablet    LIPITOR    90 tablet    Take 1 tablet (40 mg) by mouth daily    Benign essential hypertension, Acute CVA (cerebrovascular accident) (H)       FLUoxetine 20 MG capsule    PROzac    90 capsule    Take 1 capsule (20 mg) by mouth daily    Acute ischemic stroke (H), Positive depression screening       lisinopril 20 MG tablet    PRINIVIL/ZESTRIL    90 tablet    Take 1 tablet (20 mg) by mouth daily    Benign essential hypertension, Acute CVA (cerebrovascular accident) (H)       LORazepam 1 MG tablet    ATIVAN    12 tablet    Take 0.5-1 tablets (0.5-1 mg) by mouth every 8 hours as needed for anxiety Take 30 minutes prior to departure.  Do not operate a vehicle after taking this medication    Adjustment disorder with anxious mood       metoprolol tartrate 50 MG tablet    LOPRESSOR    180 tablet    Take 1 tablet (50 mg) by mouth 2 times  daily    Hypertension, unspecified type

## 2018-09-17 NOTE — PROGRESS NOTES
SUBJECTIVE:   Alicia Penaloza is a 43 year old female who presents to clinic today for the following health issues:    ED/UC Followup:    Facility:  [facility]  Date of visit: 9/12/18  Reason for visit: Left arm numbness and Htn  Current Status: Follow up     BP and emotions were high before the ER visit.      Left arm still numb from shoulder to elbow.    Left thigh is numb on the left side.    Right sided toes are numb.      BP is good today, she has been following at home and doing well.  120/70 range at home.     Saw OB/GYN did not get her ultrasound results, however the ultrasound shows thickening.  The endometrial biopsy was unable to be done.    Past Medical History:   Diagnosis Date     Hypertension      Stroke (H) 08/07/2018     Current Outpatient Prescriptions   Medication     aspirin 325 MG tablet     atorvastatin (LIPITOR) 40 MG tablet     FLUoxetine (PROZAC) 20 MG capsule     lisinopril (PRINIVIL/ZESTRIL) 20 MG tablet     LORazepam (ATIVAN) 1 MG tablet     metoprolol tartrate (LOPRESSOR) 50 MG tablet     [DISCONTINUED] atorvastatin (LIPITOR) 40 MG tablet     [DISCONTINUED] FLUoxetine (PROZAC) 20 MG capsule     [DISCONTINUED] lisinopril (PRINIVIL/ZESTRIL) 20 MG tablet     No current facility-administered medications for this visit.      Social History   Substance Use Topics     Smoking status: Former Smoker     Quit date: 8/7/2018     Smokeless tobacco: Never Used     Alcohol use No     Review of Systems  Constitutional-No fevers, chills, or weight changes..  ENT-No earpain, sore throat, voice changes or rhinitis.  Cardiac-No chest pain or palpitations.  Respiratory-No cough, sob, or hemoptysis.  GI-No nausea, vomitting, diarrhea, constipation, or blood in the stool.    Physical Exam  /84  Pulse 56  Temp 97.3  F (36.3  C) (Temporal)  Resp 16  Wt 209 lb (94.8 kg)  SpO2 100%  BMI 37.02 kg/m2  General Appearance-healthy, alert, no distress  Cardiac-regular rate and rhythm  with normal S1,  S2 ; no murmur, rub or gallops  Lungs-clear to auscultation  Extremities-no peripheral edema, peripheral pulses normal  Neurological-Cranial nerves 2-12 intact, motor strength intact, sensation decreased in left upper arm    ASSESSMENT:  43-year-old woman who had severe stroke affecting her left arm and leg, and some facial droop she is down the [facility].  She has had some more episodes with emotional lability probably related to her stroke, elevated blood pressure was in the emergency room a negative workup there.  Blood pressure since is been stable but she continues to have some numbness in her left upper arm and left upper thigh.  We are getting her back in to see neurology and the stroke team at the [facility].  She is already on treatment for blood pressure, cholesterol, anticoagulation.    For her emotional lability we tried her on Prozac but I think this is more related to her stroke and something she may have to learn to deal with.  I will give her some Ativan to use only as needed from her sister to call or break these events.    Vaginal bleeding she will be seeing OB/GYN again next week, her ultrasound showed thickening but they are unable to do a uterine biopsy.  She may need a hysteroscopy which I think will be okay but she should not have a surgery such as hysterectomy due to the recent stroke    Electronically signed by Augusto Thomas MD  .

## 2018-09-18 LAB
COPATH REPORT: NORMAL
PAP: NORMAL

## 2018-09-19 LAB
FINAL DIAGNOSIS: NORMAL
HPV HR 12 DNA CVX QL NAA+PROBE: NEGATIVE
HPV16 DNA SPEC QL NAA+PROBE: NEGATIVE
HPV18 DNA SPEC QL NAA+PROBE: NEGATIVE
SPECIMEN DESCRIPTION: NORMAL
SPECIMEN SOURCE CVX/VAG CYTO: NORMAL

## 2018-09-20 ENCOUNTER — HOSPITAL ENCOUNTER (OUTPATIENT)
Dept: CT IMAGING | Facility: CLINIC | Age: 43
Discharge: HOME | End: 2018-09-20
Attending: INTERNAL MEDICINE | Admitting: INTERNAL MEDICINE
Payer: MEDICAID

## 2018-09-20 DIAGNOSIS — I63.9 ACUTE ISCHEMIC STROKE (H): ICD-10-CM

## 2018-09-20 DIAGNOSIS — I63.9 ACUTE CVA (CEREBROVASCULAR ACCIDENT) (H): ICD-10-CM

## 2018-09-20 PROCEDURE — 25000125 ZZHC RX 250: Performed by: RADIOLOGY

## 2018-09-20 PROCEDURE — 70491 CT SOFT TISSUE NECK W/DYE: CPT

## 2018-09-20 PROCEDURE — 25000128 H RX IP 250 OP 636: Performed by: RADIOLOGY

## 2018-09-20 RX ORDER — IOPAMIDOL 755 MG/ML
500 INJECTION, SOLUTION INTRAVASCULAR ONCE
Status: COMPLETED | OUTPATIENT
Start: 2018-09-20 | End: 2018-09-20

## 2018-09-20 RX ADMIN — IOPAMIDOL 80 ML: 755 INJECTION, SOLUTION INTRAVENOUS at 08:07

## 2018-09-20 RX ADMIN — SODIUM CHLORIDE 68 ML: 9 INJECTION, SOLUTION INTRAVENOUS at 08:07

## 2018-09-24 ENCOUNTER — OFFICE VISIT (OUTPATIENT)
Dept: OBGYN | Facility: OTHER | Age: 43
End: 2018-09-24
Payer: MEDICAID

## 2018-09-24 VITALS
HEART RATE: 60 BPM | SYSTOLIC BLOOD PRESSURE: 112 MMHG | DIASTOLIC BLOOD PRESSURE: 78 MMHG | WEIGHT: 205.4 LBS | BODY MASS INDEX: 36.38 KG/M2

## 2018-09-24 DIAGNOSIS — N93.9 ABNORMAL UTERINE BLEEDING (AUB): Primary | ICD-10-CM

## 2018-09-24 PROCEDURE — 88305 TISSUE EXAM BY PATHOLOGIST: CPT | Performed by: OBSTETRICS & GYNECOLOGY

## 2018-09-24 PROCEDURE — 99214 OFFICE O/P EST MOD 30 MIN: CPT | Mod: 25 | Performed by: OBSTETRICS & GYNECOLOGY

## 2018-09-24 PROCEDURE — 58100 BIOPSY OF UTERUS LINING: CPT | Performed by: OBSTETRICS & GYNECOLOGY

## 2018-09-24 PROCEDURE — 88305 TISSUE EXAM BY PATHOLOGIST: CPT | Mod: 26 | Performed by: OBSTETRICS & GYNECOLOGY

## 2018-09-24 RX ORDER — MEDROXYPROGESTERONE ACETATE 10 MG
10 TABLET ORAL DAILY
Qty: 90 TABLET | Refills: 3 | Status: SHIPPED | OUTPATIENT
Start: 2018-09-24 | End: 2018-10-05 | Stop reason: ALTCHOICE

## 2018-09-24 NOTE — PROCEDURES
"Endometrial Biopsy                                                                       Time Out - \"Pause for the Cause\"  Just before the procedure begins, through verbal and active participation of team members, verify:    Initials   Patient Name    sea   Patient Date of Birth sea   Procedure to be performed  sea   Site, laterality, level or multiples, noting patient position   sea   Relevant images or diagnostics available/displayed   sea   Implants, special equipment or special requirements        sea     Consent:  Written consent signed and scanned into medical record.    Indication: metrorhagia    Using a medium Graves speculum, the cervix was visualized. The cervix was prepped with Betadine. A cervical block was placed with 7cc 1% lidocaine in four quadrants. A single tooth tenaculum was applied to the anterior lip of the cervix. An os finder was advanced through the endometrial canal, then removed. The endometrial pipelle was advanced through the cervix without difficulty and a sample collected. One additional pass was made.  The tenaculum was removed from the cervix and the tenaculum site made hemostatic with pressure.    EBL: 5ml  Complications:  none  Pathology: EMB sample was sent to pathology.  Tolerance of Procedure:  Patient did tolerate the procedure well.     Patient was instructed to call if she experiences any heavy bleeding, severe cramping, or abnormal vaginal discharge.     Will notify patient of results.    Yosef Garg MD  September 24, 2018, 2:02 PM        "

## 2018-09-24 NOTE — MR AVS SNAPSHOT
"              After Visit Summary   9/24/2018    Alicia Penaloza    MRN: [id number]           Patient Information     Date Of Birth          1975        Visit Information        Provider Department      9/24/2018 8:00 AM Yosef Garg MD [facility]        Today's Diagnoses     Abnormal uterine bleeding (AUB)    -  1       Follow-ups after your visit        Follow-up notes from your care team     Return in about 4 weeks (around 10/22/2018) for Follow up abnormal bleeding, new medication.      Your next 10 appointments already scheduled     Antonio 15, 2019 11:20 AM CST   (Arrive by 11:05 AM)   New Stroke with Dex Jackson MD   [facility] Neurology ([facility])    [address]  3rd Floor  [facility] [address] [phone]              Who to contact     If you have questions or need follow up information about today's clinic visit or your schedule please contact [facility] directly at [phone].  Normal or non-critical lab and imaging results will be communicated to you by [employer], letter or phone within 4 business days after the clinic has received the results. If you do not hear from us within 7 days, please contact the clinic through [employer] or phone. If you have a critical or abnormal lab result, we will notify you by phone as soon as possible.  Submit refill requests through [employer] or call your pharmacy and they will forward the refill request to us. Please allow 3 business days for your refill to be completed.          Additional Information About Your Visit        [employer] Information     [employer] lets you send messages to your doctor, view your test results, renew your prescriptions, schedule appointments and more. To sign up, go to [email or web address] . Click on \"Log in\" on the left side of the screen, which will take you to the Welcome page. Then click on \"Sign up Now\" on the right side of the page.     You " will be asked to enter the access code listed below, as well as some personal information. Please follow the directions to create your username and password.     Your access code is: [email or web address]  Expires: 2018  8:53 PM     Your access code will  in 90 days. If you need help or a new code, please call your [facility] clinic or [phone].        Care EveryWhere ID     This is your Care EveryWhere ID. This could be used by other organizations to access your [facility] medical records  [id number]        Your Vitals Were     Pulse Last Period BMI (Body Mass Index)             60 (LMP Unknown) 36.38 kg/m2          Blood Pressure from Last 3 Encounters:   18 112/78   18 126/84   18 116/80    Weight from Last 3 Encounters:   18 205 lb 6.4 oz (93.2 kg)   18 209 lb (94.8 kg)   18 209 lb 12.8 oz (95.2 kg)              We Performed the Following     ENDOMETRIAL BIOPSY W/O CERVICAL DILATION     Surgical pathology exam          Today's Medication Changes          These changes are accurate as of 18  8:52 AM.  If you have any questions, ask your nurse or doctor.               Start taking these medicines.        Dose/Directions    medroxyPROGESTERone 10 MG tablet   Commonly known as:  PROVERA   Used for:  Abnormal uterine bleeding (AUB)   Started by:  Yosef Garg MD        Dose:  10 mg   Take 1 tablet (10 mg) by mouth daily   Quantity:  90 tablet   Refills:  3            Where to get your medicines      These medications were sent to [facility] - [address]   [address] [place] [address]     Phone:  [phone]     medroxyPROGESTERone 10 MG tablet                Primary Care Provider Office Phone # Fax #    Augusto Thomas [phone]       [address] [facility] [address]        Equal Access to Services     LIZANDRO PITTS AH: Hadii aad ku hadasho Sograciela, waaxda luqadaalondra, qaybta kaalmaermias  anjelica guerreroyesenia lokidontrell fergusonaan ah. So [facility] [phone].    ATENCIÓN: Si bridgettela warren, tiene a finnegan disposición servicios gratuitos de asistencia lingüística. Jean Carlos al [phone].    We comply with applicable federal civil rights laws and [place] laws. We do not discriminate on the basis of race, color, national origin, age, disability, sex, sexual orientation, or gender identity.            Thank you!     Thank you for choosing [facility]  for your care. Our goal is always to provide you with excellent care. Hearing back from our patients is one way we can continue to improve our services. Please take a few minutes to complete the written survey that you may receive in the mail after your visit with us. Thank you!             Your Updated Medication List - Protect others around you: Learn how to safely use, store and throw away your medicines at www.disposemymeds.org.          This list is accurate as of 9/24/18  8:52 AM.  Always use your most recent med list.                   Brand Name Dispense Instructions for use Diagnosis    aspirin 325 MG tablet      Take 325 mg by mouth        atorvastatin 40 MG tablet    LIPITOR    90 tablet    Take 1 tablet (40 mg) by mouth daily    Benign essential hypertension, Acute CVA (cerebrovascular accident) (H)       FLUoxetine 20 MG capsule    PROzac    90 capsule    Take 1 capsule (20 mg) by mouth daily    Acute ischemic stroke (H), Positive depression screening       lisinopril 20 MG tablet    PRINIVIL/ZESTRIL    90 tablet    Take 1 tablet (20 mg) by mouth daily    Benign essential hypertension, Acute CVA (cerebrovascular accident) (H)       LORazepam 1 MG tablet    ATIVAN    12 tablet    Take 0.5-1 tablets (0.5-1 mg) by mouth every 8 hours as needed for anxiety Take 30 minutes prior to departure.  Do not operate a vehicle after taking this medication    Adjustment disorder with anxious mood       medroxyPROGESTERone 10 MG tablet     PROVERA    90 tablet    Take 1 tablet (10 mg) by mouth daily    Abnormal uterine bleeding (AUB)       metoprolol tartrate 50 MG tablet    LOPRESSOR    180 tablet    Take 1 tablet (50 mg) by mouth 2 times daily    Hypertension, unspecified type

## 2018-09-24 NOTE — PROGRESS NOTES
Gynecology Clinic Note    HPI: Alicia Penaloza is a 43 year old P0 seen for AUB. She was recently seen by Dr. Beyer for this issue and an EMB was attempted and unable to be completed (Please see notes dated 9/10 & 9/14 for details). She has chronic prolonged uterine bleeding, bleeding for up to three weeks each month, with the bleeding worsening over the last two months. Ultrasound notable for fibroids.    Her medical history in notable for a recent stroke, and per Dr. Thomas, she is not currently a good surgical candidate (see note dated 9/17/18).     ROS: Mood has been labile, report left sided numbness ongoing. Remainder of 10 point ROS negative other than noted in HPI.     PMH:   Past Medical History:   Diagnosis Date     Hypertension      Stroke (H) 08/07/2018     PSHx:   History reviewed. No pertinent surgical history.    Medications:   Current Outpatient Prescriptions   Medication     aspirin 325 MG tablet     atorvastatin (LIPITOR) 40 MG tablet     FLUoxetine (PROZAC) 20 MG capsule     lisinopril (PRINIVIL/ZESTRIL) 20 MG tablet     LORazepam (ATIVAN) 1 MG tablet     medroxyPROGESTERone (PROVERA) 10 MG tablet     metoprolol tartrate (LOPRESSOR) 50 MG tablet     No current facility-administered medications for this visit.        Current Outpatient Prescriptions on File Prior to Visit:  aspirin 325 MG tablet Take 325 mg by mouth   atorvastatin (LIPITOR) 40 MG tablet Take 1 tablet (40 mg) by mouth daily   FLUoxetine (PROZAC) 20 MG capsule Take 1 capsule (20 mg) by mouth daily   lisinopril (PRINIVIL/ZESTRIL) 20 MG tablet Take 1 tablet (20 mg) by mouth daily   LORazepam (ATIVAN) 1 MG tablet Take 0.5-1 tablets (0.5-1 mg) by mouth every 8 hours as needed for anxiety Take 30 minutes prior to departure.  Do not operate a vehicle after taking this medication   metoprolol tartrate (LOPRESSOR) 50 MG tablet Take 1 tablet (50 mg) by mouth 2 times daily     No current facility-administered medications on file prior to  visit.     Allergies:    No Known Allergies    Social History:   Social History     Social History     Marital status: Single     Spouse name: N/A     Number of children: N/A     Years of education: N/A     Occupational History     Not on file.     Social History Main Topics     Smoking status: Former Smoker     Quit date: 8/7/2018     Smokeless tobacco: Never Used     Alcohol use No     Drug use: No     Sexual activity: Not Currently     Other Topics Concern     Not on file     Social History Narrative     Physical Exam:   Vitals:    09/24/18 0802   BP: 112/78   BP Location: Right arm   Cuff Size: Adult Regular   Pulse: 60   Weight: 205 lb 6.4 oz (93.2 kg)      Gen: Alert, oriented, appropriately interactive, NAD  CV: RRR, no murmurs, no extra heart sounds, 2+ peripheral pulses  Resp: CTAB, good effort without distress   Abdomen: soft, obese, non tender, non distended, no masses, no hernias. No inguinal lymphadenopathy. No hepatosplenomegaly  Perineum: no lesions; normal appearing external genitalia, bartholins glands, urethra, skenes glands  Vagina : no masses or lesions or discharge, normally rugated.  Cervix: no masses or lesions or discharge   Bimanual exam:   Urethra nontender   Bladder- nontender and without massess  Uterus- midline , anteverted, mobile , no masses, non-tender  Adnexa - no masses or tenderness   No cervical motion tenderness  Lower extremities: non-tender, no edema  Skin: no lesions or rashes    Imaging:   Pelvic US:  Uterus measures 9.8 x 6.2 x 6.8 cm.  There are two heterogeneous masses in the uterus, the largest of which is along the left side and measures up to 4.8 cm.  Endometrium is 13 mm thick.    The ovaries are normal in size. There is a 2.2 cm cyst in the right ovary and a 3.0 cm cyst in the left ovary.  There are no adnexal masses.  There is no free fluid in the cul-de-sac.    IMPRESSION:  Bulky uterine fibroids. Thickened endometrium may be related to secretory phase.  Simple-appearing bilateral ovarian cysts.    A&P: 43 year old P0 seen for AUB, likely secondary to fibroids. However, risk factors for endometrial malignancy including age, nulliparous, obesity. Will sample endometrium today with referral to GynOnc if indicated by pathology. If benign, patient desires definitive management with hysterectomy, however she is not a fit surgical candidate at this time. Will follow up results and proceed as indicated with discussion of hysteroscopy and D&C with hormonal management if pathology benign.     Yosef Garg MD  9/24/2018 1:45 PM

## 2018-09-29 LAB — COPATH REPORT: NORMAL

## 2018-10-02 ENCOUNTER — ONCOLOGY VISIT (OUTPATIENT)
Dept: ONCOLOGY | Facility: CLINIC | Age: 43
End: 2018-10-02
Payer: MEDICAID

## 2018-10-02 VITALS
OXYGEN SATURATION: 92 % | RESPIRATION RATE: 20 BRPM | SYSTOLIC BLOOD PRESSURE: 110 MMHG | BODY MASS INDEX: 37.32 KG/M2 | DIASTOLIC BLOOD PRESSURE: 70 MMHG | HEART RATE: 70 BPM | TEMPERATURE: 97.3 F | WEIGHT: 210.6 LBS | HEIGHT: 63 IN

## 2018-10-02 DIAGNOSIS — D75.839 THROMBOCYTOSIS: Primary | ICD-10-CM

## 2018-10-02 DIAGNOSIS — E78.5 HYPERLIPIDEMIA LDL GOAL <100: ICD-10-CM

## 2018-10-02 DIAGNOSIS — I63.9 ACUTE CVA (CEREBROVASCULAR ACCIDENT) (H): ICD-10-CM

## 2018-10-02 DIAGNOSIS — I10 BENIGN ESSENTIAL HYPERTENSION: ICD-10-CM

## 2018-10-02 LAB
ERYTHROCYTE [SEDIMENTATION RATE] IN BLOOD BY WESTERGREN METHOD: 42 MM/H (ref 0–20)
FERRITIN SERPL-MCNC: 6 NG/ML (ref 8–252)

## 2018-10-02 PROCEDURE — 85652 RBC SED RATE AUTOMATED: CPT | Performed by: INTERNAL MEDICINE

## 2018-10-02 PROCEDURE — 81403 MOPATH PROCEDURE LEVEL 4: CPT | Performed by: INTERNAL MEDICINE

## 2018-10-02 PROCEDURE — 99205 OFFICE O/P NEW HI 60 MIN: CPT | Performed by: INTERNAL MEDICINE

## 2018-10-02 PROCEDURE — 81219 CALR GENE COM VARIANTS: CPT | Performed by: INTERNAL MEDICINE

## 2018-10-02 PROCEDURE — 82728 ASSAY OF FERRITIN: CPT | Performed by: INTERNAL MEDICINE

## 2018-10-02 PROCEDURE — 36415 COLL VENOUS BLD VENIPUNCTURE: CPT | Performed by: INTERNAL MEDICINE

## 2018-10-02 ASSESSMENT — PAIN SCALES - GENERAL: PAINLEVEL: NO PAIN (0)

## 2018-10-02 NOTE — PATIENT INSTRUCTIONS
Please follow up with Dr. Frye with result review from labs today.      Follow Up Date/Time:     If you have any questions or concerns please feel free to call.    If you need to reschedule please call:  Clinic or Lab Appointment - [phone]  Infusion - [phone]  Imaging - [phone]    Mat Heller RN, BSN, OCN  [facility]   Oncology/Hematology Care Coordinator RN  [facility]  [phone]

## 2018-10-02 NOTE — NURSING NOTE
DISCHARGE PLAN:  Next appointments: See patient instruction section  Departure Mode: Ambulatory  Accompanied by: friend  3 minutes for nursing discharge (face to face time)     Alicia DUDLEY Penaloza is here today for Hematology consult.  Writing nurse seen patient after Medical Oncology appointment to address questions/concerns/coordinate care. Patient to have labs today and follow up with results.  Patient ambulated by nurse to  to schedule follow up and/or lab appointments.  Imaging scheduled if ordered.  See patient instructions and Oncologist's Progress note for further details. Questions and concerns addressed to patient's satisfaction. Patient verbalized and demonstrated understanding of plan.  Contact information provided and patient is encouraged to call with any that arise in the interim of care.    Mat Heller, RN, BSN, OCN   Oncology Care Coordinator RN  [facility]  [phone]  10/2/2018, 4:05 PM

## 2018-10-02 NOTE — MR AVS SNAPSHOT
After Visit Summary   10/2/2018    Alicia Penaloza    MRN: [id number]           Patient Information     Date Of Birth          1975        Visit Information        Provider Department      10/2/2018 2:45 PM Katia Frye MD [facility]        Today's Diagnoses     Benign essential hypertension    -  1      Care Instructions      Please follow up with Dr. Frye with result review from labs today.      Follow Up Date/Time:     If you have any questions or concerns please feel free to call.    If you need to reschedule please call:  Clinic or Lab Appointment - [phone]  Infusion - [phone]  Imaging - [phone]    Mat Heller RN, BSN, OCN  [facility]   Oncology/Hematology Care Coordinator RN  [facility]  [phone]              Follow-ups after your visit        Your next 10 appointments already scheduled     Oct 05, 2018  4:30 PM CDT   Office Visit with Yosef Garg MD   [facility] ([facility])    [address] [place] [id number] [phone]           Bring a current list of meds and any records pertaining to this visit. For Physicals, please bring immunization records and any forms needing to be filled out. Please arrive 10 minutes early to complete paperwork.            Antonio 15, 2019 11:20 AM CST   (Arrive by 11:05 AM)   New Stroke with Dex Jackson MD   [facility] ([facility])    [address] [place] [address] [phone]              Who to contact     If you have questions or need follow up information about today's clinic visit or your schedule please contact [facility] directly at [phone].  Normal or non-critical lab and imaging results will be communicated to you by MyChart, letter or phone within 4 business days after the clinic has received the results. If you do not hear from us  "within 7 days, please contact the clinic through [employer] or phone. If you have a critical or abnormal lab result, we will notify you by phone as soon as possible.  Submit refill requests through [employer] or call your pharmacy and they will forward the refill request to us. Please allow 3 business days for your refill to be completed.          Additional Information About Your Visit        [employer]" Information     [employer] lets you send messages to your doctor, view your test results, renew your prescriptions, schedule appointments and more. To sign up, go to [email or web address] . Click on \"Log in\" on the left side of the screen, which will take you to the Welcome page. Then click on \"Sign up Now\" on the right side of the page.     You will be asked to enter the access code listed below, as well as some personal information. Please follow the directions to create your username and password.     Your access code is: [email or web address]  Expires: 2018  8:53 PM     Your access code will  in 90 days. If you need help or a new code, please call your [facility] clinic or [phone].        Care EveryWhere ID     This is your Care EveryWhere ID. This could be used by other organizations to access your [facility] medical records  [id number]        Your Vitals Were     Pulse Temperature Respirations Height Last Period Pulse Oximetry    70 97.3  F (36.3  C) (Temporal) 20 1.6 m (5' 3\") (LMP Unknown) 92%    BMI (Body Mass Index)                   37.31 kg/m2            Blood Pressure from Last 3 Encounters:   10/02/18 110/70   18 112/78   18 126/84    Weight from Last 3 Encounters:   10/02/18 95.5 kg (210 lb 9.6 oz)   18 93.2 kg (205 lb 6.4 oz)   18 94.8 kg (209 lb)              Today, you had the following     No orders found for display       Primary Care Provider Office Phone # Fax #    Augusto Thomas [phone]        [facility] [address]        Equal Access " to Services     YADI PITTS : Brenda Noel, wacamrynda coraqtyson, qaybta kaalmaermias guerrero, anjelica mercado. So [facility] [phone].    ATENCIÓN: Si bridgettela warren, tiene a finnegan disposición servicios gratuitos de asistencia lingüística. Llame al [phone].    We comply with applicable federal civil rights laws and [place] laws. We do not discriminate on the basis of race, color, national origin, age, disability, sex, sexual orientation, or gender identity.            Thank you!     Thank you for choosing [facility]  for your care. Our goal is always to provide you with excellent care. Hearing back from our patients is one way we can continue to improve our services. Please take a few minutes to complete the written survey that you may receive in the mail after your visit with us. Thank you!             Your Updated Medication List - Protect others around you: Learn how to safely use, store and throw away your medicines at www.disposemymeds.org.          This list is accurate as of 10/2/18  3:05 PM.  Always use your most recent med list.                   Brand Name Dispense Instructions for use Diagnosis    aspirin 325 MG tablet      Take 325 mg by mouth        atorvastatin 40 MG tablet    LIPITOR    90 tablet    Take 1 tablet (40 mg) by mouth daily    Benign essential hypertension, Acute CVA (cerebrovascular accident) (H)       FLUoxetine 20 MG capsule    PROzac    90 capsule    Take 1 capsule (20 mg) by mouth daily    Acute ischemic stroke (H), Positive depression screening       lisinopril 20 MG tablet    PRINIVIL/ZESTRIL    90 tablet    Take 1 tablet (20 mg) by mouth daily    Benign essential hypertension, Acute CVA (cerebrovascular accident) (H)       LORazepam 1 MG tablet    ATIVAN    12 tablet    Take 0.5-1 tablets (0.5-1 mg) by mouth every 8 hours as needed for anxiety Take 30 minutes prior to departure.  Do not operate a vehicle after taking this  medication    Adjustment disorder with anxious mood       medroxyPROGESTERone 10 MG tablet    PROVERA    90 tablet    Take 1 tablet (10 mg) by mouth daily    Abnormal uterine bleeding (AUB)       metoprolol tartrate 50 MG tablet    LOPRESSOR    180 tablet    Take 1 tablet (50 mg) by mouth 2 times daily    Hypertension, unspecified type

## 2018-10-02 NOTE — PROGRESS NOTES
DATE OF VISIT: Oct 2, 2018    REASON FOR REFERRAL: Thrombocytosis    CHIEF COMPLAINT:   Chief Complaint   Patient presents with     Oncology Clinic Visit     blood condition contributing to stroke       HISTORY OF PRESENT ILLNESS:   43-year-old female patient who presented today for evaluation for thrombocytosis.  The patient presented on August 7, 2018 with altered mental status.  Further workup revealed stroke.  She subsequently went on to have tPA.  She was found at the time of admission to have elevated blood pressure as well as hyperlipidemia.  She is a former smoker.  She again presented with a right-sided facial dropping, left-sided numbness and weakness.  Workup at that time revealed left MCA territory and hemorrhagic transformation of an ischemic region in the right thalamus.  Further workup revealed mildly elevated IgM antibody level.  Further hypercoagulability workup revealed no abnormalities.  However her platelet count was elevated.  With a platelet count in the 600,000 range.  Workup for anticardiolipin antibodies came back negative.  The patient had a long history of excessive vaginal bleeding.  Her hemoglobin runs around 9-10 g.  It is here today to discuss elevated platelet count.  She is gradually improving without any new headaches or dizzy episodes.      REVIEW OF SYSTEMS:   Constitutional: Negative for fever, chills, and night sweats.  Skin: negative.  Eyes: negative.  Ears/Nose/Throat: negative.  Respiratory: No shortness of breath, dyspnea on exertion, cough, or hemoptysis.  Cardiovascular: negative.  Gastrointestinal: negative.  Genitourinary: negative.  Musculoskeletal: negative.  Neurologic: negative.  Psychiatric: negative.  Hematologic/Lymphatic/Immunologic: negative.  Endocrine: negative.    PAST MEDICAL HISTORY:   Past Medical History:   Diagnosis Date     Hypertension      Stroke (H) 08/07/2018       PAST SURGICAL HISTORY:   History reviewed. No pertinent surgical  "history.    ALLERGIES:   Allergies as of 10/02/2018     (No Known Allergies)       MEDICATIONS:   Current Outpatient Prescriptions   Medication Sig Dispense Refill     aspirin 325 MG tablet Take 325 mg by mouth       atorvastatin (LIPITOR) 40 MG tablet Take 1 tablet (40 mg) by mouth daily 90 tablet 1     FLUoxetine (PROZAC) 20 MG capsule Take 1 capsule (20 mg) by mouth daily 90 capsule 1     lisinopril (PRINIVIL/ZESTRIL) 20 MG tablet Take 1 tablet (20 mg) by mouth daily 90 tablet 1     medroxyPROGESTERone (PROVERA) 10 MG tablet Take 1 tablet (10 mg) by mouth daily 90 tablet 3     metoprolol tartrate (LOPRESSOR) 50 MG tablet Take 1 tablet (50 mg) by mouth 2 times daily 180 tablet 3     LORazepam (ATIVAN) 1 MG tablet Take 0.5-1 tablets (0.5-1 mg) by mouth every 8 hours as needed for anxiety Take 30 minutes prior to departure.  Do not operate a vehicle after taking this medication 12 tablet 0        FAMILY HISTORY:   No family history of stroke or hypercoagulability states.    SOCIAL HISTORY:   Social History     Social History     Marital status: Single     Spouse name: N/A     Number of children: N/A     Years of education: N/A     Social History Main Topics     Smoking status: Former Smoker     Quit date: 8/7/2018     Smokeless tobacco: Never Used     Alcohol use No     Drug use: No     Sexual activity: Not Currently     Other Topics Concern     None     Social History Narrative       PHYSICAL EXAMINATION:   /70  Pulse 70  Temp 97.3  F (36.3  C) (Temporal)  Resp 20  Ht 1.6 m (5' 3\")  Wt 95.5 kg (210 lb 9.6 oz)  LMP  (LMP Unknown)  SpO2 92%  BMI 37.31 kg/m2  Wt Readings from Last 10 Encounters:   10/02/18 95.5 kg (210 lb 9.6 oz)   09/24/18 93.2 kg (205 lb 6.4 oz)   09/17/18 94.8 kg (209 lb)   09/14/18 95.2 kg (209 lb 12.8 oz)   09/12/18 95.3 kg (210 lb)   09/10/18 95.6 kg (210 lb 11.2 oz)   09/05/18 96.2 kg (212 lb)   08/25/18 95.6 kg (210 lb 12.2 oz)   08/22/18 95.3 kg (210 lb)      GENERAL APPEARANCE: " "Healthy, alert and in no acute distress.  HEENT: Sclerae anicteric. PERRLA. Oropharynx without ulcers, lesions, or thrush.  NECK: Supple. No asymmetry or masses.  LYMPHATICS: No palpable cervical, supraclavicular, axillary, or inguinal lymphadenopathy.  RESP: Lungs clear to auscultation bilaterally without rales, rhonchi or wheezes.  CARDIOVASCULAR: Regular rate and rhythm. Normal S1, S2; no S3 or S4. No murmur, gallop, or rub.  ABDOMEN: Soft, nontender. Bowel sounds normal. No palpable organomegaly or masses.  MUSCULOSKELETAL: Extremities without gross deformities noted. No edema of bilateral lower extremities.  SKIN: No suspicious lesions or rashes.  NEURO: Alert and oriented x 3. Cranial nerves II-XII grossly intact.  PSYCHIATRIC: Mentation and affect appear normal.    LABORATORY RESULTS:  Office Visit on 09/24/2018   Component Date Value Ref Range Status     Copath Report 09/24/2018    Final                    Value:Patient Name: ZAIN SEXTON  MR#: [id number]  Specimen #: [id number]  Collected: 9/24/2018  Received: 9/24/2018  Reported: 9/29/2018 11:41  Ordering Phy(s): BRENDA MOYA    For improved result formatting, select 'View Enhanced Report Format' under   Linked Documents section.    SPECIMEN(S):  Endometrial biopsy    FINAL DIAGNOSIS:  Endometrial biopsy:  - Disordered proliferative endometrium without cytologic atypia.    COMMENT:      To view the digital images associated with this report in Epic, select   \"View Enhanced Report Format\" under the  Linked Documents section of this report to view the PDF version of the   [facility].    Electronically signed out by:    GILA Greenberg M.D.    CLINICAL HISTORY:  43-year-old female with abnormal uterine bleeding.    GROSS:  A single specimen container with formalin is received labeled with the   patient's name, date of birth, and  medical record number. Informat                          ion on the requisition slip, container, " "and   associated labels is confirmed.    The specimen is designated \"endometrial biopsy\".  It consists of a 2.3 cm   aggregate of red-brown, hemorrhagic  soft tissue fragments admixed with a scant amount of mucus.  The specimen   is filtered over lens paper,  wrapped, and entirely submitted in one cassette. (Dictated by: Mariya Alonso 9/25/2018 08:52 AM)    MICROSCOPIC:  The sections show cohesive proliferative endometrium with a disordered   distribution of benign proliferative  glands, focally dilated and without cytologic atypia. Occasional areas of   glandular crowding are present,  short of that typical for endometrial hyperplasia. Additional features   commonly seen with dysfunctional  uterine bleeding are not seen (i.e. inflammation, lytic stromal   degeneration, fibrin thrombi and eosinophilic  change). No polyps or other atypical cellular proliferations are   identified. (Dictated by: SORAYA Greenberg MD  09/29/2018)    CPT Codes:  A: 8                          8305-GM5    TESTING LAB LOCATION:  [address] [address] [phone]    COLLECTION SITE:  Client: [facility]  Location: [facility] (P)         IMAGING RESULTS:  Recent Results (from the past 744 hour(s))   US Pelvic Complete w Transvaginal    Narrative    PELVIC ULTRASOUND WITH ENDOVAGINAL TRANSDUCER    9/12/2018 3:08 PM     HISTORY:  Excessive or frequent menstruation.    TECHNIQUE:  Endovaginal sonography was added to the transabdominal  exam to better evaluate the uterus and ovaries because of inadequate  bladder fullness.    COMPARISON: None.    FINDINGS:  Uterus measures 9.8 x 6.2 x 6.8 cm.  There are two  heterogeneous masses in the uterus, the largest of which is along the  left side and measures up to 4.8 cm.  Endometrium is 13 mm thick.      The ovaries are normal in size. There is a 2.2 cm cyst in the right  ovary and a 3.0 cm cyst in the " left ovary.  There are no adnexal  masses.  There is no free fluid in the cul-de-sac.        Impression    IMPRESSION:  Bulky uterine fibroids. Thickened endometrium may be  related to secretory phase. Simple-appearing bilateral ovarian cysts.    LEXIE BISHOP MD   CT Soft Tissue Neck w Contrast    Narrative    CT SCAN OF THE NECK WITH CONTRAST  9/20/2018 8:16 AM     HISTORY:  Acute CVA (cerebrovascular accident) (H). Acute ischemic  stroke (H). Prominent left neck veins.    TECHNIQUE:  Axial images and coronal reformations. Radiation dose for  this scan was reduced using automated exposure control, adjustment of  the mA and/or kV according to patient size, or iterative  reconstruction technique. 80 mL Isovue 370 IV.      COMPARISON: CT angiogram dated 8/22/2018.    FINDINGS: The area in question was marked with a vitamin E capsule.  The capsule overlies the posterior aspect of the left  sternocleidomastoid muscle at the C3 level. No enlarged vessels are  identified in this region. There are several normal-sized lymph nodes  in this region and deep to the left sternocleidomastoid. The largest  node in this region measures about 0.9 cm in axial dimension.    Visualized sinuses, nasopharynx and orbits: The right maxillary sinus  is completely opacified.      Tongue, oral cavity and oropharynx:  Normal.      Hypopharynx: Normal.      Larynx and trachea: Normal.      Thyroid: There is a 1.4 cm nodule in the right lobe of the thyroid  gland.    Submandibular glands: Normal.      Parotid glands: Normal.        Lymph nodes: Normal.      Vasculature: There is thickening of the wall of the right common  carotid artery extending from the C6 level up to the carotid  bifurcation. This is seen on axial images 60-40. There is no luminal  narrowing associated with this wall thickening. No perivascular  inflammation is seen.    Upper mediastinum and lungs: Normal.      Bones: Normal.      Impression    IMPRESSION:     1. No  enlarged veins are seen at the site of the marker in the left  upper neck. There are normal-sized lymph nodes in the subcutaneous  tissues adjacent to this marker.  2. Thickening of the wall the right common carotid artery extending  from the C6 level to the level of the carotid bifurcations. This is  nonspecific, but it could be due to carotidynia. It is not causing any  luminal stenosis.  3. 1.4 cm nodule in the right lobe of the thyroid.  4. Right maxillary sinus completely opacified.      BRET MUJICA MD       ASSESSMENT AND PLAN:    (D47.3) Thrombocytosis (H)  (primary encounter diagnosis)  I reviewed with the patient today causes of elevated platelet count.  Her iron deficiency anemia secondary to excessive vaginal bleeding probably could be responsible for secondary thrombocytosis.  However, essential thrombocytosis is also should be considered.  I am going to arrange for next generation sequencing.  However the patient currently on aspirin 325 mg orally daily.  Update the patient with the results when they are available.    (I10) Benign essential hypertension  Patient currently on lisinopril 20 mg orally daily.  She is also on Lopressor 50 mg orally daily.    (I63.9) Acute CVA (cerebrovascular accident) (H)  Currently on aspirin 325 mg orally daily.  Her symptoms has been gradually improving.    (E78.5) Hyperlipidemia LDL goal <100  Patient currently on Lipitor 40 mg orally daily.    Iron deficiency anemia.  I would recommended to proceed with ferrous sulfate twice daily.    The patient is ready to learn, no apparent learning barriers were identified, Diagnosis and treatment plans were explained to the patient. The patient expressed understanding of the content. The patient questions were answered to her satisfaction.    Katia Frye MD    Chart documentation with Dragon Voice recognition Software. Although reviewed after completion, some words and grammatical errors may remain.

## 2018-10-02 NOTE — LETTER
10/2/2018         RE: Alicia Penaloza  [address] [facility] [address]        Dear Colleague,    Thank you for referring your patient, Alicia Penaloza, to the [facility]. Please see a copy of my visit note below.    DATE OF VISIT: Oct 2, 2018    REASON FOR REFERRAL: Thrombocytosis    CHIEF COMPLAINT:   Chief Complaint   Patient presents with     Oncology Clinic Visit     blood condition contributing to stroke       HISTORY OF PRESENT ILLNESS:   43-year-old female patient who presented today for evaluation for thrombocytosis.  The patient presented on August 7, 2018 with altered mental status.  Further workup revealed stroke.  She subsequently went on to have tPA.  She was found at the time of admission to have elevated blood pressure as well as hyperlipidemia.  She is a former smoker.  She again presented with a right-sided facial dropping, left-sided numbness and weakness.  Workup at that time revealed left MCA territory and hemorrhagic transformation of an ischemic region in the right thalamus.  Further workup revealed mildly elevated IgM antibody level.  Further hypercoagulability workup revealed no abnormalities.  However her platelet count was elevated.  With a platelet count in the 600,000 range.  Workup for anticardiolipin antibodies came back negative.  The patient had a long history of excessive vaginal bleeding.  Her hemoglobin runs around 9-10 g.  It is here today to discuss elevated platelet count.  She is gradually improving without any new headaches or dizzy episodes.      REVIEW OF SYSTEMS:   Constitutional: Negative for fever, chills, and night sweats.  Skin: negative.  Eyes: negative.  Ears/Nose/Throat: negative.  Respiratory: No shortness of breath, dyspnea on exertion, cough, or hemoptysis.  Cardiovascular: negative.  Gastrointestinal: negative.  Genitourinary: negative.  Musculoskeletal: negative.  Neurologic: negative.  Psychiatric:  "negative.  Hematologic/Lymphatic/Immunologic: negative.  Endocrine: negative.    PAST MEDICAL HISTORY:   Past Medical History:   Diagnosis Date     Hypertension      Stroke (H) 08/07/2018       PAST SURGICAL HISTORY:   History reviewed. No pertinent surgical history.    ALLERGIES:   Allergies as of 10/02/2018     (No Known Allergies)       MEDICATIONS:   Current Outpatient Prescriptions   Medication Sig Dispense Refill     aspirin 325 MG tablet Take 325 mg by mouth       atorvastatin (LIPITOR) 40 MG tablet Take 1 tablet (40 mg) by mouth daily 90 tablet 1     FLUoxetine (PROZAC) 20 MG capsule Take 1 capsule (20 mg) by mouth daily 90 capsule 1     lisinopril (PRINIVIL/ZESTRIL) 20 MG tablet Take 1 tablet (20 mg) by mouth daily 90 tablet 1     medroxyPROGESTERone (PROVERA) 10 MG tablet Take 1 tablet (10 mg) by mouth daily 90 tablet 3     metoprolol tartrate (LOPRESSOR) 50 MG tablet Take 1 tablet (50 mg) by mouth 2 times daily 180 tablet 3     LORazepam (ATIVAN) 1 MG tablet Take 0.5-1 tablets (0.5-1 mg) by mouth every 8 hours as needed for anxiety Take 30 minutes prior to departure.  Do not operate a vehicle after taking this medication 12 tablet 0        FAMILY HISTORY:   No family history of stroke or hypercoagulability states.    SOCIAL HISTORY:   Social History     Social History     Marital status: Single     Spouse name: N/A     Number of children: N/A     Years of education: N/A     Social History Main Topics     Smoking status: Former Smoker     Quit date: 8/7/2018     Smokeless tobacco: Never Used     Alcohol use No     Drug use: No     Sexual activity: Not Currently     Other Topics Concern     None     Social History Narrative       PHYSICAL EXAMINATION:   /70  Pulse 70  Temp 97.3  F (36.3  C) (Temporal)  Resp 20  Ht 1.6 m (5' 3\")  Wt 95.5 kg (210 lb 9.6 oz)  LMP  (LMP Unknown)  SpO2 92%  BMI 37.31 kg/m2  Wt Readings from Last 10 Encounters:   10/02/18 95.5 kg (210 lb 9.6 oz)   09/24/18 93.2 kg " "(205 lb 6.4 oz)   09/17/18 94.8 kg (209 lb)   09/14/18 95.2 kg (209 lb 12.8 oz)   09/12/18 95.3 kg (210 lb)   09/10/18 95.6 kg (210 lb 11.2 oz)   09/05/18 96.2 kg (212 lb)   08/25/18 95.6 kg (210 lb 12.2 oz)   08/22/18 95.3 kg (210 lb)      GENERAL APPEARANCE: Healthy, alert and in no acute distress.  HEENT: Sclerae anicteric. PERRLA. Oropharynx without ulcers, lesions, or thrush.  NECK: Supple. No asymmetry or masses.  LYMPHATICS: No palpable cervical, supraclavicular, axillary, or inguinal lymphadenopathy.  RESP: Lungs clear to auscultation bilaterally without rales, rhonchi or wheezes.  CARDIOVASCULAR: Regular rate and rhythm. Normal S1, S2; no S3 or S4. No murmur, gallop, or rub.  ABDOMEN: Soft, nontender. Bowel sounds normal. No palpable organomegaly or masses.  MUSCULOSKELETAL: Extremities without gross deformities noted. No edema of bilateral lower extremities.  SKIN: No suspicious lesions or rashes.  NEURO: Alert and oriented x 3. Cranial nerves II-XII grossly intact.  PSYCHIATRIC: Mentation and affect appear normal.    LABORATORY RESULTS:  Office Visit on 09/24/2018   Component Date Value Ref Range Status     Copath Report 09/24/2018    Final                    Value:Patient Name: ZAIN SEXTON  MR#: [id number]  Specimen #: [id number]  Collected: 9/24/2018  Received: 9/24/2018  Reported: 9/29/2018 11:41  Ordering Phy(s): BRENDA MOYA    For improved result formatting, select 'View Enhanced Report Format' under   Linked Documents section.    SPECIMEN(S):  Endometrial biopsy    FINAL DIAGNOSIS:  Endometrial biopsy:  - Disordered proliferative endometrium without cytologic atypia.    COMMENT:      To view the digital images associated with this report in Epic, select   \"View Enhanced Report Format\" under the  Linked Documents section of this report to view the PDF version of the   original CoPath generated report.    Electronically signed out by:    GILA Greenberg M.D.    CLINICAL " "HISTORY:  43-year-old female with abnormal uterine bleeding.    GROSS:  A single specimen container with formalin is received labeled with the   patient's name, date of birth, and  medical record number. Informat                          ion on the requisition slip, container, and   associated labels is confirmed.    The specimen is designated \"endometrial biopsy\".  It consists of a 2.3 cm   aggregate of red-brown, hemorrhagic  soft tissue fragments admixed with a scant amount of mucus.  The specimen   is filtered over lens paper,  wrapped, and entirely submitted in one cassette. (Dictated by: Mariya Alonso 9/25/2018 08:52 AM)    MICROSCOPIC:  The sections show cohesive proliferative endometrium with a disordered   distribution of benign proliferative  glands, focally dilated and without cytologic atypia. Occasional areas of   glandular crowding are present,  short of that typical for endometrial hyperplasia. Additional features   commonly seen with dysfunctional  uterine bleeding are not seen (i.e. inflammation, lytic stromal   degeneration, fibrin thrombi and eosinophilic  change). No polyps or other atypical cellular proliferations are   identified. (Dictated by: SORAYA Greneberg MD  09/29/2018)    CPT Codes:  A: 8                          8305-GM5    TESTING LAB LOCATION:  [address] [address]  [phone]    COLLECTION SITE:  Client: [facility]  Location: [facility] (P)         IMAGING RESULTS:  Recent Results (from the past 744 hour(s))   US Pelvic Complete w Transvaginal    Narrative    PELVIC ULTRASOUND WITH ENDOVAGINAL TRANSDUCER    9/12/2018 3:08 PM     HISTORY:  Excessive or frequent menstruation.    TECHNIQUE:  Endovaginal sonography was added to the transabdominal  exam to better evaluate the uterus and ovaries because of inadequate  bladder fullness.    COMPARISON: None.    FINDINGS:  Uterus measures " 9.8 x 6.2 x 6.8 cm.  There are two  heterogeneous masses in the uterus, the largest of which is along the  left side and measures up to 4.8 cm.  Endometrium is 13 mm thick.      The ovaries are normal in size. There is a 2.2 cm cyst in the right  ovary and a 3.0 cm cyst in the left ovary.  There are no adnexal  masses.  There is no free fluid in the cul-de-sac.        Impression    IMPRESSION:  Bulky uterine fibroids. Thickened endometrium may be  related to secretory phase. Simple-appearing bilateral ovarian cysts.    LEXIE BISHOP MD   CT Soft Tissue Neck w Contrast    Narrative    CT SCAN OF THE NECK WITH CONTRAST  9/20/2018 8:16 AM     HISTORY:  Acute CVA (cerebrovascular accident) (H). Acute ischemic  stroke (H). Prominent left neck veins.    TECHNIQUE:  Axial images and coronal reformations. Radiation dose for  this scan was reduced using automated exposure control, adjustment of  the mA and/or kV according to patient size, or iterative  reconstruction technique. 80 mL Isovue 370 IV.      COMPARISON: CT angiogram dated 8/22/2018.    FINDINGS: The area in question was marked with a vitamin E capsule.  The capsule overlies the posterior aspect of the left  sternocleidomastoid muscle at the C3 level. No enlarged vessels are  identified in this region. There are several normal-sized lymph nodes  in this region and deep to the left sternocleidomastoid. The largest  node in this region measures about 0.9 cm in axial dimension.    Visualized sinuses, nasopharynx and orbits: The right maxillary sinus  is completely opacified.      Tongue, oral cavity and oropharynx:  Normal.      Hypopharynx: Normal.      Larynx and trachea: Normal.      Thyroid: There is a 1.4 cm nodule in the right lobe of the thyroid  gland.    Submandibular glands: Normal.      Parotid glands: Normal.        Lymph nodes: Normal.      Vasculature: There is thickening of the wall of the right common  carotid artery extending from the C6 level up  to the carotid  bifurcation. This is seen on axial images 60-40. There is no luminal  narrowing associated with this wall thickening. No perivascular  inflammation is seen.    Upper mediastinum and lungs: Normal.      Bones: Normal.      Impression    IMPRESSION:     1. No enlarged veins are seen at the site of the marker in the left  upper neck. There are normal-sized lymph nodes in the subcutaneous  tissues adjacent to this marker.  2. Thickening of the wall the right common carotid artery extending  from the C6 level to the level of the carotid bifurcations. This is  nonspecific, but it could be due to carotidynia. It is not causing any  luminal stenosis.  3. 1.4 cm nodule in the right lobe of the thyroid.  4. Right maxillary sinus completely opacified.      BRET MUJICA MD       ASSESSMENT AND PLAN:    (D47.3) Thrombocytosis (H)  (primary encounter diagnosis)  I reviewed with the patient today causes of elevated platelet count.  Her iron deficiency anemia secondary to excessive vaginal bleeding probably could be responsible for secondary thrombocytosis.  However, essential thrombocytosis is also should be considered.  I am going to arrange for next generation sequencing.  However the patient currently on aspirin 325 mg orally daily.  Update the patient with the results when they are available.    (I10) Benign essential hypertension  Patient currently on lisinopril 20 mg orally daily.  She is also on Lopressor 50 mg orally daily.    (I63.9) Acute CVA (cerebrovascular accident) (H)  Currently on aspirin 325 mg orally daily.  Her symptoms has been gradually improving.    (E78.5) Hyperlipidemia LDL goal <100  Patient currently on Lipitor 40 mg orally daily.    Iron deficiency anemia.  I would recommended to proceed with ferrous sulfate twice daily.    The patient is ready to learn, no apparent learning barriers were identified, Diagnosis and treatment plans were explained to the patient. The patient expressed  understanding of the content. The patient questions were answered to her satisfaction.    Katia Frye MD    Chart documentation with Dragon Voice recognition Software. Although reviewed after completion, some words and grammatical errors may remain.    Again, thank you for allowing me to participate in the care of your patient.        Sincerely,        Katia Frye MD

## 2018-10-04 PROCEDURE — 81403 MOPATH PROCEDURE LEVEL 4: CPT | Performed by: INTERNAL MEDICINE

## 2018-10-04 PROCEDURE — 81403 MOPATH PROCEDURE LEVEL 4: CPT | Mod: 91 | Performed by: INTERNAL MEDICINE

## 2018-10-04 PROCEDURE — 36415 COLL VENOUS BLD VENIPUNCTURE: CPT | Performed by: INTERNAL MEDICINE

## 2018-10-04 PROCEDURE — 81219 CALR GENE COM VARIANTS: CPT | Performed by: INTERNAL MEDICINE

## 2018-10-05 ENCOUNTER — OFFICE VISIT (OUTPATIENT)
Dept: OBGYN | Facility: CLINIC | Age: 43
End: 2018-10-05
Payer: MEDICAID

## 2018-10-05 VITALS — DIASTOLIC BLOOD PRESSURE: 76 MMHG | HEART RATE: 64 BPM | SYSTOLIC BLOOD PRESSURE: 118 MMHG

## 2018-10-05 DIAGNOSIS — N93.9 ABNORMAL UTERINE BLEEDING (AUB): Primary | ICD-10-CM

## 2018-10-05 LAB — COPATH REPORT: NORMAL

## 2018-10-05 PROCEDURE — 99213 OFFICE O/P EST LOW 20 MIN: CPT | Mod: 25 | Performed by: OBSTETRICS & GYNECOLOGY

## 2018-10-05 PROCEDURE — 58300 INSERT INTRAUTERINE DEVICE: CPT | Performed by: OBSTETRICS & GYNECOLOGY

## 2018-10-05 NOTE — PROGRESS NOTES
Clinic Note    Continues to have light daily bleeding, however, emotional lability improved with provera. No new concerns since last visit.     EXAM:  Vitals:    10/05/18 1635   BP: 118/76   BP Location: Right arm   Cuff Size: Adult Regular   Pulse: 64    There is no height or weight on file to calculate BMI.    Gen: Alert, oriented, appropriately interactive, NAD  Abdomen: soft, obese, non tender  Perineum: no lesions; normal appearing external genitalia  Vagina : no masses or lesions or discharge, normally rugated.  Cervix: scant bleeding from os, no masses or lesions or discharge     Labs & Imaging:  Endometrial biopsy:   - Disordered proliferative endometrium without cytologic atypia.     Pelvic US:  Uterus measures 9.8 x 6.2 x 6.8 cm.  There are two heterogeneous masses in the uterus, the largest of which is along the left side and measures up to 4.8 cm.  Endometrium is 13 mm thick.    The ovaries are normal in size. There is a 2.2 cm cyst in the right ovary and a 3.0 cm cyst in the left ovary.  There are no adnexal masses.  There is no free fluid in the cul-de-sac.    IMPRESSION:  Bulky uterine fibroids. Thickened endometrium may be related to secretory phase. Simple-appearing bilateral ovarian cysts.    ASSESSMENT/PLAN: Alicia Penaloza is a 43 year old P0 seen for AUB, likely secondary to fibroids. However, risk factors for endometrial malignancy including age, nulliparous, obesity. Endometrial biopsy without atypia as above. Desires hysterectomy, however not surgical candidate given recent stroke. Discussed long term management with Mirena IUD. She agrees to proceed. Discussed expectation of spotting for 1-3 months, to return if bleeding outside of expectations.     1. Abnormal uterine bleeding (AUB)  - levonorgestrel (MIRENA) 20 MCG/24HR IUD; 1 each (20 mcg) by Intrauterine route once for 1 dose  Dispense: 1 each; Refill: 0    Yosef Garg MD  10/5/2018 4:51 PM

## 2018-10-05 NOTE — MR AVS SNAPSHOT
"              After Visit Summary   10/5/2018    Alicia Penaloza    MRN: [id number]           Patient Information     Date Of Birth          1975        Visit Information        Provider Department      10/5/2018 4:30 PM Yosef Garg MD [facility]        Today's Diagnoses     Abnormal uterine bleeding (AUB)    -  1       Follow-ups after your visit        Your next 10 appointments already scheduled     Oct 09, 2018  8:15 AM CDT   [facility]    [address] [place] [id number] [phone]            Antonio 15, 2019 11:20 AM CST   (Arrive by 11:05 AM)   New Stroke with Dex Jackson MD   [facility] ([email or web address] [place])    [address] [place] [id number] [phone]              Who to contact     If you have questions or need follow up information about today's clinic visit or your schedule please contact [facility] directly at [phone].  Normal or non-critical lab and imaging results will be communicated to you by [employer], letter or phone within 4 business days after the clinic has received the results. If you do not hear from us within 7 days, please contact the clinic through [employer] or phone. If you have a critical or abnormal lab result, we will notify you by phone as soon as possible.  Submit refill requests through [employer] or call your pharmacy and they will forward the refill request to us. Please allow 3 business days for your refill to be completed.          Additional Information About Your Visit        Red ZebraharTapulous Information     [employer] lets you send messages to your doctor, view your test results, renew your prescriptions, schedule appointments and more. To sign up, go to [email or web address] . Click on \"Log in\" on the left side of the screen, which will take you to the Welcome page. Then " "click on \"Sign up Now\" on the right side of the page.     You will be asked to enter the access code listed below, as well as some personal information. Please follow the directions to create your username and password.     Your access code is: [email or web address]  Expires: 2018  8:53 PM     Your access code will  in 90 days. If you need help or a new code, please call your [place] clinic or [phone].        Care EveryWhere ID     This is your Care EveryWhere ID. This could be used by other organizations to access your [place] medical records  [id number]        Your Vitals Were     Pulse Last Period                64 2018           Blood Pressure from Last 3 Encounters:   10/05/18 118/76   10/02/18 110/70   18 112/78    Weight from Last 3 Encounters:   10/02/18 210 lb 9.6 oz (95.5 kg)   18 205 lb 6.4 oz (93.2 kg)   18 209 lb (94.8 kg)              Today, you had the following     No orders found for display         Today's Medication Changes          These changes are accurate as of 10/5/18  5:13 PM.  If you have any questions, ask your nurse or doctor.               Start taking these medicines.        Dose/Directions    levonorgestrel 20 MCG/24HR IUD   Commonly known as:  MIRENA   Used for:  Abnormal uterine bleeding (AUB)   Started by:  Yosef Garg MD        Dose:  1 each   1 each (20 mcg) by Intrauterine route once for 1 dose   Quantity:  1 each   Refills:  0         Stop taking these medicines if you haven't already. Please contact your care team if you have questions.     medroxyPROGESTERone 10 MG tablet   Commonly known as:  PROVERA   Stopped by:  Yosef Gagr MD                Where to get your medicines      Some of these will need a paper prescription and others can be bought over the counter.  Ask your nurse if you have questions.     You don't need a prescription for these medications     levonorgestrel 20 MCG/24HR IUD                Primary Care " Provider Office Phone # Fax #    Augusto Thomas [phone]       [facility] [address]        Equal Access to Services     YADI PITTS : Hadii aad ku hadisidraerasmo Sojohannali, wacamrynda luqadaha, qaybta kaalmada yolanda, anjelica johnstonmartha howellyesenia tavarez to mercado. So [facility] [phone].    ATENCIÓN: Si habla español, tiene a finnegan disposición servicios gratuitos de asistencia lingüística. Llame al [phone].    We comply with applicable federal civil rights laws and [place] laws. We do not discriminate on the basis of race, color, national origin, age, disability, sex, sexual orientation, or gender identity.            Thank you!     Thank you for choosing [facility]  for your care. Our goal is always to provide you with excellent care. Hearing back from our patients is one way we can continue to improve our services. Please take a few minutes to complete the written survey that you may receive in the mail after your visit with us. Thank you!             Your Updated Medication List - Protect others around you: Learn how to safely use, store and throw away your medicines at www.disposemymeds.org.          This list is accurate as of 10/5/18  5:13 PM.  Always use your most recent med list.                   Brand Name Dispense Instructions for use Diagnosis    aspirin 325 MG tablet      Take 325 mg by mouth        atorvastatin 40 MG tablet    LIPITOR    90 tablet    Take 1 tablet (40 mg) by mouth daily    Benign essential hypertension, Acute CVA (cerebrovascular accident) (H)       FLUoxetine 20 MG capsule    PROzac    90 capsule    Take 1 capsule (20 mg) by mouth daily    Acute ischemic stroke (H), Positive depression screening       levonorgestrel 20 MCG/24HR IUD    MIRENA    1 each    1 each (20 mcg) by Intrauterine route once for 1 dose    Abnormal uterine bleeding (AUB)       lisinopril 20 MG tablet    PRINIVIL/ZESTRIL    90 tablet    Take 1 tablet (20 mg) by mouth  daily    Benign essential hypertension, Acute CVA (cerebrovascular accident) (H)       LORazepam 1 MG tablet    ATIVAN    12 tablet    Take 0.5-1 tablets (0.5-1 mg) by mouth every 8 hours as needed for anxiety Take 30 minutes prior to departure.  Do not operate a vehicle after taking this medication    Adjustment disorder with anxious mood       metoprolol tartrate 50 MG tablet    LOPRESSOR    180 tablet    Take 1 tablet (50 mg) by mouth 2 times daily    Hypertension, unspecified type

## 2018-10-05 NOTE — PROCEDURES
"IUD PROCEDURE NOTE  10/5/2018    Alicia Penaloza is a 43 year old P0 here today for Mirena IUD insertion.    Time Out - \"Pause for the Cause\"  Just before the procedure begins, through verbal and active participation of team members, verify:    Initials   Patient Name    JCB   Patient Date of Birth JCB   Procedure to be performed  JCB   Implants, special equipment or special requirements        JCB     Consent:  Risks, benefits of treatment, and no treatment were discussed.  Patient's questions were elicited and answered.  and Written consent signed and scanned into medical record.    Reason for insertion:  abnormal uterine bleeding.    Counseling:  Patient counseled on potential side effects of Mirena, including unpredictable spotting for at least 2-3 months, decreased dysmenorrhea and plan for removal/replacement of IUD in 5 years or when desired.       The cervix was visualized with a Raghav speculum.  The cervix was prepped with Betadine solution.  The uterus was sounded to 8 cm.  The Mirena IUD insertion apparatus was prepared and placed through the cervix without significant resistance and deployed at the fundus in the usual fashion.  The strings were trimmed to mid vagina.      EBL: Minimal  Complications: none    Lot# [id number]  Exp: APR 2021     Tolerance of Procedure:  Patient did tolerate the procedure well.     Patient was instructed to call if she experiences any heavy bleeding, severe cramping, or abnormal vaginal discharge.  May take Ibuprofen 400-800 mg PO TID PRN or Naproxen 500 mg PO BID for cramping. Return to clinic in 4-6 weeks for string check.    A: 43 year old P0 here today for IUD insertion. Completed without difficulty.    P:  -Post insertion instructions given  -Patient to RTC in 4-6 weeks for string check.    Yosef Garg MD  OB/GYN  10/5/2018 5:11 PM              "

## 2018-10-08 ENCOUNTER — TELEPHONE (OUTPATIENT)
Dept: INTERNAL MEDICINE | Facility: CLINIC | Age: 43
End: 2018-10-08

## 2018-10-08 NOTE — TELEPHONE ENCOUNTER
Angelique a Occupational Therapist with [facility] calling. She is needing verbal orders to continue home care OT 2 times a week for 3 weeks. Please call her at [phone].  Thank you,  Jeannette Rosenthal- Pt Rep.

## 2018-10-09 ENCOUNTER — ONCOLOGY VISIT (OUTPATIENT)
Dept: ONCOLOGY | Facility: CLINIC | Age: 43
End: 2018-10-09
Payer: MEDICAID

## 2018-10-09 VITALS
TEMPERATURE: 96.9 F | HEIGHT: 63 IN | SYSTOLIC BLOOD PRESSURE: 105 MMHG | HEART RATE: 65 BPM | RESPIRATION RATE: 18 BRPM | BODY MASS INDEX: 37.32 KG/M2 | WEIGHT: 210.6 LBS | DIASTOLIC BLOOD PRESSURE: 68 MMHG | OXYGEN SATURATION: 99 %

## 2018-10-09 DIAGNOSIS — D50.0 IRON DEFICIENCY ANEMIA DUE TO CHRONIC BLOOD LOSS: ICD-10-CM

## 2018-10-09 PROCEDURE — 99214 OFFICE O/P EST MOD 30 MIN: CPT | Performed by: INTERNAL MEDICINE

## 2018-10-09 ASSESSMENT — PAIN SCALES - GENERAL: PAINLEVEL: NO PAIN (0)

## 2018-10-09 NOTE — MR AVS SNAPSHOT
After Visit Summary   10/9/2018    Alicia Penaloza    MRN: [id number]           Patient Information     Date Of Birth          1975        Visit Information        Provider Department      10/9/2018 8:15 AM Katia Frye MD [facility]        Today's Diagnoses     Iron deficiency anemia due to chronic blood loss          Care Instructions      Please follow up with Dr. Frye as needed with worsening or new symptoms or if instructed by your Primary Care.  Iron Infusion.  Oncology will call with rest of lab results.  Feel free to call next week if you don't hear anything.    Infusion Date/Time:  10/16/18 at 10:00    Follow Up Date/Time: As needed    If you have any questions or concerns please feel free to call.    If you need to reschedule please call:  Clinic or Lab Appointment - [phone]  Infusion - [phone]  Imaging - [phone]    Mat Heller RN, BSN, OCN  [facility]   Oncology/Hematology Care Coordinator RN  [facility]  [phone]              Follow-ups after your visit        Follow-up notes from your care team     Return if symptoms worsen or fail to improve.      Your next 10 appointments already scheduled     Antonio 15, 2019 11:20 AM CST   (Arrive by 11:05 AM)   New Stroke with Dex Jackson MD   [facility] ([facility])    [address] [place] [address] [phone]              Who to contact     If you have questions or need follow up information about today's clinic visit or your schedule please contact [facility] directly at [phone].  Normal or non-critical lab and imaging results will be communicated to you by MyChart, letter or phone within 4 business days after the clinic has received the results. If you do not hear from us within 7 days, please contact the clinic through MyChart or phone. If you have a critical or abnormal lab result,  "we will notify you by phone as soon as possible.  Submit refill requests through [employer] or call your pharmacy and they will forward the refill request to us. Please allow 3 business days for your refill to be completed.          Additional Information About Your Visit        [email or web address] Information     [employer] lets you send messages to your doctor, view your test results, renew your prescriptions, schedule appointments and more. To sign up, go to [email or web address] . Click on \"Log in\" on the left side of the screen, which will take you to the Welcome page. Then click on \"Sign up Now\" on the right side of the page.     You will be asked to enter the access code listed below, as well as some personal information. Please follow the directions to create your username and password.     Your access code is: [email or web address]  Expires: 2018  8:53 PM     Your access code will  in 90 days. If you need help or a new code, please call your [place] clinic or [phone].        Care EveryWhere ID     This is your Care EveryWhere ID. This could be used by other organizations to access your [place] medical records  [id number]        Your Vitals Were     Pulse Temperature Respirations Height Last Period Pulse Oximetry    65 96.9  F (36.1  C) (Temporal) 18 1.6 m (5' 3\") 2018 99%    BMI (Body Mass Index)                   37.31 kg/m2            Blood Pressure from Last 3 Encounters:   10/09/18 105/68   10/05/18 118/76   10/02/18 110/70    Weight from Last 3 Encounters:   10/09/18 95.5 kg (210 lb 9.6 oz)   10/02/18 95.5 kg (210 lb 9.6 oz)   18 93.2 kg (205 lb 6.4 oz)              Today, you had the following     No orders found for display       Primary Care Provider Office Phone # Fax #    Augusto Thomas [phone] [address] [facility] [address]        Equal Access to Services     [facility] Hospital[facility]ILA AH: Brenda Noel, mayelin infante, anjelica durant " jennifer howellyesenia lokidontrell la'aan ah. So [facility] [phone].    ATENCIÓN: Si bridgettela warren, tiene a finnegan disposición servicios gratuitos de asistencia lingüística. Jean Carlos al [phone].    We comply with applicable federal civil rights laws and [place] laws. We do not discriminate on the basis of race, color, national origin, age, disability, sex, sexual orientation, or gender identity.            Thank you!     Thank you for choosing [facility]  for your care. Our goal is always to provide you with excellent care. Hearing back from our patients is one way we can continue to improve our services. Please take a few minutes to complete the written survey that you may receive in the mail after your visit with us. Thank you!             Your Updated Medication List - Protect others around you: Learn how to safely use, store and throw away your medicines at www.disposemymeds.org.          This list is accurate as of 10/9/18  8:22 AM.  Always use your most recent med list.                   Brand Name Dispense Instructions for use Diagnosis    aspirin 325 MG tablet      Take 325 mg by mouth        atorvastatin 40 MG tablet    LIPITOR    90 tablet    Take 1 tablet (40 mg) by mouth daily    Benign essential hypertension, Acute CVA (cerebrovascular accident) (H)       FLUoxetine 20 MG capsule    PROzac    90 capsule    Take 1 capsule (20 mg) by mouth daily    Acute ischemic stroke (H), Positive depression screening       levonorgestrel 20 MCG/24HR IUD    MIRENA    1 each    1 each (20 mcg) by Intrauterine route once for 1 dose    Abnormal uterine bleeding (AUB)       lisinopril 20 MG tablet    PRINIVIL/ZESTRIL    90 tablet    Take 1 tablet (20 mg) by mouth daily    Benign essential hypertension, Acute CVA (cerebrovascular accident) (H)       LORazepam 1 MG tablet    ATIVAN    12 tablet    Take 0.5-1 tablets (0.5-1 mg) by mouth every 8 hours as needed for anxiety Take 30 minutes prior to departure.  Do not  operate a vehicle after taking this medication    Adjustment disorder with anxious mood       metoprolol tartrate 50 MG tablet    LOPRESSOR    180 tablet    Take 1 tablet (50 mg) by mouth 2 times daily    Hypertension, unspecified type

## 2018-10-09 NOTE — LETTER
10/9/2018         RE: Alicia Penaloza  [id number] [facility] [address]        Dear Colleague,    Thank you for referring your patient, Alicia Penaloza, to the [facility]. Please see a copy of my visit note below.    DATE OF VISIT: Oct 9, 2018    Alicia Penaloza is a 43 year old female is seen today for   Chief Complaint   Patient presents with     Hematology     Thrombocytosis & Iron deficiency anemia     Results     labs completed on 10/2/18   .       (D50.0) Iron deficiency anemia due to chronic blood loss  She returning today for follow-up.  Laboratory test revealed iron deficiency anemia with low ferritin.  I have explained to the patient that his elevated platelet count probably secondary to low ferritin.  However next generation testing still not back we will continue to watch for that.  The patient has been using oral iron without significant improvement.  I would recommend to proceed with parenteral iron.  I gave the patient an overview about potential side effects of parenteral iron.  Also talked about increasing dietary intake of iron.    The patient is ready to learn, no apparent learning barriers were identified.  Diagnosis and treatment plans were explained to the patient. The patient expressed understanding of the content. The patient asked appropriate questions. The patient questions were answered to her satisfaction.  Spent 25 minutes more than 50% of the time in counseling and coordination of care including discussion of the event of iron deficiency anemia, thrombocytosis, follow-up and prognosis.  Chart documentation with Dragon Voice recognition Software. Although reviewed after completion, some words and grammatical errors may remain.    Again, thank you for allowing me to participate in the care of your patient.        Sincerely,        Katia Frye MD

## 2018-10-09 NOTE — NURSING NOTE
DISCHARGE PLAN:  Next appointments: See patient instruction section  Departure Mode: Ambulatory  Accompanied by: sister  5 minutes for nursing discharge (face to face time)     Alicia DUDLEY Ca is here today for hematology follow up.  Writing nurse seen patient after Medical Oncology appointment to address questions/concerns/coordinate care. Patient to get Iron, no rush.  Follow up as needed. Patient will be called with rest of lab results.  See patient instructions and Oncologist's Progress note for further details. Questions and concerns addressed to patient's satisfaction. Patient verbalized and demonstrated understanding of plan.  Contact information provided and patient is encouraged to call with any that arise in the interim of care.    Mat Heller, RN, BSN, OCN   Oncology Care Coordinator RN  [facility]  [phone]  10/9/2018, 8:22 AM

## 2018-10-09 NOTE — PATIENT INSTRUCTIONS
Please follow up with Dr. Frye as needed with worsening or new symptoms or if instructed by your Primary Care.  Iron Infusion.  Oncology will call with rest of lab results.  Feel free to call next week if you don't hear anything.    Infusion Date/Time:  10/16/18 at 10:00    Follow Up Date/Time: As needed    If you have any questions or concerns please feel free to call.    If you need to reschedule please call:  Clinic or Lab Appointment - [phone]  Infusion - [phone]  Imaging - [phone]    Mat Heller, RN, BSN, OCN  [facility]   Oncology/Hematology Care Coordinator RN  [facility]  [phone]

## 2018-10-09 NOTE — PROGRESS NOTES
DATE OF VISIT: Oct 9, 2018    Alicia Penaloza is a 43 year old female is seen today for   Chief Complaint   Patient presents with     Hematology     Thrombocytosis & Iron deficiency anemia     Results     labs completed on 10/2/18   .       (D50.0) Iron deficiency anemia due to chronic blood loss  She returning today for follow-up.  Laboratory test revealed iron deficiency anemia with low ferritin.  I have explained to the patient that his elevated platelet count probably secondary to low ferritin.  However next generation testing still not back we will continue to watch for that.  The patient has been using oral iron without significant improvement.  I would recommend to proceed with parenteral iron.  I gave the patient an overview about potential side effects of parenteral iron.  Also talked about increasing dietary intake of iron.    The patient is ready to learn, no apparent learning barriers were identified.  Diagnosis and treatment plans were explained to the patient. The patient expressed understanding of the content. The patient asked appropriate questions. The patient questions were answered to her satisfaction.  Spent 25 minutes more than 50% of the time in counseling and coordination of care including discussion of the event of iron deficiency anemia, thrombocytosis, follow-up and prognosis.  Chart documentation with Dragon Voice recognition Software. Although reviewed after completion, some words and grammatical errors may remain.

## 2018-10-09 NOTE — NURSING NOTE
"Oncology Rooming Note    October 9, 2018 8:05 AM   Alicia Penaloza is a 43 year old female who presents for:    Chief Complaint   Patient presents with     Hematology     Thrombocytosis & Iron deficiency anemia     Results     labs completed on 10/2/18     Initial Vitals: /68 (BP Location: Right arm, Patient Position: Chair, Cuff Size: Adult Large)  Pulse 65  Temp 96.9  F (36.1  C) (Temporal)  Resp 18  Ht 1.6 m (5' 3\")  Wt 95.5 kg (210 lb 9.6 oz)  LMP 09/24/2018  SpO2 99%  BMI 37.31 kg/m2 Estimated body mass index is 37.31 kg/(m^2) as calculated from the following:    Height as of this encounter: 1.6 m (5' 3\").    Weight as of this encounter: 95.5 kg (210 lb 9.6 oz). Body surface area is 2.06 meters squared.  No Pain (0) Comment: Data Unavailable   Patient's last menstrual period was 09/24/2018.  Allergies reviewed: Yes  Medications reviewed: Yes    Medications: Medication refills not needed today.  Pharmacy name entered into [facility]: [facility] - [id number]       8 minutes for nursing intake (face to face time)     Rosemary CASTRO CMA              "

## 2018-10-12 LAB — COPATH REPORT: NORMAL

## 2018-10-16 ENCOUNTER — INFUSION THERAPY VISIT (OUTPATIENT)
Dept: INFUSION THERAPY | Facility: CLINIC | Age: 43
End: 2018-10-16
Attending: INTERNAL MEDICINE
Payer: MEDICAID

## 2018-10-16 VITALS
WEIGHT: 210.9 LBS | TEMPERATURE: 97.9 F | BODY MASS INDEX: 37.37 KG/M2 | HEART RATE: 50 BPM | OXYGEN SATURATION: 100 % | DIASTOLIC BLOOD PRESSURE: 75 MMHG | RESPIRATION RATE: 16 BRPM | SYSTOLIC BLOOD PRESSURE: 123 MMHG | HEIGHT: 63 IN

## 2018-10-16 DIAGNOSIS — D50.0 IRON DEFICIENCY ANEMIA DUE TO CHRONIC BLOOD LOSS: Primary | ICD-10-CM

## 2018-10-16 PROCEDURE — 25000128 H RX IP 250 OP 636: Performed by: INTERNAL MEDICINE

## 2018-10-16 PROCEDURE — 96365 THER/PROPH/DIAG IV INF INIT: CPT

## 2018-10-16 RX ADMIN — FERRIC CARBOXYMALTOSE INJECTION 750 MG: 50 INJECTION, SOLUTION INTRAVENOUS at 10:44

## 2018-10-16 RX ADMIN — SODIUM CHLORIDE 250 ML: 9 INJECTION, SOLUTION INTRAVENOUS at 10:44

## 2018-10-16 NOTE — PATIENT INSTRUCTIONS
Pt to return on 10/30/18 for Injectafer 2 of 2. Copies of medication list and upcoming appointments given prior to discharge.

## 2018-10-16 NOTE — MR AVS SNAPSHOT
After Visit Summary   10/16/2018    Alicia Penaloza    MRN: [id number]           Patient Information     Date Of Birth          1975        Visit Information        Provider Department      10/16/2018 10:00 AM NL INFUSION CHAIR 5 [facility]        Today's Diagnoses     Iron deficiency anemia due to chronic blood loss    -  1      Care Instructions    Pt to return on 10/30/18 for Injectafer 2 of 2. Copies of medication list and upcoming appointments given prior to discharge.             Follow-ups after your visit        Your next 10 appointments already scheduled     Oct 30, 2018  8:00 AM CDT   Level 1 with NL INFUSION CHAIR 4   [facility] ([facility])    [address] [place] [id number] [phone]            Antonio 15, 2019 11:20 AM CST   (Arrive by 11:05 AM)   New Stroke with Dex Jackson MD   [facility] ([facility] [place])    [address] [address] [phone]              Who to contact     If you have questions or need follow up information about today's clinic visit or your schedule please contact [facility] directly at [phone].  Normal or non-critical lab and imaging results will be communicated to you by MyChart, letter or phone within 4 business days after the clinic has received the results. If you do not hear from us within 7 days, please contact the clinic through MyChart or phone. If you have a critical or abnormal lab result, we will notify you by phone as soon as possible.  Submit refill requests through [employer] or call your pharmacy and they will forward the refill request to us. Please allow 3 business days for your refill to be completed.          Additional Information About Your Visit        Care EveryWhere ID     This is your Care EveryWhere ID. This could be used by other organizations to  "access your [place] medical records  [id number]        Your Vitals Were     Pulse Temperature Respirations Height Last Period Pulse Oximetry    50 97.9  F (36.6  C) (Temporal) 16 1.6 m (5' 3\") 09/24/2018 100%    BMI (Body Mass Index)                   37.36 kg/m2            Blood Pressure from Last 3 Encounters:   10/16/18 123/75   10/09/18 105/68   10/05/18 118/76    Weight from Last 3 Encounters:   10/16/18 95.7 kg (210 lb 14.4 oz)   10/09/18 95.5 kg (210 lb 9.6 oz)   10/02/18 95.5 kg (210 lb 9.6 oz)              Today, you had the following     No orders found for display       Primary Care Provider Office Phone # Fax #    Augusto Thomas [phone]       [address] [place] [address]        Equal Access to Services     [facility] Hospital[facility]ILA : Hadii helio suárez hadasho Soomaali, waaxda luqadaha, qaybta kaalmada adeegyada, anjelica ariza . So [facility] [phone].    ATENCIÓN: Si habla español, tiene a finnegan disposición servicios gratuitos de asistencia lingüística. Llame al [phone].    We comply with applicable federal civil rights laws and [place] laws. We do not discriminate on the basis of race, color, national origin, age, disability, sex, sexual orientation, or gender identity.            Thank you!     Thank you for choosing [facility]  for your care. Our goal is always to provide you with excellent care. Hearing back from our patients is one way we can continue to improve our services. Please take a few minutes to complete the written survey that you may receive in the mail after your visit with us. Thank you!             Your Updated Medication List - Protect others around you: Learn how to safely use, store and throw away your medicines at www.disposemymeds.org.          This list is accurate as of 10/16/18  1:29 PM.  Always use your most recent med list.                   Brand Name Dispense Instructions for use Diagnosis    aspirin " 325 MG tablet      Take 325 mg by mouth        atorvastatin 40 MG tablet    LIPITOR    90 tablet    Take 1 tablet (40 mg) by mouth daily    Benign essential hypertension, Acute CVA (cerebrovascular accident) (H)       FLUoxetine 20 MG capsule    PROzac    90 capsule    Take 1 capsule (20 mg) by mouth daily    Acute ischemic stroke (H), Positive depression screening       levonorgestrel 20 MCG/24HR IUD    MIRENA    1 each    1 each (20 mcg) by Intrauterine route once for 1 dose    Abnormal uterine bleeding (AUB)       lisinopril 20 MG tablet    PRINIVIL/ZESTRIL    90 tablet    Take 1 tablet (20 mg) by mouth daily    Benign essential hypertension, Acute CVA (cerebrovascular accident) (H)       LORazepam 1 MG tablet    ATIVAN    12 tablet    Take 0.5-1 tablets (0.5-1 mg) by mouth every 8 hours as needed for anxiety Take 30 minutes prior to departure.  Do not operate a vehicle after taking this medication    Adjustment disorder with anxious mood       metoprolol tartrate 50 MG tablet    LOPRESSOR    180 tablet    Take 1 tablet (50 mg) by mouth 2 times daily    Hypertension, unspecified type

## 2018-10-16 NOTE — PROGRESS NOTES
Infusion Nursing Note:  Alicia DUDLEY Ca presents today for Injectafer 1 of 2.    Patient seen by provider today: No   present during visit today: Not Applicable.    Note: N/A.    Intravenous Access:  Peripheral IV placed.    Treatment Conditions:  Not Applicable.      Post Infusion Assessment:  Patient tolerated infusion without incident.  Patient observed for 30 minutes post infusion per protocol.  Blood return noted pre and post infusion.  No evidence of extravasations.  Access discontinued per protocol.    Discharge Plan:   Discharge instructions reviewed with: Patient and Family.  Patient and/or family verbalized understanding of discharge instructions and all questions answered.  Patient discharged in stable condition accompanied by: self and daughter.  Departure Mode: Ambulatory.    Sheryl Sutton RN

## 2018-10-22 ENCOUNTER — TELEPHONE (OUTPATIENT)
Dept: INTERNAL MEDICINE | Facility: CLINIC | Age: 43
End: 2018-10-22

## 2018-10-22 NOTE — TELEPHONE ENCOUNTER
Reason for Call: Request for an order or referral:    Order or referral being requested: Requesting a verbal order to continue -Speech Therapy 2 times a week for 3 weeks    Date needed: as soon as possible    Has the patient been seen by the PCP for this problem? YES    Additional comments:     Phone number Patient can be reached at:  Other phone number:  [phone]*    Best Time:      Can we leave a detailed message on this number?  YES    Call taken on 10/22/2018 at 1:39 PM by Vanessa Holloway

## 2018-10-26 ENCOUNTER — TELEPHONE (OUTPATIENT)
Dept: INTERNAL MEDICINE | Facility: CLINIC | Age: 43
End: 2018-10-26

## 2018-10-26 DIAGNOSIS — I63.9 ACUTE CVA (CEREBROVASCULAR ACCIDENT) (H): Primary | ICD-10-CM

## 2018-10-26 NOTE — TELEPHONE ENCOUNTER
Reason for Call: Request for an order or referral:    Order or referral being requested: referral to [facility] Fax # [phone]  Date needed: at your convenience    Has the patient been seen by the PCP for this problem? YES    Additional comments: Angelique from [facility][note 2] Occupation Therapist calling. Pt and her sister Sharita- who has been acting as her care giver- requested this. Please call Sharita back at [phone] per Home Care nurse     Phone number Angelique with [facility][note 2] can be reached at: [phone]    Best Time:  Any     Can we leave a detailed message on this number?  YES    Call taken on 10/26/2018 at 9:16 AM by Jeannette Rosenthal

## 2018-10-29 NOTE — TELEPHONE ENCOUNTER
Jamie keen calls to clarify, is this INPATIENT OT and PT that is wanted?  Or is it suppose to be outpatient?  [phone]

## 2018-10-29 NOTE — TELEPHONE ENCOUNTER
Sister is calling back. It is inpatient OT. Please call her back when able to discuss this.  Thank you,  Kavitha Mendes   for [facility]

## 2018-10-30 ENCOUNTER — INFUSION THERAPY VISIT (OUTPATIENT)
Dept: INFUSION THERAPY | Facility: CLINIC | Age: 43
End: 2018-10-30
Attending: INTERNAL MEDICINE
Payer: MEDICAID

## 2018-10-30 VITALS
DIASTOLIC BLOOD PRESSURE: 75 MMHG | BODY MASS INDEX: 36.92 KG/M2 | SYSTOLIC BLOOD PRESSURE: 111 MMHG | RESPIRATION RATE: 16 BRPM | HEART RATE: 57 BPM | TEMPERATURE: 98.1 F | WEIGHT: 208.4 LBS

## 2018-10-30 DIAGNOSIS — D50.0 IRON DEFICIENCY ANEMIA DUE TO CHRONIC BLOOD LOSS: Primary | ICD-10-CM

## 2018-10-30 PROCEDURE — 96365 THER/PROPH/DIAG IV INF INIT: CPT

## 2018-10-30 PROCEDURE — 25000128 H RX IP 250 OP 636: Performed by: INTERNAL MEDICINE

## 2018-10-30 RX ADMIN — FERRIC CARBOXYMALTOSE INJECTION 750 MG: 50 INJECTION, SOLUTION INTRAVENOUS at 08:19

## 2018-10-30 RX ADMIN — SODIUM CHLORIDE 250 ML: 9 INJECTION, SOLUTION INTRAVENOUS at 08:18

## 2018-10-30 ASSESSMENT — PAIN SCALES - GENERAL: PAINLEVEL: MODERATE PAIN (5)

## 2018-10-30 NOTE — TELEPHONE ENCOUNTER
Megan from [facility] calls and asks to speak with Radha regarding a referral that was sent for this pt.

## 2018-10-30 NOTE — MR AVS SNAPSHOT
After Visit Summary   10/30/2018    Alicia Penaloza    MRN: [id number]           Patient Information     Date Of Birth          1975        Visit Information        Provider Department      10/30/2018 8:00 AM NL INFUSION CHAIR 4 [facility]        Today's Diagnoses     Iron deficiency anemia due to chronic blood loss    -  1       Follow-ups after your visit        Follow-up notes from your care team     Return if symptoms worsen or fail to improve.      Your next 10 appointments already scheduled     Antonio 15, 2019 11:20 AM CST   (Arrive by 11:05 AM)   New Stroke with Dex Jackson MD   [facility] ([facility] [place])    [address] [place] [address]  3rd Floor  [facility] [address] [phone]              Who to contact     If you have questions or need follow up information about today's clinic visit or your schedule please contact [facility] INFUSION SERVICES directly at [phone].  Normal or non-critical lab and imaging results will be communicated to you by MyChart, letter or phone within 4 business days after the clinic has received the results. If you do not hear from us within 7 days, please contact the clinic through MyChart or phone. If you have a critical or abnormal lab result, we will notify you by phone as soon as possible.  Submit refill requests through [employer] or call your pharmacy and they will forward the refill request to us. Please allow 3 business days for your refill to be completed.          Additional Information About Your Visit        Care EveryWhere ID     This is your Care EveryWhere ID. This could be used by other organizations to access your [facility] medical records  [id number]        Your Vitals Were     Pulse Temperature Respirations BMI (Body Mass Index)          57 98.1  F (36.7  C) (Temporal) 16 36.92 kg/m2         Blood Pressure from Last 3 Encounters:   10/30/18 111/75   10/16/18 123/75    10/09/18 105/68    Weight from Last 3 Encounters:   10/30/18 94.5 kg (208 lb 6.4 oz)   10/16/18 95.7 kg (210 lb 14.4 oz)   10/09/18 95.5 kg (210 lb 9.6 oz)              Today, you had the following     No orders found for display       Primary Care Provider Office Phone # Fax #    Augusto Thomas [phone]       [address] [facility] [address]        Equal Access to Services     YADI PITTS : Hadii aad ku hadasho Soomaali, waaxda luqadaha, qaybta kaalmada adeegyada, waxay idiin hayaan adeeg khdontrell ariza . So [facility] [phone].    ATENCIÓN: Si habla español, tiene a finnegan disposición servicios gratuitos de asistencia lingüística. Llame al [phone].    We comply with applicable federal civil rights laws and [place] laws. We do not discriminate on the basis of race, color, national origin, age, disability, sex, sexual orientation, or gender identity.            Thank you!     Thank you for choosing [facility] SERVICES  for your care. Our goal is always to provide you with excellent care. Hearing back from our patients is one way we can continue to improve our services. Please take a few minutes to complete the written survey that you may receive in the mail after your visit with us. Thank you!             Your Updated Medication List - Protect others around you: Learn how to safely use, store and throw away your medicines at www.disposemymeds.org.          This list is accurate as of 10/30/18  9:28 AM.  Always use your most recent med list.                   Brand Name Dispense Instructions for use Diagnosis    aspirin 325 MG tablet      Take 325 mg by mouth        atorvastatin 40 MG tablet    LIPITOR    90 tablet    Take 1 tablet (40 mg) by mouth daily    Benign essential hypertension, Acute CVA (cerebrovascular accident) (H)       FLUoxetine 20 MG capsule    PROzac    90 capsule    Take 1 capsule (20 mg) by mouth daily    Acute ischemic stroke (H), Positive  depression screening       levonorgestrel 20 MCG/24HR IUD    MIRENA    1 each    1 each (20 mcg) by Intrauterine route once for 1 dose    Abnormal uterine bleeding (AUB)       lisinopril 20 MG tablet    PRINIVIL/ZESTRIL    90 tablet    Take 1 tablet (20 mg) by mouth daily    Benign essential hypertension, Acute CVA (cerebrovascular accident) (H)       LORazepam 1 MG tablet    ATIVAN    12 tablet    Take 0.5-1 tablets (0.5-1 mg) by mouth every 8 hours as needed for anxiety Take 30 minutes prior to departure.  Do not operate a vehicle after taking this medication    Adjustment disorder with anxious mood       metoprolol tartrate 50 MG tablet    LOPRESSOR    180 tablet    Take 1 tablet (50 mg) by mouth 2 times daily    Hypertension, unspecified type

## 2018-10-30 NOTE — PROGRESS NOTES
Infusion Nursing Note:  Alicia Penaloza presents today for Injectafer.    Patient seen by provider today: No   present during visit today: Not Applicable.    Note: N/A.    Intravenous Access:  Peripheral IV placed.    Treatment Conditions:  Not Applicable.      Post Infusion Assessment:  Patient observed for 30 minutes post Injectafer per protocol.  Site patent and intact, free from redness, edema or discomfort.  No evidence of extravasations.  Access discontinued per protocol.    Discharge Plan:   Discharge instructions reviewed with: Patient.  Patient discharged in stable condition accompanied by: daughter.  Departure Mode: Ambulatory.    Renuka Yates RN

## 2018-10-31 NOTE — TELEPHONE ENCOUNTER
Left message for Angelique to call Megan at [facility] to find out what is going on.

## 2018-11-05 ENCOUNTER — TELEPHONE (OUTPATIENT)
Dept: ONCOLOGY | Facility: CLINIC | Age: 43
End: 2018-11-05

## 2018-11-05 DIAGNOSIS — D50.0 IRON DEFICIENCY ANEMIA DUE TO CHRONIC BLOOD LOSS: Primary | ICD-10-CM

## 2018-11-05 NOTE — TELEPHONE ENCOUNTER
Patient's sister calling to report that patient continues with vaginal bleeding and patient was told that if she stops bleeding that the Iron infusions should be enough.  Sister reports that patient continues to bleed using 2 tampons daily.  They are wondering if patient should have her Iron rechecked.  Will route to Dr. Frye.    Mat Heller RN, BSN, OCN  11/5/2018, 9:14 AM

## 2018-11-06 NOTE — TELEPHONE ENCOUNTER
Left message for sister to call back.  [facility] Rivero spoke with Dr. Garg who would like the patient to get in to see her cardiologist and then follow up with him.  She can have her labs checked anytime (orders are in).     Shilpa Reyes RN. . .  11/6/2018, 11:02 AM

## 2018-11-06 NOTE — TELEPHONE ENCOUNTER
Reviewed with Dr. Frye and ok for patient to get CBC and Ferritin.  Labs pending will call sister to schedule.    Mat Heller RN, BSN, OCN  11/6/2018, 10:08 AM

## 2018-11-06 NOTE — TELEPHONE ENCOUNTER
Spoke with sister, she will get pt in to see cardiology and then Dr. Garg. They will come in for labs soon.     Shilpa Reyes RN. . .  11/6/2018, 11:39 AM

## 2018-11-07 DIAGNOSIS — D50.0 IRON DEFICIENCY ANEMIA DUE TO CHRONIC BLOOD LOSS: ICD-10-CM

## 2018-11-07 LAB
BASOPHILS # BLD AUTO: 0.1 10E9/L (ref 0–0.2)
BASOPHILS NFR BLD AUTO: 0.9 %
DIFFERENTIAL METHOD BLD: ABNORMAL
EOSINOPHIL NFR BLD AUTO: 5.2 %
ERYTHROCYTE [DISTWIDTH] IN BLOOD BY AUTOMATED COUNT: 20.2 % (ref 10–15)
FERRITIN SERPL-MCNC: 676 NG/ML (ref 8–252)
HCT VFR BLD AUTO: 39.6 % (ref 35–47)
HGB BLD-MCNC: 11.9 G/DL (ref 11.7–15.7)
IMM GRANULOCYTES # BLD: 0.1 10E9/L (ref 0–0.4)
IMM GRANULOCYTES NFR BLD: 0.8 %
LYMPHOCYTES # BLD AUTO: 1.7 10E9/L (ref 0.8–5.3)
LYMPHOCYTES NFR BLD AUTO: 16.7 %
MCH RBC QN AUTO: 26.5 PG (ref 26.5–33)
MCHC RBC AUTO-ENTMCNC: 30.1 G/DL (ref 31.5–36.5)
MCV RBC AUTO: 88 FL (ref 78–100)
MONOCYTES # BLD AUTO: 0.9 10E9/L (ref 0–1.3)
MONOCYTES NFR BLD AUTO: 8.6 %
NEUTROPHILS # BLD AUTO: 6.7 10E9/L (ref 1.6–8.3)
NEUTROPHILS NFR BLD AUTO: 67.8 %
NRBC # BLD AUTO: 0 10*3/UL
NRBC BLD AUTO-RTO: 0 /100
PLATELET # BLD AUTO: 376 10E9/L (ref 150–450)
RBC # BLD AUTO: 4.49 10E12/L (ref 3.8–5.2)
WBC # BLD AUTO: 9.9 10E9/L (ref 4–11)

## 2018-11-07 PROCEDURE — 85025 COMPLETE CBC W/AUTO DIFF WBC: CPT | Performed by: INTERNAL MEDICINE

## 2018-11-07 PROCEDURE — 82728 ASSAY OF FERRITIN: CPT | Performed by: INTERNAL MEDICINE

## 2018-11-07 PROCEDURE — 36415 COLL VENOUS BLD VENIPUNCTURE: CPT | Performed by: INTERNAL MEDICINE

## 2018-11-09 ENCOUNTER — TELEPHONE (OUTPATIENT)
Dept: FAMILY MEDICINE | Facility: CLINIC | Age: 43
End: 2018-11-09

## 2018-11-09 NOTE — TELEPHONE ENCOUNTER
Patient sister Sharita returning call to clinic. [facility] and no one was available to take call. Please call Sharita back at [phone] Thank you

## 2018-11-09 NOTE — TELEPHONE ENCOUNTER
Reason for call:  Sister Sharita is calling and she is on the C2C and since pt is going to be having a hysterectomy with ROOSEVELT he suggested her to see the cardiologist to get clearance but sister is confused because she had a stroke not a heart attack. Sharita just wants to make sure she is making the appropriate appointments. Please advise.

## 2018-11-12 NOTE — TELEPHONE ENCOUNTER
Spoke with pt's sister and let her know that pt will have to get clearance for surgery from her PCP before Dr. Garg will consider a surgery.  Pt's sister understood this, she will schedule pt with PCP.  If her PCP clears her for surgery then she will schedule an appt with Dr. Garg to discuss surgery.    Isela Valadez RN

## 2018-11-21 ENCOUNTER — HOSPITAL ENCOUNTER (EMERGENCY)
Facility: CLINIC | Age: 43
Discharge: ANOTHER HOSPITAL | End: 2018-11-21
Attending: EMERGENCY MEDICINE | Admitting: EMERGENCY MEDICINE
Payer: COMMERCIAL

## 2018-11-21 ENCOUNTER — APPOINTMENT (OUTPATIENT)
Dept: GENERAL RADIOLOGY | Facility: CLINIC | Age: 43
End: 2018-11-21
Attending: EMERGENCY MEDICINE
Payer: COMMERCIAL

## 2018-11-21 ENCOUNTER — APPOINTMENT (OUTPATIENT)
Dept: CT IMAGING | Facility: CLINIC | Age: 43
End: 2018-11-21
Attending: EMERGENCY MEDICINE
Payer: COMMERCIAL

## 2018-11-21 VITALS
RESPIRATION RATE: 19 BRPM | DIASTOLIC BLOOD PRESSURE: 104 MMHG | TEMPERATURE: 99.3 F | HEART RATE: 89 BPM | OXYGEN SATURATION: 98 % | SYSTOLIC BLOOD PRESSURE: 162 MMHG

## 2018-11-21 DIAGNOSIS — R41.82 ALTERED MENTAL STATUS, UNSPECIFIED ALTERED MENTAL STATUS TYPE: ICD-10-CM

## 2018-11-21 DIAGNOSIS — F44.9: ICD-10-CM

## 2018-11-21 DIAGNOSIS — R47.01 APHASIA: ICD-10-CM

## 2018-11-21 LAB
ALBUMIN UR-MCNC: 30 MG/DL
AMPHETAMINES UR QL: NOT DETECTED NG/ML
ANION GAP SERPL CALCULATED.3IONS-SCNC: 12 MMOL/L (ref 3–14)
APPEARANCE UR: CLEAR
APTT PPP: 34 SEC (ref 22–37)
BARBITURATES UR QL SCN: NOT DETECTED NG/ML
BASOPHILS # BLD AUTO: 0.1 10E9/L (ref 0–0.2)
BASOPHILS NFR BLD AUTO: 0.5 %
BENZODIAZ UR QL SCN: NOT DETECTED NG/ML
BILIRUB UR QL STRIP: NEGATIVE
BUN SERPL-MCNC: 10 MG/DL (ref 7–30)
BUPRENORPHINE UR QL: NOT DETECTED NG/ML
CALCIUM SERPL-MCNC: 8.5 MG/DL (ref 8.5–10.1)
CANNABINOIDS UR QL: NOT DETECTED NG/ML
CHLORIDE SERPL-SCNC: 110 MMOL/L (ref 94–109)
CO2 SERPL-SCNC: 21 MMOL/L (ref 20–32)
COCAINE UR QL SCN: NOT DETECTED NG/ML
COLOR UR AUTO: YELLOW
CREAT SERPL-MCNC: 0.9 MG/DL (ref 0.52–1.04)
D-METHAMPHET UR QL: NOT DETECTED NG/ML
DIFFERENTIAL METHOD BLD: ABNORMAL
EOSINOPHIL NFR BLD AUTO: 0.3 %
ERYTHROCYTE [DISTWIDTH] IN BLOOD BY AUTOMATED COUNT: 19.8 % (ref 10–15)
GFR SERPL CREATININE-BSD FRML MDRD: 67 ML/MIN/1.7M2
GLUCOSE BLDC GLUCOMTR-MCNC: 88 MG/DL (ref 70–99)
GLUCOSE SERPL-MCNC: 105 MG/DL (ref 70–99)
GLUCOSE UR STRIP-MCNC: NEGATIVE MG/DL
HCT VFR BLD AUTO: 41.3 % (ref 35–47)
HGB BLD-MCNC: 13.5 G/DL (ref 11.7–15.7)
HGB UR QL STRIP: NEGATIVE
IMM GRANULOCYTES # BLD: 0.1 10E9/L (ref 0–0.4)
IMM GRANULOCYTES NFR BLD: 0.5 %
INR PPP: 1.07 (ref 0.86–1.14)
KETONES UR STRIP-MCNC: 20 MG/DL
LACTATE BLD-SCNC: 1.7 MMOL/L (ref 0.7–2)
LEUKOCYTE ESTERASE UR QL STRIP: NEGATIVE
LYMPHOCYTES # BLD AUTO: 1.3 10E9/L (ref 0.8–5.3)
LYMPHOCYTES NFR BLD AUTO: 10.5 %
MCH RBC QN AUTO: 27.7 PG (ref 26.5–33)
MCHC RBC AUTO-ENTMCNC: 32.7 G/DL (ref 31.5–36.5)
MCV RBC AUTO: 85 FL (ref 78–100)
METHADONE UR QL SCN: NOT DETECTED NG/ML
MONOCYTES # BLD AUTO: 0.8 10E9/L (ref 0–1.3)
MONOCYTES NFR BLD AUTO: 6 %
MUCOUS THREADS #/AREA URNS LPF: PRESENT /LPF
NEUTROPHILS # BLD AUTO: 10.5 10E9/L (ref 1.6–8.3)
NEUTROPHILS NFR BLD AUTO: 82.2 %
NITRATE UR QL: NEGATIVE
NRBC # BLD AUTO: 0 10*3/UL
NRBC BLD AUTO-RTO: 0 /100
OPIATES UR QL SCN: NOT DETECTED NG/ML
OXYCODONE UR QL SCN: NOT DETECTED NG/ML
PCP UR QL SCN: NOT DETECTED NG/ML
PH UR STRIP: 5 PH (ref 5–7)
PLATELET # BLD AUTO: 389 10E9/L (ref 150–450)
POTASSIUM SERPL-SCNC: 3.4 MMOL/L (ref 3.4–5.3)
PROPOXYPH UR QL: NOT DETECTED NG/ML
RBC # BLD AUTO: 4.87 10E12/L (ref 3.8–5.2)
RBC #/AREA URNS AUTO: 0 /HPF (ref 0–2)
SODIUM SERPL-SCNC: 143 MMOL/L (ref 133–144)
SOURCE: ABNORMAL
SP GR UR STRIP: 1.03 (ref 1–1.03)
TRICYCLICS UR QL SCN: NOT DETECTED NG/ML
TROPONIN I SERPL-MCNC: <0.015 UG/L (ref 0–0.04)
UROBILINOGEN UR STRIP-MCNC: 0 MG/DL (ref 0–2)
WBC # BLD AUTO: 12.8 10E9/L (ref 4–11)
WBC #/AREA URNS AUTO: 1 /HPF (ref 0–5)

## 2018-11-21 PROCEDURE — 25000125 ZZHC RX 250: Performed by: EMERGENCY MEDICINE

## 2018-11-21 PROCEDURE — 93005 ELECTROCARDIOGRAM TRACING: CPT | Performed by: EMERGENCY MEDICINE

## 2018-11-21 PROCEDURE — 85730 THROMBOPLASTIN TIME PARTIAL: CPT | Performed by: EMERGENCY MEDICINE

## 2018-11-21 PROCEDURE — 93010 ELECTROCARDIOGRAM REPORT: CPT | Mod: Z6 | Performed by: EMERGENCY MEDICINE

## 2018-11-21 PROCEDURE — 87086 URINE CULTURE/COLONY COUNT: CPT | Performed by: EMERGENCY MEDICINE

## 2018-11-21 PROCEDURE — 99291 CRITICAL CARE FIRST HOUR: CPT | Mod: 25 | Performed by: EMERGENCY MEDICINE

## 2018-11-21 PROCEDURE — 81001 URINALYSIS AUTO W/SCOPE: CPT | Performed by: EMERGENCY MEDICINE

## 2018-11-21 PROCEDURE — 84484 ASSAY OF TROPONIN QUANT: CPT | Performed by: EMERGENCY MEDICINE

## 2018-11-21 PROCEDURE — 25000128 H RX IP 250 OP 636: Performed by: EMERGENCY MEDICINE

## 2018-11-21 PROCEDURE — 85025 COMPLETE CBC W/AUTO DIFF WBC: CPT | Performed by: EMERGENCY MEDICINE

## 2018-11-21 PROCEDURE — 80306 DRUG TEST PRSMV INSTRMNT: CPT | Performed by: EMERGENCY MEDICINE

## 2018-11-21 PROCEDURE — 96360 HYDRATION IV INFUSION INIT: CPT | Performed by: EMERGENCY MEDICINE

## 2018-11-21 PROCEDURE — 51702 INSERT TEMP BLADDER CATH: CPT | Performed by: EMERGENCY MEDICINE

## 2018-11-21 PROCEDURE — 70450 CT HEAD/BRAIN W/O DYE: CPT

## 2018-11-21 PROCEDURE — 83605 ASSAY OF LACTIC ACID: CPT | Performed by: EMERGENCY MEDICINE

## 2018-11-21 PROCEDURE — 71045 X-RAY EXAM CHEST 1 VIEW: CPT | Mod: TC

## 2018-11-21 PROCEDURE — 00000146 ZZHCL STATISTIC GLUCOSE BY METER IP

## 2018-11-21 PROCEDURE — 99285 EMERGENCY DEPT VISIT HI MDM: CPT | Mod: 25 | Performed by: EMERGENCY MEDICINE

## 2018-11-21 PROCEDURE — 80048 BASIC METABOLIC PNL TOTAL CA: CPT | Performed by: EMERGENCY MEDICINE

## 2018-11-21 PROCEDURE — 85610 PROTHROMBIN TIME: CPT | Performed by: EMERGENCY MEDICINE

## 2018-11-21 PROCEDURE — 70498 CT ANGIOGRAPHY NECK: CPT

## 2018-11-21 RX ORDER — LIDOCAINE 40 MG/G
CREAM TOPICAL
Status: DISCONTINUED | OUTPATIENT
Start: 2018-11-21 | End: 2018-11-22 | Stop reason: HOSPADM

## 2018-11-21 RX ORDER — IOPAMIDOL 755 MG/ML
500 INJECTION, SOLUTION INTRAVASCULAR ONCE
Status: COMPLETED | OUTPATIENT
Start: 2018-11-21 | End: 2018-11-21

## 2018-11-21 RX ADMIN — SODIUM CHLORIDE 100 ML: 9 INJECTION, SOLUTION INTRAVENOUS at 18:52

## 2018-11-21 RX ADMIN — SODIUM CHLORIDE 500 ML: 9 INJECTION, SOLUTION INTRAVENOUS at 19:05

## 2018-11-21 RX ADMIN — IOPAMIDOL 80 ML: 755 INJECTION, SOLUTION INTRAVENOUS at 18:53

## 2018-11-22 ENCOUNTER — APPOINTMENT (OUTPATIENT)
Dept: PHYSICAL THERAPY | Facility: CLINIC | Age: 43
DRG: 886 | End: 2018-11-22
Attending: INTERNAL MEDICINE
Payer: COMMERCIAL

## 2018-11-22 ENCOUNTER — APPOINTMENT (OUTPATIENT)
Dept: OCCUPATIONAL THERAPY | Facility: CLINIC | Age: 43
DRG: 886 | End: 2018-11-22
Attending: INTERNAL MEDICINE
Payer: COMMERCIAL

## 2018-11-22 ENCOUNTER — APPOINTMENT (OUTPATIENT)
Dept: SPEECH THERAPY | Facility: CLINIC | Age: 43
DRG: 886 | End: 2018-11-22
Attending: INTERNAL MEDICINE
Payer: COMMERCIAL

## 2018-11-22 ENCOUNTER — HOSPITAL ENCOUNTER (INPATIENT)
Facility: CLINIC | Age: 43
LOS: 7 days | Discharge: NURSING, REHAB OR LONG-TERM CARE | DRG: 886 | End: 2018-11-29
Attending: INTERNAL MEDICINE | Admitting: PSYCHIATRY & NEUROLOGY
Payer: COMMERCIAL

## 2018-11-22 DIAGNOSIS — R41.89 COGNITIVE DECLINE: ICD-10-CM

## 2018-11-22 DIAGNOSIS — F32.1 CURRENT MODERATE EPISODE OF MAJOR DEPRESSIVE DISORDER, UNSPECIFIED WHETHER RECURRENT (H): Primary | ICD-10-CM

## 2018-11-22 PROBLEM — R47.01 APHASIA: Status: ACTIVE | Noted: 2018-11-22

## 2018-11-22 LAB
ANION GAP SERPL CALCULATED.3IONS-SCNC: 10 MMOL/L (ref 3–14)
BASOPHILS # BLD AUTO: 0 10E9/L (ref 0–0.2)
BASOPHILS NFR BLD AUTO: 0.4 %
BUN SERPL-MCNC: 9 MG/DL (ref 7–30)
CALCIUM SERPL-MCNC: 8.2 MG/DL (ref 8.5–10.1)
CHLORIDE SERPL-SCNC: 113 MMOL/L (ref 94–109)
CO2 SERPL-SCNC: 19 MMOL/L (ref 20–32)
CREAT SERPL-MCNC: 0.83 MG/DL (ref 0.52–1.04)
CRP SERPL-MCNC: 5.5 MG/L (ref 0–8)
DIFFERENTIAL METHOD BLD: ABNORMAL
EOSINOPHIL # BLD AUTO: 0.1 10E9/L (ref 0–0.7)
EOSINOPHIL NFR BLD AUTO: 0.6 %
ERYTHROCYTE [DISTWIDTH] IN BLOOD BY AUTOMATED COUNT: 20.3 % (ref 10–15)
ERYTHROCYTE [SEDIMENTATION RATE] IN BLOOD BY WESTERGREN METHOD: 23 MM/H (ref 0–20)
GFR SERPL CREATININE-BSD FRML MDRD: 74 ML/MIN/1.7M2
GLUCOSE BLDC GLUCOMTR-MCNC: 109 MG/DL (ref 70–99)
GLUCOSE BLDC GLUCOMTR-MCNC: 83 MG/DL (ref 70–99)
GLUCOSE BLDC GLUCOMTR-MCNC: 84 MG/DL (ref 70–99)
GLUCOSE SERPL-MCNC: 85 MG/DL (ref 70–99)
HBA1C MFR BLD: 4.7 % (ref 0–5.6)
HCT VFR BLD AUTO: 38.5 % (ref 35–47)
HGB BLD-MCNC: 12.2 G/DL (ref 11.7–15.7)
IMM GRANULOCYTES # BLD: 0 10E9/L (ref 0–0.4)
IMM GRANULOCYTES NFR BLD: 0.3 %
LYMPHOCYTES # BLD AUTO: 1.9 10E9/L (ref 0.8–5.3)
LYMPHOCYTES NFR BLD AUTO: 17 %
MCH RBC QN AUTO: 27.9 PG (ref 26.5–33)
MCHC RBC AUTO-ENTMCNC: 31.7 G/DL (ref 31.5–36.5)
MCV RBC AUTO: 88 FL (ref 78–100)
MONOCYTES # BLD AUTO: 1.1 10E9/L (ref 0–1.3)
MONOCYTES NFR BLD AUTO: 9.5 %
NEUTROPHILS # BLD AUTO: 8.2 10E9/L (ref 1.6–8.3)
NEUTROPHILS NFR BLD AUTO: 72.2 %
NRBC # BLD AUTO: 0 10*3/UL
NRBC BLD AUTO-RTO: 0 /100
PLATELET # BLD AUTO: 340 10E9/L (ref 150–450)
POTASSIUM SERPL-SCNC: 3.2 MMOL/L (ref 3.4–5.3)
POTASSIUM SERPL-SCNC: 4.5 MMOL/L (ref 3.4–5.3)
RBC # BLD AUTO: 4.37 10E12/L (ref 3.8–5.2)
SODIUM SERPL-SCNC: 142 MMOL/L (ref 133–144)
WBC # BLD AUTO: 11.3 10E9/L (ref 4–11)

## 2018-11-22 PROCEDURE — 86160 COMPLEMENT ANTIGEN: CPT | Performed by: PSYCHIATRY & NEUROLOGY

## 2018-11-22 PROCEDURE — 40000225 ZZH STATISTIC SLP WARD VISIT

## 2018-11-22 PROCEDURE — 97116 GAIT TRAINING THERAPY: CPT | Mod: GP

## 2018-11-22 PROCEDURE — 25000132 ZZH RX MED GY IP 250 OP 250 PS 637: Performed by: STUDENT IN AN ORGANIZED HEALTH CARE EDUCATION/TRAINING PROGRAM

## 2018-11-22 PROCEDURE — 25000132 ZZH RX MED GY IP 250 OP 250 PS 637: Performed by: PSYCHIATRY & NEUROLOGY

## 2018-11-22 PROCEDURE — 92610 EVALUATE SWALLOWING FUNCTION: CPT | Mod: GN

## 2018-11-22 PROCEDURE — 40000133 ZZH STATISTIC OT WARD VISIT

## 2018-11-22 PROCEDURE — 25000125 ZZHC RX 250: Performed by: STUDENT IN AN ORGANIZED HEALTH CARE EDUCATION/TRAINING PROGRAM

## 2018-11-22 PROCEDURE — 00000401 ZZHCL STATISTIC THROMBIN TIME NC: Performed by: PSYCHIATRY & NEUROLOGY

## 2018-11-22 PROCEDURE — 85025 COMPLETE CBC W/AUTO DIFF WBC: CPT | Performed by: PSYCHIATRY & NEUROLOGY

## 2018-11-22 PROCEDURE — 97161 PT EVAL LOW COMPLEX 20 MIN: CPT | Mod: GP

## 2018-11-22 PROCEDURE — 97530 THERAPEUTIC ACTIVITIES: CPT | Mod: GP

## 2018-11-22 PROCEDURE — 85652 RBC SED RATE AUTOMATED: CPT | Performed by: PSYCHIATRY & NEUROLOGY

## 2018-11-22 PROCEDURE — 00000146 ZZHCL STATISTIC GLUCOSE BY METER IP

## 2018-11-22 PROCEDURE — 80048 BASIC METABOLIC PNL TOTAL CA: CPT | Performed by: PSYCHIATRY & NEUROLOGY

## 2018-11-22 PROCEDURE — 97535 SELF CARE MNGMENT TRAINING: CPT | Mod: GO

## 2018-11-22 PROCEDURE — 97166 OT EVAL MOD COMPLEX 45 MIN: CPT | Mod: GO

## 2018-11-22 PROCEDURE — 36415 COLL VENOUS BLD VENIPUNCTURE: CPT | Performed by: PSYCHIATRY & NEUROLOGY

## 2018-11-22 PROCEDURE — 40000193 ZZH STATISTIC PT WARD VISIT

## 2018-11-22 PROCEDURE — 84132 ASSAY OF SERUM POTASSIUM: CPT | Performed by: PSYCHIATRY & NEUROLOGY

## 2018-11-22 PROCEDURE — 25000128 H RX IP 250 OP 636: Performed by: STUDENT IN AN ORGANIZED HEALTH CARE EDUCATION/TRAINING PROGRAM

## 2018-11-22 PROCEDURE — 00000167 ZZHCL STATISTIC INR NC: Performed by: PSYCHIATRY & NEUROLOGY

## 2018-11-22 PROCEDURE — 86255 FLUORESCENT ANTIBODY SCREEN: CPT | Performed by: PSYCHIATRY & NEUROLOGY

## 2018-11-22 PROCEDURE — 12000008 ZZH R&B INTERMEDIATE UMMC

## 2018-11-22 PROCEDURE — 85730 THROMBOPLASTIN TIME PARTIAL: CPT | Performed by: PSYCHIATRY & NEUROLOGY

## 2018-11-22 PROCEDURE — 85613 RUSSELL VIPER VENOM DILUTED: CPT | Performed by: PSYCHIATRY & NEUROLOGY

## 2018-11-22 PROCEDURE — 86140 C-REACTIVE PROTEIN: CPT | Performed by: PSYCHIATRY & NEUROLOGY

## 2018-11-22 PROCEDURE — 83036 HEMOGLOBIN GLYCOSYLATED A1C: CPT | Performed by: PSYCHIATRY & NEUROLOGY

## 2018-11-22 RX ORDER — POTASSIUM CHLORIDE 29.8 MG/ML
20 INJECTION INTRAVENOUS
Status: DISCONTINUED | OUTPATIENT
Start: 2018-11-22 | End: 2018-11-29 | Stop reason: HOSPADM

## 2018-11-22 RX ORDER — LABETALOL HYDROCHLORIDE 5 MG/ML
10-20 INJECTION, SOLUTION INTRAVENOUS EVERY 10 MIN PRN
Status: DISCONTINUED | OUTPATIENT
Start: 2018-11-22 | End: 2018-11-29 | Stop reason: HOSPADM

## 2018-11-22 RX ORDER — ATORVASTATIN CALCIUM 40 MG/1
40 TABLET, FILM COATED ORAL DAILY
Status: DISCONTINUED | OUTPATIENT
Start: 2018-11-22 | End: 2018-11-29 | Stop reason: HOSPADM

## 2018-11-22 RX ORDER — ASPIRIN 325 MG
325 TABLET ORAL ONCE
Status: DISCONTINUED | OUTPATIENT
Start: 2018-11-22 | End: 2018-11-22

## 2018-11-22 RX ORDER — LISINOPRIL 20 MG/1
20 TABLET ORAL DAILY
Status: DISCONTINUED | OUTPATIENT
Start: 2018-11-22 | End: 2018-11-29 | Stop reason: HOSPADM

## 2018-11-22 RX ORDER — POTASSIUM CL/LIDO/0.9 % NACL 10MEQ/0.1L
10 INTRAVENOUS SOLUTION, PIGGYBACK (ML) INTRAVENOUS
Status: DISCONTINUED | OUTPATIENT
Start: 2018-11-22 | End: 2018-11-29 | Stop reason: HOSPADM

## 2018-11-22 RX ORDER — ASPIRIN 325 MG
325 TABLET ORAL DAILY
Status: DISCONTINUED | OUTPATIENT
Start: 2018-11-23 | End: 2018-11-29 | Stop reason: HOSPADM

## 2018-11-22 RX ORDER — ONDANSETRON 2 MG/ML
4 INJECTION INTRAMUSCULAR; INTRAVENOUS EVERY 6 HOURS PRN
Status: DISCONTINUED | OUTPATIENT
Start: 2018-11-22 | End: 2018-11-29 | Stop reason: HOSPADM

## 2018-11-22 RX ORDER — POTASSIUM CHLORIDE 1.5 G/1.58G
20-40 POWDER, FOR SOLUTION ORAL
Status: DISCONTINUED | OUTPATIENT
Start: 2018-11-22 | End: 2018-11-29 | Stop reason: HOSPADM

## 2018-11-22 RX ORDER — POTASSIUM CHLORIDE 750 MG/1
20-40 TABLET, EXTENDED RELEASE ORAL
Status: DISCONTINUED | OUTPATIENT
Start: 2018-11-22 | End: 2018-11-29 | Stop reason: HOSPADM

## 2018-11-22 RX ORDER — METOPROLOL TARTRATE 50 MG
50 TABLET ORAL 2 TIMES DAILY
Status: DISCONTINUED | OUTPATIENT
Start: 2018-11-22 | End: 2018-11-22 | Stop reason: ALTCHOICE

## 2018-11-22 RX ORDER — POTASSIUM CHLORIDE 7.45 MG/ML
10 INJECTION INTRAVENOUS
Status: DISCONTINUED | OUTPATIENT
Start: 2018-11-22 | End: 2018-11-29 | Stop reason: HOSPADM

## 2018-11-22 RX ORDER — ONDANSETRON 4 MG/1
4 TABLET, ORALLY DISINTEGRATING ORAL EVERY 6 HOURS PRN
Status: DISCONTINUED | OUTPATIENT
Start: 2018-11-22 | End: 2018-11-29 | Stop reason: HOSPADM

## 2018-11-22 RX ORDER — METOPROLOL TARTRATE 1 MG/ML
5 INJECTION, SOLUTION INTRAVENOUS EVERY 12 HOURS
Status: DISCONTINUED | OUTPATIENT
Start: 2018-11-22 | End: 2018-11-22

## 2018-11-22 RX ORDER — SODIUM CHLORIDE 9 MG/ML
INJECTION, SOLUTION INTRAVENOUS CONTINUOUS
Status: DISCONTINUED | OUTPATIENT
Start: 2018-11-22 | End: 2018-11-23

## 2018-11-22 RX ORDER — ASPIRIN 300 MG/1
300 SUPPOSITORY RECTAL DAILY
Status: DISCONTINUED | OUTPATIENT
Start: 2018-11-22 | End: 2018-11-22

## 2018-11-22 RX ORDER — METOPROLOL TARTRATE 50 MG
50 TABLET ORAL 2 TIMES DAILY
Status: DISCONTINUED | OUTPATIENT
Start: 2018-11-22 | End: 2018-11-29 | Stop reason: HOSPADM

## 2018-11-22 RX ADMIN — POTASSIUM CHLORIDE 20 MEQ: 750 TABLET, EXTENDED RELEASE ORAL at 16:03

## 2018-11-22 RX ADMIN — LISINOPRIL 20 MG: 20 TABLET ORAL at 11:51

## 2018-11-22 RX ADMIN — FLUOXETINE 20 MG: 20 CAPSULE ORAL at 14:31

## 2018-11-22 RX ADMIN — SODIUM CHLORIDE, PRESERVATIVE FREE: 5 INJECTION INTRAVENOUS at 20:57

## 2018-11-22 RX ADMIN — ATORVASTATIN CALCIUM 40 MG: 40 TABLET, FILM COATED ORAL at 11:51

## 2018-11-22 RX ADMIN — POTASSIUM CHLORIDE 40 MEQ: 750 TABLET, EXTENDED RELEASE ORAL at 11:50

## 2018-11-22 RX ADMIN — METOPROLOL TARTRATE 50 MG: 50 TABLET, FILM COATED ORAL at 20:39

## 2018-11-22 RX ADMIN — ENOXAPARIN SODIUM 40 MG: 40 INJECTION SUBCUTANEOUS at 11:50

## 2018-11-22 RX ADMIN — FLUOXETINE 20 MG: 20 CAPSULE ORAL at 11:51

## 2018-11-22 RX ADMIN — SODIUM CHLORIDE, PRESERVATIVE FREE: 5 INJECTION INTRAVENOUS at 05:05

## 2018-11-22 RX ADMIN — METOPROLOL TARTRATE 5 MG: 1 INJECTION, SOLUTION INTRAVENOUS at 03:37

## 2018-11-22 RX ADMIN — ASPIRIN 300 MG: 300 SUPPOSITORY RECTAL at 03:37

## 2018-11-22 ASSESSMENT — VISUAL ACUITY
OU: NORMAL ACUITY

## 2018-11-22 ASSESSMENT — ACTIVITIES OF DAILY LIVING (ADL)
PREVIOUS_RESPONSIBILITIES: MEAL PREP;HOUSEKEEPING;LAUNDRY;SHOPPING;YARDWORK;MEDICATION MANAGEMENT;FINANCES
TOILETING: 3-->ASSISTIVE EQUIPMENT AND PERSON
FALL_HISTORY_WITHIN_LAST_SIX_MONTHS: NO
ADLS_ACUITY_SCORE: 14
ADLS_ACUITY_SCORE: 17
ADLS_ACUITY_SCORE: 13
RETIRED_COMMUNICATION: 0-->UNDERSTANDS/COMMUNICATES WITHOUT DIFFICULTY
SWALLOWING: 0-->SWALLOWS FOODS/LIQUIDS WITHOUT DIFFICULTY
RETIRED_EATING: 2-->ASSISTIVE PERSON
TRANSFERRING: 3-->ASSISTIVE EQUIPMENT AND PERSON
ADLS_ACUITY_SCORE: 26
WHICH_OF_THE_ABOVE_FUNCTIONAL_RISKS_HAD_A_RECENT_ONSET_OR_CHANGE?: COMMUNICATION/SPEECH
DRESS: 3-->ASSISTIVE EQUIPMENT AND PERSON
ADLS_ACUITY_SCORE: 14
AMBULATION: 3-->ASSISTIVE EQUIPMENT AND PERSON
COGNITION: 1 - ATTENTION OR MEMORY DEFICITS
BATHING: 3-->ASSISTIVE EQUIPMENT AND PERSON

## 2018-11-22 NOTE — IP AVS SNAPSHOT
"` `           [address] [facility]: [phone]                                              INTERAGENCY TRANSFER FORM - NURSING   2018                    Hospital Admission Date: 2018  ZAIN SEXTON   : 1975  Sex: Female        Attending Provider: Nirav Luna MD     Allergies:  No Known Allergies    Infection:  None   Service:  GENERAL MEDI    Ht:  1.6 m (5' 3\")   Wt:  92.5 kg (203 lb 14.4 oz)   Admission Wt:  97.1 kg (214 lb)    BMI:  36.12 kg/m 2   BSA:  2.03 m 2            Patient PCP Information     Provider PCP Type    Augusto Thomas MD General      Current Code Status     Date Active Code Status Order ID Comments User Context       Prior      Code Status History     Date Active Date Inactive Code Status Order ID Comments User Context    2018  3:36 PM  Full Code [id number]  Regina Farris MD Outpatient    2018  1:35 AM 2018  3:36 PM Full Code [id number]  Jitendra Messina MD Inpatient    2018  3:45 PM 2018  1:35 AM Full Code [id number]  Parul Rees MD Outpatient    2018  5:26 PM 2018  3:45 PM Full Code [id number]  David Knight MD Inpatient      Advance Directives        Scanned docmt in ACP Activity?           No scanned doc        Hospital Problems as of 2018              Priority Class Noted POA    Aphasia Medium  2018 Yes      Non-Hospital Problems as of 2018              Priority Class Noted    Hypertensive urgency Medium  3/28/2018    Vaginal bleeding Medium  3/31/2018    Acute ischemic stroke (H) Medium  2018    Benign essential hypertension Medium  2018    Acute CVA (cerebrovascular accident) (H) Medium  2018    Obesity (BMI 35.0-39.9) with comorbidity (H) Medium  2018    Hyperlipidemia LDL goal <100 Medium  10/2/2018    Iron deficiency anemia due to chronic blood loss Medium  10/9/2018      Immunizations     None         END      ASSESSMENT     Discharge Profile Flowsheet " "    DISCHARGE NEEDS ASSESSMENT     Full except areas not inspected   Coccyx;Sacrum;Buttock, right;Buttock, left;Hip, right;Hip, left;Spine 11/29/18 0054    Equipment Currently Used at Home  none 11/22/18 1345   Inspection under devices  Full except (identify device(s) not inspected) 11/29/18 0054    Transportation Available  car;family or friend will provide 11/22/18 1345   Skin WDL  WDL 11/29/18 0054    GASTROINTESTINAL (ADULT,PEDIATRIC,OB)     Skin Temperature  warm 11/29/18 0054    GI WDL  WDL 11/29/18 0054   Skin Moisture  dry 11/29/18 0054    Last Bowel Movement  11/28/18 11/28/18 1818   Skin Elasticity  quick return to original state 11/28/18 1818    Passing flatus  yes 11/28/18 1818   Skin Integrity  intact 11/29/18 0054    COMMUNICATION ASSESSMENT     Not Inspected under devices  IV/art line hub 11/29/18 0054    Patient's communication style  spoken language (English or Bilingual) 09/12/18 1919   SAFETY      SKIN     Safety WDL  WDL 11/29/18 0054    Inspection of bony prominences  Full except (identify areas not inspected) 11/29/18 0054   All Alarms  alarm(s) activated and audible 11/28/18 1818                 Assessment WDL (Within Defined Limits) Definitions           Safety WDL     Effective: 09/28/15    Row Information: <b>WDL Definition:</b> Bed in low position, wheels locked; call light in reach; upper side rails up x 2; ID band on<br> <font color=\"gray\"><i>Item=AS safety wdl>>List=AS safety wdl>>Version=F14</i></font>      Skin WDL     Effective: 09/28/15    Row Information: <b>WDL Definition:</b> Warm; dry; intact; elastic; without discoloration; pressure points without redness<br> <font color=\"gray\"><i>Item=AS skin wdl>>List=AS skin wdl>>Version=F14</i></font>      Vitals     Vital Signs Flowsheet     VITAL SIGNS     Best Eye Response  4-->(E4) spontaneous 11/28/18 1818    Temp  98.2  F (36.8  C) 11/29/18 0410   Best Motor Response  6-->(M6) obeys commands 11/28/18 1818    Temp src  Oral 11/29/18 " "0410   Best Verbal Response  5-->(V5) oriented 11/28/18 1818    Resp  16 11/29/18 0410   Esdras Coma Scale Score  15 11/28/18 1818    Pulse  60 11/27/18 2325   HEIGHT AND WEIGHT      Heart Rate  58 11/29/18 0413   Height  1.6 m (5' 3\") 11/22/18 0225    Pulse/Heart Rate Source  Monitor 11/28/18 2311   Height Method  Estimated 11/22/18 0225    BP  110/85 11/29/18 0413   Weight  92.5 kg (203 lb 14.4 oz) 11/24/18 1259    BP Location  Left arm 11/28/18 2311   Weight Method  Bed scale 11/22/18 0132    OXYGEN THERAPY     BSA (Calculated - sq m)  2.08 11/22/18 0225    SpO2  96 % 11/29/18 0413   BMI (Calculated)  37.99 11/22/18 0225    O2 Device  None (Room air) 11/29/18 0413   ECG      PAIN/COMFORT     ECG Rhythm  Normal sinus rhythm;Atrial tachycardia 11/21/18 2219    Patient Currently in Pain  denies 11/28/18 1632   Ectopy  None 11/21/18 2219    Preferred Pain Scale  CAPA (Clinically Aligned Pain Assessment) ([facility] Adults Only) 11/28/18 1632   Lead Monitored  Lead II 11/21/18 2219    Pain Location  Chest 11/25/18 0019   RESPIRATORY MONITORING      Pain Orientation  Mid;Left 11/25/18 0019   Respiratory Monitoring (EtCO2)  0 mmHg 11/21/18 2217    Pain Descriptors  Other (comment) (heartburn) 11/25/18 0019   POSITIONING      CLINICALLY ALIGNED PAIN ASSESSMENT (CAPA) ([facility] ADULTS ONLY)     Body Position  independently positioning 11/29/18 0054    Comfort  negligible pain 11/28/18 0037   Head of Bed (HOB)  HOB flat 11/28/18 1230    Change in Pain  getting better 11/22/18 1801   Chair  Upright in chair 11/28/18 1818    Pain Control  fully effective 11/22/18 1801   Positioning/Transfer Devices  pillows;in use 11/28/18 1818    Functioning  can do everything I need to 11/22/18 1801   DAILY CARE      Sleep  normal sleep 11/22/18 1801   Activity Management  activity adjusted per tolerance;dorsiflexion, plantar flexion encouraged 11/29/18 0054    ESDRAS COMA SCALE     Activity Assistance " Provided  assistance, stand-by 11/29/18 0054            Patient Lines/Drains/Airways Status    Active LINES/DRAINS/AIRWAYS     Name: Placement date: Placement time: Site: Days: Last dressing change:    Peripheral IV 11/26/18 Right Upper arm 11/26/18   0114   Upper arm   3             Patient Lines/Drains/Airways Status    Active PICC/CVC     None            Intake/Output Detail Report     Date Intake     Output Net    Shift P.O. I.V. IV Piggyback Total Urine Total       Day 11/28/18 0000 - 11/28/18 0659 160 -- -- 160 0 -- 160    Cori 11/28/18 0700 - 11/28/18 1459 520 -- -- 520 -- -- 520    Noc 11/28/18 1500 - 11/28/18 2359 -- -- -- -- -- -- 0    Day 11/29/18 0000 - 11/29/18 0659 -- -- -- -- -- -- 0    Cori 11/29/18 0700 - 11/29/18 1459 -- -- -- -- -- -- 0      Last Void/BM       Most Recent Value    Urine Occurrence 1 at 11/28/2018 1300    Stool Occurrence 1 at 11/28/2018 1300      Case Management/Discharge Planning     Case Management/Discharge Planning Flowsheet     REFERRAL INFORMATION     Equipment Currently Used at Home  none 11/22/18 1345    Admission Type  inpatient 11/23/18 1126   / CAREGIVER      LIVING ENVIRONMENT     Filed Complexity Screen Score  10 11/23/18 1126    Lives With  sibling(s) 11/22/18 1300   ABUSE RISK SCREEN      Living Arrangements  house 11/22/18 1300   QUESTION TO PATIENT:  Has a member of your family or a partner(now or in the past) intimidated, hurt, manipulated, or controlled you in any way?  no 11/22/18 1755    COPING/STRESS     QUESTION TO PATIENT: Do you feel safe going back to the place where you are living?  yes 11/22/18 1755    Major Change/Loss/Stressor  illness 11/22/18 1756   OBSERVATION: Is there reason to believe there has been maltreatment of a vulnerable adult (ie. Physical/Sexual/Emotional abuse, self neglect, lack of adequate food, shelter, medical care, or financial exploitation)?  no 11/22/18 1755    DISCHARGE PLANNING     OTHER      Transportation Available   car;family or friend will provide 11/22/18 4817   Are you depressed or being treated for depression?  Yes 11/22/18 1800    FINAL RESOURCES

## 2018-11-22 NOTE — LETTER
Transition Communication Hand-off for Care Transitions to Next Level of Care Provider    Name: Alicia Penaloza  : 1975  MRN #: [id number]  Primary Care Provider: Augusto Thomas     Primary Clinic: [address] [place] [id number]     Reason for Hospitalization:  altered mental status  Aphasia  Admit Date/Time: 2018  1:12 AM  Discharge Date: 18  Payor Source: Payor: [employer] / Plan: UC[employer] MA / Product Type: HMO /              Reason for Communication Hand-off Referral:     Discharge Plan:    Social Work Services Discharge Note     Patient Name:  Alicia Penaloza     Anticipated Discharge Date:  18     Discharge Disposition:   [facility] [address] [place] [address]  [phone]     Following MD:  Facility Assignment     Pre-Admission Screening (PAS) online form has been completed.  The Level of Care (LOC) is:  Determined  Confirmation Code is:  [id number].  Patient/caregiver informed of referral to [facility] [place] [facility] for Pre-Admission Screening for skilled nursing facility (SNF) placement and to expect a phone call post discharge from SNF.     MARIA ANTONIA spoke with [facility] Level 2 screener (Hayley) at 4:19pm and Hayley confirmed completion of the Level 2 screen.     Additional Services/Equipment Arranged:  MARIA ANTONIA confirmed readiness for discharge with Dr. Farris.  MARIA ANTONIA confirmed acceptance for admit to [facility] with the Director of Nursing, on 18.  MARIA ANTONIA arranged for [facility] ([place] [phone]) to provide w/c transport at 10am.     Patient / Family response to discharge plan:  Pt and sister (Sharita) voice understanding of the discharge plan and agreement with the discharge plan.     Persons notified of above discharge plan:  Pt, sister, 6A nursing and Dr. Farris     Staff Discharge Instructions:  Please fax discharge orders and signed hard scripts for any controlled substances (MARIA ANTONIA will complete this  task).  Please print a packet and send with patient.      CTS Handoff completed:  YES     Medicare Notice of Rights provided to the patient/family:  NO, as per Admissions Facesheet, pt is not on medicare.     ELOY Dhillon  Social Work, 6A  Phone:  [phone]  Pager:  [phone]  11/28/2018

## 2018-11-22 NOTE — IP AVS SNAPSHOT
"    [facility]: [phone]                                              INTERAGENCY TRANSFER FORM - LAB / IMAGING / EKG / EMG RESULTS   2018                    Hospital Admission Date: 2018  ZAIN SEXTON   : 1975  Sex: Female        Attending Provider: Nirav Luna MD     Allergies:  No Known Allergies    Infection:  None   Service:  GENERAL MEDI    Ht:  1.6 m (5' 3\")   Wt:  92.5 kg (203 lb 14.4 oz)   Admission Wt:  97.1 kg (214 lb)    BMI:  36.12 kg/m 2   BSA:  2.03 m 2            Patient PCP Information     Provider PCP Type    Augusto Thomas MD General         Lab Results - 3 Days      Basic metabolic panel [[id number]] (Abnormal)  Resulted: 18, Result status: Final result    Ordering provider: Nirav Luna MD  18 0000 Resulting lab: [facility]    Specimen Information    Type Source Collected On   Blood  18 0630          Components       Value Reference Range Flag Lab   Sodium 139 133 - 144 mmol/L  51   Potassium 3.6 3.4 - 5.3 mmol/L  51   Chloride 110 94 - 109 mmol/L H 51   Carbon Dioxide 24 20 - 32 mmol/L  51   Anion Gap 5 3 - 14 mmol/L  51   Glucose 82 70 - 99 mg/dL  51   Urea Nitrogen 18 7 - 30 mg/dL  51   Creatinine 0.87 0.52 - 1.04 mg/dL  51   GFR Estimate 71 >60 mL/min/1.7m2  51   Comment:  Non  GFR Calc   GFR Estimate If Black 85 >60 mL/min/1.7m2  51   Comment:  African American GFR Calc   Calcium 8.1 8.5 - 10.1 mg/dL L 51            CBC with platelets [[id number]] (Abnormal)  Resulted: 18 07, Result status: Final result    Ordering provider: Nirav Luna MD  18 0000 Resulting lab: [facility]    Specimen Information    Type Source Collected On   Blood  1830          Components       Value Reference Range Flag Lab   WBC 7.1 4.0 - 11.0 10e9/L  51   RBC Count 4.47 3.8 - 5.2 10e12/L  51   Hemoglobin 12.4 " 11.7 - 15.7 g/dL  51   Hematocrit 40.0 35.0 - 47.0 %  51   MCV 90 78 - 100 fl  51   MCH 27.7 26.5 - 33.0 pg  51   MCHC 31.0 31.5 - 36.5 g/dL L 51   RDW 20.2 10.0 - 15.0 % H 51   Platelet Count 294 150 - 450 10e9/L  51            Basic metabolic panel [[id number]] (Abnormal)  Resulted: 11/26/18 1159, Result status: Final result    Ordering provider: Regina Farris MD  11/26/18 1119 Resulting lab: [facility]    Specimen Information    Type Source Collected On   Blood  11/26/18 1048          Components       Value Reference Range Flag Lab   Sodium 139 133 - 144 mmol/L  51   Potassium 3.8 3.4 - 5.3 mmol/L  51   Chloride 110 94 - 109 mmol/L H 51   Carbon Dioxide 22 20 - 32 mmol/L  51   Anion Gap 7 3 - 14 mmol/L  51   Glucose 106 70 - 99 mg/dL H 51   Urea Nitrogen 15 7 - 30 mg/dL  51   Creatinine 0.89 0.52 - 1.04 mg/dL  51   GFR Estimate 68 >60 mL/min/1.7m2  51   Comment:  Non  GFR Calc   GFR Estimate If Black 83 >60 mL/min/1.7m2  51   Comment:  African American GFR Calc   Calcium 8.6 8.5 - 10.1 mg/dL  51            CBC (AM Draw) [[id number]] (Abnormal)  Resulted: 11/26/18 1100, Result status: Final result    Ordering provider: Nirav Luna MD  11/26/18 0850 Resulting lab: [facility]    Specimen Information    Type Source Collected On   Blood  11/26/18 1048          Components       Value Reference Range Flag Lab   WBC 9.3 4.0 - 11.0 10e9/L  51   RBC Count 4.85 3.8 - 5.2 10e12/L  51   Hemoglobin 13.4 11.7 - 15.7 g/dL  51   Hematocrit 42.9 35.0 - 47.0 %  51   MCV 89 78 - 100 fl  51   MCH 27.6 26.5 - 33.0 pg  51   MCHC 31.2 31.5 - 36.5 g/dL L 51   RDW 20.4 10.0 - 15.0 % H 51   Platelet Count 330 150 - 450 10e9/L  51            Testing Performed By     Lab - Abbreviation Name Director Address Valid Date Range    51 - Unknown [facility] Unknown [address] [facility] [address]  "12/31/14 1010 - Present            Unresulted Labs (24h ago through future)    Start       Ordered    Unscheduled  Potassium  (Potassium Replacement - \"Standard\" - For K levels less than 3.4 mmol/L - UU,UR,UA,RH,SH,PH,WY )  CONDITIONAL (SPECIFY),   Routine     Comments:  Obtain Potassium Level for these conditions:  *IF no potassium result within 24 hours before initiation of order set, draw potassium level with next lab collect.    *2 HOURS AFTER last IV potassium replacement dose and 4 hours after an oral replacement dose.  *Next morning after potassium dose.     Repeat Potassium Replacement if necessary.    11/22/18 1052      Encounter-Level Documents:     There are no encounter-level documents.      Order-Level Documents:     There are no order-level documents.      "

## 2018-11-22 NOTE — PLAN OF CARE
Problem: Patient Care Overview  Goal: Plan of Care/Patient Progress Review  Discharge Planner SLP   Patient plan for discharge: Unknown  Current status: Clinical swallow eval completed per MD order. Pt presents with functional oropharyngeal swallow mechanism. Oral mech exam unremarkable. Pt verbal and responding appropriately to questions during evaluation, no word finding difficulty noted. Decreased vocal intensity observed. Assessed pt with thin liquids via cup and straw, puree, and higher texture solids. Oral phase WFL. Pharyngeal phase WFL, no overt s/sx of aspiration with any consistency trialed. Recommend regular diet/thin liquids. Pt to sit upright for all PO, taking small bites/sips at slow rate. No additional SLP services indicated.   Barriers to return to prior living situation: None from ST perspective  Recommendations for discharge: Defer to med team/PT/OT  Rationale for recommendations: Functional oropharyngeal swallow mechanism       Entered by: Silvia Keating 11/22/2018 11:36 AM

## 2018-11-22 NOTE — IP AVS SNAPSHOT
` ` Patient Information     Patient Name Sex Alicia Sheehan ([id number]) Female 1975       Room Bed    6210 9910-40      Patient Demographics     Address Phone    [id number] [place] [id number] [phone] (Home)  [phone] (Mobile)      Patient Ethnicity & Race     Ethnic Group Patient Race    American White      Emergency Contact(s)     Name Relation Home Work Mobile    Emily Britt Sister [phone]      [place] UMANA Mother [phone]      Sharita Pino Sister [phone]      Documents on File        Status Date Received Description       Documents for the Patient    Affiliate Privacy placeholder   phase3    HIM OWEN Authorization  18 [facility]    Consent for EHR Access       Insurance Card       Patient ID Received 18 exp 2019 [place] KAY[place]     [facility] Specified Other       Privacy Notice - [place] Received 18     Consent for Services - Hospital and Clinic Received 18     HIE Auth Received 18     Consent for EHR Access Received 18     Consent for Services - RUST       Consent to Communicate  10/09/18 AUTHORIZATION TO DISCUSS PROTECTED  HEALTH INFORMATION 10/9/18    Consent to Communicate  10/15/18 AUTHORIZATION TO DISCUSS PROTECTED  HEALTH INFORMATION 10/9/18    HIM OWEN [facility]  [facility]    Insurance Card Received 18 [facility]       Documents for the Encounter    CMS IM for Patient Signature         Admission Information     Attending Provider Admitting Provider Admission Type Admission Date/Time    Nirav Luna MD Ezzeddine, Mustapha Ahmad, MD Urgent 18  0112    Discharge Date Hospital Service Auth/Cert Status Service Area     General Medicine [facility]    Unit Room/Bed Admission Status       UU U6A 6210/6210-01 Admission (Confirmed)       Admission     Complaint    altered mental status, Aphasia      Hospital Account     Name Acct ID Class Status  Primary Coverage    Alicia Penaloza [id number] Inpatient Open [place]            Guarantor Account (for Hospital Account #[id number])     Name Relation to Pt Service Area Active? Acct Type    Alicia Penaloza Self FCS Yes Personal/Family    Address Phone          [address] [address] [phone](H)              Coverage Information (for Hospital Account #[id number])     F/O Payor/Plan Precert #    SANTA/[place]     Subscriber Subscriber #    Alicia Penaloza [id number]    Address Phone    [address]  [place] [address] [phone]

## 2018-11-22 NOTE — PLAN OF CARE
Problem: Patient Care Overview  Goal: Discharge Needs Assessment  Neuro- difficult to assess as pt waxes and wanes on speech, word finding, cooperative behaviors, and confusion. Strengths 5/5, no facial droop noted, pupils appear equal. Pt very withdrawn, does not use call light, refuses hygienic cares, flat affect mostly throughout the day  CV- htn within pararmeters  Pulm- on RA with O2 sats in upper 90s, LS clear  GI- Tolerates regular diet with minimal intake throughout the day, BM today, small and brown  - hydrating adequately with PO intake, voiding with continence and bloody discharge from baseline vaginal bleeding  Skin- intact ex LUE bruising  Gtts- PIV saline locked  ID- n/a  Labs- WNL ex K replaced, awaiting redraw  Pain- denied by pt  Activity- up with SBA and GB, needs constant encouragement and cueing  Drains- n/a  Social- family at bedside, sister Sharita is primary caregiver, please call her for updates  CRRT- n/a  See flow sheets for further details and assessments.  A: pt still awaiting MRI, EEG possibly in POC, and psyche consult  P: continue to monitor, notify MD of significant changes.

## 2018-11-22 NOTE — LETTER
Transition Communication Hand-off for Care Transitions to Next Level of Care Provider    Name: Alicia Penaloza  : 1975  MRN #: [id number]  Primary Care Provider: Augusto Thomas     Primary Clinic: [address] [place] [address]     Reason for Hospitalization:  altered mental status  Aphasia  Admit Date/Time: 2018  1:12 AM  Discharge Date: 18  Payor Source: Payor: UCARE / Plan: [place]ARE [place] / Product Type: HMO /     Plan of Care Goals/Milestone Events:   Return to her home in [place], [place].     Discharge Plan:   TCU then home to [place], [place]     Concern for non-adherence with plan of care:   No - patient is committed to being able to return home after rehab placement    Discharge Needs Assessment:  Needs       Most Recent Value    Equipment Currently Used at Home none    Transportation Available car, family or friend will provide        Follow-up plan:  Future Appointments  Date Time Provider Department Center   2018 6:00 AM UU PT OVERFLOW [facility] O   12/3/2018 2:45 PM Augusto Thomas MD [facility]   1/15/2019 11:20 AM Dex Jackson MD [facility]       On 18 the  on 6A can be reached at [phone].    Patient's home address is [address] [place] [address]  [address] - address on face sheet is where she was living with her sister.    Please call 6A with any nursing questions at [phone].    Patient has [employer] HOOD [place] and will require a prior auth on 18

## 2018-11-22 NOTE — PROGRESS NOTES
11/22/18 1259   Living Environment   Lives With sibling(s)   Living Arrangements house   Home Accessibility bed and bath on same level   Number of Stairs to Enter Home 0   Number of Stairs Within Home 12  (split level)   Stair Railings at Home inside, present on right side   Transportation Available car;family or friend will provide   Living Environment Comment Pt a poor historian and no family or friends present. Per chart review, pt lives with sister and brother-in-law. Per interdisciplinary discussion, pt is home alone for majority of day as pt's sister works full time.   Self-Care   Dominant Hand right   Usual Activity Tolerance fair   Current Activity Tolerance fair   Regular Exercise no   Equipment Currently Used at Home none   Activity/Exercise/Self-Care Comment Pt a poor historian, therefore uncertain of accuracy of information.   Functional Level Prior   Ambulation 0-->independent   Transferring 0-->independent   Toileting 0-->independent   Bathing 0-->independent   Dressing 0-->independent   Eating 0-->independent   Communication 0-->understands/communicates without difficulty   Swallowing 0-->swallows foods/liquids without difficulty   Cognition 1 - attention or memory deficits   Fall history within last six months no   Which of the above functional risks had a recent onset or change? ambulation;cognition;communication/speech;bathing;toileting;dressing;transferring   Prior Functional Level Comment Pt reports prior independent with functional mobility and ADLs   General Information   Onset of Illness/Injury or Date of Surgery - Date 11/22/18   Referring Physician Jitendra Messina MD   Patient/Family Goals Statement Not stated   Pertinent History of Current Problem (include personal factors and/or comorbidities that impact the POC) 43F hx of ischemic strokes 8/2018, vaginal bleeding, HLD, HTN, prior smoker p/w expressive aphasia. She was admitted here in 8/2018 with R thalamic, L frontal white matter, and  temporo-occipital lobe strokes. Her strokes were deemed embolic, for which she had a large workup that was all negative including BETH and hypercoagulable workup. She was sent home on aspirin. Since her stroke, she has had multiple presentations for intermittent neurologic symptoms such as LUE numbness, R hand weakness, etc with subsequent MRI showing no new strokes. Her last presentation here was 9/12. Family says her mood has been very labile since the stroke as well. She was recently begun on an serotonin specific reuptake inhibitor for this. Apparently, since Sunday, she has been progressively not speaking. Yesterday, she stopped speaking completely and only grunts or moans. She came to an OSH and had a CTA showing unchanged L M2 stenosis with no LVO. She was transferred here for further workup.    Precautions/Limitations fall precautions   General Observations 43-year-old female resting in bed upon arrival   General Info Comments Activity orders: up with assist   Cognitive Status Examination   Orientation person   Level of Consciousness confused   Follows Commands and Answers Questions 25% of the time;unable to follow multi-step instructions   Personal Safety and Judgment impaired   Memory impaired   Cognitive Comment Pt very confused throughout session with poor command following. Pt unable to state month, day of week, or current location even after extensive cueing,   Pain Assessment   Patient Currently in Pain No   Integumentary/Edema   Integumentary/Edema no deficits were identifed   Posture    Posture Forward head position;Protracted shoulders   Range of Motion (ROM)   ROM Comment B LE ROM WFL   Strength   Strength Comments B LE strength at least 3/5 per functional status   Bed Mobility   Bed Mobility Comments ind supine>sit   Transfer Skills   Transfer Comments SBA sit<>stand   Gait   Gait Comments CGA x50ft with no AD, slow gait speed and short step length   Balance   Balance Comments Good sitting  "balance, CGA for safety for standing balance   Sensory Examination   Sensory Perception no deficits were identified   General Therapy Interventions   Planned Therapy Interventions balance training;gait training;neuromuscular re-education;strengthening;home program guidelines;progressive activity/exercise   Clinical Impression   Criteria for Skilled Therapeutic Intervention yes, treatment indicated   PT Diagnosis Impaired functional mobility   Influenced by the following impairments impaired cognition, decreased strength and endurance, impaired balance   Functional limitations due to impairments Pt requires CGA-SBA for functional mobility   Clinical Presentation Stable/Uncomplicated   Clinical Presentation Rationale PMH and clinical judgment   Clinical Decision Making (Complexity) Low complexity   Therapy Frequency` 3 times/week   Predicted Duration of Therapy Intervention (days/wks) 5 days   Anticipated Equipment Needs at Discharge (TBD)   Anticipated Discharge Disposition Transitional Care Facility;Other (see comments)  (IP psych)   Risk & Benefits of therapy have been explained Yes   Patient, Family & other staff in agreement with plan of care Yes   Clinical Impression Comments PT eval complete, treatment initiated. Pt primarily limited by cognition at this time which affects overall mobility. Pt currently not safe to return home, strongly recommend psych consult for further assessment of mental health status.   [facility] [employer] TM \"6 Clicks\"   2016, Trustees of [facility], under license to [employer].  All rights reserved.   6 Clicks Short Forms Basic Mobility Inpatient Short Form   [facility] AM-PAC  \"6 Clicks\" V.2 Basic Mobility Inpatient Short Form   1. Turning from your back to your side while in a flat bed without using bedrails? 4 - None   2. Moving from lying on your back to sitting on the side of a flat bed without using bedrails? 4 - None   3. Moving to and from a bed to a chair " (including a wheelchair)? 3 - A Little   4. Standing up from a chair using your arms (e.g., wheelchair, or bedside chair)? 3 - A Little   5. To walk in hospital room? 3 - A Little   6. Climbing 3-5 steps with a railing? 3 - A Little   Basic Mobility Raw Score (Score out of 24.Lower scores equate to lower levels of function) 20   Total Evaluation Time   Total Evaluation Time (Minutes) 8

## 2018-11-22 NOTE — IP AVS SNAPSHOT
"    UNIT 6A [facility]: [phone]                                              INTERAGENCY TRANSFER FORM - PHYSICIAN ORDERS   2018                    Hospital Admission Date: 2018  ZAIN SEXTON   : 1975  Sex: Female        Attending Provider: Nirav Luna MD     Allergies:  No Known Allergies    Infection:  None   Service:  GENERAL MEDI    Ht:  1.6 m (5' 3\")   Wt:  92.5 kg (203 lb 14.4 oz)   Admission Wt:  97.1 kg (214 lb)    BMI:  36.12 kg/m 2   BSA:  2.03 m 2            Patient PCP Information     Provider PCP Type    Augusto Thomas MD General      ED Clinical Impression     Diagnosis Description Comment Added By Time Added    Current moderate episode of major depressive disorder, unspecified whether recurrent (H) [F32.1] Current moderate episode of major depressive disorder, unspecified whether recurrent (H) [F32.1]  Regina Farris MD 2018  3:22 PM    Cognitive decline [R41.89] Cognitive decline [R41.89]  Regina Farris MD 2018  3:36 PM      Hospital Problems as of 2018              Priority Class Noted POA    Aphasia Medium  2018 Yes      Non-Hospital Problems as of 2018              Priority Class Noted    Hypertensive urgency Medium  3/28/2018    Vaginal bleeding Medium  3/31/2018    Acute ischemic stroke (H) Medium  2018    Benign essential hypertension Medium  2018    Acute CVA (cerebrovascular accident) (H) Medium  2018    Obesity (BMI 35.0-39.9) with comorbidity (H) Medium  2018    Hyperlipidemia LDL goal <100 Medium  10/2/2018    Iron deficiency anemia due to chronic blood loss Medium  10/9/2018      Code Status History     Date Active Date Inactive Code Status Order ID Comments User Context    2018  3:36 PM  Full Code [id number]  Regina Farris MD Outpatient    2018  1:35 AM 2018  3:36 PM Full Code [id number]  Jitendra Messina MD Inpatient    2018  " 3:45 PM 11/22/2018  1:35 AM Full Code [id number]  Parul Rees MD Outpatient    8/25/2018  5:26 PM 8/27/2018  3:45 PM Full Code [id number]  David Knight MD Inpatient         Medication Review      CONTINUE these medications which may have CHANGED, or have new prescriptions. If we are uncertain of the size of tablets/capsules you have at home, strength may be listed as something that might have changed.        Dose / Directions Comments    FLUoxetine 40 MG capsule   Commonly known as:  PROzac   This may have changed:    - medication strength  - how much to take  - how to take this   Used for:  Current moderate episode of major depressive disorder, unspecified whether recurrent (H)        Dose:  40 mg   1 capsule (40 mg) by Oral or Feeding Tube route daily   Quantity:  30 capsule   Refills:  0          CONTINUE these medications which have NOT CHANGED        Dose / Directions Comments    aspirin 325 MG tablet   Commonly known as:  ASA        Dose:  325 mg   Take 325 mg by mouth   Refills:  0        atorvastatin 40 MG tablet   Commonly known as:  LIPITOR   Used for:  Benign essential hypertension, Acute CVA (cerebrovascular accident) (H)        Dose:  40 mg   Take 1 tablet (40 mg) by mouth daily   Quantity:  90 tablet   Refills:  1        levonorgestrel 20 MCG/24HR IUD   Commonly known as:  MIRENA        Dose:  20 mcg   20 mcg by Intrauterine route   Refills:  0        lisinopril 20 MG tablet   Commonly known as:  PRINIVIL/ZESTRIL   Used for:  Benign essential hypertension, Acute CVA (cerebrovascular accident) (H)        Dose:  20 mg   Take 1 tablet (20 mg) by mouth daily   Quantity:  90 tablet   Refills:  1        LORazepam 1 MG tablet   Commonly known as:  ATIVAN   Used for:  Adjustment disorder with anxious mood        Dose:  0.5-1 mg   Take 0.5-1 tablets (0.5-1 mg) by mouth every 8 hours as needed for anxiety Take 30 minutes prior to departure.  Do not operate a vehicle after taking this medication   Quantity:   12 tablet   Refills:  0        metoprolol tartrate 50 MG tablet   Commonly known as:  LOPRESSOR   Used for:  Hypertension, unspecified type        Dose:  50 mg   Take 1 tablet (50 mg) by mouth 2 times daily   Quantity:  180 tablet   Refills:  3    Profile Rx: patient will contact pharmacy when needed               Summary of Visit     Reason for your hospital stay       43F hx of ischemic strokes 8/2018, vaginal bleeding, HLD, HTN, prior smoker p/w sudden onset of not speaking but was following commands and was able to walk around on her own with no other neurological deficit. She was admitted OSH in 8/7/2018 with R thalamic, L frontal white matter, and temporo-occipital lobe strokes. Her strokes were deemed embolic, for which she had a large workup that was all negative including BETH and hypercoagulable workup. She was sent home on aspirin. Since her stroke, she has had multiple presentations for intermittent neurologic symptoms such as LUE numbness, R hand weakness, etc with subsequent MRI showing no new strokes. Her last presentation here was 9/12. Family says her mood has been very labile since the stroke as well. She was recently begun on flouxetine 20 mg for depression. Apparently, over 4 days prior to admission, she has been progressively not speaking. The day prior to admission she stopped speaking completely and only grunts or moans. She came to an OSH and had a CTA showing unchanged L M2 stenosis with no LVO. She was transferred here for further workup. The next day of her admission she was talking with no deficit. She endorsed she is profoundly depressed and cries without cause, but she is not thinking or planning to harm herself. She is not feeling safe.      We do not think she had aphasia. 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 Her speech has been progressively worse prior to admission suggesting it is less likely to be stroke. She was admitted to get MRI and complete stroke work up.  Psychiatry consulted and recommended neuropsych testing to assess her cognitive status and  TCU disposition then she needs to be seen by psychiatrist as an outpatient. Her sister endorsed that she is doing dangerous behaviors involving the gas stove. Her sister can't watch her 24/7 so the OT, PT and psychiatry doctor recommended TCU. The psychiatry doctor does not think she is candidate for inpatient psychiatry transfer             After Care     Activity - Ambulate in hallway       Every shift       Diet       Follow this diet upon discharge: Orders Placed This Encounter          Regular Diet Adult       General info for SNF       Length of Stay Estimate: Short Term Care: Estimated # of Days 31-90  Condition at Discharge: Improving  Level of care:skilled   Rehabilitation Potential: Good  Admission H&P remains valid and up-to-date: Yes  Recent Chemotherapy: N/A  Use Nursing Home Standing Orders: N/A             Procedures     NEUROPSYCH TESTING, PER HR/PSYCHOLOGIST                 Referrals     MENTAL HEALTH REFERRAL  - Adult; Psychiatry and Medication Management; Psychiatry; [facility]: Psychiatry Clinic [phone]; We will contact you to schedule the appointment or please call with any questions       All scheduling is subject to the client's specific insurance plan & benefits, provider/location availability, and provider clinical specialities.  Please arrive 15 minutes early for your first appointment and bring your completed paperwork.    Please be aware that coverage of these services is subject to the terms and limitations of your health insurance plan.  Call member services at your health plan with any benefit or coverage questions.                       Your next 10 appointments already scheduled     Dec 03, 2018  2:45 PM CST   [facility]    [address] [place] [address] [phone]           Bring a current  list of meds and any records pertaining to this visit. For Physicals, please bring immunization records and any forms needing to be filled out. Please arrive 10 minutes early to complete paperwork.            Antonio 15, 2019 11:20 AM CST   (Arrive by 11:05 AM)   New Stroke with Dex Jackson MD   [facility] ([facility] [place])    [address] [facility]  [address] [facility] [address] [phone]              Follow-Up Appointment Instructions     Future Labs/Procedures    Adult [facility] Follow-up and recommended labs and tests     Comments:    Follow up with primary care provider, Augusto Thomas, within one month to follow blood pressure, diabetes control     Follow up with outpatient psychiatrist in 1-2 weeks     Appointments on [facility] and/or [facility] (with [facility] or [facility] provider or service). Call [phone] if you haven't heard regarding these appointments within 7 days of discharge.      Follow-Up Appointment Instructions     Adult [facility] Follow-up and recommended labs and tests       Follow up with primary care provider, Augusto Thomas, within one month to follow blood pressure, diabetes control     Follow up with outpatient psychiatrist in 1-2 weeks     Appointments on [facility] and/or [facility] (with [facility] or [facility] provider or service). Call [phone] if you haven't heard regarding these appointments within 7 days of discharge.             Statement of Approval     Ordered          11/29/18 0842  I have reviewed and agree with all the recommendations and orders detailed in this document.  EFFECTIVE NOW     Approved and electronically signed by:  Shanika Venegas APRN CNP

## 2018-11-22 NOTE — ED NOTES
Ordonez placed w/o difficulty.  Pt has vaginal bleeding with clots noted upon insertion.  Swathi-care done.  Brother-in-law states that pt is being followed by Gyn for vag bleeding at this time.

## 2018-11-22 NOTE — H&P
[facility], [place]      Neurology Stroke Admission    Patient Name: Alicia Penaloza  : 1975 MRN#: [id number]    STROKE DATA    Stroke Code:  Stroke code not activated.  Time patient seen:  2018 0001  Last known normal (pt's baseline):  unknown     TPA treatment:  Not given due to outside the time window.      Stroke Scales      National Institutes of Health Stroke Scale (at presentation)   NIHSS done at:  time patient seen      Score    Level of consciousness:  (0)   Alert, keenly responsive    LOC questions:  (2)   Answers neither question correctly    LOC commands:  (1)   Performs one task correctly    Best gaze:  (0)   Normal    Visual:  (0)   No visual loss    Facial palsy:  (0)   Normal symmetrical movements    Motor arm (left):  (0)   No drift    Motor arm (right):  (0)   No drift    Motor leg (left):  (0)   No drift    Motor leg (right):  (0)   No drift    Limb ataxia:  (0)   Absent    Sensory:  (0)   Normal- no sensory loss    Best language:  (3)   Mute- global aphasia    Dysarthria:  (0)   Normal    Extinction and inattention:  (0)   No abnormality        NIHSS Total Score:  6          Dysphagia Screen  Time of screenin2018 0001  Screening results: Failed screening - strict NPO pending SLP evaluation      HPI  43F hx of ischemic strokes 2018, vaginal bleeding, HLD, HTN, prior smoker p/w expressive aphasia. She was admitted here in 2018 with R thalamic, L frontal white matter, and temporo-occipital lobe strokes. Her strokes were deemed embolic, for which she had a large workup that was all negative including BETH and hypercoagulable workup. She was sent home on aspirin. Since her stroke, she has had multiple presentations for intermittent neurologic symptoms such as LUE numbness, R hand weakness, etc with subsequent MRI showing no new strokes. Her last presentation here was . Family says her mood has been very labile since the stroke as well. She  was recently begun on an serotonin specific reuptake inhibitor for this. Apparently, since Sunday, she has been progressively not speaking. Yesterday, she stopped speaking completely and only grunts or moans. She came to an OSH and had a CTA showing unchanged L M2 stenosis with no LVO. She was transferred here for further workup.     Pertinent Past Medical/Surgical History  Past Medical History:   Diagnosis Date     Hypertension      Stroke (H) 08/07/2018       No past surgical history on file.    Medications:   No current facility-administered medications for this encounter.    .    Allergies: No Known Allergies.    Family History: No family history on file..    Social History:   Social History   Substance Use Topics     Smoking status: Former Smoker     Quit date: 8/7/2018     Smokeless tobacco: Never Used     Alcohol use No   .    Tobacco use: Former smoker    ROS:  The 10 point Review of Systems is negative other than noted in the HPI or here.     PHYSICAL EXAMINATION  Vital Signs:  B/P: Data Unavailable,  T: Data Unavailable,  P: Data Unavailable,  R: Data Unavailable    General:  patient lying in bed without any acute distress    HEENT:  normocephalic/atraumatic  Cardio:  RRR  Pulmonary:  no respiratory distress  Abdomen:  soft  Extremities:  no edema  Skin:  intact     Neurologic  Mental status: awake, alert, follows almost all commands. Minimal speech produced but does nod correctly most of the time to questions. Mumbles occasionally but it is unintelligible.   Cranial nerves: EOMI, face symmetric, VFF, equal to LT  Motor: no drift in any extremity  Sensory: equal to LT  Coordination: FTN intact BL  Gait: defer      Labs  Labs and Imaging reviewed and used in developing the plan; pertinent results included.     Lab Results   Component Value Date     (H) 11/21/2018      ASSESSMENT & PLAN BY PROBLEM     43F hx of ischemic strokes 8/2018, vaginal bleeding, HLD, HTN, prior smoker p/w expressive aphasia.      ## expressive aphasia: is at risk for aphasia given severe M2 stenosis, which is still patent per CTA done last night. Does have a recent history of ischemic infarcts in 8/2018 as well. Will admit tonight and obtain MRI in the AM. Per report she has been having labile mood issues since her stroke. There was some concern raised at OSH of her not speaking being due to conversion disorder or a mood issue.   --MRI brain in AM.  If new stroke, will initiate full stroke workup.   --hold pta aspirin 325mg. MI aspirin 300mg  --inflammatory labs  --NPO except for meds (unable to make vocalizations after water as per bedside swallowing protocol)  --PT/OT/SLP in the AM.   --repeat antiphospholipid antibody as was recommended at last visit  --if MRI negative, consider EEG in the AM.     ## HTN: out of window for permissive HTN given symptoms began Sunday, per report  --hold pta lisinopril 20mg every day  --hold pta metoprolol 50mg bid  --metoprolol IV bid tonight until SLP evaluation    ## HLD:  --hold pta atorvastatin 40mg every day    ## vaginal bleeding:  --will monitor w/ cbc  --cares per nursing    ## mood disorder: new since her stroke in 8/2018.  --hold pta fluoxetine 20mg qd    Diet: NPO. NS@100/h  PPx: enoxaparin  Code: full  Dispo: pending MRI and workup    The patient was discussed with Dr. Fischer.    Robert Messina  PGY4 Neurology  [phone]

## 2018-11-22 NOTE — ED TRIAGE NOTES
Pt w/hx of stroke -8/7/2018- [facility] in hospital x 3 wks.   Brought in by brother in law by car.  He states that pt has not been able to speak since 1630 last afternoon.  2000 last evening having trouble speaking/out of it.  Never has had difficulty speaking other than her stroke.   Not a lytic candidate per MD>

## 2018-11-22 NOTE — PLAN OF CARE
Problem: Patient Care Overview  Goal: Plan of Care/Patient Progress Review  Discharge Planner OT   Patient plan for discharge: not discussed   Current status: OT eval completed, tx indicated. Discussed OT role and POC, (friend present). Pt demos confusion throughout session today and eye contact is limited, friend provides input during evaluation. Pt requesting to use toilet, requiring Max VC's (1 step cues most effective) to complete bed mobility, able to complete sit<>stand with Sami and tactile cues and ambulates with close CGA without assistive device, though demos confusion about location of bathroom in room, pt the completed toilet transfer with Sami 2/2 difficulty with command following and requires MaxA for pericare mgmt and to marino clean brief though tolerates standing. Patient requires Max VC's to utilize grab bars to ensure safety. Pt then agreeable to ambulate in hallway with CGA, ambulated ~ 125 feet.   Barriers to return to prior living situation: medical status, cognition, deconditioning, level of assist required to maintain safety   Recommendations for discharge: TCU vs Inpatient Psych, pt would not be safe to discharge home at this time based on functional performance today   Rationale for recommendations: Pt is SBA for mobility though she requires extensive cueing (verbal and tactile) to follow commands and maintain safety. As patient presents today, she will require 24 hour care to maintain safety and may benefit from psych consult to assess how mental health is effecting function. Per discussion with patients' friend, depression has been a significant limiting factor to optimal function at home, reports extended time in bed 2/2 depressive mood.        Entered by: Kaitlynn Merino 11/22/2018 11:49 AM

## 2018-11-22 NOTE — LETTER
Transition Communication Hand-off for Care Transitions to Next Level of Care Provider    Name: Alicia Penaloza  : 1975  MRN #: [id number]  Primary Care Provider: Augusto Thomas     Primary Clinic: [address] [place] [id number]     Reason for Hospitalization:  altered mental status  Aphasia  Admit Date/Time: 2018  1:12 AM  Discharge Date: 18  Payor Source: Payor: [employer] / Plan: [employer] [place] / Product Type: HMO /     Plan of Care Goals/Milestone Events:   Return to her home in [place], [place].     Discharge Plan:   TCU then home to [place], [place]     Concern for non-adherence with plan of care:   No - patient is committed to being able to return home after rehab placement    Discharge Needs Assessment:  Needs       Most Recent Value    Equipment Currently Used at Home none    Transportation Available car, family or friend will provide        Follow-up plan:  Future Appointments  Date Time Provider Department Center   2018 6:00 AM [facility] PT OVERFLOW [facility] O   12/3/2018 2:45 PM Augusto Thomas MD [facility]   1/15/2019 11:20 AM Dex Jackson MD [facility]

## 2018-11-22 NOTE — PLAN OF CARE
Problem: Patient Care Overview  Goal: Plan of Care/Patient Progress Review  Discharge Planner PT  6A  Patient plan for discharge: Pt states home  Current status: PT eval complete, treatment initiated. Pt with significant confusion and poor command following throughout session, often needing multiple one step cues to successfully complete task. Pt independent with bed mobility and SBA for sit<>stand, though multiple cues required. Pt ambulates 450ft with CGA, ascends/descends 12 stairs with 1 hand rail and CGA. Occasional unsteadiness though no LOB or extra assist needed, pt often not following commands during gait such as turn left or right. Significant difficulty with pathfinding to room even with multiple cues from therapist. Pt occasionally tearful throughout session though does not elaborate feelings with therapist, often avoids eye contact.  Barriers to return to prior living situation: Medical status, cognition, safety concerns  Recommendations for discharge: TCU vs IP psych   Rationale for recommendations: Pt currently not safe to return home per cognitive status. Pt overall SBA for functional mobility, though requires significant cueing for safety and command following. Currently, pt requires 24/7 assist to maintain safety and pt does not receive this at home. Per interdisciplinary discussion and observation of pt's cognition today, pt would benefit from psych consult to further assess mental health function.        Entered by: Conchita Miller 11/22/2018 1:29 PM

## 2018-11-22 NOTE — ED PROVIDER NOTES
"  History     Chief Complaint   Patient presents with     Altered Mental Status     The history is provided by a relative.     Alicia Penaloza is a 43 year old female who presents to the ED with her brother-in-law complaining of an altered mental status. Patient's brother-in-law reports Alicia had a stroke back in August of this year. She was hospitalized for 3 weeks at that time and has lived with him ever since as she \"hasn't been herself\". She had a speech impairment at that time which she is also currently exhibiting. His wife who also lives in the same house noticed a difference in Alicia's ability to speak on Sunday night that has progressively worsened. Last night, he noticed that her responses were grunts or one-worded. Her speech has turned into moaning and grunting as of 1630 today. Upon arrival to the ED, the nurses note that she is not following commands. Brother-in-law denies known weakness to her right leg. Patient has a vaginal bleeding issue and is seeing clinical treatments for it.    Problem List:    Patient Active Problem List    Diagnosis Date Noted     Iron deficiency anemia due to chronic blood loss 10/09/2018     Priority: Medium     Hyperlipidemia LDL goal <100 10/02/2018     Priority: Medium     Obesity (BMI 35.0-39.9) with comorbidity (H) 09/14/2018     Priority: Medium     Acute CVA (cerebrovascular accident) (H) 08/25/2018     Priority: Medium     Benign essential hypertension 08/22/2018     Priority: Medium     Acute ischemic stroke (H) 08/07/2018     Priority: Medium     Vaginal bleeding 03/31/2018     Priority: Medium     Hypertensive urgency 03/28/2018     Priority: Medium        Past Medical History:    Past Medical History:   Diagnosis Date     Hypertension      Stroke (H) 08/07/2018       Past Surgical History:    History reviewed. No pertinent surgical history.    Family History:    No family history on file.    Social History:  Marital Status:  Single [1]  Social History "   Substance Use Topics     Smoking status: Former Smoker     Quit date: 8/7/2018     Smokeless tobacco: Never Used     Alcohol use No        Medications:      levonorgestrel (MIRENA) 20 MCG/24HR IUD   aspirin 325 MG tablet   atorvastatin (LIPITOR) 40 MG tablet   FLUoxetine (PROZAC) 20 MG capsule   lisinopril (PRINIVIL/ZESTRIL) 20 MG tablet   LORazepam (ATIVAN) 1 MG tablet   metoprolol tartrate (LOPRESSOR) 50 MG tablet   [DISCONTINUED] levonorgestrel (MIRENA) 20 MCG/24HR IUD         Review of Systems   All other systems reviewed and are negative.      Physical Exam   BP: (!) 187/122  Pulse: 89  Heart Rate: 89  Temp: 98.3  F (36.8  C)  Resp: 21  SpO2: 99 %      Physical Exam   Constitutional: She appears well-developed and well-nourished. No distress.   Eyes closed upon arrival but will open them to verbal command.  Withdrawn.   HENT:   Head: Normocephalic and atraumatic.   Mouth/Throat: Oropharynx is clear and moist.   Eyes: Pupils are equal, round, and reactive to light. No scleral icterus.   Neck: Normal range of motion. Neck supple.   Cardiovascular: Normal rate, regular rhythm, normal heart sounds and intact distal pulses.    No murmur heard.  Pulmonary/Chest: No stridor. No respiratory distress. She has no wheezes. She has no rales.   Abdominal: Soft. There is no tenderness.   Musculoskeletal: She exhibits no edema or tenderness.   Neurological: She is alert. No cranial nerve deficit.   Moves all extremities.  No verbal response other than grunting.   Skin: Skin is warm and dry. No rash noted. She is not diaphoretic. No erythema. No pallor.   Psychiatric: She is slowed and withdrawn. She exhibits a depressed mood. She is noncommunicative.   Nursing note and vitals reviewed.      ED Course     ED Course     Procedures           EKG reveals normal sinus rhythm at 80 bpm.  No acute ST segment or T wave changes noted.  Some diffuse ST depression.  Interpreted by myself.       Critical Care time:  was 60 minutes for  this patient excluding procedures.               Results for orders placed or performed during the hospital encounter of 11/21/18 (from the past 24 hour(s))   Glucose by meter   Result Value Ref Range    Glucose 88 70 - 99 mg/dL   CBC with platelets differential   Result Value Ref Range    WBC 12.8 (H) 4.0 - 11.0 10e9/L    RBC Count 4.87 3.8 - 5.2 10e12/L    Hemoglobin 13.5 11.7 - 15.7 g/dL    Hematocrit 41.3 35.0 - 47.0 %    MCV 85 78 - 100 fl    MCH 27.7 26.5 - 33.0 pg    MCHC 32.7 31.5 - 36.5 g/dL    RDW 19.8 (H) 10.0 - 15.0 %    Platelet Count 389 150 - 450 10e9/L    Diff Method Automated Method     % Neutrophils 82.2 %    % Lymphocytes 10.5 %    % Monocytes 6.0 %    % Eosinophils 0.3 %    % Basophils 0.5 %    % Immature Granulocytes 0.5 %    Nucleated RBCs 0 0 /100    Absolute Neutrophil 10.5 (H) 1.6 - 8.3 10e9/L    Absolute Lymphocytes 1.3 0.8 - 5.3 10e9/L    Absolute Monocytes 0.8 0.0 - 1.3 10e9/L    Absolute Basophils 0.1 0.0 - 0.2 10e9/L    Abs Immature Granulocytes 0.1 0 - 0.4 10e9/L    Absolute Nucleated RBC 0.0    Basic metabolic panel   Result Value Ref Range    Sodium 143 133 - 144 mmol/L    Potassium 3.4 3.4 - 5.3 mmol/L    Chloride 110 (H) 94 - 109 mmol/L    Carbon Dioxide 21 20 - 32 mmol/L    Anion Gap 12 3 - 14 mmol/L    Glucose 105 (H) 70 - 99 mg/dL    Urea Nitrogen 10 7 - 30 mg/dL    Creatinine 0.90 0.52 - 1.04 mg/dL    GFR Estimate 67 >60 mL/min/1.7m2    GFR Estimate If Black 81 >60 mL/min/1.7m2    Calcium 8.5 8.5 - 10.1 mg/dL   INR   Result Value Ref Range    INR 1.07 0.86 - 1.14   Partial thromboplastin time   Result Value Ref Range    PTT 34 22 - 37 sec   Troponin I   Result Value Ref Range    Troponin I ES <0.015 0.000 - 0.045 ug/L   Lactic acid whole blood   Result Value Ref Range    Lactic Acid 1.7 0.7 - 2.0 mmol/L   CT Head w/o Contrast    Narrative    CT SCAN OF THE HEAD WITHOUT CONTRAST   11/21/2018 6:46 PM     HISTORY: Aphasia.    TECHNIQUE: Axial images of the head and coronal  reformations without  IV contrast material.  Radiation dose for this scan was reduced using  automated exposure control, adjustment of the mA and/or kV according  to patient size, or iterative reconstruction technique.    COMPARISON: None.    FINDINGS: There is some mild cerebral atrophy. There is an old left  parietal occipital infarct. There is also an old infarct in the right  basal ganglia region. Patchy white matter changes are present in both  hemispheres. There is no evidence for mass effect, acute infarct, or  skull fracture. There is near complete opacification of the right  maxillary sinus and there is an air-fluid level in the left sphenoid  sinus.      Impression    IMPRESSION:  1. No evidence for intracranial hemorrhage or any acute brain  pathology.  2. Cerebral atrophy with old bilateral infarcts and chronic appearing  white matter disease.  3. Right maxillary and left sphenoid sinus disease.    ANSLEY GONZALEZ MD   CT Head Neck Angio w/o & w Contrast    Narrative    CTA HEAD NECK WITH CONTRAST 11/21/2018 6:53 PM     HISTORY: Aphasia.    TECHNIQUE: MR angiography was performed through the head and neck  without and with intravenous contrast. 80 mL of Isovue 370 was given.  Multiplanar reconstructions were performed. 3-D reconstructions off a  remote workstation for CT angiography were also acquired. Carotid  stenoses were evaluated by comparing the caliber of the proximal  internal carotid artery to the caliber of the distal internal carotid  artery. Radiation dose for this scan was reduced using automated  exposure control, adjustment of the mA and/or kV according to patient  size, or iterative reconstruction technique.    COMPARISON: 8/25/2018    FINDINGS:  Brachiocephalic vessels: Normal.    Right carotid system: Normal.    Left carotid system: Normal.    Right vertebral artery: Normal nondominant vessel.    Left vertebral artery: Normal.    [place]: Again noted is moderate narrowing of a  large left  middle cerebral artery branch proximally which is similar to that seen  on other CT angiogram studies. There is also narrowing of the right P1  segment which is presumably congenital and also unchanged. The distal  internal carotid arteries and basilar artery are patent. The proximal  anterior, middle, and posterior cerebral arteries are patent.      Impression    IMPRESSION: No change from prior CT angiogram. There is some moderate  stenosis of a large left M2 branch similar to that seen on the prior  exam most likely due to atherosclerotic disease.    ANSLEY GONZALEZ MD   UA with Microscopic   Result Value Ref Range    Color Urine Yellow     Appearance Urine Clear     Glucose Urine Negative NEG^Negative mg/dL    Bilirubin Urine Negative NEG^Negative    Ketones Urine 20 (A) NEG^Negative mg/dL    Specific Gravity Urine 1.026 1.003 - 1.035    Blood Urine Negative NEG^Negative    pH Urine 5.0 5.0 - 7.0 pH    Protein Albumin Urine 30 (A) NEG^Negative mg/dL    Urobilinogen mg/dL 0.0 0.0 - 2.0 mg/dL    Nitrite Urine Negative NEG^Negative    Leukocyte Esterase Urine Negative NEG^Negative    Source Catheterized Urine     WBC Urine 1 0 - 5 /HPF    RBC Urine 0 0 - 2 /HPF    Mucous Urine Present (A) NEG^Negative /LPF   Urine Drugs of Abuse Screen Panel 13   Result Value Ref Range    Cannabinoids (45-cln-7-carboxy-9-THC) Not Detected NDET^Not Detected ng/mL    Phencyclidine (Phencyclidine) Not Detected NDET^Not Detected ng/mL    Cocaine (Benzoylecgonine) Not Detected NDET^Not Detected ng/mL    Methamphetamine (d-Methamphetamine) Not Detected NDET^Not Detected ng/mL    Opiates (Morphine) Not Detected NDET^Not Detected ng/mL    Amphetamine (d-Amphetamine) Not Detected NDET^Not Detected ng/mL    Benzodiazepines (Nordiazepam) Not Detected NDET^Not Detected ng/mL    Tricyclic Antidepressants (Desipramine) Not Detected NDET^Not Detected ng/mL    Methadone (Methadone) Not Detected NDET^Not Detected ng/mL    Barbiturates  (Butalbital) Not Detected NDET^Not Detected ng/mL    Oxycodone (Oxycodone) Not Detected NDET^Not Detected ng/mL    Propoxyphene (Norpropoxyphene) Not Detected NDET^Not Detected ng/mL    Buprenorphine (Buprenorphine) Not Detected NDET^Not Detected ng/mL   XR Chest Port 1 View    Narrative    CHEST ONE VIEW PORTABLE   11/21/2018 8:07 PM     HISTORY: Altered loss of consciousness.     COMPARISON: 8/25/2018      Impression    IMPRESSION: Normal.    LEXIE BISHOP MD       Medications   lidocaine 1 % 1 mL (not administered)   lidocaine (LMX4) kit (not administered)   sodium chloride (PF) 0.9% PF flush 3 mL (not administered)   sodium chloride (PF) 0.9% PF flush 3 mL (3 mLs Intracatheter Not Given 11/21/18 2023)   0.9% sodium chloride BOLUS (0 mLs Intravenous Stopped 11/21/18 2007)   sodium chloride (PF) 0.9% PF flush 3 mL (3 mLs Intracatheter Given 11/21/18 1852)   iopamidol (ISOVUE-370) solution 500 mL (80 mLs Intravenous Given 11/21/18 1853)   sodium chloride 0.9 % bag 250mL for CT scan flush use (100 mLs Intravenous Given 11/21/18 1852)       8:52 PM Mother arrived to the ED, she states the patient spoke to her at 1430 today.    Assessments & Plan (with Medical Decision Making)  43-year-old female with a history of significant stroke this year who presented after family reported some aphasia that started today.  Exam here was inconsistent and she is quite withdrawn.  We did  go down the path of possibility of acute stroke.  CT and CTA of the head was negative.  Family reports significant recent depression.  My clinical gestalt is that this could be more of a conversion type reaction with depression.  I did discuss the case with the stroke neurologist at the [facility].  He did not recommend any further pursuit of stroke at this point.  I discussed the case with our hospitalist who felt without MRI it would be best to transfer her back to the [facility].  She was accepted by the hospitalist  service by Dr. Jamison where she can be further evaluated by neurology and/or other services as needed.     I have reviewed the nursing notes.    I have reviewed the findings, diagnosis, plan and need for follow up with the patient.       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 It was created on their behalf by Scott Langley, a trained medical scribe. The creation of this record is based on the provider's personal observations and the statements of the patient. This document has been checked and approved by the attending provider.    Note: Chart documentation done in part with Dragon Voice Recognition software. Although reviewed after completion, some word and grammatical errors may remain.    11/21/2018   [facility] EMERGENCY DEPARTMENT     Beny Lorenzana MD  11/21/18 [id number]

## 2018-11-22 NOTE — PROGRESS NOTES
11/22/18 1100   Quick Adds   Type of Visit Initial Occupational Therapy Evaluation   Living Environment   Lives With sibling(s)   Living Arrangements house   Home Accessibility bed and bath on same level   Number of Stairs to Enter Home 0   Number of Stairs Within Home 12  (split level )   Stair Railings at Home inside, present on right side   Transportation Available car;family or friend will provide   Living Environment Comment Pt lives with sister and brother in-law.    Self-Care   Dominant Hand right   Usual Activity Tolerance fair   Current Activity Tolerance poor   Regular Exercise no   Equipment Currently Used at Home none   Activity/Exercise/Self-Care Comment friend in room reports she does not exercise regularly and is emotionally labile (depressive mood often)   Functional Level Prior   Ambulation 0-->independent   Transferring 0-->independent   Toileting 0-->independent   Bathing 0-->independent   Dressing 0-->independent   Eating 0-->independent   Communication 0-->understands/communicates without difficulty   Swallowing 0-->swallows foods/liquids without difficulty   Cognition 1 - attention or memory deficits   Fall history within last six months no   Which of the above functional risks had a recent onset or change? ambulation;cognition;communication/speech;bathing;toileting;dressing;transferring   Prior Functional Level Comment (pt reports IND at baseline, may be poor historian)   General Information   Onset of Illness/Injury or Date of Surgery - Date 11/22/18   Referring Physician Jitendra Messina MD   Patient/Family Goals Statement return home to [facility]    Additional Occupational Profile Info/Pertinent History of Current Problem 43F hx of ischemic strokes 8/2018, vaginal bleeding, HLD, HTN, prior smoker p/w expressive aphasia. She was admitted here in 8/2018 with R thalamic, L frontal white matter, and temporo-occipital lobe strokes. Her strokes were deemed embolic, for which she had a large workup  that was all negative including BETH and hypercoagulable workup. She was sent home on aspirin. Since her stroke, she has had multiple presentations for intermittent neurologic symptoms such as LUE numbness, R hand weakness, etc with subsequent MRI showing no new strokes. Her last presentation here was 9/12. Family says her mood has been very labile since the stroke as well. She was recently begun on an serotonin specific reuptake inhibitor for this. Apparently, since Sunday, she has been progressively not speaking   Precautions/Limitations no known precautions/limitations   Weight-Bearing Status - LUE full weight-bearing   Weight-Bearing Status - RUE full weight-bearing   Weight-Bearing Status - LLE full weight-bearing   Weight-Bearing Status - RLE full weight-bearing   General Info Comments Activity: up with assist    Cognitive Status Examination   Orientation person;place   Level of Consciousness confused   Able to Follow Commands moderate impairment   Personal Safety (Cognitive) one on one supervision required for safety;decreased awareness, need for safety   Memory impaired   Attention Sustained attention impaired   Organization/Problem Solving Problem solving impaired;Sequencing impaired   Executive Function Initiation impaired;Planning ability impaired;Cognitive flexibility impaired   Visual Perception   Visual Perception No deficits were identified   Sensory Examination   Sensory Quick Adds Other (describe)  (pt reports numbness/tingling in B UE's and B LE's)   Pain Assessment   Patient Currently in Pain No   Integumentary/Edema   Integumentary/Edema no deficits were identifed   Posture   Posture not impaired   Range of Motion (ROM)   ROM Quick Adds No deficits were identified   Strength   Manual Muscle Testing Quick Adds No deficits were identified   Hand Strength   Hand Strength Comments BUE  strength WFL but weak   Muscle Tone Assessment   Muscle Tone Quick Adds No deficits were identified    Coordination   Upper Extremity Coordination No deficits were identified   Gross Motor Coordination WFL   Fine Motor Coordination WFL   Mobility   Bed Mobility Bed mobility skill: Sit to supine;Bed mobility skill: Supine to sit;Bed mobility skill: Rolling/Turning   Bed Mobility Comments extended time required for initiation/processing   Bed Mobility Skill: Supine to Sit   Level of [place]: Supine/Sit stand-by assist   Physical Assist/Nonphysical Assist: Supine/Sit 1 person assist;verbal cues;set-up required;supervision   Assistive Device: Supine/Sit (extended time for cueing)   Transfer Skills   Transfer Transfer Skill: Stand to Sit   Transfer Skill: Sit to Stand   Level of [place]: Sit/Stand stand-by assist   Physical Assist/Nonphysical Assist: Sit/Stand 1 person assist;supervision;verbal cues   Transfer Skill: Sit to Stand full weight-bearing   Toilet Transfer   Toilet Transfer Toilet Transfer Skill;Toilet Transfer Safety Analysis   Transfer Skill: Toilet Transfer   Level of [place]: Toilet minimum assist (75% patients effort)   Physical Assist/Nonphysical Assist: Toilet verbal cues;set-up required;1 person + 1 person to manage equipment  (extensive cues required )   Weight-Bearing Restrictions: Toilet full weight-bearing   Transfer Safety Analysis Toilet   Transfer Safety Concerns Noted: Toilet decreased sequencing ability   Balance   Balance Quick Add No deficits identified   Toileting   Level of [place]: Toilet maximum assist (25% patients effort)   Physical Assist/Nonphysical Assist: Toilet 1 person assist;1 person + 1 person to manage equipment   Assistive Device grab bars   Instrumental Activities of Daily Living (IADL)   Previous Responsibilities meal prep;housekeeping;laundry;shopping;yardwork;medication management;finances   IADL Comments friend reports pt is IND with IADLs at baseline   Activities of Daily Living Analysis   Impairments Contributing to Impaired Activities of Daily  "Living strength decreased;sensory feedback impaired;cognition impaired   General Therapy Interventions   Planned Therapy Interventions ADL retraining;IADL retraining;cognition;strengthening   Clinical Impression   Criteria for Skilled Therapeutic Interventions Met yes, treatment indicated   OT Diagnosis decreased ADL-I   Influenced by the following impairments impaired cognition, decreased strength, sensory feedback impaired    Assessment of Occupational Performance 1-3 Performance Deficits   Identified Performance Deficits transferring, functional ambulation, toileting, dressing, bathing, self-feeding    Clinical Decision Making (Complexity) Moderate complexity   Therapy Frequency 5 times/wk   Predicted Duration of Therapy Intervention (days/wks) 11/29/19   Anticipated Discharge Disposition Transitional Care Facility;Other (see comments)  (inpatient psych)   Risks and Benefits of Treatment have been explained. Yes   Patient, Family & other staff in agreement with plan of care Yes   Clinical Impression Comments Pt presents today with decreased strength, impaired cognition, and sensory feedback impairment all leading to decreased ADL-I. Pt to benefit from continued skilled instruction to address the following problem list.    [facility] AM-PAC  \"6 Clicks\" Daily Activity Inpatient Short Form   1. Putting on and taking off regular lower body clothing? 3 - A Little   2. Bathing (including washing, rinsing, drying)? 3 - A Little   3. Toileting, which includes using toilet, bedpan or urinal? 3 - A Little   4. Putting on and taking off regular upper body clothing? 3 - A Little   5. Taking care of personal grooming such as brushing teeth? 3 - A Little   6. Eating meals? 4 - None   Daily Activity Raw Score (Score out of 24.Lower scores equate to lower levels of function) 19   Total Evaluation Time   Total Evaluation Time (Minutes) 3     "

## 2018-11-22 NOTE — PROGRESS NOTES
"[facility], [place]   Neurology Daily Note  Alicia Penaloza  [id number]  11/22/2018    Subjective:  She is speaking today and endorsed she is depressed but not thinking of harming herself. She does not see psychiatrist as an outpatient. She reported that she feels unsafe. She does not want to talk about her mood or psych issues    Objective:   BP (!) 159/104 (BP Location: Right arm)  Pulse 79  Temp 98.3  F (36.8  C) (Oral)  Resp 21  Ht 1.6 m (5' 3\")  Wt 97.1 kg (214 lb)  SpO2 99%  BMI 37.91 kg/m2  General:  patient lying in bed without any acute distress    HEENT:  normocephalic/atraumatic  Cardio:  RRR  Pulmonary:  no respiratory distress  Abdomen:  soft  Extremities:  no edema  Skin:  intact      Neurologic  Mental status: awake, alert, follows almost all commands. Minimal speech produced but does nod correctly most of the time to questions. Mumbles occasionally but it is unintelligible.   Cranial nerves: EOMI, face symmetric, VFF, equal to LT  Motor: no drift in any extremity  Sensory: equal to LT except in the left UE  Coordination: FTN intact BL  Gait: normal gait and stance    National [employer] Stroke Scale  Exam Interval: 24 hours post onset of symptom +/- 20 minutes   Score    Level of consciousness: (0)   Alert, keenly responsive    LOC questions: (0)   Answers both questions correctly    LOC commands: (0)   Performs both tasks correctly    Best gaze: (0)   Normal    Visual: (0)   No visual loss    Facial palsy: (0)   Normal symmetrical movements    Motor arm (left): (0)   No drift    Motor arm (right): (0)   No drift    Motor leg (left): (0)   No drift    Motor leg (right): (0)   No drift    Limb ataxia: (0)   Absent    Sensory: (1)   Mild to moderate sensory loss    Best language: (0)   Normal- no aphasia    Dysarthria: (0)   Normal    Extinction and inattention: (0)   No abnormality        Total Score:  1         Lab Investigations:   Hemoglobin A1C   Date " Value Ref Range Status   11/22/2018 4.7 0 - 5.6 % Final     Comment:     Normal <5.7% Prediabetes 5.7-6.4%  Diabetes 6.5% or higher - adopted from ADA   consensus guidelines.     08/26/2018 4.8 0 - 5.6 % Final     Comment:     Normal <5.7% Prediabetes 5.7-6.4%  Diabetes 6.5% or higher - adopted from ADA   consensus guidelines.       Lab Results   Component Value Date    LDL 53 08/26/2018       Imaging:  Recent Results (from the past 24 hour(s))   CT Head w/o Contrast    Narrative    CT SCAN OF THE HEAD WITHOUT CONTRAST   11/21/2018 6:46 PM     HISTORY: Aphasia.    TECHNIQUE: Axial images of the head and coronal reformations without  IV contrast material.  Radiation dose for this scan was reduced using  automated exposure control, adjustment of the mA and/or kV according  to patient size, or iterative reconstruction technique.    COMPARISON: None.    FINDINGS: There is some mild cerebral atrophy. There is an old left  parietal occipital infarct. There is also an old infarct in the right  basal ganglia region. Patchy white matter changes are present in both  hemispheres. There is no evidence for mass effect, acute infarct, or  skull fracture. There is near complete opacification of the right  maxillary sinus and there is an air-fluid level in the left sphenoid  sinus.      Impression    IMPRESSION:  1. No evidence for intracranial hemorrhage or any acute brain  pathology.  2. Cerebral atrophy with old bilateral infarcts and chronic appearing  white matter disease.  3. Right maxillary and left sphenoid sinus disease.    ANSLEY GONZALEZ MD   CT Head Neck Angio w/o & w Contrast    Narrative    CTA HEAD NECK WITH CONTRAST 11/21/2018 6:53 PM     HISTORY: Aphasia.    TECHNIQUE: MR angiography was performed through the head and neck  without and with intravenous contrast. 80 mL of Isovue 370 was given.  Multiplanar reconstructions were performed. 3-D reconstructions off a  remote workstation for CT angiography were also  acquired. Carotid  stenoses were evaluated by comparing the caliber of the proximal  internal carotid artery to the caliber of the distal internal carotid  artery. Radiation dose for this scan was reduced using automated  exposure control, adjustment of the mA and/or kV according to patient  size, or iterative reconstruction technique.    COMPARISON: 8/25/2018    FINDINGS:  Brachiocephalic vessels: Normal.    Right carotid system: Normal.    Left carotid system: Normal.    Right vertebral artery: Normal nondominant vessel.    Left vertebral artery: Normal.    [place]: Again noted is moderate narrowing of a large left  middle cerebral artery branch proximally which is similar to that seen  on other CT angiogram studies. There is also narrowing of the right P1  segment which is presumably congenital and also unchanged. The distal  internal carotid arteries and basilar artery are patent. The proximal  anterior, middle, and posterior cerebral arteries are patent.      Impression    IMPRESSION: No change from prior CT angiogram. There is some moderate  stenosis of a large left M2 branch similar to that seen on the prior  exam most likely due to atherosclerotic disease.    ANSLEY GONZALEZ MD   XR Chest Port 1 View    Narrative    CHEST ONE VIEW PORTABLE   11/21/2018 8:07 PM     HISTORY: Altered loss of consciousness.     COMPARISON: 8/25/2018      Impression    IMPRESSION: Normal.    LEXIE BISHOP MD       Assessment and Plan   43F hx of ischemic strokes 8/2018, vaginal bleeding, HLD, HTN, prior smoker p/w sudden onset of not speaking but was following commands and was able to walk around on her own with no other neurological deficit. She was admitted OSH in 8/7/2018 with R thalamic, L frontal white matter, and temporo-occipital lobe strokes. Her strokes were deemed embolic, for which she had a large workup that was all negative including BETH and hypercoagulable workup. She was sent home on aspirin. Since her  stroke, she has had multiple presentations for intermittent neurologic symptoms such as LUE numbness, R hand weakness, etc with subsequent MRI showing no new strokes. Her last presentation here was 9/12. Family says her mood has been very labile since the stroke as well. She was recently begun on flouxetine 20 mg for depression. Apparently, since Sunday, she has been progressively not speaking. Yesterday, she stopped speaking completely and only grunts or moans. She came to an OSH and had a CTA showing unchanged L M2 stenosis with no LVO. She was transferred here for further workup. Patient endorsed she is profoundly depressed and cries without cause, but she is not thinking or planning to harm herself. She is not feeling safe.     # Mutism vs aphasia:  # Mood disorder:    is at risk for aphasia given severe M2 stenosis, which is still patent per CTA done last night. Does have a recent history of ischemic infarcts in 8/2018 as well. However from the history and exam she does not seem to have aphasia. 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 She endorsed that she is depressed. Her speech is progressively worse suggesting it is less likely to be stroke. She was admitted to get MRI and complete stroke work up. Psychiatry consult to address her mood status and the need for IP psych admission is discussed today  --MRI brain tomorrow.  If new stroke, will initiate full stroke workup.   --restart aspirin 325mg.   --inflammatory labs in process  --PT: recommended TCU vs IP psych because her cognitive status is concerning.   --OT and SPL  --repeat antiphospholipid antibody as was recommended at last visit  -- Psychiatry consult  for the depression and feeling unsafe  -- increase Fluoxetine to 40 mg daily     # HTN: out of window for permissive HTN given symptoms began Sunday, per report  --restart pta lisinopril 20mg every day  --restart pta metoprolol 50mg bid     # HLD:  --restart pta atorvastatin 40mg every  day     # vaginal bleeding:  Due to uterine fibroids. Endometrial biopsy without atypia  --will monitor w/ cbc  --cares per nursing     Diet: regular diet  PPx: enoxaparin  Code: full  Dispo: pending MRI, psychiatry consult and workup        Patient was seen and discussed with Dr. Carlo Farris MD  Neurology Resident, PGY-1  Pager: [phone]

## 2018-11-22 NOTE — IP AVS SNAPSHOT
` `     [address] [facility] [facility]: [phone]                 INTERAGENCY TRANSFER FORM - NOTES (H&P, Discharge Summary, Consults, Procedures, Therapies)   2018                    Hospital Admission Date: 2018  ALICIA SEXTON   : 1975  Sex: Female        Patient PCP Information     Provider PCP Type    Augusto Thomas MD General         History & Physicals      H&P by Jitendra Messina MD at 2018  1:32 AM     Author:  Jitendra Messina MD Service:  Neurology Author Type:  Resident    Filed:  2018  2:04 AM Date of Service:  2018  1:32 AM Creation Time:  2018  1:32 AM    Status:  Attested :  Jitendra Messina MD (Resident)    Cosigner:  Nirav Luna MD at 2018  2:01 PM        Attestation signed by Nirav Luna MD at 2018  2:01 PM        Attestation:  Physician Attestation   I, Nirav Luna, personally examined and evaluated this patient.  I discussed the patient with the medical student and/or resident and care team, and agree with the assessment and plan of care as documented in the note of 2018 [date].      I personally reviewed vital signs, medications, labs and imaging.    Key findings: stroke unlikely. Suspect recurrent depression.   Nirav Luna MD  Date of Service (when I saw the patient): 18                               [facility], [place]      Neurology Stroke Admission    Patient Name: Alicia Sexton  : 1975 MRN#: [id number]    STROKE DATA    Stroke Code:  Stroke code not activated.  Time patient seen:[MR1.1]  2018[MR1.2] 0001  Last known normal (pt's baseline):  unknown     TPA treatment:  Not given due to outside the time window.      Stroke Scales      National Institutes of Health Stroke Scale (at presentation)   NIHSS done at:  time patient seen      Score    Level of consciousness:  (0)   Alert, keenly responsive    LOC questions:  (2)    Answers neither question correctly    LOC commands:  (1)   Performs one task correctly    Best gaze:  (0)   Normal    Visual:  (0)   No visual loss    Facial palsy:  (0)   Normal symmetrical movements    Motor arm (left):  (0)   No drift    Motor arm (right):  (0)   No drift    Motor leg (left):  (0)   No drift    Motor leg (right):  (0)   No drift    Limb ataxia:  (0)   Absent    Sensory:  (0)   Normal- no sensory loss    Best language:  (3)   Mute- global aphasia    Dysarthria:  (0)   Normal    Extinction and inattention:  (0)   No abnormality        NIHSS Total Score:  6          Dysphagia Screen  Time of screening:[MR1.1] 11/22/2018[MR1.2] 0001  Screening results: Failed screening - strict NPO pending SLP evaluation      HPI  43F hx of ischemic strokes 8/2018, vaginal bleeding, HLD, HTN, prior smoker p/w expressive aphasia.[MR1.1] She was admitted here in 8/2018 with R thalamic, L frontal white matter, and temporo-occipital lobe strokes. Her strokes were deemed embolic, for which she had a large workup that was all negative including BETH and hypercoagulable workup. She was sent home on aspirin. Since her stroke, she has had multiple presentations for intermittent neurologic symptoms such as LUE numbness, R hand weakness, etc with subsequent MRI showing no new strokes. Her last presentation here was 9/12. Family says her mood has been very labile since the stroke as well. She was recently begun on an serotonin specific reuptake inhibitor for this. Apparently, since Sunday, she has been progressively not speaking. Yesterday, she stopped speaking completely and only grunts or moans. She came to an OSH and had a CTA showing unchanged L M2 stenosis with no LVO. She was transferred here for further workup.[MR1.3]     Pertinent Past Medical/Surgical History  Past Medical History:   Diagnosis Date     Hypertension      Stroke (H) 08/07/2018       No past surgical history on file.    Medications:[MR1.1]   No current  facility-administered medications for this encounter.[MR1.4]    .    Allergies:[MR1.1] No Known Allergies[MR1.4].    Family History:[MR1.1] No family history on file.[MR1.4].    Social History:[MR1.1]   Social History   Substance Use Topics     Smoking status: Former Smoker     Quit date: 8/7/2018     Smokeless tobacco: Never Used     Alcohol use No[MR1.4]   .    Tobacco use: Former smoker    ROS:  The 10 point Review of Systems is negative other than noted in the HPI or here.     PHYSICAL EXAMINATION  Vital Signs:  B/P: Data Unavailable,  T: Data Unavailable,  P: Data Unavailable,  R: Data Unavailable    General:  patient lying in bed without any acute distress    HEENT:  normocephalic/atraumatic  Cardio:  RRR  Pulmonary:  no respiratory distress  Abdomen:  soft  Extremities:  no edema  Skin:  intact     Neurologic[MR1.1]  Mental status: awake, alert, follows almost all commands. Minimal speech produced but does nod correctly most of the time to questions. Mumbles occasionally but it is unintelligible.   Cranial nerves: EOMI, face symmetric, VFF, equal to LT  Motor: no drift in any extremity  Sensory: equal to LT  Coordination: FTN intact BL  Gait: defer[MR1.5]      Labs  Labs and Imaging reviewed and used in developing the plan; pertinent results included.     Lab Results   Component Value Date     (H) 11/21/2018      ASSESSMENT & PLAN BY PROBLEM     43F hx of ischemic strokes 8/2018, vaginal bleeding, HLD, HTN, prior smoker p/w expressive aphasia.[MR1.1]     ## expressive aphasia: is at risk for[MR1.5] aphasia[MR1.3] given[MR1.5] severe M2 stenosis, which is still patent per CTA done last night. Does have a recent history of ischemic infarcts in 8/2018 as well. Will admit tonight and obtain MRI in the AM. Per report she has been having labile mood issues since her stroke. There was some concern raised at OSH of her not speaking being due to conversion disorder or a mood issue.   --MRI brain in AM.  If new  stroke, will initiate full stroke workup.   --[MR1.3]hold[MR1.6] pta aspirin 325mg[MR1.3]. [place] aspirin 300mg[MR1.6]  --inflammatory labs  --NPO except for meds (unable to make vocalizations after water as per bedside swallowing protocol)  --PT/OT/SLP in the AM.   --repeat antiphospholipid antibody as was recommended at last visit[MR1.3]  --if MRI negative, consider EEG in the AM.[MR1.7]     ## HTN: out of window for permissive HTN given symptoms began Sunday, per report  --[MR1.3]hold[MR1.6] pta lisinopril 20mg every day  --[MR1.3]hold[MR1.6] pta metoprolol 50mg bid[MR1.3]  --metoprolol IV bid tonight until SLP evaluation[MR1.6]    ## HLD:  --[MR1.3]hold[MR1.6] pta atorvastatin 40mg every day    ## vaginal bleeding:  --will monitor w/ cbc  --cares per nursing    ## mood disorder: new since her stroke in 8/2018.  --[MR1.3]hold[MR1.6] pta fluoxetine 20mg qd    Diet: NPO[MR1.3]. NS@100/h[MR1.6]  PPx: enoxaparin  Code: full  Dispo: pending MRI and workup[MR1.3]    The patient was discussed with[MR1.1] Dr. Fischer.    Robert Messina  PGY4 Neurology  [phone][MR1.5]           Revision History        User Key Date/Time User Provider Type Action    > MR1.7 11/22/2018  2:04 AM Jitendra Messina MD Resident Sign     MR1.6 11/22/2018  1:58 AM Jitendra Messina MD Resident Sign     MR1.3 11/22/2018  1:53 AM Jitendra Messina MD Resident Sign     MR1.5 11/22/2018  1:36 AM Jitendra Messina MD Resident      MR1.4 11/22/2018  1:34 AM Jitendra Messina MD Resident      MR1.2 11/22/2018  1:33 AM Jitendra Messina MD Resident      MR1.1 11/22/2018  1:32 AM Jitendra Messina MD Resident                      Discharge Summaries      Discharge Summaries by Shanika Venegas APRN CNP at 11/25/2018  3:51 PM     Author:  Shanika Venegas APRN CNP Service:  Neurology Author Type:  Nurse Practitioner    Filed:  11/29/2018  8:44 AM Date of Service:  11/25/2018  3:51 PM Creation Time:  11/25/2018  3:45 PM    Status:  Cosign Needed :  Theo  KEVIN Renteria CNP (Nurse Practitioner)    Cosign Required:  Yes             [facility], [place]    Neurology Stroke Discharge Summary    Date of Admission: 11/22/2018  Date of Discharge:[HA1.1] 11/29/2018[KB1.1]    Disposition: Discharged to short-term care facility  Primary Care Physician: Augusto Thomas      Admission Diagnosis:   Possible stroke    Discharge Diagnosis:   selective mutism and depression    Problem Leading to Hospitalization (from [facility]):   43F hx of ischemic strokes 8/2018, vaginal bleeding, HLD, HTN, prior smoker p/w sudden onset of not speaking but was following commands and was able to walk around on her own with no other neurological deficit. She was admitted OSH in 8/7/2018 with R thalamic, L frontal white matter, and temporo-occipital lobe strokes. Her strokes were deemed embolic, for which she had a large workup that was all negative including BETH and hypercoagulable workup. She was sent home on aspirin. Since her stroke, she has had multiple presentations for intermittent neurologic symptoms such as LUE numbness, R hand weakness, etc with subsequent MRI showing no new strokes. Her last presentation here was 9/12. Family says her mood has been very labile since the stroke as well. She was recently begun on flouxetine 20 mg for depression. Apparently, over 4 days prior to admission, she has been progressively not speaking. The day prior to admission she stopped speaking completely and only grunts or moans. She was admitted for stroke work up    Please see H&P dated 11/22/2018 for further details about presentation.    Brief Hospital Course:   She came to an OSH and had a CTA showing unchanged L M2 stenosis with no LVO. She was transferred here for further workup.[HA1.1] The inflammatory labs were negative. Hba1c was 4.7, LDL was 48.[HA1.2] The next day of her admission she was talking with no deficit. She endorsed she is profoundly depressed and cries without  cause, but she is not thinking or planning to harm herself. She is not feeling safe.    We do not think she had aphasia. 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 Her speech has been progressively worse prior to admission suggesting it is less likely to be stroke. She was admitted to complete stroke work up. Her sister endorsed that[HA1.1] sometimes the patient leaves[HA1.3] the gas stove[HA1.1] on concerning for her safety[HA1.3]. Her sister can't watch her 24/7 so the OT, PT and psychiatry doctor recommended TCU.  Psychiatry has been consulted and recommended neuropsych testing to assess her cognitive functions and  TCU disposition then she needs to be seen by psychiatrist as an outpatient. He does not think she is candidate for inpatient psychiatry transfer.[HA1.1] The fluoxetine is increased to 40 mg daily.  Meanwhile we did not do an MRI because we do not think this was a stroke and the symptoms completely resolved after admission[HA1.2]     Found to have selective mutism     IV tPA was not given because she came fare outside the tPA therapeutic window    Work-up as stated below under Pertinent Investigations.    Etiology is thought to be the underlying depression      Rehab evaluation: OT, PT and SLP.     Smoking Cessation: already quit smoking    BP Long-term Goal: 140/90 or less    Antithrombotic/Anticoagulant Agent: aspirin 325 mg    Statins:[HA1.1] continue on PTA atorvastatin 40 mg[HA1.2]       Hgb A1C Goal: < 7.0    Complications:[HA1.1] None[HA1.2].     Other problems addressed during this hospitalization:[HA1.1]  # Mutism:  # Mood disorder:[HA1.2]    She[HA1.3] is at risk for[HA1.2] stroke[HA1.3] given severe M2 stenosis, which is still patent per CTA done[HA1.2] on admission[HA1.3]. Does have a recent history of ischemic infarcts in 8/2018 as well. However from the history and exam she does not seem to have aphasia. 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 She endorsed that  she is depressed. Her speech is progressively worse suggesting it is less likely to be stroke. She was admitted to get MRI and complete stroke work up. Psychiatry consulted this morning and recommended neuropsych testing and  TCU disposition and psychiatry follow up as an outpatient.  --no need for the MRI brain  --continue on aspirin 325mg.   --inflammatory labs including C3,4,anticardiolipin IGG and IGM, ANCA were negative  --PT/OT: recommended TCU    -- continue Fluoxetine to 40 mg daily      # HTN:   --continue pta lisinopril 20mg every day  --continue pta metoprolol 50mg bid      # HLD:  --continue pta atorvastatin 40mg every day      # vaginal bleeding:  Due to uterine fibroids. Endometrial biopsy without atypia  --will monitor w/ cbc  --cares per nursing[HA1.2]    PERTINENT INVESTIGATIONS    Labs  Lipid Panel:   Recent Labs   Lab Test  11/23/18   0701   CHOL  97   HDL  28*   LDL  48   TRIG  107     A1C:   Lab Results   Component Value Date    A1C 4.7 11/22/2018     INR:[HA1.1]   No lab results found in last 7 days.[KB1.1]   Coag Panel / Hypercoag Workup:[HA1.1] was negative[HA1.2]  Pending test results:[HA1.1] none[HA1.2]    Echo:[HA1.1] not done this time. She just had one in august that was unremarkable[HA1.2]  Imaging:[HA1.1]   CT head, CTA head and neck 11/21  No evidence for intracranial hemorrhage or any acute brain  pathology.  2. Cerebral atrophy with old bilateral infarcts and chronic appearing  white matter disease.  3. Right maxillary and left sphenoid sinus disease.  No change from prior CT angiogram. There is some moderate  stenosis of a large left M2 branch similar to that seen on the prior  exam most likely due to atherosclerotic disease.[HA1.2]    Endovascular procedure:[HA1.1] None[HA1.2]     Cardiac Monitoring: Patient had > 24 hrs of cardiac monitor while in hospital.    Findings:[HA1.1] No atrial fibrillation was found.[HA1.2]    Sleep Apnea Screen:[HA1.1]   Questions/Answers      1. Prior  to your stroke, have you been told that you snore? No.    2. Prior to your stroke, have you been told that you struggle to breath while you are sleeping? No.    3. Prior to your stroke, do you feel tired and sleepy even after getting a normal night of sleep? No.[HA1.2]    Sleep Apnea Screen Findings:[HA1.1] Patient has 0-1 symptoms of sleep apnea.  Further sleep study is not recommended at this time.[HA1.2]    Education discussed with:[HA1.1] patient[HA1.2] on[HA1.1] blood pressure management, cholesterol management and medical management[HA1.2].    During daily rounds, the plan of care[HA1.1] was discussed and developed with patient.  Plan of care includes: she will go to a TCU then get neuropsych test and follow with psychiatry as an outpatient. In the meantime we increased the Fluoxetine from 20 to 40 mg per day[HA1.2].    PHYSICAL EXAMINATION  Vital Signs:  B/P: 115/90, T: 98.4, P: 56, R: 18    General:[HA1.1]  patient lying in bed without any acute distress , depressed mood and flat affect[HA1.2]    HEENT:[HA1.1]  normocephalic/atraumatic[HA1.2]  Cardio:[HA1.1]  RRR[HA1.2]  Pulmonary:[HA1.1]  no respiratory distress[HA1.2]  Abdomen:[HA1.1]  soft[HA1.2]  Extremities:[HA1.1]  no edema[HA1.2]  Skin:[HA1.1]  intact[HA1.2]     Neurologic  Mental Status:[HA1.1]  fully alert, attentive and oriented, follows commands, speech clear and fluent[HA1.2]  Cranial Nerves:[HA1.1]  visual fields intact, PERRL, EOMI with normal smooth pursuit, facial sensation intact and symmetric, facial movements symmetric, hearing not formally tested but intact to conversation, palate elevation symmetric and uvula midline, no dysarthria, shoulder shrug strong bilaterally, tongue protrusion midline[HA1.2]  Motor:[HA1.1]  no abnormal movements, normal tone throughout, normal muscle bulk, no pronator drift, normal and symmetric rapid finger tapping, able to move all limbs spontaneously, strength 5/5 throughout upper and lower  extremities[HA1.2]  Reflexes:[HA1.1]  2+ and symmetric throughout[HA1.2]  Sensory:[HA1.1]  intact/symmetric to light touch and pin prick throughout upper and lower extremities[HA1.2]  Coordination:[HA1.1]  FNF and HS intact without dysmetria[HA1.2]  Station/Gait:[HA1.1]  normal width, stride length, turn, and arm swing[HA1.2]     National Institutes of Health Stroke Scale (on day of discharge)  NIHSS Total Score:[HA1.1] 0[HA1.2]    Modified Jake Scale (on day of discharge):[HA1.1] 0-No symptoms[HA1.2]    Medications[HA1.1]    Current Discharge Medication List      CONTINUE these medications which have CHANGED    Details   FLUoxetine (PROZAC) 40 MG capsule 1 capsule (40 mg) by Oral or Feeding Tube route daily  Qty: 30 capsule    Associated Diagnoses: Current moderate episode of major depressive disorder, unspecified whether recurrent (H)         CONTINUE these medications which have NOT CHANGED    Details   aspirin 325 MG tablet Take 325 mg by mouth      atorvastatin (LIPITOR) 40 MG tablet Take 1 tablet (40 mg) by mouth daily  Qty: 90 tablet, Refills: 1    Associated Diagnoses: Benign essential hypertension; Acute CVA (cerebrovascular accident) (H)      levonorgestrel (MIRENA) 20 MCG/24HR IUD 20 mcg by Intrauterine route      lisinopril (PRINIVIL/ZESTRIL) 20 MG tablet Take 1 tablet (20 mg) by mouth daily  Qty: 90 tablet, Refills: 1    Associated Diagnoses: Benign essential hypertension; Acute CVA (cerebrovascular accident) (H)      LORazepam (ATIVAN) 1 MG tablet Take 0.5-1 tablets (0.5-1 mg) by mouth every 8 hours as needed for anxiety Take 30 minutes prior to departure.  Do not operate a vehicle after taking this medication  Qty: 12 tablet, Refills: 0    Associated Diagnoses: Adjustment disorder with anxious mood      metoprolol tartrate (LOPRESSOR) 50 MG tablet Take 1 tablet (50 mg) by mouth 2 times daily  Qty: 180 tablet, Refills: 3    Comments: Profile Rx: patient will contact pharmacy when needed  Associated  Diagnoses: Hypertension, unspecified type[KB1.1]             Additional recommendations and follow up:[HA1.1]      NEUROPSYCH TESTING, PER HR/PSYCHOLOGIST     MENTAL HEALTH REFERRAL  - Adult; Psychiatry and Medication Management; Psychiatry; [facility]: Psychiatry Clinic [phone]; We will contact you to schedule the appointment or please call with any questions     General info for SNF   Length of Stay Estimate: Short Term Care: Estimated # of Days 31-90  Condition at Discharge: Improving  Level of care:skilled   Rehabilitation Potential: Good  Admission H&P remains valid and up-to-date: Yes  Recent Chemotherapy: N/A  Use Nursing Home Standing Orders: N/A     Reason for your hospital stay   43F hx of ischemic strokes 8/2018, vaginal bleeding, HLD, HTN, prior smoker p/w sudden onset of not speaking but was following commands and was able to walk around on her own with no other neurological deficit. She was admitted OSH in 8/7/2018 with R thalamic, L frontal white matter, and temporo-occipital lobe strokes. Her strokes were deemed embolic, for which she had a large workup that was all negative including BETH and hypercoagulable workup. She was sent home on aspirin. Since her stroke, she has had multiple presentations for intermittent neurologic symptoms such as LUE numbness, R hand weakness, etc with subsequent MRI showing no new strokes. Her last presentation here was 9/12. Family says her mood has been very labile since the stroke as well. She was recently begun on flouxetine 20 mg for depression. Apparently, over 4 days prior to admission, she has been progressively not speaking. The day prior to admission she stopped speaking completely and only grunts or moans. She came to an OSH and had a CTA showing unchanged L M2 stenosis with no LVO. She was transferred here for further workup. The next day of her admission she was talking with no deficit. She endorsed she is profoundly depressed and cries without cause, but  she is not thinking or planning to harm herself. She is not feeling safe.      We do not think she had aphasia. 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 Her speech has been progressively worse prior to admission suggesting it is less likely to be stroke. She was admitted to get MRI and complete stroke work up. Psychiatry consulted and recommended neuropsych testing to assess her cognitive status and  TCU disposition then she needs to be seen by psychiatrist as an outpatient. Her sister endorsed that she is doing dangerous behaviors involving the gas stove. Her sister can't watch her 24/7 so the OT, PT and psychiatry doctor recommended TCU. The psychiatry doctor does not think she is candidate for inpatient psychiatry transfer     Activity - Ambulate in hallway   Every shift     Adult [facility] Follow-up and recommended labs and tests   Follow up with primary care provider, Augusto Thomas, within one month to follow blood pressure, diabetes control     Follow up with outpatient psychiatrist in 1-2 weeks     Appointments on [place] and/or [facility] (with [facility] or [facility] provider or service). Call [phone] if you haven't heard regarding these appointments within 7 days of discharge.     Full Code     Diet   Follow this diet upon discharge: Orders Placed This Encounter         Regular Diet Adult[KB1.1]         Patient was seen and discussed with the Attending, [HA1.1] Sajan[HA1.2].[HA1.1]    KEVIN Avila CNP[KB1.1]  Pager:[HA1.1] [phone][HA1.2]    Addendum of day of discharge made by Shanika Venegas, no changes to plan of care.[KB1.1]       Revision History        User Key Date/Time User Provider Type Action    > KB1.1 11/29/2018  8:44 AM Shanika Venegas APRN CNP Nurse Practitioner Sign     [N/A] 11/28/2018  7:22 AM Regina Farris MD Resident Sign     HA1.3 11/28/2018  7:21 AM Regina Farris MD Resident Sign     HA1.2 11/25/2018  3:52 PM  "Regina Farris MD Resident      HA1.1 11/25/2018  3:45 PM Regina Farris MD Resident                      Consult Notes      Consults signed by Panda Beard MD at 11/25/2018 12:44 PM      Author:  Panda Beard MD Service:  Psychiatry Author Type:  Physician    Filed:  11/25/2018 12:44 PM Date of Service:  11/23/2018  8:57 AM Creation Time:  11/25/2018 12:39 PM    Status:  Signed :  Panda Beard MD (Physician)         Consult Date:  11/23/2018      PSYCHIATRIC CONSULTATION      REASON FOR CONSULTATION:  The Neurology Service requested a consultation for this patient with a history of strokes who is presenting with depression, lack of insight, unsafe behaviors at home.      HISTORY OF PRESENT ILLNESS:  The patient is a 43-year-old female with a history of ischemic strokes in 08/2018, history of vaginal bleeding, hyperlipidemia, hypertension, prior smoker who was initially admitted here in 08/2018 for right thalamic left frontal and temporal occipital strokes; these were deemed to be embolic.  She had extensive workup, sent home on aspirin.  She has had multiple intermittent neurologic symptoms since then with MRI showing no new strokes.  She had last been here on 09/12.  Family describes her mood as being very labile and that she is engaging in \"dangerous and careless behavior.\"  They have noticed cognitive decline in the last few weeks.  Since Sunday prior to admission she has not been speaking.  She came to the outside hospital and a CTA showed apparently no new findings.  She was transferred here for further workup.      On my interview, the patient was lying in bed.  She made essentially no eye contact.  She was very focused on just how difficult it was to be here, how she wanted to go home, although she did acknowledge that she was having some struggles there.  She stated a friend would be there to watch her.  I did discuss issues about the amount of care she needed.  She " "seems to minimize her dysfunction.  She describes her mood as, \"I cry a lot sometimes; it is just out of nowhere.\"  She admits to some confusion.  She describes being very frustrated, \"it seems like they can't fix this.\"  The patient denies any previous psychiatric history, describes herself as a happy person prior to her strokes.  Now she states that she is always crying.  She states she is sleeping okay.  Her appetite is okay.  She stated at times she wishes she was dead, but this is infrequent and she has no plan or impulse to harm herself.  She has had no attempts.  She denies any hallucinations.  She states that overall she just feels very frustrated, overwhelmed and \"sick of living at my sister's house.\"  Does describe feelings of helplessness.      PAST PSYCHIATRIC HISTORY:  As above.  No previous psychiatric history.  No hospitalizations, no suicide attempts.      CHEMICAL DEPENDENCE HISTORY:  The patient states she is an occasional user of alcohol.  She denies any other substance use or heavy use of alcohol.  She does have a long history of smoking one pack per day of cigarettes but stopped when she had her stroke.      SOCIAL HISTORY:  The patient left school in 9th grade.  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      PAST MEDICAL HISTORY:  Significant for hypertension, stroke, hyperlipidemia.      MEDICATIONS:  Fluoxetine 20 mg per day, Merina hormonal treatment, Lipitor, lisinopril, lorazepam.      REVIEW OF SYSTEMS:  A 10-point review of systems was performed.  The patient states her main physical complaint at this time is that she is having \"heavy periods.\"  She denies abdominal pain, denies chest pain, shortness of breath.  Does admit to being somewhat confused cognitively at times.  The rest of 10-point review of systems is negative.      DATA:  Chart was reviewed, OT, PT evaluations were reviewed, and previous imaging was " "reviewed.      VITAL SIGNS:  Temperature 98, respirations 16, pulse 59, blood pressure 135/75.      MENTAL STATUS EXAMINATION:  The patient is lying in bed.  She requested that the light be turned off.  She made essentially no eye contact, although she was cooperative with the interview.  Mood and affect:  Patient describes herself as depressed and frustrated.  Her affect is somewhat labile.  She was crying quite a bit during the interview.  Thought processes are generally goal directed.  No loosening of associations.  Thought content:  Denies auditory or visual hallucinations, denies suicidal ideation.  Speech and language:  Speech is normal rate, use of language is appropriate.  Attention and concentration:  This was somewhat difficult to assess as the patient was so upset.  I asked her to spell world forward and backward.  She was able to do it forward but could not do it backward and seemed quite overwhelmed.  Orientation:  The patient states she is in a hospital in [place], does not know the name, dates this the day after Thanksgiving 2018.  Short and long-term memory appeared generally intact but the patient is very emotionally labile.  Fund of knowledge appears average.  Judgment and insight:  The patient does understand that she needs some more help in terms of her mood and her functional status, but she does state that she wants to go home and has what appears to be a somewhat unrealistic plan for that.  Muscle bulk and tone is normal.  Abnormal movements:  None noted.      IMPRESSION:  The patient is a 43-year-old female with a history of embolic strokes in 08/2018.  Since that time, she has had depression and emotional lability.  She describes her depressed mood is coming \"out of nowhere.\"  Also, per report of her family, has had cognitive deficits and is engaging in \"dangers and careless behavior,\" and they have noticed a change in the last few weeks.  Her picture appears consistent with a mood disorder " secondary to her cerebrovascular accident as well as likely neurocognitive symptoms secondary to her cerebrovascular accidents.      DSM DIAGNOSES:   1.  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   2.  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      TREATMENT RECOMMENDATIONS:   1.  I will continue SSRI at this time.   2.  It will be important to have the family's input in involvement in terms of their opinion of how dangerous things are at home.   3.  It is unclear what type of rehabilitation goals there may be but both PT and OT are recommending TCU.   4.  As the patient's functional status and placement status has become more clarified, we can look and recommendations for further care.  Due to her neurocognitive deficits and functional difficulties she would likely not be a candidate for inpatient Psychiatry but once we have a better assessment we could certainly look into this.   5.  When the patient's mood is more stable, would consider neuropsychological testing.   6.  Please call or reconsult with any questions, concerns or change in the patient's status.  My number is [phone].      Revised account 2018  shyla         DENYS BEARD MD             D: 2018   T: 2018   MT: NEREIDA      Name:     ZAIN SEXTON   MRN:      [id number]        Account:       [id number]   :      1975           Consult Date:  2018      Document: [id number][RW1.1]         Revision History        User Key Date/Time User Provider Type Action    > RW1.1 2018 12:44 PM Denys Beard MD Physician Sign            Consults by Viji Escalante LICSW at 2018 12:49 PM     Author:  Viji Escalante LICSW Service:  Social Work Author Type:      Filed:  2018  2:03 PM Date of Service:  2018 12:49 PM Creation Time:  2018 12:05 PM    Status:  Signed :  Viji Escalante LICSW ()     Consult Orders:    1. Social Work IP Consult [[id number]] ordered  "by Jitendra Messina MD at 11/22/18 0132                  Social Work Services Progress Note    Hospital Day: 3  Date of Initial Social Work Evaluation:  11.23.18  Collaborated with:  Patient     Data:  Patient is a 43 year old female who per chart review is admitted with Expressive Aphasia (admitted from [facility]) PMHx includes Ischemic strokes August, 2018; hyperlipidemia; HTN; vaginal bleeding.    Intervention:  Instructed by weekday  to follow-up with patient re: plan for discharge. Talked with patient in room - she was sitting up in bedside chair. She said that she was having difficulty managing at her sister's house d/t tension with living with her sister as an adult and her sister being \"bossy.\" Patient would like to eventually get back home to her house, girlfriend and  cats in [place], [place]. She is in agreement with placement in TCU for further rehab - writer verified her [place] zip code as [address] and we looked at facilities on the Medicare.gov website and found three that patient was interested in having referrals faxed to.  This does not appear to be her home zip code as her address in [facility] appears to have been changed to her sister's home in [place], [place]. Patient's plan is to eventually get back to her home in [place], [place]. Writer spoke with patient again and she verified that she would like to look for TCU space in[EL1.1] [place], [place].    Referrals sent to following facilities:    1) [facility] P [phone] F [phone]  [address] [place] [address]    2) [facility] P [phone]  F [phone]  [address] [facility] [address]    3) [facility] P [phone]  F [phone]  [address]  [place] [address]    4) [facility] P [phone]   F [phone]  [address] [place] [address]    5) [address] P [phone]  F [phone]  [address] [place] [address]    6) Miguel" Megamartha NOONAN [phone]  [phone]  [address] [address][EL1.2]    Assessment:  Patient in agreement with placement in TCU. She has no one who could provide 24 hour supervision at home[EL1.1] in [place], [place] which is her preference[EL1.2] - her SO works nights.     Plan:    Anticipated Disposition:  Facility:  TBD    Barriers to d/c plan:  Bed availability and Prior auth from [facility]    Follow Up:  Will have weekday  f/u    Viji HOFFMAN  11/24/2018    ON CALL PAGER   0800 - 1600   [phone]    ON CALL COVERAGE AFTER 1600  [phone][EL1.1]         Revision History        User Key Date/Time User Provider Type Action    > EL1.2 11/24/2018  2:03 PM Viji Escalante LICSW  Sign     EL1.1 11/24/2018 12:05 PM Viji Escalante LICSW              Consults by Panda Beard MD at 11/23/2018  8:58 AM     Author:  Panda Beard MD Service:  Psychiatry Author Type:  Physician    Filed:  11/23/2018  8:58 AM Date of Service:  11/23/2018  8:58 AM Creation Time:  11/23/2018  8:57 AM    Status:  Signed :  Panda Beard MD (Physician)     Consult Orders:    1. Psychiatry IP Consult: patient with depression and lack of interest and feeling unsafe to go home. She does not want to speak about the psych problem. Pt recommended TCU vs IP psych because her cognitive status is concerning; Consultant may enter ... [[id number]] ordered by Regina Farris MD at 11/22/18 1527                Initial  Dictated[RW1.1]     Revision History        User Key Date/Time User Provider Type Action    > RW1.1 11/23/2018  8:58 AM Panda Beard MD Physician Sign                     Progress Notes - Physician (Notes from 11/26/18 through 11/29/18)      Progress Notes by Teri Vigil BSW at 11/28/2018  4:19 PM     Author:  Teri Vigil BSW Service:  (none) Author Type:      Filed:  11/28/2018  4:26 PM Date of Service:  11/28/2018  4:19 PM  Creation Time:  11/28/2018  4:19 PM    Status:  Signed :  Teri Vigil BSW ()         Social Work Services Discharge Note      Patient Name:  Alicia Penaloza     Anticipated Discharge Date:  11/29/18    Discharge Disposition:   [facility]  [address] [place] [address]  [phone]    Following MD:  Facility Assignment     Pre-Admission Screening (PAS) online form has been completed.  The Level of Care (LOC) is:  Determined  Confirmation Code is:  [id number].  Patient/caregiver informed of referral to [facility] for Pre-Admission Screening for skilled nursing facility (SNF) placement and to expect a phone call post discharge from SNF.    MARIA ANTONIA spoke with [facility] Level 2 screener (Hayley) at 4:19pm and Hayley confirmed completion of the Level 2 screen.     Additional Services/Equipment Arranged:  SW confirmed readiness for discharge with Dr. Farris.  SW confirmed acceptance for admit to [facility] with the Director of Nursing, on 11/27/18.  MARIA ANTONIA arranged for [facility] (Juliette [phone]) to provide w/c transport at 10am.     Patient / Family response to discharge plan:  Pt and sister (Sharita) voice understanding of the discharge plan and agreement with the discharge plan.     Persons notified of above discharge plan:  Pt, sister, 6A nursing and Dr. Farris    Staff Discharge Instructions:  Please fax discharge orders and signed hard scripts for any controlled substances (SW will complete this task).  Please print a packet and send with patient.     CTS Handoff completed:  YES    Medicare Notice of Rights provided to the patient/family:  NO, as per Admissions Facesheet, pt is not on medicare.    ELOY Dhillon  Social Work, 6A  Phone:  [phone]  Pager:  [phone]  11/28/2018[TK1.1]           Revision History        User Key Date/Time User Provider Type Action    > TK1.1 11/28/2018  4:26 PM Musa  MARSHAL Farmer  Sign            Progress Notes by Teri Vigil BSW at 11/28/2018 11:43 AM     Author:  Teri Vigil BSW Service:  (none) Author Type:      Filed:  11/28/2018 11:45 AM Date of Service:  11/28/2018 11:43 AM Creation Time:  11/28/2018 11:43 AM    Status:  Signed :  Teri Vigil BSW ()         Social Work Services Progress Note    Hospital Day: 7  Date of Initial Social Work Evaluation:  11/23/18      Data:  Discharge to [facility] is anticipated on 11/29/18 at 10am pending completed of PAS Obra Level 2 screen.      Intervention:  SW phoned [facility] Level 2 Screener (Hayley [phone]), and left a message for her to call to confirm when the Level 2 will be completed.         Plan:    Anticipated Disposition:  Rehab placement at [facility]    Barriers to d/c plan:  Await PAS Level 2 screen    Follow Up:  MARIA ANTONIA will coordinate discharge.    ELOY Dhillon  Social Work, 6A  Phone:  [phone]  Pager:  [phone]  11/28/2018[TK1.1]           Revision History        User Key Date/Time User Provider Type Action    > TK1.1 11/28/2018 11:45 AM Teri Vigil BSW  Sign            Progress Notes by Teri Vigil BSW at 11/27/2018  4:10 PM     Author:  Teri Vigil BSW Service:  (none) Author Type:      Filed:  11/28/2018 11:43 AM Date of Service:  11/27/2018  4:10 PM Creation Time:  11/27/2018  4:10 PM    Status:  Signed :  Teri Vigil BSW ()         Social Work Services Progress Note    Hospital Day: 6  Date of Initial Social Work Evaluation:[TK1.1]  11/23/18[TK1.2]  Collaborated with:[TK1.1]  SNF Admissions Coordinator's, [facility], pt's sister (Sharita), [facility], [facility] PAS Obra Level 2, patient[TK1.2]    Data:[TK1.1]  MARIA ANTONIA is following for discharge planning.[TK1.2]    Intervention:[TK1.1]  MARIA ANTONIA  "received a call from Admissions at [facility] (Alba).  Alba states that at this time, they will not accept pt for admit but would be open to re-assessing in 2 weeks.  Alba states that they are concerned about lack of access to mental health services [place].  MARIA ANTONIA received a call from the Senior Link[facility] Line (Domonique [phone]).  Domonique indicates that she will be referring pt for a Level 2 screen in spite of the fact that pt's PAS referral did not trigger a Level 2 screen. Domonique states that based on the information provided in the PAS referral, they feel that specialized services for MI should be offered.  MARIA ANTONIA received a call from the Director of Nurses at (Skip) at [facility].  Skip states that he talked with the [facility] Financial Worker and based on the outcome of that conversation, they can accept pt for admit on 11/28/18.  Skip states that he is off work on 11/28/18 and thus, the Assistant Director of Nursing ([facility] [phone]) will be the Admissions .  MARIA ANTONIA received a return call from pt's sister, Sharita.  MARIA ANTONIA updated Sharita in regards to the discharge plan.  Sharita states that she is \"happy\" with this plan.  Sharita confirms that she will sign pt's nursing home admissions paperwork on pt's behalf, if needed.  MARIA ANTONIA spoke with Sharita about potential need to pursue guardianship in the future if pt's cognition does not improve.  MARIA ANTONIA spoke with Remieleanor about behaviors pt exhibited while in her home.  Sharita states that there were no behaviors until 11/20/18.  Sharita states that pt had gone over to a friends home on 11/18/18 to watch the [employer] game.  Sharita states that she had to go to [place] on 11/19/18.  Sharita states that per her , pt stopped talking on 11/20/18 and was only mumbling.  Sharita states that pt had been eating family dinners with them but did not do so on 11/20 or 11/21 but instead ate by " herself before family arrived home from work.  Sharita states that on one of these nights, her  woke up to find pt sitting in a recliner watching TV.  When he asked if she was ok, pt reportedly said that she was.  Later in the evening, the dog was barking so Sharita's  again got up to check on pt who stated that she was up due to the dog barking.  Sharita states that pt did not take her medications on 11/20 or 11/21.  Sharita reports that pt had decreased responsiveness and difficulty moving her hand to put meds in her mouth when instructed to take her meds.  Sharita states that pt was unable to recall events occurring on 11/20 and 11/21/18.  MARIA ANTONIA phoned Director of Nurses at (Skip) at [facility] to inform that a PAS Level 2 is now required.  Thus, will [facility] if the Level 2 is complete.  Per discussion, [facility] reports that they have a Mental Health Services NP who will be accessible to pt.  Per [facility], they have a SW starting soon who specializes in mental health and should be available to provide counseling to pt.  MARIA ANTONIA phoned [facility] (Juliette) and scheduled 11/29/18, 10am  Wheelchair transport.  MARIA ANTONIA phoned [facility] Level 2 Screener (Hayley [phone])  Who states that Level 2 will be completed by 11/29/18 at 10am.  Per Hayley's request, MARIA ANTONIA faxed pt's H/P to her at (fax [phone]).  MARIA ANTONIA updated pt.    Assessment: Pt voices understanding of the discharge plan and agreement with the discharge plan.     Plan:    Anticipated Disposition:  Rehab placement at [facility]    Barriers to d/c plan:  Await PAS Level 2 screen    Follow Up:  SW will coordinate discharge.    ELOY Dhillon  Social Work, 6A  Phone:  [phone]  Pager:  [phone]  11/28/2018[TK1.2]           Revision History        User Key Date/Time User Provider Type Action    > TK1.2 11/28/2018 11:43 AM Musa  MARSHAL Farmer  Sign     TK1.1 11/27/2018  4:10 PM Teri Vigil BSW              Progress Notes by Jacquelin Murcia RD at 11/27/2018  9:34 AM     Author:  Jacquelin Murcia RD Service:  Nutrition Author Type:  Registered Dietitian    Filed:  11/27/2018  1:51 PM Date of Service:  11/27/2018  9:34 AM Creation Time:  11/27/2018  9:34 AM    Status:  Signed :  Jacquelin Murcia RD (Registered Dietitian)         CLINICAL NUTRITION SERVICES - BRIEF NOTE (see RD note on[RS1.1] 11/23[RS1.2] for full assessment)    New findings[RS1.1]   Oral intake significantly improved over course of 3-day calorie counts (see summary below). Meeting approximately 75-85% minimum nutrition needs as per yesterday's intake.     11/26:  1228 kcal, 67 g PRO   11/25:   998 kcal, 50 g PRO  11/24:   182 kcal, 6 g PRO[RS1.2]      Interventions[RS1.1]  Continue to encourage oral intake via meals/snacks as able.     Florence Murcia RD, LD  Neuro Dietitian Pager: [phone][RS1.2]          Revision History        User Key Date/Time User Provider Type Action    > RS1.2 11/27/2018  1:51 PM Jacquelin Murcia RD Registered Dietitian Sign     RS1.1 11/27/2018  9:34 AM Jacquelin Murcia RD Registered Dietitian             Progress Notes by Teri Vigil BSW at 11/27/2018 11:29 AM     Author:  Teri Vigil BSW Service:  (none) Author Type:      Filed:  11/27/2018 11:36 AM Date of Service:  11/27/2018 11:29 AM Creation Time:  11/27/2018 11:29 AM    Status:  Signed :  Teri Vigil BSW ()         Social Work Services Progress Note     Hospital Day: 6  Date of Initial Social Work Evaluation:  11/23/18  Collaborated with:  SNF Admissions Coordinator's, 6A nursing and Dr. Farris (Neuro Stroke), patient     Data:  Pt is awaiting rehab placement.        Intervention:  SW spoke with the Director of Nursing (Skip) at [facility].  Skip states that he spoke with pt's  [facility] Financial Worker who states that they will have a better idea as to whether or not pt will be approved for MA coverage for LTC facility placement after they review the MA for LTC application.  Skip does not anticipate knowing the outcome by 11am today.  MARIA ANTONIA informed Skip that the jillian was faxed to [facility] Financial Worker.  [facility] ([facility] and cancelled today's scheduled transport.   Transport re-scheduled to take place at 11am tomorrow pending outcome of insurance coverage.  SW received a call from Admissions at [facility] (Alba) and they are still waiting to talk with pt's [facility] Financial Worker.  Per [facility]ba's request, MARIA ANTONIA faxed a copy of pt's PAS referrals and MA for LTC application.    MARIA ANTONIA updated pt who voices understanding of the discharge plan.  With pt's verbal permission, MARIA ANTONIA left a message for pt's sister (Sharita) to call for a discharge planning update.  MARIA ANTONIA updated 6A nursing and Dr. Farris.     Assessment:  Pt is agreeable to rehab placement and wants [place].     Plan:    Anticipated Disposition:  Rehab placement in a community SNF    Barriers to d/c plan:  SNF's are waiting confirmation from  [facility] Financial Worker that pt will have coverage for SNF stay.    Follow Up:  MARIA ANTONIA will continue to follow.     ELOY Dhillon  Social Work, 6A  Phone:  [phone]  Pager:  [phone]  11/27/2018      [TK1.1]     Revision History        User Key Date/Time User Provider Type Action    > TK1.1 11/27/2018 11:36 AM Teri Vigil BSW  Sign            Progress Notes by Teri Vigil BSW at 11/27/2018  9:59 AM     Author:  Teri Vigil BSW Service:  (none) Author Type:      Filed:  11/27/2018 10:32 AM Date of Service:  11/27/2018  9:59 AM Creation Time:  11/27/2018  9:59 AM    Status:  Signed :  Teri Vigil BSW ()         Social Work Services  Progress Note[TK1.1]    Hospital Day: 6[TK1.2]  Date of Initial Social Work Evaluation:  11/23/18  Collaborated with:  SNF Admissions Coordinator's[TK1.1], 6A nursing and Dr. Farris (Neuro Stroke)[TK1.3]    Data:  Pt is awaiting rehab placement.[TK1.1]   MARIA ANTONIA has[TK1.4] a tentative w/c transport scheduled with [facility] today at 11am , however, this is[TK1.1] pending financial clearance from accepting SNF.  SW received a 11/26/18 4:32pm voice mail message from [facility] Financial Counselor asking if pt still needs to be seen for MA LTC jillian.    SW received a 11/26/18 voice mail message from Shani Richardson at [facility] in [place] stating that pt needs to have the LTC benefit before they can get prior auth.  Shani also indicated that they would complete a telephone nursing assessment.  jSW received a 11/26/18 voice mail from Admissions at [facility] ([place]) and due to staffing, they cannot accommodate pt for admit.[TK1.4]      Intervention:[TK1.1]  MARIA ANTONIA spoke with the Director of Nursing at[TK1.4] [facility] ([facility]) who states that they are prepared to accept pt for admit today if pt's [facility] Financial Worker confirms that pt will have coverage for SNF stay.  [facility] states that pt's financial worker is in a meeting until 10:30am and thus, he will contact them at 10:30am.  MARIA ANTONIA left a voice mail message for Stephen Financial Counselor Medina Mauricio indicating that MA for LTC jillian is needed. Medina arrived on 6A and completed jillian.  MARIA ANTONIA also spoke with Admissions at [facility] ([place]) and they are also waiting to speak with pt's  [facility] Financial Worker to confirm that pt will have coverage for SNF stay.  MARIA ANTONIA updated pts' floor nurse (Belkis), the 6A Charge Nurse (Deidra) and Dr. Farris.[TK1.3]    Assessment:[TK1.1]  Pt is agreeable to rehab placement and wants placement near to [place].[TK1.3]    Plan:    Anticipated Disposition:[TK1.1]   Rehab placement in a community SNF[TK1.3]    Barriers to d/c plan:[TK1.1]  SNF's are waiting to speak with pt's  [facility] Financial Worker to confirm that pt will have coverage for SNF stay.[TK1.3]    Follow Up:[TK1.1]  SW will continue to follow.    ELOY Dhillon  Social Work, 6A  Phone:  [phone]  Pager:  [phone]  11/27/2018[TK1.3]           Revision History        User Key Date/Time User Provider Type Action    > TK1.3 11/27/2018 10:32 AM Teri Vigil BSW  Sign     TK1.4 11/27/2018 10:03 AM Teri Vigil BSW       TK1.2 11/27/2018 10:00 AM Teri Vigil BSW       TK1.1 11/27/2018  9:59 AM Teri Vigil BSW              Progress Notes by Selma Titus at 11/27/2018  8:21 AM     Author:  Selma Titus Service:  Nutrition Author Type:  Nutrition Associate    Filed:  11/27/2018  8:23 AM Date of Service:  11/27/2018  8:21 AM Creation Time:  11/27/2018  8:21 AM    Status:  Addendum :  Selma Titus (Nutrition Associate)         Calorie Counts  Intake recorded for: 11/26  Kcals: 1228  Protein: 67g  # Meals Recorded: 3 meals (First - 75% apple juice, 50% breakfast sandwich w/ egg, ham & cheese)      (Second -[LJ1.1] 100%[LJ1.2] deli sandwich w/ roast beef & tomatoes, iced tea)      (Third - 100% chicken & cheese quesadilla, iced tea, brownie)  # Supplements Recorded: 0[LJ1.1]       Revision History        User Key Date/Time User Provider Type Action    > [N/A] 11/27/2018  8:23 AM Selma Titus Nutrition Associate Addend     LJ1.2 11/27/2018  8:23 AM Selma Titus Nutrition Associate Sign     LJ1.1 11/27/2018  8:21 AM Selma Titus Nutrition Associate             Progress Notes by Regina Farris MD at 11/26/2018  8:36 AM     Author:  Regina Farris MD Service:  Neurology Author Type:  Resident    Filed:  11/26/2018 11:50 AM Date of Service:  11/26/2018  8:36 AM  "Creation Time:  11/26/2018  8:36 AM    Status:  Attested :  Regina Farris MD (Resident)    Cosigner:  Michoacano Moeller MD at 11/26/2018  3:53 PM        Attestation signed by Michoacano Moeller MD at 11/26/2018  3:53 PM        Attestation:  Physician Attestation   I, Michoacano Moeller, saw this patient with the resident and agree with the resident/fellow's findings and plan of care as documented in the note.      I personally reviewed vital signs, medications, labs and imaging.    Key findings: Ready for TCU    Michoacano Moeller MD  Date of Service (when I saw the patient): 11/26/18                               [facility], [place]   Neurology Daily Note  Alicia Penaloza  [id number]  11/26/2018    Subjective:  No overnight events. She is waiting for TCU transfer. She slept well yesterday and her mood is better today    Objective:   /80  Pulse 56  Temp 98.2  F (36.8  C) (Oral)  Resp 16  Ht 1.6 m (5' 3\")  Wt 92.5 kg (203 lb 14.4 oz)  SpO2 99%  BMI 36.12 kg/m2  General:  sitting in chair non distressed  HEENT:  normocephalic/atraumatic  Cardio:  RRR  Pulmonary:  no respiratory distress  Abdomen:  soft  Extremities:  no edema  Skin:  intact      Neurologic  Mental status: awake, alert, follows all commands. Speech is appropriate. Cranial nerves: EOMI, face symmetric, VFF, equal to LT, tongue midline/mobile, no dysarthria, eharing intact to convo  Motor: no drift in any extremity  Sensory: equal to LT except in the left UE  Coordination: FTN intact BL      Lab Investigations:   Hemoglobin A1C   Date Value Ref Range Status   11/22/2018 4.7 0 - 5.6 % Final     Comment:     Normal <5.7% Prediabetes 5.7-6.4%  Diabetes 6.5% or higher - adopted from ADA   consensus guidelines.     08/26/2018 4.8 0 - 5.6 % Final     Comment:     Normal <5.7% Prediabetes 5.7-6.4%  Diabetes 6.5% or higher - adopted from ADA   consensus guidelines.       Lab Results   Component " Value Date    LDL 53 08/26/2018       Imaging:  Recent Results (from the past 24 hour(s))   CT Head w/o Contrast    Narrative    CT SCAN OF THE HEAD WITHOUT CONTRAST   11/21/2018 6:46 PM     HISTORY: Aphasia.    TECHNIQUE: Axial images of the head and coronal reformations without  IV contrast material.  Radiation dose for this scan was reduced using  automated exposure control, adjustment of the mA and/or kV according  to patient size, or iterative reconstruction technique.    COMPARISON: None.    FINDINGS: There is some mild cerebral atrophy. There is an old left  parietal occipital infarct. There is also an old infarct in the right  basal ganglia region. Patchy white matter changes are present in both  hemispheres. There is no evidence for mass effect, acute infarct, or  skull fracture. There is near complete opacification of the right  maxillary sinus and there is an air-fluid level in the left sphenoid  sinus.      Impression    IMPRESSION:  1. No evidence for intracranial hemorrhage or any acute brain  pathology.  2. Cerebral atrophy with old bilateral infarcts and chronic appearing  white matter disease.  3. Right maxillary and left sphenoid sinus disease.    ANSLEY GONZALEZ MD   CT Head Neck Angio w/o & w Contrast    Narrative    CTA HEAD NECK WITH CONTRAST 11/21/2018 6:53 PM     HISTORY: Aphasia.    TECHNIQUE: MR angiography was performed through the head and neck  without and with intravenous contrast. 80 mL of Isovue 370 was given.  Multiplanar reconstructions were performed. 3-D reconstructions off a  remote workstation for CT angiography were also acquired. Carotid  stenoses were evaluated by comparing the caliber of the proximal  internal carotid artery to the caliber of the distal internal carotid  artery. Radiation dose for this scan was reduced using automated  exposure control, adjustment of the mA and/or kV according to patient  size, or iterative reconstruction technique.    COMPARISON:  8/25/2018    FINDINGS:  Brachiocephalic vessels: Normal.    Right carotid system: Normal.    Left carotid system: Normal.    Right vertebral artery: Normal nondominant vessel.    Left vertebral artery: Normal.    [place]: Again noted is moderate narrowing of a large left  middle cerebral artery branch proximally which is similar to that seen  on other CT angiogram studies. There is also narrowing of the right P1  segment which is presumably congenital and also unchanged. The distal  internal carotid arteries and basilar artery are patent. The proximal  anterior, middle, and posterior cerebral arteries are patent.      Impression    IMPRESSION: No change from prior CT angiogram. There is some moderate  stenosis of a large left M2 branch similar to that seen on the prior  exam most likely due to atherosclerotic disease.    ANSLEY GONZALEZ MD   XR Chest Port 1 View    Narrative    CHEST ONE VIEW PORTABLE   11/21/2018 8:07 PM     HISTORY: Altered loss of consciousness.     COMPARISON: 8/25/2018      Impression    IMPRESSION: Normal.    LEXIE BISHOP MD       Assessment and Plan   43F hx of ischemic strokes 8/2018, vaginal bleeding, HLD, HTN, prior smoker p/w sudden onset of not speaking but was following commands and was able to walk around on her own with no other neurological deficit. She was admitted OSH in 8/7/2018 with R thalamic, L frontal white matter, and temporo-occipital lobe strokes. Her strokes were deemed embolic, for which she had a large workup that was all negative including BETH and hypercoagulable workup. She was sent home on aspirin. Since her stroke, she has had multiple presentations for intermittent neurologic symptoms such as LUE numbness, R hand weakness, etc with subsequent MRI showing no new strokes. Her last presentation here was 9/12. Family says her mood has been very labile since the stroke as well. She was recently begun on flouxetine 20 mg for depression. Apparently, since Sunday,  she has been progressively not speaking. The day prior to admission she stopped speaking completely and only grunts or moans. She came to an OSH and had a CTA showing unchanged L M2 stenosis with no LVO. She was transferred here for further workup. Patient endorsed she is profoundly depressed and cries without cause, but she is not thinking or planning to harm herself. She is not feeling safe.     # Mutism:  # Mood disorder:    is at risk for aphasia given severe M2 stenosis, which is still patent per CTA done last night. Does have a recent history of ischemic infarcts in 8/2018 as well. However from the history and exam she does not seem to have aphasia. 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 She endorsed that she is depressed. Her speech is progressively worse suggesting it is less likely to be stroke. She was admitted to get MRI and complete stroke work up. Psychiatry consulted this morning and recommended neuropsych testing and  TCU disposition and psychiatry follow up as an outpatient.  --no need for the MRI brain  --continue on aspirin 325mg.   --inflammatory labs including C3,4,anticardiolipin IGG and IGM, ANCA were negative  --PT/OT: recommended TCU    -- continue Fluoxetine to 40 mg daily     # HTN:   --continue pta lisinopril 20mg every day  --continue pta metoprolol 50mg bid  - bl pressure stable around 120s occasionally 140s     # HLD:  --continue pta atorvastatin 40mg every day     # vaginal bleeding:  Due to uterine fibroids. Endometrial biopsy without atypia  --will monitor w/ cbc Hb  Stable around 12[HA1.1] -13[HA1.2]  --cares per nursing     Diet: regular diet  PPx: she is fully ambulatory   Code: full  Dispo: is ready for discharge pending finding a place in the TCU      Patient was seen and discussed with Dr. rosa Farris MD  Neuro-intern  [phone][HA1.1]     Revision History        User Key Date/Time User Provider Type Action    > HA1.2 11/26/2018 11:50 AM Regina Farris  Vianney Nava MD Resident Sign     HA1.1 11/26/2018  8:36 AM AfeeRegina serrano MD Resident             Progress Notes by Teri Vigil BSW at 11/26/2018  3:38 PM     Author:  Teri Vigil BSW Service:  (none) Author Type:      Filed:  11/26/2018  3:43 PM Date of Service:  11/26/2018  3:38 PM Creation Time:  11/26/2018  3:38 PM    Status:  Signed :  Teri Vigil BSW ()         Social Work Services Progress Note    Hospital Day: 5  Date of Initial Social Work Evaluation:  11/23/18  Collaborated with:  Patient    Data:  Pt is awaiting rehab placement.    Intervention:   SW met with pt and informed pt that SW is working on securing rehab placement near to the [place], [place] area.  SW completed a PAS referral #[id number].  The PAS referral did not trigger a Level 2.   SW left a message for [employer] Counselor (Medina Mauricio [id number]) requesting that she follow up with pt in regards to MA for LTC as pt's MA has not been determined for LTC per this SW phone call to the [facility] automated line.    Assessment:  Pt states that she will be happy if she can be placed in a SNF near to [place], [place]    Plan:    Anticipated Disposition:  Rehab placement    Barriers to d/c plan: [facility] is seeking insurance authorizatin     Follow Up:  SW will continue to follow.    ELOY Dhillon  Social Work,   Phone:  [phone]  Pager:  [phone]  11/26/2018[TK1.1]           Revision History        User Key Date/Time User Provider Type Action    > TK1.1 11/26/2018  3:43 PM Teri Vigil BSW  Sign            Progress Notes by Teri Vigil BSW at 11/26/2018  1:42 PM     Author:  Teri Vigil BSW Service:  (none) Author Type:      Filed:  11/26/2018  2:19 PM Date of Service:  11/26/2018  1:42 PM Creation Time:  11/26/2018  1:42 PM    Status:  Signed :  Teri Vigil BSW (Social  Worker)         Social Work Services Progress Note    Hospital Day: 5  Date of Initial Social Work Evaluation:  11/23/18  Collaborated with:[TK1.1]  SNF Admissions  Coordinator's[TK1.2]    Data:[TK1.1] Pt is awaiting rehab placement.  Pt prefers placement near to [facility].[TK1.2]    Intervention:[TK1.1]  SW placed follow up calls to the following SNF's:  - [facility], SW left a message for Admissions to call in regards to whether or not they can accept pt for admit.  SW indicated that pt is medically ready for discharge.    - [facility], SW left a message for Admissions to call in regards to whether or not they can accept pt for admit.   SW indicated that pt is medically ready for discharge.  - [facility] ([facility]), assessed and declined pt for admit due to pt's age (young)  - [facility] ([facility]), assessed and approved pt for admission on 11/27/18 pending insurance authorization  - [facility], SW left a message for Admissions to call in regards to whether or not they can accept pt for admit.   SW indicated that pt is medically ready for discharge.  - [facility], SW left a message for Admissions to call in regards to whether or not they can accept pt for admit.   SW indicated that pt is medically ready for discharge.[TK1.2]    Assessment:[TK1.1]  Per Neurology (Dr. Farris), pt is medically ready for discharge.  A rehab stay is indicated and pt is agreeable.[TK1.2]    Plan:    Anticipated Disposition:[TK1.1]  Rehab placement at [facility][TK1.2]    Barriers to d/c plan:[TK1.1]  Pending receipt of insurance authorization[TK1.2]    Follow Up:[TK1.1]  SW will continue to follow.    ELOY Dhillon  Social Work, 6A  Phone:  [phone]  Pager:  [phone]  11/26/2018[TK1.2]           Revision History        User Key Date/Time User Provider Type Action    > TK1.2 11/26/2018  2:19 PM Teri Vigil BSW Social  Worker Sign     TK1.1 11/26/2018  1:42 PM Teri Vigil BSW              Progress Notes by Gely Motta at 11/26/2018  8:20 AM     Author:  Gely Motta Service:  (none) Author Type:  Nutrition Associate    Filed:  11/26/2018  8:23 AM Date of Service:  11/26/2018  8:20 AM Creation Time:  11/26/2018  8:20 AM    Status:  Signed :  Gely Motta (Nutrition Associate)         Calorie Count  Intake recorded for: 11/25/2018  Kcals: 988  Protein: 50g  # Meals Recorded: 100% breakfast sandwich (English muffin w/ scrambled egg, ham, cheddar & American cheese), orange juice, chicken quesadilla, 30% fresh fruit plate, less than 25% V8 juice  # Supplements Recorded: no intake recorded[JS1.1]          Revision History        User Key Date/Time User Provider Type Action    > JS1.1 11/26/2018  8:23 AM Gely Motta Nutrition Associate Sign                  Procedure Notes     No notes of this type exist for this encounter.      Progress Notes - Therapies (Notes from 11/26/18 through 11/29/18)     No notes of this type exist for this encounter.

## 2018-11-22 NOTE — PLAN OF CARE
Problem: Patient Care Overview  Goal: Plan of Care/Patient Progress Review  Outcome: No Change  Status: Pt arrived to OSH with progressive worsening of ability to speak over 24 hours until finally not speaking at all. PMH ischemic strokes 8/2018, vaginal bleeding, HLD, HTN, prior smoker p/w expressive aphasia.   VS: Hypertension within parameters. Pt given IV metoprolol d/t inability to take PTA oral metoprolol as pt is NPO. AOVSS.   Neuros: L droop, R pupil >L; R grasp mod; slow to respond while following commands; does not speak but will nod/shake head yes/no  GI: +bs  : has not voided yet since arrival to unit; had alvares at OSH. Bladder scanned x2; both < 300cc. Pt having unusual vaginal bleeding- mod amts changed q2h; pt has been seeing gyn for this issue.   IV: MIVF infusing at 100cc/hr  Activity: pt has not been OOB yet.  Pain: denies  Respiratory: LS clear  Skin: intact ex LUE bruising  Labs/Tests: MRI and possible EEG this am

## 2018-11-22 NOTE — PROGRESS NOTES
11/22/18 0950   General Information   Onset Date 11/22/18   Start of Care Date 11/22/18   Referring Physician Jitendra Messina MD   Patient Profile Review/OT: Additional Occupational Profile Info See Profile for full history and prior level of function   Patient/Family Goals Statement None stated   Swallowing Evaluation Bedside swallow evaluation   Behaviorial Observations WFL (within functional limits)  (soft spoken)   Mode of current nutrition NPO   Respiratory Status Room air   Comments SLP: Pt is a 43F hx of ischemic strokes 8/2018, vaginal bleeding, HLD, HTN, prior smoker p/w expressive aphasia. She was admitted here in 8/2018 with R thalamic, L frontal white matter, and temporo-occipital lobe strokes. Her strokes were deemed embolic, for which she had a large workup that was all negative including BETH and hypercoagulable workup. Per H&P, pt completely stopped talking yesterday, communicating via grunts/moans. Today, NSG reports pt has began to speak. MRI ordered but not completed at time of evaluation. Of note, swallow eval was ordered 8/29/18, at which time pt presented with functional oropharyngeal swallow mechanism. Clinical swallow eval completed per MD order.    Clinical Swallow Evaluation   Oral Musculature generally intact   Dentition present and adequate   Mucosal Quality good   Laryngeal Function Cough;Throat clear;Swallow;Voicing initiated  (Soft voice/decreased intensity)   Oral Musculature Comments Grossly function; no droop/deviation   Swallow Eval   Feeding Assistance no assistance needed   Clinical Swallow Eval: Thin Liquid Texture Trial   Mode of Presentation, Thin Liquids cup;straw;self-fed   Volume of Liquid or Food Presented 4oz water   Oral Phase of Swallow WFL   Pharyngeal Phase of Swallow intact   Diagnostic Statement No overt s/sx of aspiration   Clinical Swallow Eval: Puree Solid Texture Trial   Mode of Presentation, Puree spoon;self-fed   Volume of Puree Presented 3oz pudding   Oral  Phase, Puree WFL   Pharyngeal Phase, Puree intact   Diagnostic Statement Oral phase WFL. No overt s/sx of aspiration   Clinical Swallow Eval: Solid Food Texture Trial   Mode of Presentation, Solid self-fed   Volume of Solid Food Presented 1 cracker   Oral Phase, Solid WFL   Pharyngeal Phase, Solid intact   Diagnostic Statement Oral phase WFL. No overt s/sx of aspiration.   Esophageal Phase of Swallow   Patient reports or presents with symptoms of esophageal dysphagia No   Swallow Eval: Clinical Impressions   Skilled Criteria for Therapy Intervention No problems identified which require skilled intervention   Functional Assessment Scale (FAS) 7   Treatment Diagnosis Functional oropharyngeal swallow mechanism   Diet texture recommendations Regular diet;Thin liquids   Recommended Feeding/Eating Techniques small sips/bites  (slow rate, upright for all PO)   Anticipated Discharge Disposition (Defer to med team/PT/OT)   Risks and Benefits of Treatment have been explained. Yes   Patient, family and/or staff in agreement with Plan of Care Yes   Clinical Impression Comments SLP: Clinical swallow eval completed per MD order. Pt presents with functional oropharyngeal swallow mechanism. Oral mech exam unremarkable. Pt verbal and responding appropriately to questions during evaluation, no word finding difficulty noted. Decreased vocal intensity observed. Assessed pt with thin liquids via cup and straw, puree, and higher texture solids. Oral phase WFL. Pharyngeal phase WFL, no overt s/sx of aspiration with any consistency trialed. Recommend regular diet/thin liquids. Pt to sit upright for all PO, taking small bites/sips at slow rate. No additional SLP services indicated.    Total Evaluation Time   Total Evaluation Time (Minutes) 12

## 2018-11-22 NOTE — PROVIDER NOTIFICATION
Pt sister Sharita is primary caregiver and called to update writer that pt has exhibited very dangerous and careless behaviors and cognitive decline over last few weeks especially regarding safety with use of gas stove at home. Family is unable to provide 24 hour assist at home for pt and state that her depression appears to be getting increasingly worse. Sharita would like to be called by Psychiatry regarding pt behavior

## 2018-11-22 NOTE — IP AVS SNAPSHOT
` `     UNIT 6A [facility] BANK: [phone]            Medication Administration Report for Aliica Penaloza as of 11/29/18 0853   Legend:    Given Hold Not Given Due Canceled Entry Other Actions    Time Time (Time) Time  Time-Action       Inactive    Active    Linked        Medications 11/23/18 11/24/18 11/25/18 11/26/18 11/27/18 11/28/18 11/29/18    aspirin (ASA) tablet 325 mg  Dose: 325 mg  Freq: DAILY Route: PO  Start: 11/23/18 0800   Admin Instructions: Starting tomorrow 0800    Admin. Amount: 1 tablet (1 × 325 mg tablet)  Last Admin: 11/29/18 0559  Dispense Loc: [facility] ADS 6A     0843 (325 mg)-Given        0843 (325 mg)-Given        0856 (325 mg)-Given        1031 (325 mg)-Given        0810 (325 mg)-Given        0908 (325 mg)-Given        0559 (325 mg)-Given                  atorvastatin (LIPITOR) tablet 40 mg  Dose: 40 mg  Freq: DAILY Route: ORAL OR FEED  Start: 11/22/18 0800   Admin. Amount: 1 tablet (1 × 40 mg tablet)  Last Admin: 11/29/18 0559  Dispense Loc: [facility] ADS 6A     0843 (40 mg)-Given        0844 (40 mg)-Given        0856 (40 mg)-Given        1031 (40 mg)-Given        0810 (40 mg)-Given        0908 (40 mg)-Given        0559 (40 mg)-Given                  calcium carbonate (TUMS) chewable tablet 500 mg  Dose: 500 mg  Freq: DAILY PRN Route: PO  PRN Reason: heartburn  Start: 11/25/18 0014   Admin. Amount: 1 tablet (1 × 500 mg tablet)  Last Admin: 11/25/18 0034  Dispense Loc: [facility] ADS 6A       0034 (500 mg)-Given               FLUoxetine (PROzac) capsule 40 mg  Dose: 40 mg  Freq: DAILY Route: ORAL OR FEED  Start: 11/23/18 1130   Admin. Amount: 2 capsule (2 × 20 mg capsule)  Last Admin: 11/29/18 0559  Dispense Loc: [facility] ADS 6A     1159 (40 mg)-Given        0844 (40 mg)-Given        0856 (40 mg)-Given        1030 (40 mg)-Given        0810 (40 mg)-Given        0908 (40 mg)-Given        0559 (40 mg)-Given                  labetalol (NORMODYNE/TRANDATE) injection 10-20 mg  Dose: 10-20 mg  Freq:  EVERY 10 MIN PRN Route: IV  PRN Reason: high blood pressure  PRN Comment: For Systolic Blood Pressure greater than 220 mmHg or Diastolic Blood Pressure greater than 120 mmHg.  Start: 11/22/18 0135   Admin Instructions: Give dose over 1-2 minutes. Start with a 10 mg dose, then repeat or double dose if needed (MAX daily dose: 300 mg / 24 hrs) Hold if Heart Rate less than 60 bpm. Notify provider within 2 hours if Blood Pressure parameters are not met.  For ordered doses up to 80 mg, give IV Push undiluted. Give each 20 mg over 2 minutes.    Admin. Amount: 10-20 mg = 2-4 mL Conc: 5 mg/mL  Dispense Loc: [facility]  Infused Over: 2-8 Minutes  Volume: 4 mL               lisinopril (PRINIVIL/ZESTRIL) tablet 20 mg  Dose: 20 mg  Freq: DAILY Route: ORAL OR FEED  Start: 11/22/18 0800   Admin. Amount: 1 tablet (1 × 20 mg tablet)  Last Admin: 11/29/18 0559  Dispense Loc: [facility] ADS 6A     0843 (20 mg)-Given        0843 (20 mg)-Given        0856 (20 mg)-Given        1031 (20 mg)-Given        0810 (20 mg)-Given        0908 (20 mg)-Given        0559 (20 mg)-Given                  medication instruction  Freq: CONTINUOUS PRN Route: XX  Start: 11/22/18 0135   Admin Instructions: No oral medications if patient did not pass the swallow screen. Contact provider for alternative routes or NG placement.    Dispense Loc: [facility]               metoprolol tartrate (LOPRESSOR) tablet 50 mg  Dose: 50 mg  Freq: 2 TIMES DAILY Route: PO  Start: 11/22/18 2000   Admin. Amount: 1 tablet (1 × 50 mg tablet)  Last Admin: 11/29/18 0559  Dispense Loc: [facility] ADS 6A     0842 (50 mg)-Given       1949 (50 mg)-Given        0843 (50 mg)-Given       1951 (50 mg)-Given        0856 (50 mg)-Given       2138 (50 mg)-Given        1030 (50 mg)-Given       2140 (50 mg)-Given        0810 (50 mg)-Given       2043 (50 mg)-Given        0908 (50 mg)-Given       2038 (50 mg)-Given        0559 (50 mg)-Given              [ ] 2000            ondansetron (ZOFRAN-ODT) ODT tab 4 mg  Dose: 4 mg  Freq: EVERY 6 HOURS PRN Route: PO  PRN Reasons: nausea,vomiting  Start: 11/22/18 0135   Admin Instructions: This is Step 1 of nausea and vomiting management.  If nausea not resolved in 15 minutes, go to Step 2 prochlorperazine (COMPAZINE). Do not push through foil backing. Peel back foil and gently remove. Place on tongue immediately. Administration with liquid unnecessary  With dry hands, peel back foil backing and gently remove tablet; do not push oral disintegrating tablet through foil backing; administer immediately on tongue and oral disintegrating tablet dissolves in seconds; then swallow with saliva; liquid not required.    Admin. Amount: 1 tablet (1 × 4 mg tablet)  Dispense Loc: [facility] ADS 6A              Or  ondansetron (ZOFRAN) injection 4 mg  Dose: 4 mg  Freq: EVERY 6 HOURS PRN Route: IV  PRN Reasons: nausea,vomiting  Start: 11/22/18 0135   Admin Instructions: This is Step 1 of nausea and vomiting management.  If nausea not resolved in 15 minutes, go to Step 2 prochlorperazine (COMPAZINE).  Irritant. For ordered IV doses 0.1-4 mg, give IV Push undiluted over 2-5 minutes.    Admin. Amount: 4 mg = 2 mL Conc: 4 mg/2 mL  Dispense Loc: [facility] ADS 6A  Infused Over: 2-5 Minutes  Volume: 2 mL               potassium chloride (KLOR-CON) Packet 20-40 mEq  Dose: 20-40 mEq  Freq: EVERY 2 HOURS PRN Route: ORAL OR FEED  PRN Reason: potassium supplementation  Start: 11/22/18 1052   Admin Instructions: Use if unable to tolerate tablets.  If Serum K+ 3.0-3.3, dose = 60 mEq po total dose (40 mEq x1 followed in 2 hours by 20 mEq x1). Recheck K+ level 4 hours after dose and the next AM.  If Serum K+ 2.5-2.9, dose = 80 mEq po total dose (40 mEq Q2H x2). Recheck K+ level 4 hours after dose and the next AM.  If Serum K+ less than 2.5, See IV order.  Dissolve packet contents in 4-8 ounces of cold water or juice.    Admin. Amount: 20-40 mEq  Dispense Loc: [facility] ADS 6A                potassium chloride 10 mEq in 100 mL intermittent infusion with 10 mg lidocaine  Dose: 10 mEq  Freq: EVERY 1 HOUR PRN Route: IV  PRN Reason: potassium supplementation  Start: 11/22/18 1052   Admin Instructions: Infuse via PERIPHERAL LINE. Use potassium with lidocaine for pain with peripheral administration.  If Serum K+ 3.0-3.3, dose = 10 mEq/hr x4 doses (40 mEq IV total dose). Recheck K+ level 2 hours after dose and the next AM.  If Serum K+ less than 3.0, dose = 10 mEq/hr x6 doses (60 mEq IV total dose). Recheck K+ level 2 hours after dose and the next AM.    Admin. Amount: 10 mEq = 100 mL Conc: 10 mEq/100 mL  Dispense Loc: [facility] ADS 6A  Infused Over: 1 Hours  Volume: 100 mL               potassium chloride 10 mEq in 100 mL sterile water intermittent infusion (premix)  Dose: 10 mEq  Freq: EVERY 1 HOUR PRN Route: IV  PRN Reason: potassium supplementation  Start: 11/22/18 1052   Admin Instructions: Infuse via PERIPHERAL LINE or CENTRAL LINE. Use for central line replacement if patient weight less than 65 kg, if patient is on TPN with high potassium content or if unit does not stock 20 mEq bags.   If Serum K+ 3.0-3.3, dose = 10 mEq/hr x4 doses (40 mEq IV total dose). Recheck K+ level 2 hours after dose and the next AM.   If Serum K+ less than 3.0, dose = 10 mEq/hr x6 doses (60 mEq IV total dose). Recheck K+ level 2 hours after dose and the next AM.    Admin. Amount: 10 mEq = 100 mL Conc: 10 mEq/100 mL  Dispense Loc: [facility] ADS 6A  Infused Over: 60 Minutes  Volume: 100 mL               potassium chloride 20 mEq in 50 mL intermittent infusion  Dose: 20 mEq  Freq: EVERY 1 HOUR PRN Route: IV  PRN Reason: potassium supplementation  Start: 11/22/18 1052   Admin Instructions: Infuse via CENTRAL LINE Only. May need EKG if less than 65 kg or on TPN - Max rate is 0.3 mEq/kg/hr for patients not on EKG monitoring.   If Serum K+ 3.0-3.3, dose = 20 mEq/hr x2 doses (40 mEq IV total dose). Recheck K+ level 2 hours after dose  and the next AM.  If Serum K+ less than 3.0, dose = 20 mEq/hr x3 doses (60 mEq IV total dose). Recheck K+ level 2 hours after dose and the next AM.    Admin. Amount: 20 mEq = 50 mL Conc: 20 mEq/50 mL  Dispense Loc: [facility]  Volume: 50 mL               potassium chloride SA (K-DUR/KLOR-CON M) CR tablet 20-40 mEq  Dose: 20-40 mEq  Freq: EVERY 2 HOURS PRN Route: PO  PRN Reason: potassium supplementation  Start: 11/22/18 1052   Admin Instructions: Use if able to take PO.   If Serum K+ 3.0-3.3, dose = 60 mEq po total dose (40 mEq x1 followed in 2 hours by 20 mEq x1). Recheck K+ level 4 hours after dose and the next AM.  If Serum K+ 2.5-2.9, dose = 80 mEq po total dose (40 mEq Q2H x2). Recheck K+ level 4 hours after dose and the next AM.  If Serum K+ less than 2.5, See IV order.  DO NOT CRUSH.    Admin. Amount: 2-4 tablet (2-4 × 10 mEq tablet)  Last Admin: 11/22/18 7568  Dispense Loc: [facility] ADS 6A            Medications 11/23/18 11/24/18 11/25/18 11/26/18 11/27/18 11/28/18 11/29/18

## 2018-11-23 ENCOUNTER — APPOINTMENT (OUTPATIENT)
Dept: PHYSICAL THERAPY | Facility: CLINIC | Age: 43
DRG: 886 | End: 2018-11-23
Attending: INTERNAL MEDICINE
Payer: COMMERCIAL

## 2018-11-23 LAB
ANCA AB PATTERN SER IF-IMP: NORMAL
BACTERIA SPEC CULT: NO GROWTH
C-ANCA TITR SER IF: NORMAL {TITER}
C3 SERPL-MCNC: 107 MG/DL (ref 76–169)
C4 SERPL-MCNC: 32 MG/DL (ref 15–50)
CHOLEST SERPL-MCNC: 97 MG/DL
GLUCOSE BLDC GLUCOMTR-MCNC: 102 MG/DL (ref 70–99)
GLUCOSE BLDC GLUCOMTR-MCNC: 73 MG/DL (ref 70–99)
GLUCOSE BLDC GLUCOMTR-MCNC: 73 MG/DL (ref 70–99)
GLUCOSE BLDC GLUCOMTR-MCNC: 90 MG/DL (ref 70–99)
GLUCOSE BLDC GLUCOMTR-MCNC: 98 MG/DL (ref 70–99)
HDLC SERPL-MCNC: 28 MG/DL
LA PPP-IMP: NEGATIVE
LDLC SERPL CALC-MCNC: 48 MG/DL
Lab: NORMAL
NONHDLC SERPL-MCNC: 69 MG/DL
SPECIMEN SOURCE: NORMAL
TRIGL SERPL-MCNC: 107 MG/DL

## 2018-11-23 PROCEDURE — 25000128 H RX IP 250 OP 636: Performed by: STUDENT IN AN ORGANIZED HEALTH CARE EDUCATION/TRAINING PROGRAM

## 2018-11-23 PROCEDURE — 25000132 ZZH RX MED GY IP 250 OP 250 PS 637: Performed by: STUDENT IN AN ORGANIZED HEALTH CARE EDUCATION/TRAINING PROGRAM

## 2018-11-23 PROCEDURE — 36415 COLL VENOUS BLD VENIPUNCTURE: CPT | Performed by: PSYCHIATRY & NEUROLOGY

## 2018-11-23 PROCEDURE — 99221 1ST HOSP IP/OBS SF/LOW 40: CPT

## 2018-11-23 PROCEDURE — 00000146 ZZHCL STATISTIC GLUCOSE BY METER IP

## 2018-11-23 PROCEDURE — 80061 LIPID PANEL: CPT | Performed by: PSYCHIATRY & NEUROLOGY

## 2018-11-23 PROCEDURE — 97116 GAIT TRAINING THERAPY: CPT | Mod: GP

## 2018-11-23 PROCEDURE — 97110 THERAPEUTIC EXERCISES: CPT | Mod: GP

## 2018-11-23 PROCEDURE — 40000193 ZZH STATISTIC PT WARD VISIT

## 2018-11-23 PROCEDURE — 97112 NEUROMUSCULAR REEDUCATION: CPT | Mod: GP

## 2018-11-23 PROCEDURE — 25000132 ZZH RX MED GY IP 250 OP 250 PS 637: Performed by: PSYCHIATRY & NEUROLOGY

## 2018-11-23 PROCEDURE — 97530 THERAPEUTIC ACTIVITIES: CPT | Mod: GP

## 2018-11-23 PROCEDURE — 12000008 ZZH R&B INTERMEDIATE UMMC

## 2018-11-23 RX ADMIN — ATORVASTATIN CALCIUM 40 MG: 40 TABLET, FILM COATED ORAL at 08:43

## 2018-11-23 RX ADMIN — ENOXAPARIN SODIUM 40 MG: 40 INJECTION SUBCUTANEOUS at 08:43

## 2018-11-23 RX ADMIN — ASPIRIN 325 MG ORAL TABLET 325 MG: 325 PILL ORAL at 08:43

## 2018-11-23 RX ADMIN — METOPROLOL TARTRATE 50 MG: 50 TABLET, FILM COATED ORAL at 08:42

## 2018-11-23 RX ADMIN — FLUOXETINE HYDROCHLORIDE 40 MG: 20 CAPSULE ORAL at 11:59

## 2018-11-23 RX ADMIN — LISINOPRIL 20 MG: 20 TABLET ORAL at 08:43

## 2018-11-23 RX ADMIN — METOPROLOL TARTRATE 50 MG: 50 TABLET, FILM COATED ORAL at 19:49

## 2018-11-23 ASSESSMENT — ACTIVITIES OF DAILY LIVING (ADL)
ADLS_ACUITY_SCORE: 13

## 2018-11-23 ASSESSMENT — VISUAL ACUITY
OU: NORMAL ACUITY

## 2018-11-23 NOTE — PLAN OF CARE
Problem: Patient Care Overview  Goal: Plan of Care/Patient Progress Review  Discharge Planner PT   Patient plan for discharge: not discussed  Current status: Pt performed bed mobility and supine>sit from flat bed with supervision. Ambulated >500' with intermittent UE support on IV pole and SBA. To promote improvement in functional stability, pt completed dynamic balance drills - compensated appropriately with no overt LOB. To promote improvement in activity tolerance, pt completed 10' on nu-step at level 3 R. AVSS throughout session. Pt requriing simple step commands to complete tasks.   Barriers to return to prior living situation: medical needs, level of A, cognition   Recommendations for discharge: pending medical work-up. From a functional mobility standpoint pt would be safe to dc home, however dt cognition concerns, the recommendation is 24/7 support.   Rationale for recommendations: Pt would benefit from continued skilled PT to address deficits and maintain strength and activity tolerance to promote safe indep mobility.        Entered by: Stacy Grace 11/23/2018 10:50 AM

## 2018-11-23 NOTE — PROGRESS NOTES
Care Coordinator Progress Note    Admission Date/Time:  11/22/2018  Attending MD:  Dr Nirav Luna    Data  Received Care Coordinator consult for stroke--discharge planning. Chart reviewed, discussed with interdisciplinary team. In 6A Discharge Rounds it was reported pt has a flat affect, single word responses; up with 1 assist; IV fluids. Plan is MRI, Psych consult and possible EEG monitoring.   Yesterday PT & OT recommending TCU.   No RNCC needs at this time.       Patient was admitted for:  Expressive aphasia.   Pt transferred from [facility] ER.   Past history includes:  Ischemic strokes August, 2018; hyperlipidemia; HTN; vaginal bleeding.    Concerns with insurance coverage for discharge needs: None. Pt's insurance is [employer].      Assessment  Medical evaluation continues. Anticipate TCU when ready for discharge.      Plan  Social Work will follow for TCU placement.        Desirae Muse, RN Care Coordinator  Unit 6A, [facility]        2:15pm: Dr Farris and Dr Beard discussed case. Pt is depressed. Dr Farris reported pt did not have a stroke. Will need inpt rehab. Dr Beard reported pt will need follow-up with psychiatry after discharge. Dr Farris said pt is ready for discharge. I will notify SW.

## 2018-11-23 NOTE — PROGRESS NOTES
"[facility], [place]   Neurology Daily Note  Alicia Penaloza  [id number]  11/23/2018    Subjective:  She is depressed and crying a lot since this morning. She was seen by psychiatrist today. She is not thinking of harming herself. She understand that her symptoms could be related to depression. She agreed to go to TCU and see psychiatrist as an outpatient    Objective:   BP (!) 155/98 (BP Location: Right arm)  Pulse 56  Temp 97.9  F (36.6  C) (Oral)  Resp 20  Ht 1.6 m (5' 3\")  Wt 97.1 kg (214 lb)  SpO2 99%  BMI 37.91 kg/m2  General:  patient lying in bed without any acute distress    HEENT:  normocephalic/atraumatic  Cardio:  RRR  Pulmonary:  no respiratory distress  Abdomen:  soft  Extremities:  no edema  Skin:  intact      Neurologic  Mental status: awake, alert, follows almost all commands. Minimal speech produced but does nod correctly most of the time to questions. Mumbles occasionally but it is unintelligible.   Cranial nerves: EOMI, face symmetric, VFF, equal to LT  Motor: no drift in any extremity  Sensory: equal to LT except in the left UE  Coordination: FTN intact BL  Gait: normal gait and stance    National Institutes of Health Stroke Scale  Exam Interval: 24 hours post onset of symptom +/- 20 minutes   Score    Level of consciousness: (0)   Alert, keenly responsive    LOC questions: (0)   Answers both questions correctly    LOC commands: (0)   Performs both tasks correctly    Best gaze: (0)   Normal    Visual: (0)   No visual loss    Facial palsy: (0)   Normal symmetrical movements    Motor arm (left): (0)   No drift    Motor arm (right): (0)   No drift    Motor leg (left): (0)   No drift    Motor leg (right): (0)   No drift    Limb ataxia: (0)   Absent    Sensory: (1)   Mild to moderate sensory loss    Best language: (0)   Normal- no aphasia    Dysarthria: (0)   Normal    Extinction and inattention: (0)   No abnormality        Total Score:  1         Lab " Investigations:   Hemoglobin A1C   Date Value Ref Range Status   11/22/2018 4.7 0 - 5.6 % Final     Comment:     Normal <5.7% Prediabetes 5.7-6.4%  Diabetes 6.5% or higher - adopted from ADA   consensus guidelines.     08/26/2018 4.8 0 - 5.6 % Final     Comment:     Normal <5.7% Prediabetes 5.7-6.4%  Diabetes 6.5% or higher - adopted from ADA   consensus guidelines.       Lab Results   Component Value Date    LDL 53 08/26/2018       Imaging:  Recent Results (from the past 24 hour(s))   CT Head w/o Contrast    Narrative    CT SCAN OF THE HEAD WITHOUT CONTRAST   11/21/2018 6:46 PM     HISTORY: Aphasia.    TECHNIQUE: Axial images of the head and coronal reformations without  IV contrast material.  Radiation dose for this scan was reduced using  automated exposure control, adjustment of the mA and/or kV according  to patient size, or iterative reconstruction technique.    COMPARISON: None.    FINDINGS: There is some mild cerebral atrophy. There is an old left  parietal occipital infarct. There is also an old infarct in the right  basal ganglia region. Patchy white matter changes are present in both  hemispheres. There is no evidence for mass effect, acute infarct, or  skull fracture. There is near complete opacification of the right  maxillary sinus and there is an air-fluid level in the left sphenoid  sinus.      Impression    IMPRESSION:  1. No evidence for intracranial hemorrhage or any acute brain  pathology.  2. Cerebral atrophy with old bilateral infarcts and chronic appearing  white matter disease.  3. Right maxillary and left sphenoid sinus disease.    ANSLEY GONZALEZ MD   CT Head Neck Angio w/o & w Contrast    Narrative    CTA HEAD NECK WITH CONTRAST 11/21/2018 6:53 PM     HISTORY: Aphasia.    TECHNIQUE: MR angiography was performed through the head and neck  without and with intravenous contrast. 80 mL of Isovue 370 was given.  Multiplanar reconstructions were performed. 3-D reconstructions off a  remote  workstation for CT angiography were also acquired. Carotid  stenoses were evaluated by comparing the caliber of the proximal  internal carotid artery to the caliber of the distal internal carotid  artery. Radiation dose for this scan was reduced using automated  exposure control, adjustment of the mA and/or kV according to patient  size, or iterative reconstruction technique.    COMPARISON: 8/25/2018    FINDINGS:  Brachiocephalic vessels: Normal.    Right carotid system: Normal.    Left carotid system: Normal.    Right vertebral artery: Normal nondominant vessel.    Left vertebral artery: Normal.    [place]: Again noted is moderate narrowing of a large left  middle cerebral artery branch proximally which is similar to that seen  on other CT angiogram studies. There is also narrowing of the right P1  segment which is presumably congenital and also unchanged. The distal  internal carotid arteries and basilar artery are patent. The proximal  anterior, middle, and posterior cerebral arteries are patent.      Impression    IMPRESSION: No change from prior CT angiogram. There is some moderate  stenosis of a large left M2 branch similar to that seen on the prior  exam most likely due to atherosclerotic disease.    ANSLEY GONZALEZ MD   XR Chest Port 1 View    Narrative    CHEST ONE VIEW PORTABLE   11/21/2018 8:07 PM     HISTORY: Altered loss of consciousness.     COMPARISON: 8/25/2018      Impression    IMPRESSION: Normal.    LEXIE BISHOP MD       Assessment and Plan   43F hx of ischemic strokes 8/2018, vaginal bleeding, HLD, HTN, prior smoker p/w sudden onset of not speaking but was following commands and was able to walk around on her own with no other neurological deficit. She was admitted OSH in 8/7/2018 with R thalamic, L frontal white matter, and temporo-occipital lobe strokes. Her strokes were deemed embolic, for which she had a large workup that was all negative including BETH and hypercoagulable workup.  She was sent home on aspirin. Since her stroke, she has had multiple presentations for intermittent neurologic symptoms such as LUE numbness, R hand weakness, etc with subsequent MRI showing no new strokes. Her last presentation here was 9/12. Family says her mood has been very labile since the stroke as well. She was recently begun on flouxetine 20 mg for depression. Apparently, since Sunday, she has been progressively not speaking. The day prior to admission she stopped speaking completely and only grunts or moans. She came to an OSH and had a CTA showing unchanged L M2 stenosis with no LVO. She was transferred here for further workup. Patient endorsed she is profoundly depressed and cries without cause, but she is not thinking or planning to harm herself. She is not feeling safe.     # Mutism:  # Mood disorder:    is at risk for aphasia given severe M2 stenosis, which is still patent per CTA done last night. Does have a recent history of ischemic infarcts in 8/2018 as well. However from the history and exam she does not seem to have aphasia. 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 She endorsed that she is depressed. Her speech is progressively worse suggesting it is less likely to be stroke. She was admitted to get MRI and complete stroke work up. Psychiatry consulted this morning and recommended neuropsych testing and  TCU disposition and psychiatry follow up as an outpatient.  --no need for the MRI brain  --continue on aspirin 325mg.   --inflammatory labs in process  --PT: recommended TCU vs IP psych because her cognitive status is concerning.   --OT also recommended TCU  -- continue Fluoxetine to 40 mg daily     # HTN:   --continue pta lisinopril 20mg every day  --continue pta metoprolol 50mg bid     # HLD:  --continue pta atorvastatin 40mg every day     # vaginal bleeding:  Due to uterine fibroids. Endometrial biopsy without atypia  --will monitor w/ cbc  --cares per nursing     Diet: regular  diet  PPx: enoxaparin  Code: full  Dispo: is ready for discharge pending finding a place in the TCU      Patient was seen and discussed with Dr. Carlo Farris MD  Neurology Resident, PGY-1  Pager: [phone]

## 2018-11-23 NOTE — PLAN OF CARE
Status: pt on 6A s/t progressive worsening of ability to speak.   VS: AVSS  Neuros: Pt has given single word responses this shift. Intermittently follows commands. Flat affect. L side facial droop. Cooperative with cares.    GI: Regular diet. Passing gas  : voiding. Vaginal bleeding; pad changed x3.   IV: IVMF infusing via PIV at 100cc/hr   Activity: up 1/GB; needs cueing  Pain: denies  Respiratory: LS clear  Skin: intact   Plan of care: plan for MRI and psych consult today. Possible EEG.

## 2018-11-23 NOTE — PROGRESS NOTES
"CLINICAL NUTRITION SERVICES - ASSESSMENT NOTE     Nutrition Prescription    RECOMMENDATIONS FOR MDs/PROVIDERS TO ORDER:  Please encourage po intakes.       Malnutrition Status:    Unable to determine at this time    Recommendations already ordered by Registered Dietitian (RD):  Enter order for Room Service with Assist to help prompt pt to order consistent meals daily.  - Initiate calorie counts to help quantify po intakes and possible need for nutritional support via TF.    Future/Additional Recommendations:  Need for TF support if results of calorie count collection meeting < 1150 calories and 60 g PRO.     REASON FOR ASSESSMENT  Alicia Penaloza is a/an 43 year old female assessed by the dietitian for Admission Nutrition Risk Screen for reduced oral intake over the last month    NUTRITION HISTORY  Unable to obtain from pt secondary to sleeping. Per chart review, pt admitted with expressive aphasia which has since improved, but has depression and is confused off and on since admission.     CURRENT NUTRITION ORDERS  Diet: Regular per SLP evaluation for swallow study on 11/22 (pt with functional oropharyngeal swallow mechanism)  Intake/Tolerance: Unknown, no meals ordered yet this admission per review    LABS  (+) Ketones 20 per UA on 11//21; reflective of recent poor po intakes    MEDICATIONS  Medications reviewed    ANTHROPOMETRICS  Height: 160 cm (5' 3\")  Most Recent Weight: 97.1 kg (214 lb) - 11/21  IBW: 52 kg  BMI: Obesity Grade II BMI 35-39.9  Weight History: Unable to obtain from pt. Per review of EMR, no evidence of wt loss at this time.  Wt Readings from Last 10 Encounters:   11/21/18 97.1 kg (214 lb)   10/30/18 94.5 kg (208 lb 6.4 oz)   10/16/18 95.7 kg (210 lb 14.4 oz)   10/09/18 95.5 kg (210 lb 9.6 oz)   10/02/18 95.5 kg (210 lb 9.6 oz)   09/24/18 93.2 kg (205 lb 6.4 oz)   09/17/18 94.8 kg (209 lb)   09/14/18 95.2 kg (209 lb 12.8 oz)   09/12/18 95.3 kg (210 lb)   09/10/18 95.6 kg (210 lb 11.2 oz) "     Dosing Weight: 63 kg (adjusted based on current wt and IBW)    ASSESSED NUTRITION NEEDS  Estimated Energy Needs: 8774-8868 kcals/day (25 - 30 kcals/kg)  Justification: Maintenance  Estimated Protein Needs:75-95 grams protein/day (1.2 - 1.5 grams of pro/kg)  Justification: Repletion  Estimated Fluid Needs: (1 mL/kcal)   Justification: Maintenance    PHYSICAL FINDINGS  See malnutrition section below.  Unable to assess secondary to pt sleeping at time of visit.   L side facial droop.     MALNUTRITION  % Intake: % unknown, but suspect decreased for a few days d/t positive ketones on admission.  % Weight Loss: None noted  Subcutaneous Fat Loss: Unable to assess  Muscle Loss: Unable to assess  Fluid Accumulation/Edema: None noted per chart review  Malnutrition Diagnosis: Unable to determine due to unable to obtain diet hx and unable to complete nutritional physical assessment    NUTRITION DIAGNOSIS  Inadequate oral intake related to question poor appetite d/t depression and intermittent confusion as evidenced by RN report of reduced oral intakes PTA, positive ketones on admit and no meal trays have been ordered yet this admission on 11/22.    INTERVENTIONS  Implementation  Nutrition Education: No education needs assessed at this time   Initiate calorie counts   Collaboration with RN regarding pt's po intakes. Informed that pt's sister had brought some food in for pt.     Goals  Ave po intakes per calorie counts x 3 days to meet at least 75% pt's estimated nutritional needs.     Monitoring/Evaluation  Progress toward goals will be monitored and evaluated per protocol.  Sue Qureshi RD,LD  Unit pager [phone]

## 2018-11-24 ENCOUNTER — APPOINTMENT (OUTPATIENT)
Dept: OCCUPATIONAL THERAPY | Facility: CLINIC | Age: 43
DRG: 886 | End: 2018-11-24
Attending: INTERNAL MEDICINE
Payer: COMMERCIAL

## 2018-11-24 LAB
ERYTHROCYTE [DISTWIDTH] IN BLOOD BY AUTOMATED COUNT: 20.3 % (ref 10–15)
HCT VFR BLD AUTO: 39.3 % (ref 35–47)
HGB BLD-MCNC: 12.3 G/DL (ref 11.7–15.7)
MCH RBC QN AUTO: 27.6 PG (ref 26.5–33)
MCHC RBC AUTO-ENTMCNC: 31.3 G/DL (ref 31.5–36.5)
MCV RBC AUTO: 88 FL (ref 78–100)
PLATELET # BLD AUTO: 300 10E9/L (ref 150–450)
RBC # BLD AUTO: 4.46 10E12/L (ref 3.8–5.2)
WBC # BLD AUTO: 9.2 10E9/L (ref 4–11)

## 2018-11-24 PROCEDURE — 25000132 ZZH RX MED GY IP 250 OP 250 PS 637: Performed by: PSYCHIATRY & NEUROLOGY

## 2018-11-24 PROCEDURE — 97535 SELF CARE MNGMENT TRAINING: CPT | Mod: GO | Performed by: OCCUPATIONAL THERAPIST

## 2018-11-24 PROCEDURE — 25000128 H RX IP 250 OP 636: Performed by: STUDENT IN AN ORGANIZED HEALTH CARE EDUCATION/TRAINING PROGRAM

## 2018-11-24 PROCEDURE — 85027 COMPLETE CBC AUTOMATED: CPT | Performed by: PSYCHIATRY & NEUROLOGY

## 2018-11-24 PROCEDURE — 40000133 ZZH STATISTIC OT WARD VISIT: Performed by: OCCUPATIONAL THERAPIST

## 2018-11-24 PROCEDURE — 12000001 ZZH R&B MED SURG/OB UMMC

## 2018-11-24 PROCEDURE — 25000132 ZZH RX MED GY IP 250 OP 250 PS 637: Performed by: STUDENT IN AN ORGANIZED HEALTH CARE EDUCATION/TRAINING PROGRAM

## 2018-11-24 PROCEDURE — 36415 COLL VENOUS BLD VENIPUNCTURE: CPT | Performed by: PSYCHIATRY & NEUROLOGY

## 2018-11-24 RX ADMIN — ASPIRIN 325 MG ORAL TABLET 325 MG: 325 PILL ORAL at 08:43

## 2018-11-24 RX ADMIN — ENOXAPARIN SODIUM 40 MG: 40 INJECTION SUBCUTANEOUS at 08:43

## 2018-11-24 RX ADMIN — LISINOPRIL 20 MG: 20 TABLET ORAL at 08:43

## 2018-11-24 RX ADMIN — ATORVASTATIN CALCIUM 40 MG: 40 TABLET, FILM COATED ORAL at 08:44

## 2018-11-24 RX ADMIN — METOPROLOL TARTRATE 50 MG: 50 TABLET, FILM COATED ORAL at 19:51

## 2018-11-24 RX ADMIN — METOPROLOL TARTRATE 50 MG: 50 TABLET, FILM COATED ORAL at 08:43

## 2018-11-24 RX ADMIN — FLUOXETINE HYDROCHLORIDE 40 MG: 20 CAPSULE ORAL at 08:44

## 2018-11-24 ASSESSMENT — ACTIVITIES OF DAILY LIVING (ADL)
ADLS_ACUITY_SCORE: 12
ADLS_ACUITY_SCORE: 13
ADLS_ACUITY_SCORE: 12
ADLS_ACUITY_SCORE: 12

## 2018-11-24 ASSESSMENT — VISUAL ACUITY
OU: NORMAL ACUITY

## 2018-11-24 NOTE — PLAN OF CARE
Problem: Patient Care Overview  Goal: Plan of Care/Patient Progress Review  Status: Patient admitted d/t worsening speech.    VS: VSS, HTN within parameters.   Neuros: A&Ox4. Neuros include slight left facial droop, and numbness/tingling to left arm and bilateral feet per baseline. No issues with speech this shift.   GI: Regular diet, fair intake. On veronica counts.   : Voiding spontaneously.  ?  IV: PIV SL.   Activity: Up SBA.   Pain: Denies.   Respiratory/Trach: No issues.    Skin: Intact.   Social: No visitors today.     Plan of care: Plan to discharge at beginning on week pending placement. Will continue to monitor.

## 2018-11-24 NOTE — PLAN OF CARE
Problem: Stroke (Ischemic) (Adult)  Goal: Signs and Symptoms of Listed Potential Problems Will be Absent, Minimized or Managed (Stroke)  Signs and symptoms of listed potential problems will be absent, minimized or managed by discharge/transition of care (reference Stroke (Ischemic) (Adult) CPG).   Outcome: No Change  Status: 6A s/p worsening speech  VS: HTN within parameters   Neuros: Oriented self, time, situation, follows commands. Crying and flat at times.   GI: +BS, reg diet  : Voiding spontaneously, vaginal bleeding, baseline per patient ?  IV: IV SL  Activity: Up SBA  Pain: Denies  Respiratory: LS clear   Skin: Intact   Plan of care: Continue to monitor and follow current POC.

## 2018-11-24 NOTE — PLAN OF CARE
Problem: Stroke (Ischemic) (Adult)  Goal: Signs and Symptoms of Listed Potential Problems Will be Absent, Minimized or Managed (Stroke)  Signs and symptoms of listed potential problems will be absent, minimized or managed by discharge/transition of care (reference Stroke (Ischemic) (Adult) CPG).   Late entry day shift:  AVSS, AxO to self ,hospital mth and yr. Forgetful flat to weepy affect. Up w/ SBA to BR. Bill consult completed PLC teaching re: stroke prevention completed. EKG NSR, SL,pt continues to have vaginal bleeding (of concern pt reports she has fibriod tumors and a Merano (IUD) in place. She has kasie red bleeding w/ eraser head sized clots when wiping) She reports she receives iron infusions 2nd to low hgb-most recent 12.2 11/22. She has black loose BM's ( she states this iron TX causes her black loose BM's). Pt sister is [place] and called for update she is very concerned about pt MS.

## 2018-11-24 NOTE — PROGRESS NOTES
"[facility], [place]   Neurology Daily Note  Alicia Penaloza  [id number]  11/24/2018    Subjective:  No overnight events    Objective:   BP (!) 175/110 (BP Location: Right arm)  Pulse 56  Temp 98.1  F (36.7  C) (Oral)  Resp 18  Ht 1.6 m (5' 3\")  Wt 92.5 kg (203 lb 14.4 oz)  SpO2 99%  BMI 36.12 kg/m2  General:  sitting in chair non distressed  HEENT:  normocephalic/atraumatic  Cardio:  RRR  Pulmonary:  no respiratory distress  Abdomen:  soft  Extremities:  no edema  Skin:  intact      Neurologic  Mental status: awake, alert, follows all commands. Speech is appropriate. Cranial nerves: EOMI, face symmetric, VFF, equal to LT, tongue midline/mobile, no dysarthria, eharing intact to convo  Motor: no drift in any extremity  Sensory: equal to LT except in the left UE  Coordination: FTN intact BL      Lab Investigations:   Hemoglobin A1C   Date Value Ref Range Status   11/22/2018 4.7 0 - 5.6 % Final     Comment:     Normal <5.7% Prediabetes 5.7-6.4%  Diabetes 6.5% or higher - adopted from ADA   consensus guidelines.     08/26/2018 4.8 0 - 5.6 % Final     Comment:     Normal <5.7% Prediabetes 5.7-6.4%  Diabetes 6.5% or higher - adopted from ADA   consensus guidelines.       Lab Results   Component Value Date    LDL 53 08/26/2018       Imaging:  Recent Results (from the past 24 hour(s))   CT Head w/o Contrast    Narrative    CT SCAN OF THE HEAD WITHOUT CONTRAST   11/21/2018 6:46 PM     HISTORY: Aphasia.    TECHNIQUE: Axial images of the head and coronal reformations without  IV contrast material.  Radiation dose for this scan was reduced using  automated exposure control, adjustment of the mA and/or kV according  to patient size, or iterative reconstruction technique.    COMPARISON: None.    FINDINGS: There is some mild cerebral atrophy. There is an old left  parietal occipital infarct. There is also an old infarct in the right  basal ganglia region. Patchy white matter changes are present " in both  hemispheres. There is no evidence for mass effect, acute infarct, or  skull fracture. There is near complete opacification of the right  maxillary sinus and there is an air-fluid level in the left sphenoid  sinus.      Impression    IMPRESSION:  1. No evidence for intracranial hemorrhage or any acute brain  pathology.  2. Cerebral atrophy with old bilateral infarcts and chronic appearing  white matter disease.  3. Right maxillary and left sphenoid sinus disease.    ANSLEY GONZALEZ MD   CT Head Neck Angio w/o & w Contrast    Narrative    CTA HEAD NECK WITH CONTRAST 11/21/2018 6:53 PM     HISTORY: Aphasia.    TECHNIQUE: MR angiography was performed through the head and neck  without and with intravenous contrast. 80 mL of Isovue 370 was given.  Multiplanar reconstructions were performed. 3-D reconstructions off a  remote workstation for CT angiography were also acquired. Carotid  stenoses were evaluated by comparing the caliber of the proximal  internal carotid artery to the caliber of the distal internal carotid  artery. Radiation dose for this scan was reduced using automated  exposure control, adjustment of the mA and/or kV according to patient  size, or iterative reconstruction technique.    COMPARISON: 8/25/2018    FINDINGS:  Brachiocephalic vessels: Normal.    Right carotid system: Normal.    Left carotid system: Normal.    Right vertebral artery: Normal nondominant vessel.    Left vertebral artery: Normal.    [place] of [place]: Again noted is moderate narrowing of a large left  middle cerebral artery branch proximally which is similar to that seen  on other CT angiogram studies. There is also narrowing of the right P1  segment which is presumably congenital and also unchanged. The distal  internal carotid arteries and basilar artery are patent. The proximal  anterior, middle, and posterior cerebral arteries are patent.      Impression    IMPRESSION: No change from prior CT angiogram. There is some  moderate  stenosis of a large left M2 branch similar to that seen on the prior  exam most likely due to atherosclerotic disease.    ANSLEY GONZALEZ MD   XR Chest Port 1 View    Narrative    CHEST ONE VIEW PORTABLE   11/21/2018 8:07 PM     HISTORY: Altered loss of consciousness.     COMPARISON: 8/25/2018      Impression    IMPRESSION: Normal.    LEXIE BISHOP MD       Assessment and Plan   43F hx of ischemic strokes 8/2018, vaginal bleeding, HLD, HTN, prior smoker p/w sudden onset of not speaking but was following commands and was able to walk around on her own with no other neurological deficit. She was admitted OSH in 8/7/2018 with R thalamic, L frontal white matter, and temporo-occipital lobe strokes. Her strokes were deemed embolic, for which she had a large workup that was all negative including BETH and hypercoagulable workup. She was sent home on aspirin. Since her stroke, she has had multiple presentations for intermittent neurologic symptoms such as LUE numbness, R hand weakness, etc with subsequent MRI showing no new strokes. Her last presentation here was 9/12. Family says her mood has been very labile since the stroke as well. She was recently begun on flouxetine 20 mg for depression. Apparently, since Sunday, she has been progressively not speaking. The day prior to admission she stopped speaking completely and only grunts or moans. She came to an OSH and had a CTA showing unchanged L M2 stenosis with no LVO. She was transferred here for further workup. Patient endorsed she is profoundly depressed and cries without cause, but she is not thinking or planning to harm herself. She is not feeling safe.     # Mutism:  # Mood disorder:    is at risk for aphasia given severe M2 stenosis, which is still patent per CTA done last night. Does have a recent history of ischemic infarcts in 8/2018 as well. However from the history and exam she does not seem to have aphasia. 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 She endorsed that she is depressed. Her speech is progressively worse suggesting it is less likely to be stroke. She was admitted to get MRI and complete stroke work up. Psychiatry consulted this morning and recommended neuropsych testing and  TCU disposition and psychiatry follow up as an outpatient.  --no need for the MRI brain  --continue on aspirin 325mg.   --inflammatory labs in process  --PT/OT: recommended TCU    -- continue Fluoxetine to 40 mg daily     # HTN:   --continue pta lisinopril 20mg every day  --continue pta metoprolol 50mg bid     # HLD:  --continue pta atorvastatin 40mg every day     # vaginal bleeding:  Due to uterine fibroids. Endometrial biopsy without atypia  --will monitor w/ cbc  --cares per nursing     Diet: regular diet  PPx: enoxaparin  Code: full  Dispo: is ready for discharge pending finding a place in the TCU      Patient was seen and discussed with Dr. Carlo Chandler MD  Stroke fellow

## 2018-11-24 NOTE — CONSULTS
"  Social Work Services Progress Note    Hospital Day: 3  Date of Initial Social Work Evaluation:  11.23.18  Collaborated with:  Patient     Data:  Patient is a 43 year old female who per chart review is admitted with Expressive Aphasia (admitted from [facility]) PMHx includes Ischemic strokes August, 2018; hyperlipidemia; HTN; vaginal bleeding.    Intervention:  Instructed by weekday  to follow-up with patient re: plan for discharge. Talked with patient in room - she was sitting up in bedside chair. She said that she was having difficulty managing at her sister's house d/t tension with living with her sister as an adult and her sister being \"bossy.\" Patient would like to eventually get back home to her house, girlfriend and  cats in [place], [place]. She is in agreement with placement in TCU for further rehab - writer verified her [place] [address] and we looked at facilities on the Medicare.gov website and found three that patient was interested in having referrals faxed to.  This does not appear to be her home zip code as her address in [facility] appears to have been changed to her sister's home in [place], [place]. Patient's plan is to eventually get back to her home in [place], [place]. Writer spoke with patient again and she verified that she would like to look for TCU space in [place], [place].    Referrals sent to following facilities:    1) [facility] P [phone] F [phone]  [address] [place] [address]    2) [facility] P [phone]  F [phone]  [address] [facility] [address]    3) [facility] P [phone]  F [phone]  [address]  [place] [address]    4) [facility] P [phone]   F [phone]  [address] [place] [address]    5) [facility] P [phone]  F [phone]  [address] [place] [address]    6) [facility] P [phone]  [phone]  [address] [facility] [address]  [place] [address]    Assessment:  Patient in agreement with placement in TCU. She has no one who could provide 24 hour supervision at home in [place] which is her preference - her SO works nights.     Plan:    Anticipated Disposition:  Facility:  TBD    Barriers to d/c plan:  Bed availability and Prior auth from [facility]    Follow Up:  Will have weekday  f/u    Viji VENEGAS [facility]  11/24/2018    ON CALL PAGER   0800 - 1600   [phone]    ON CALL COVERAGE AFTER 1600  [phone]

## 2018-11-24 NOTE — PLAN OF CARE
Problem: Patient Care Overview  Goal: Plan of Care/Patient Progress Review  Outcome: No Change  Pt. has hx of ischemic strokes 8/2018, vaginal bleeding, HLD, HTN, prior smoker p/w sudden onset of not speaking but was following commands and was able to walk around on her own with no other neurological deficit. VSS ex/ HTN within parameters. A&Ox4. Neuros include: slow to respond at times, very very small L. facial droop, N/T in LUE/LLE, baseline N/T to BLE feet, baseline numbness from R. shoulder to elbow. Tolerating regular diet. Voids spontaneously without difficulty; has baseline vaginal bleeding. No BM this shift. Up with SBA. Had no c/o pain this shift. Pt medically ready for discharge, SW to follow up with d/c plans. Continue to monitor and follow POC.

## 2018-11-24 NOTE — PLAN OF CARE
Problem: Patient Care Overview  Goal: Plan of Care/Patient Progress Review  Discharge Planner OT   Patient plan for discharge: Rehab vs home with 24/7  Current status: Pt completed MOCA and scored 12/30 indicated severe cognitive impairment (26 or above considered normal). Pt able to complete functional transfers and ADLs at sink with SBA however needs significant cuing for sequencing/initiation due to cognitive deficits.  Barriers to return to prior living situation: cognitive deficits, weakness  Recommendations for discharge: TCU vs home with 24/7 and home OT/PT, may ultimately require LTC  Rationale for recommendations: to increase pt's safety and (I) with ADL/IADLs       Entered by: Jelena Ariza 11/24/2018 4:04 PM

## 2018-11-25 PROCEDURE — 25000132 ZZH RX MED GY IP 250 OP 250 PS 637: Performed by: STUDENT IN AN ORGANIZED HEALTH CARE EDUCATION/TRAINING PROGRAM

## 2018-11-25 PROCEDURE — 25000132 ZZH RX MED GY IP 250 OP 250 PS 637: Performed by: INTERNAL MEDICINE

## 2018-11-25 PROCEDURE — 25000132 ZZH RX MED GY IP 250 OP 250 PS 637: Performed by: PSYCHIATRY & NEUROLOGY

## 2018-11-25 PROCEDURE — 25000128 H RX IP 250 OP 636: Performed by: STUDENT IN AN ORGANIZED HEALTH CARE EDUCATION/TRAINING PROGRAM

## 2018-11-25 PROCEDURE — 12000001 ZZH R&B MED SURG/OB UMMC

## 2018-11-25 RX ORDER — FLUOXETINE 40 MG/1
40 CAPSULE ORAL DAILY
Qty: 30 CAPSULE | DISCHARGE
Start: 2018-11-26 | End: 2019-02-18

## 2018-11-25 RX ORDER — CALCIUM CARBONATE 500 MG/1
500 TABLET, CHEWABLE ORAL DAILY PRN
Status: DISCONTINUED | OUTPATIENT
Start: 2018-11-25 | End: 2018-11-29 | Stop reason: HOSPADM

## 2018-11-25 RX ADMIN — ENOXAPARIN SODIUM 40 MG: 40 INJECTION SUBCUTANEOUS at 08:56

## 2018-11-25 RX ADMIN — ASPIRIN 325 MG ORAL TABLET 325 MG: 325 PILL ORAL at 08:56

## 2018-11-25 RX ADMIN — METOPROLOL TARTRATE 50 MG: 50 TABLET, FILM COATED ORAL at 08:56

## 2018-11-25 RX ADMIN — ATORVASTATIN CALCIUM 40 MG: 40 TABLET, FILM COATED ORAL at 08:56

## 2018-11-25 RX ADMIN — FLUOXETINE HYDROCHLORIDE 40 MG: 20 CAPSULE ORAL at 08:56

## 2018-11-25 RX ADMIN — CALCIUM CARBONATE (ANTACID) CHEW TAB 500 MG 500 MG: 500 CHEW TAB at 00:34

## 2018-11-25 RX ADMIN — LISINOPRIL 20 MG: 20 TABLET ORAL at 08:56

## 2018-11-25 RX ADMIN — METOPROLOL TARTRATE 50 MG: 50 TABLET, FILM COATED ORAL at 21:38

## 2018-11-25 ASSESSMENT — ACTIVITIES OF DAILY LIVING (ADL)
ADLS_ACUITY_SCORE: 12

## 2018-11-25 ASSESSMENT — VISUAL ACUITY
OU: NORMAL ACUITY

## 2018-11-25 NOTE — PROGRESS NOTES
"[facility], [place]   Neurology Daily Note  Alicia Penaloza  [id number]  11/25/2018    Subjective:  No overnight events. She is waiting for TCU transfer.     Objective:   BP (!) 135/100 (BP Location: Left arm)  Pulse 56  Temp 98.3  F (36.8  C) (Oral)  Resp 18  Ht 1.6 m (5' 3\")  Wt 92.5 kg (203 lb 14.4 oz)  SpO2 98%  BMI 36.12 kg/m2  General:  sitting in chair non distressed  HEENT:  normocephalic/atraumatic  Cardio:  RRR  Pulmonary:  no respiratory distress  Abdomen:  soft  Extremities:  no edema  Skin:  intact      Neurologic  Mental status: awake, alert, follows all commands. Speech is appropriate. Cranial nerves: EOMI, face symmetric, VFF, equal to LT, tongue midline/mobile, no dysarthria, eharing intact to convo  Motor: no drift in any extremity  Sensory: equal to LT except in the left UE  Coordination: FTN intact BL      Lab Investigations:   Hemoglobin A1C   Date Value Ref Range Status   11/22/2018 4.7 0 - 5.6 % Final     Comment:     Normal <5.7% Prediabetes 5.7-6.4%  Diabetes 6.5% or higher - adopted from ADA   consensus guidelines.     08/26/2018 4.8 0 - 5.6 % Final     Comment:     Normal <5.7% Prediabetes 5.7-6.4%  Diabetes 6.5% or higher - adopted from ADA   consensus guidelines.       Lab Results   Component Value Date    LDL 53 08/26/2018       Imaging:  Recent Results (from the past 24 hour(s))   CT Head w/o Contrast    Narrative    CT SCAN OF THE HEAD WITHOUT CONTRAST   11/21/2018 6:46 PM     HISTORY: Aphasia.    TECHNIQUE: Axial images of the head and coronal reformations without  IV contrast material.  Radiation dose for this scan was reduced using  automated exposure control, adjustment of the mA and/or kV according  to patient size, or iterative reconstruction technique.    COMPARISON: None.    FINDINGS: There is some mild cerebral atrophy. There is an old left  parietal occipital infarct. There is also an old infarct in the right  basal ganglia region. Patchy " white matter changes are present in both  hemispheres. There is no evidence for mass effect, acute infarct, or  skull fracture. There is near complete opacification of the right  maxillary sinus and there is an air-fluid level in the left sphenoid  sinus.      Impression    IMPRESSION:  1. No evidence for intracranial hemorrhage or any acute brain  pathology.  2. Cerebral atrophy with old bilateral infarcts and chronic appearing  white matter disease.  3. Right maxillary and left sphenoid sinus disease.    ANSLEY GONZALEZ MD   CT Head Neck Angio w/o & w Contrast    Narrative    CTA HEAD NECK WITH CONTRAST 11/21/2018 6:53 PM     HISTORY: Aphasia.    TECHNIQUE: MR angiography was performed through the head and neck  without and with intravenous contrast. 80 mL of Isovue 370 was given.  Multiplanar reconstructions were performed. 3-D reconstructions off a  remote workstation for CT angiography were also acquired. Carotid  stenoses were evaluated by comparing the caliber of the proximal  internal carotid artery to the caliber of the distal internal carotid  artery. Radiation dose for this scan was reduced using automated  exposure control, adjustment of the mA and/or kV according to patient  size, or iterative reconstruction technique.    COMPARISON: 8/25/2018    FINDINGS:  Brachiocephalic vessels: Normal.    Right carotid system: Normal.    Left carotid system: Normal.    Right vertebral artery: Normal nondominant vessel.    Left vertebral artery: Normal.    [place]: Again noted is moderate narrowing of a large left  middle cerebral artery branch proximally which is similar to that seen  on other CT angiogram studies. There is also narrowing of the right P1  segment which is presumably congenital and also unchanged. The distal  internal carotid arteries and basilar artery are patent. The proximal  anterior, middle, and posterior cerebral arteries are patent.      Impression    IMPRESSION: No change from prior CT  angiogram. There is some moderate  stenosis of a large left M2 branch similar to that seen on the prior  exam most likely due to atherosclerotic disease.    ANSLEY GONZALEZ MD   XR Chest Port 1 View    Narrative    CHEST ONE VIEW PORTABLE   11/21/2018 8:07 PM     HISTORY: Altered loss of consciousness.     COMPARISON: 8/25/2018      Impression    IMPRESSION: Normal.    LEXIE BISHOP MD       Assessment and Plan   43F hx of ischemic strokes 8/2018, vaginal bleeding, HLD, HTN, prior smoker p/w sudden onset of not speaking but was following commands and was able to walk around on her own with no other neurological deficit. She was admitted OSH in 8/7/2018 with R thalamic, L frontal white matter, and temporo-occipital lobe strokes. Her strokes were deemed embolic, for which she had a large workup that was all negative including BETH and hypercoagulable workup. She was sent home on aspirin. Since her stroke, she has had multiple presentations for intermittent neurologic symptoms such as LUE numbness, R hand weakness, etc with subsequent MRI showing no new strokes. Her last presentation here was 9/12. Family says her mood has been very labile since the stroke as well. She was recently begun on flouxetine 20 mg for depression. Apparently, since Sunday, she has been progressively not speaking. The day prior to admission she stopped speaking completely and only grunts or moans. She came to an OSH and had a CTA showing unchanged L M2 stenosis with no LVO. She was transferred here for further workup. Patient endorsed she is profoundly depressed and cries without cause, but she is not thinking or planning to harm herself. She is not feeling safe.     # Mutism:  # Mood disorder:    is at risk for aphasia given severe M2 stenosis, which is still patent per CTA done last night. Does have a recent history of ischemic infarcts in 8/2018 as well. However from the history and exam she does not seem to have aphasia. It is more likely  selective mutism due to depression. She endorsed that she is depressed. Her speech is progressively worse suggesting it is less likely to be stroke. She was admitted to get MRI and complete stroke work up. Psychiatry consulted this morning and recommended neuropsych testing and  TCU disposition and psychiatry follow up as an outpatient.  --no need for the MRI brain  --continue on aspirin 325mg.   --inflammatory labs including C3,4,anticardiolipin IGG and IGM, ANCA were negative  --PT/OT: recommended TCU    -- continue Fluoxetine to 40 mg daily     # HTN:   --continue pta lisinopril 20mg every day  --continue pta metoprolol 50mg bid     # HLD:  --continue pta atorvastatin 40mg every day     # vaginal bleeding:  Due to uterine fibroids. Endometrial biopsy without atypia  --will monitor w/ cbc  --cares per nursing     Diet: regular diet  PPx: she is fully ambulatory   Code: full  Dispo: is ready for discharge pending finding a place in the TCU      Patient was seen and discussed with Dr. Carlo Farris MD  Neuro-intern  [phone]

## 2018-11-25 NOTE — PROGRESS NOTES
Calorie Count  Intake recorded for: 11/24 Kcals: 182  Protein: 6g  # Meals Recorded: 50% orange juice, 25% breakfast sandwich w/ ham, egg, cheese & spinach on English muffin, less than 25% fruit plate   # Supplements Recorded: 0

## 2018-11-25 NOTE — PLAN OF CARE
Problem: Patient Care Overview  Goal: Plan of Care/Patient Progress Review  Status: Patient admitted d/t worsening speech.    VS: VSS, HTN within parameters.   Neuros: A&Ox4. Neuros include numbness/tingling to left arm and bilateral feet per baseline. No issues with speech this shift.   GI: Regular diet, fair intake. On veronica counts.   : Voiding spontaneously.  ?  IV: PIV SL.   Activity: Up SBA. Walked unit x1 this shift.   Pain: Denies.   Respiratory/Trach: No issues.    Skin: Intact.   Social: No visitors today, sister updated over the phone.     Plan of care: Plan to discharge at beginning of week pending placement. Will continue to monitor.

## 2018-11-25 NOTE — PLAN OF CARE
Problem: Stroke (Ischemic) (Adult)  Goal: Signs and Symptoms of Listed Potential Problems Will be Absent, Minimized or Managed (Stroke)  Signs and symptoms of listed potential problems will be absent, minimized or managed by discharge/transition of care (reference Stroke (Ischemic) (Adult) CPG).   Outcome: No Change  Status: Patient admitted d/t worsening speech, stroke unlikely according to MD note. Suspect recurrent depression.    VS: VSS, HTN within parameters.   Neuros: A&Ox4. Neuros include slight left facial droop, and numbness/tingling to left forearm and bilateral feet numbness at baseline. No issues with speech this shift.   GI: Regular diet, fair intake.   : Voiding spontaneously.  ?  IV: PIV SL.   Activity: Up SBA. Ambulated in [place] x1 tonight.  Pain: Denies.   Respiratory/Trach: No issues.    Skin: Intact.   Social: No visitors today.     Plan of care: Plan to discharge at beginning on week pending placement, see SW note. Continue to monitor and follow POC.

## 2018-11-25 NOTE — PLAN OF CARE
Problem: Patient Care Overview  Goal: Plan of Care/Patient Progress Review  Outcome: No Change  Pt. has hx of ischemic strokes 8/2018, vaginal bleeding, HLD, HTN, prior smoker p/w sudden onset of not speaking but was following commands and was able to walk around on her own with no other neurological deficit. VSS. A&Ox3-4 this shift, d/o placex1. Neuros include: slow to respond at times, did not observe L. facial droop this shift, intermittent N/T in LUE/LLE, baseline N/T to BLE feet, intermittent numbness in RUE. Tolerating regular diet. Voids spontaneously without difficulty. No BM this shift. Up with SBA. Had no c/o heartburn pain managed with PRN tums. Pt medically ready for discharge, SW to follow up with d/c plans. Continue to monitor and follow POC.

## 2018-11-25 NOTE — DISCHARGE SUMMARY
[facility], [place]    Neurology Stroke Discharge Summary    Date of Admission: 11/22/2018  Date of Discharge: 11/29/2018    Disposition: Discharged to short-term care facility  Primary Care Physician: Augusto Thomas      Admission Diagnosis:   Possible stroke    Discharge Diagnosis:   selective mutism and depression    Problem Leading to Hospitalization (from [facility]):   43F hx of ischemic strokes 8/2018, vaginal bleeding, HLD, HTN, prior smoker p/w sudden onset of not speaking but was following commands and was able to walk around on her own with no other neurological deficit. She was admitted OSH in 8/7/2018 with R thalamic, L frontal white matter, and temporo-occipital lobe strokes. Her strokes were deemed embolic, for which she had a large workup that was all negative including BETH and hypercoagulable workup. She was sent home on aspirin. Since her stroke, she has had multiple presentations for intermittent neurologic symptoms such as LUE numbness, R hand weakness, etc with subsequent MRI showing no new strokes. Her last presentation here was 9/12. Family says her mood has been very labile since the stroke as well. She was recently begun on flouxetine 20 mg for depression. Apparently, over 4 days prior to admission, she has been progressively not speaking. The day prior to admission she stopped speaking completely and only grunts or moans. She was admitted for stroke work up    Please see H&P dated 11/22/2018 for further details about presentation.    Brief Hospital Course:   She came to an OSH and had a CTA showing unchanged L M2 stenosis with no LVO. She was transferred here for further workup. The inflammatory labs were negative. Hba1c was 4.7, LDL was 48. The next day of her admission she was talking with no deficit. She endorsed she is profoundly depressed and cries without cause, but she is not thinking or planning to harm herself. She is not feeling safe.    We do not think she  had aphasia. 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 Her speech has been progressively worse prior to admission suggesting it is less likely to be stroke. She was admitted to complete stroke work up. Her sister endorsed that sometimes the patient leaves the gas stove on concerning for her safety. Her sister can't watch her 24/7 so the OT, PT and psychiatry doctor recommended TCU.  Psychiatry has been consulted and recommended neuropsych testing to assess her cognitive functions and  TCU disposition then she needs to be seen by psychiatrist as an outpatient. He does not think she is candidate for inpatient psychiatry transfer. The fluoxetine is increased to 40 mg daily.  Meanwhile we did not do an MRI because we do not think this was a stroke and the symptoms completely resolved after admission     Found to have selective mutism     IV tPA was not given because she came fare outside the tPA therapeutic window    Work-up as stated below under Pertinent Investigations.    Etiology is thought to be the underlying depression      Rehab evaluation: OT, PT and SLP.     Smoking Cessation: already quit smoking    BP Long-term Goal: 140/90 or less    Antithrombotic/Anticoagulant Agent: aspirin 325 mg    Statins: continue on PTA atorvastatin 40 mg       Hgb A1C Goal: < 7.0    Complications: None.     Other problems addressed during this hospitalization:  # Mutism:  # Mood disorder:    She is at risk for stroke given severe M2 stenosis, which is still patent per CTA done on admission. Does have a recent history of ischemic infarcts in 8/2018 as well. However from the history and exam she does not seem to have aphasia. 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 She endorsed that she is depressed. Her speech is progressively worse suggesting it is less likely to be stroke. She was admitted to get MRI and complete stroke work up. Psychiatry consulted this morning and recommended neuropsych testing and  TCU  disposition and psychiatry follow up as an outpatient.  --no need for the MRI brain  --continue on aspirin 325mg.   --inflammatory labs including C3,4,anticardiolipin IGG and IGM, ANCA were negative  --PT/OT: recommended TCU    -- continue Fluoxetine to 40 mg daily      # HTN:   --continue pta lisinopril 20mg every day  --continue pta metoprolol 50mg bid      # HLD:  --continue pta atorvastatin 40mg every day      # vaginal bleeding:  Due to uterine fibroids. Endometrial biopsy without atypia  --will monitor w/ cbc  --cares per nursing    PERTINENT INVESTIGATIONS    Labs  Lipid Panel:   Recent Labs   Lab Test  11/23/18   0701   CHOL  97   HDL  28*   LDL  48   TRIG  107     A1C:   Lab Results   Component Value Date    A1C 4.7 11/22/2018     INR:   No lab results found in last 7 days.   Coag Panel / Hypercoag Workup: was negative  Pending test results: none    Echo: not done this time. She just had one in august that was unremarkable  Imaging:   CT head, CTA head and neck 11/21  No evidence for intracranial hemorrhage or any acute brain  pathology.  2. Cerebral atrophy with old bilateral infarcts and chronic appearing  white matter disease.  3. Right maxillary and left sphenoid sinus disease.  No change from prior CT angiogram. There is some moderate  stenosis of a large left M2 branch similar to that seen on the prior  exam most likely due to atherosclerotic disease.    Endovascular procedure: None     Cardiac Monitoring: Patient had > 24 hrs of cardiac monitor while in hospital.    Findings: No atrial fibrillation was found.    Sleep Apnea Screen:   Questions/Answers      1. Prior to your stroke, have you been told that you snore? No.    2. Prior to your stroke, have you been told that you struggle to breath while you are sleeping? No.    3. Prior to your stroke, do you feel tired and sleepy even after getting a normal night of sleep? No.    Sleep Apnea Screen Findings: Patient has 0-1 symptoms of sleep apnea.   Further sleep study is not recommended at this time.    Education discussed with: patient on blood pressure management, cholesterol management and medical management.    During daily rounds, the plan of care was discussed and developed with patient.  Plan of care includes: she will go to a TCU then get neuropsych test and follow with psychiatry as an outpatient. In the meantime we increased the Fluoxetine from 20 to 40 mg per day.    PHYSICAL EXAMINATION  Vital Signs:  B/P: 115/90, T: 98.4, P: 56, R: 18    General:  patient lying in bed without any acute distress , depressed mood and flat affect    HEENT:  normocephalic/atraumatic  Cardio:  RRR  Pulmonary:  no respiratory distress  Abdomen:  soft  Extremities:  no edema  Skin:  intact     Neurologic  Mental Status:  fully alert, attentive and oriented, follows commands, speech clear and fluent  Cranial Nerves:  visual fields intact, PERRL, EOMI with normal smooth pursuit, facial sensation intact and symmetric, facial movements symmetric, hearing not formally tested but intact to conversation, palate elevation symmetric and uvula midline, no dysarthria, shoulder shrug strong bilaterally, tongue protrusion midline  Motor:  no abnormal movements, normal tone throughout, normal muscle bulk, no pronator drift, normal and symmetric rapid finger tapping, able to move all limbs spontaneously, strength 5/5 throughout upper and lower extremities  Reflexes:  2+ and symmetric throughout  Sensory:  intact/symmetric to light touch and pin prick throughout upper and lower extremities  Coordination:  FNF and HS intact without dysmetria  Station/Gait:  normal width, stride length, turn, and arm swing     National Institutes of Health Stroke Scale (on day of discharge)  NIHSS Total Score: 0    Modified Clever Scale (on day of discharge): 0-No symptoms    Medications    Current Discharge Medication List      CONTINUE these medications which have CHANGED    Details   FLUoxetine  (PROZAC) 40 MG capsule 1 capsule (40 mg) by Oral or Feeding Tube route daily  Qty: 30 capsule    Associated Diagnoses: Current moderate episode of major depressive disorder, unspecified whether recurrent (H)         CONTINUE these medications which have NOT CHANGED    Details   aspirin 325 MG tablet Take 325 mg by mouth      atorvastatin (LIPITOR) 40 MG tablet Take 1 tablet (40 mg) by mouth daily  Qty: 90 tablet, Refills: 1    Associated Diagnoses: Benign essential hypertension; Acute CVA (cerebrovascular accident) (H)      levonorgestrel (MIRENA) 20 MCG/24HR IUD 20 mcg by Intrauterine route      lisinopril (PRINIVIL/ZESTRIL) 20 MG tablet Take 1 tablet (20 mg) by mouth daily  Qty: 90 tablet, Refills: 1    Associated Diagnoses: Benign essential hypertension; Acute CVA (cerebrovascular accident) (H)      LORazepam (ATIVAN) 1 MG tablet Take 0.5-1 tablets (0.5-1 mg) by mouth every 8 hours as needed for anxiety Take 30 minutes prior to departure.  Do not operate a vehicle after taking this medication  Qty: 12 tablet, Refills: 0    Associated Diagnoses: Adjustment disorder with anxious mood      metoprolol tartrate (LOPRESSOR) 50 MG tablet Take 1 tablet (50 mg) by mouth 2 times daily  Qty: 180 tablet, Refills: 3    Comments: Profile Rx: patient will contact pharmacy when needed  Associated Diagnoses: Hypertension, unspecified type             Additional recommendations and follow up:      NEUROPSYCH TESTING, PER HR/PSYCHOLOGIST     MENTAL HEALTH REFERRAL  - Adult; Psychiatry and Medication Management; Psychiatry; [facility]: Psychiatry Clinic [phone]; We will contact you to schedule the appointment or please call with any questions     General info for SNF   Length of Stay Estimate: Short Term Care: Estimated # of Days 31-90  Condition at Discharge: Improving  Level of care:skilled   Rehabilitation Potential: Good  Admission H&P remains valid and up-to-date: Yes  Recent Chemotherapy: N/A  Use Nursing Home Standing Orders:  N/A     Reason for your hospital stay   43F hx of ischemic strokes 8/2018, vaginal bleeding, HLD, HTN, prior smoker p/w sudden onset of not speaking but was following commands and was able to walk around on her own with no other neurological deficit. She was admitted OSH in 8/7/2018 with R thalamic, L frontal white matter, and temporo-occipital lobe strokes. Her strokes were deemed embolic, for which she had a large workup that was all negative including BETH and hypercoagulable workup. She was sent home on aspirin. Since her stroke, she has had multiple presentations for intermittent neurologic symptoms such as LUE numbness, R hand weakness, etc with subsequent MRI showing no new strokes. Her last presentation here was 9/12. Family says her mood has been very labile since the stroke as well. She was recently begun on flouxetine 20 mg for depression. Apparently, over 4 days prior to admission, she has been progressively not speaking. The day prior to admission she stopped speaking completely and only grunts or moans. She came to an OSH and had a CTA showing unchanged L M2 stenosis with no LVO. She was transferred here for further workup. The next day of her admission she was talking with no deficit. She endorsed she is profoundly depressed and cries without cause, but she is not thinking or planning to harm herself. She is not feeling safe.      We do not think she had aphasia. 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 Her speech has been progressively worse prior to admission suggesting it is less likely to be stroke. She was admitted to get MRI and complete stroke work up. Psychiatry consulted and recommended neuropsych testing to assess her cognitive status and  TCU disposition then she needs to be seen by psychiatrist as an outpatient. Her sister endorsed that she is doing dangerous behaviors involving the gas stove. Her sister can't watch her 24/7 so the OT, PT and psychiatry doctor recommended TCU.  The psychiatry doctor does not think she is candidate for inpatient psychiatry transfer     Activity - Ambulate in hallway   Every shift     Adult [facility] Follow-up and recommended labs and tests   Follow up with primary care provider, Augusto Thoams, within one month to follow blood pressure, diabetes control     Follow up with outpatient psychiatrist in 1-2 weeks     Appointments on [place] and/or [facility] (with [facility] or [facility] provider or service). Call [phone] if you haven't heard regarding these appointments within 7 days of discharge.     Full Code     Diet   Follow this diet upon discharge: Orders Placed This Encounter         Regular Diet Adult         Patient was seen and discussed with the Attending, Dr. Moeller.    Shanika Venegas, KEVIN CNP  Pager: [phone]    Addendum of day of discharge made by Shanika Venegas, no changes to plan of care.

## 2018-11-25 NOTE — CONSULTS
"Consult Date:  11/23/2018      PSYCHIATRIC CONSULTATION      REASON FOR CONSULTATION:  The Neurology Service requested a consultation for this patient with a history of strokes who is presenting with depression, lack of insight, unsafe behaviors at home.      HISTORY OF PRESENT ILLNESS:  The patient is a 43-year-old female with a history of ischemic strokes in 08/2018, history of vaginal bleeding, hyperlipidemia, hypertension, prior smoker who was initially admitted here in 08/2018 for right thalamic left frontal and temporal occipital strokes; these were deemed to be embolic.  She had extensive workup, sent home on aspirin.  She has had multiple intermittent neurologic symptoms since then with MRI showing no new strokes.  She had last been here on 09/12.  Family describes her mood as being very labile and that she is engaging in \"dangerous and careless behavior.\"  They have noticed cognitive decline in the last few weeks.  Since Sunday prior to admission she has not been speaking.  She came to the outside hospital and a CTA showed apparently no new findings.  She was transferred here for further workup.      On my interview, the patient was lying in bed.  She made essentially no eye contact.  She was very focused on just how difficult it was to be here, how she wanted to go home, although she did acknowledge that she was having some struggles there.  She stated a friend would be there to watch her.  I did discuss issues about the amount of care she needed.  She seems to minimize her dysfunction.  She describes her mood as, \"I cry a lot sometimes; it is just out of nowhere.\"  She admits to some confusion.  She describes being very frustrated, \"it seems like they can't fix this.\"  The patient denies any previous psychiatric history, describes herself as a happy person prior to her strokes.  Now she states that she is always crying.  She states she is sleeping okay.  Her appetite is okay.  She stated at times she " "wishes she was dead, but this is infrequent and she has no plan or impulse to harm herself.  She has had no attempts.  She denies any hallucinations.  She states that overall she just feels very frustrated, overwhelmed and \"sick of living at my sister's house.\"  Does describe feelings of helplessness.      PAST PSYCHIATRIC HISTORY:  As above.  No previous psychiatric history.  No hospitalizations, no suicide attempts.      CHEMICAL DEPENDENCE HISTORY:  The patient states she is an occasional user of alcohol.  She denies any other substance use or heavy use of alcohol.  She does have a long history of smoking one pack per day of cigarettes but stopped when she had her stroke.      SOCIAL HISTORY:  The patient left school in 9th grade.  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      PAST MEDICAL HISTORY:  Significant for hypertension, stroke, hyperlipidemia.      MEDICATIONS:  Fluoxetine 20 mg per day, Merina hormonal treatment, Lipitor, lisinopril, lorazepam.      REVIEW OF SYSTEMS:  A 10-point review of systems was performed.  The patient states her main physical complaint at this time is that she is having \"heavy periods.\"  She denies abdominal pain, denies chest pain, shortness of breath.  Does admit to being somewhat confused cognitively at times.  The rest of 10-point review of systems is negative.      DATA:  Chart was reviewed, OT, PT evaluations were reviewed, and previous imaging was reviewed.      VITAL SIGNS:  Temperature 98, respirations 16, pulse 59, blood pressure 135/75.      MENTAL STATUS EXAMINATION:  The patient is lying in bed.  She requested that the light be turned off.  She made essentially no eye contact, although she was cooperative with the interview.  Mood and affect:  Patient describes herself as depressed and frustrated.  Her affect is somewhat labile.  She was crying quite a bit during the interview.  Thought " "processes are generally goal directed.  No loosening of associations.  Thought content:  Denies auditory or visual hallucinations, denies suicidal ideation.  Speech and language:  Speech is normal rate, use of language is appropriate.  Attention and concentration:  This was somewhat difficult to assess as the patient was so upset.  I asked her to spell world forward and backward.  She was able to do it forward but could not do it backward and seemed quite overwhelmed.  Orientation:  The patient states she is in a hospital in [place], does not know the name, dates this the day after Thanksgiving 2018.  Short and long-term memory appeared generally intact but the patient is very emotionally labile.  Fund of knowledge appears average.  Judgment and insight:  The patient does understand that she needs some more help in terms of her mood and her functional status, but she does state that she wants to go home and has what appears to be a somewhat unrealistic plan for that.  Muscle bulk and tone is normal.  Abnormal movements:  None noted.      IMPRESSION:  The patient is a 43-year-old female with a history of embolic strokes in 08/2018.  Since that time, she has had depression and emotional lability.  She describes her depressed mood is coming \"out of nowhere.\"  Also, per report of her family, has had cognitive deficits and is engaging in \"dangers and careless behavior,\" and they have noticed a change in the last few weeks.  Her picture appears consistent with a mood disorder secondary to her cerebrovascular accident as well as likely neurocognitive symptoms secondary to her cerebrovascular accidents.      DSM DIAGNOSES:   1.  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   2.  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      TREATMENT RECOMMENDATIONS:   1.  I will continue SSRI at this time.   2.  It will be important to have the family's input in involvement in terms of their opinion of how dangerous things " are at home.   3.  It is unclear what type of rehabilitation goals there may be but both PT and OT are recommending TCU.   4.  As the patient's functional status and placement status has become more clarified, we can look and recommendations for further care.  Due to her neurocognitive deficits and functional difficulties she would likely not be a candidate for inpatient Psychiatry but once we have a better assessment we could certainly look into this.   5.  When the patient's mood is more stable, would consider neuropsychological testing.   6.  Please call or reconsult with any questions, concerns or change in the patient's status.  My number is [phone].      Revised account 2018  shyla HOLDER MD             D: 2018   T: 2018   MT: NEREIDA      Name:     ZAIN SEXTON   MRN:      [id number]        Account:       [id number]   :      1975           Consult Date:  2018      Document: [id number]

## 2018-11-26 ENCOUNTER — APPOINTMENT (OUTPATIENT)
Dept: OCCUPATIONAL THERAPY | Facility: CLINIC | Age: 43
DRG: 886 | End: 2018-11-26
Attending: INTERNAL MEDICINE
Payer: COMMERCIAL

## 2018-11-26 LAB
ANION GAP SERPL CALCULATED.3IONS-SCNC: 7 MMOL/L (ref 3–14)
BUN SERPL-MCNC: 15 MG/DL (ref 7–30)
CALCIUM SERPL-MCNC: 8.6 MG/DL (ref 8.5–10.1)
CHLORIDE SERPL-SCNC: 110 MMOL/L (ref 94–109)
CO2 SERPL-SCNC: 22 MMOL/L (ref 20–32)
CREAT SERPL-MCNC: 0.89 MG/DL (ref 0.52–1.04)
ERYTHROCYTE [DISTWIDTH] IN BLOOD BY AUTOMATED COUNT: 20.4 % (ref 10–15)
GFR SERPL CREATININE-BSD FRML MDRD: 68 ML/MIN/1.7M2
GLUCOSE SERPL-MCNC: 106 MG/DL (ref 70–99)
HCT VFR BLD AUTO: 42.9 % (ref 35–47)
HGB BLD-MCNC: 13.4 G/DL (ref 11.7–15.7)
MCH RBC QN AUTO: 27.6 PG (ref 26.5–33)
MCHC RBC AUTO-ENTMCNC: 31.2 G/DL (ref 31.5–36.5)
MCV RBC AUTO: 89 FL (ref 78–100)
PLATELET # BLD AUTO: 330 10E9/L (ref 150–450)
POTASSIUM SERPL-SCNC: 3.8 MMOL/L (ref 3.4–5.3)
RBC # BLD AUTO: 4.85 10E12/L (ref 3.8–5.2)
SODIUM SERPL-SCNC: 139 MMOL/L (ref 133–144)
WBC # BLD AUTO: 9.3 10E9/L (ref 4–11)

## 2018-11-26 PROCEDURE — 25000132 ZZH RX MED GY IP 250 OP 250 PS 637: Performed by: STUDENT IN AN ORGANIZED HEALTH CARE EDUCATION/TRAINING PROGRAM

## 2018-11-26 PROCEDURE — 85027 COMPLETE CBC AUTOMATED: CPT | Performed by: PSYCHIATRY & NEUROLOGY

## 2018-11-26 PROCEDURE — 80048 BASIC METABOLIC PNL TOTAL CA: CPT | Performed by: STUDENT IN AN ORGANIZED HEALTH CARE EDUCATION/TRAINING PROGRAM

## 2018-11-26 PROCEDURE — 12000001 ZZH R&B MED SURG/OB UMMC

## 2018-11-26 PROCEDURE — 97530 THERAPEUTIC ACTIVITIES: CPT | Mod: GO

## 2018-11-26 PROCEDURE — 40000133 ZZH STATISTIC OT WARD VISIT

## 2018-11-26 PROCEDURE — 12000008 ZZH R&B INTERMEDIATE UMMC

## 2018-11-26 PROCEDURE — 25000132 ZZH RX MED GY IP 250 OP 250 PS 637: Performed by: PSYCHIATRY & NEUROLOGY

## 2018-11-26 PROCEDURE — 36415 COLL VENOUS BLD VENIPUNCTURE: CPT | Performed by: PSYCHIATRY & NEUROLOGY

## 2018-11-26 RX ADMIN — ASPIRIN 325 MG ORAL TABLET 325 MG: 325 PILL ORAL at 10:31

## 2018-11-26 RX ADMIN — LISINOPRIL 20 MG: 20 TABLET ORAL at 10:31

## 2018-11-26 RX ADMIN — METOPROLOL TARTRATE 50 MG: 50 TABLET, FILM COATED ORAL at 21:40

## 2018-11-26 RX ADMIN — ATORVASTATIN CALCIUM 40 MG: 40 TABLET, FILM COATED ORAL at 10:31

## 2018-11-26 RX ADMIN — FLUOXETINE HYDROCHLORIDE 40 MG: 20 CAPSULE ORAL at 10:30

## 2018-11-26 RX ADMIN — METOPROLOL TARTRATE 50 MG: 50 TABLET, FILM COATED ORAL at 10:30

## 2018-11-26 ASSESSMENT — ACTIVITIES OF DAILY LIVING (ADL)
ADLS_ACUITY_SCORE: 12

## 2018-11-26 ASSESSMENT — VISUAL ACUITY
OU: NORMAL ACUITY

## 2018-11-26 NOTE — PROGRESS NOTES
Social Work Services Progress Note    Hospital Day: 5  Date of Initial Social Work Evaluation:  11/23/18  Collaborated with:  Patient    Data:  Pt is awaiting rehab placement.    Intervention:   SW met with pt and informed pt that SW is working on securing rehab placement near to the [place], [place] area.  SW completed a PAS referral #[id number].  The PAS referral did not trigger a Level 2.   SW left a message for [employer] Counselor (Mdeina Mauricio [id number]) requesting that she follow up with pt in regards to MA for LTC as pt's MA has not been determined for LTC per this SW phone call to the [place] [facility] automated line.    Assessment:  Pt states that she will be happy if she can be placed in a SNF near to [place], [place]    Plan:    Anticipated Disposition:  Rehab placement    Barriers to d/c plan: [facility] is seeking insurance authorizatin     Follow Up:  SW will continue to follow.    ELOY Dhillon  Social Work, 6A  Phone:  [phone]  Pager:  [phone]  11/26/2018

## 2018-11-26 NOTE — PLAN OF CARE
Problem: Patient Care Overview  Goal: Plan of Care/Patient Progress Review  OT6A:  Discharge Planner OT   Patient plan for discharge: rehab  Current status: Pt sit<>stand SBA, ambulated ~500+' SBA with no AE and no LOB, needing x1 seated rest break. Pt needing modA for navigation back to room from different unit, x3 verbal reminders on room number and unit. Pt completed SLUMs scoring 18 indicating moderate impairment. Pt answering safety and problem solving questions answering 5/10 correctly.   Barriers to return to prior living situation: medical status, below baseline for functional mobility and I/ADLs, higher level cognition   Recommendations for discharge: TCU  Rationale for recommendations: Pt would benefit from continued therapy to progress safety and IND with I/ADLs, and higher level functional cognition          Entered by: Ledy Naylor 11/26/2018 3:31 PM

## 2018-11-26 NOTE — PROGRESS NOTES
Calorie Count  Intake recorded for: 11/25/2018  Kcals: 988  Protein: 50g  # Meals Recorded: 100% breakfast sandwich (English muffin w/ scrambled egg, ham, cheddar & American cheese), orange juice, chicken quesadilla, 30% fresh fruit plate, less than 25% V8 juice  # Supplements Recorded: no intake recorded

## 2018-11-26 NOTE — PROGRESS NOTES
Social Work Services Progress Note    Hospital Day: 5  Date of Initial Social Work Evaluation:  11/23/18  Collaborated with:  SNF Admissions  Coordinator's    Data: Pt is awaiting rehab placement.  Pt prefers placement near to [facility].    Intervention:  SW placed follow up calls to the following SNF's:  - [facility], SW left a message for Admissions to call in regards to whether or not they can accept pt for admit.  SW indicated that pt is medically ready for discharge.    - [facility], SW left a message for Admissions to call in regards to whether or not they can accept pt for admit.   SW indicated that pt is medically ready for discharge.  - [facility] ([facility]), assessed and declined pt for admit due to pt's age (young)  - [facility] ([facility]), assessed and approved pt for admission on 11/27/18 pending insurance authorization  - [facility] Murray, MARIA ANTONIA left a message for Admissions to call in regards to whether or not they can accept pt for admit.   SW indicated that pt is medically ready for discharge.  - [facility] Marrufo, MARIA ANTONIA left a message for Admissions to call in regards to whether or not they can accept pt for admit.   SW indicated that pt is medically ready for discharge.    Assessment:  Per Neurology (Dr. Farris), pt is medically ready for discharge.  A rehab stay is indicated and pt is agreeable.    Plan:    Anticipated Disposition:  Rehab placement at [facility]    Barriers to d/c plan:  Pending receipt of insurance authorization    Follow Up:  SW will continue to follow.    ELOY Dhillon  Social Work, 6A  Phone:  [phone]  Pager:  [phone]  11/26/2018

## 2018-11-26 NOTE — PLAN OF CARE
Problem: Patient Care Overview  Goal: Plan of Care/Patient Progress Review  Outcome: Improving  Pt here with Hx of of stroke - presented here with aphasia- stroke workup completed and did not show a new infarct. AVSS on RA. Neuros intact except baseline numbness to left thigh. Pt interactive with RN and in a good mood. VDSP - bloody vaginal discharge improving. Regular diet with fair PO - on Tarik counts. Up SBA. Plan is for discharge to TCU once insurance authorizes it and neurophsych testing. Will continue to monitor and follow with POC.

## 2018-11-26 NOTE — PLAN OF CARE
Problem: Stroke (Ischemic) (Adult)  Goal: Signs and Symptoms of Listed Potential Problems Will be Absent, Minimized or Managed (Stroke)  Signs and symptoms of listed potential problems will be absent, minimized or managed by discharge/transition of care (reference Stroke (Ischemic) (Adult) CPG).   Outcome: No Change  VSS. Denies pain. Neuros unchanged; tingling to left thigh that is baseline. Pt was withdrawn this am, wouldn't let us do assesments/wake her up until 1030. Good po intake, see veronica counts (needs help ordering). Voiding spontaneously, bloody vaginal discharge - small amount. Up with SBA. Walked halls with RN and therapy, also sat in chair most of afternoon. Plan is to discontinue to TCU when placement found, will do neuropsych testing outpatient.

## 2018-11-26 NOTE — PLAN OF CARE
Problem: Patient Care Overview  Goal: Plan of Care/Patient Progress Review  Status: 6A s/p worsening speech  VS: HTN within parameters   Neuros: A&O x4. Flat affect.   GI: +BS, regular diet, veronica counts  : Voiding spontaneously, vaginal bleeding, baseline per patient ?  IV: PIV SL  Activity: Up SBA  Pain: Denies  Respiratory: LS clear   Skin: Intact   Plan of care: Continue to monitor and follow current POC.  Awaiting TCU placement

## 2018-11-26 NOTE — PROGRESS NOTES
"[facility], [place]   Neurology Daily Note  Alicia Penaloza  [id number]  11/26/2018    Subjective:  No overnight events. She is waiting for TCU transfer. She slept well yesterday and her mood is better today    Objective:   /80  Pulse 56  Temp 98.2  F (36.8  C) (Oral)  Resp 16  Ht 1.6 m (5' 3\")  Wt 92.5 kg (203 lb 14.4 oz)  SpO2 99%  BMI 36.12 kg/m2  General:  sitting in chair non distressed  HEENT:  normocephalic/atraumatic  Cardio:  RRR  Pulmonary:  no respiratory distress  Abdomen:  soft  Extremities:  no edema  Skin:  intact      Neurologic  Mental status: awake, alert, follows all commands. Speech is appropriate. Cranial nerves: EOMI, face symmetric, VFF, equal to LT, tongue midline/mobile, no dysarthria, eharing intact to convo  Motor: no drift in any extremity  Sensory: equal to LT except in the left UE  Coordination: FTN intact BL      Lab Investigations:   Hemoglobin A1C   Date Value Ref Range Status   11/22/2018 4.7 0 - 5.6 % Final     Comment:     Normal <5.7% Prediabetes 5.7-6.4%  Diabetes 6.5% or higher - adopted from ADA   consensus guidelines.     08/26/2018 4.8 0 - 5.6 % Final     Comment:     Normal <5.7% Prediabetes 5.7-6.4%  Diabetes 6.5% or higher - adopted from ADA   consensus guidelines.       Lab Results   Component Value Date    LDL 53 08/26/2018       Imaging:  Recent Results (from the past 24 hour(s))   CT Head w/o Contrast    Narrative    CT SCAN OF THE HEAD WITHOUT CONTRAST   11/21/2018 6:46 PM     HISTORY: Aphasia.    TECHNIQUE: Axial images of the head and coronal reformations without  IV contrast material.  Radiation dose for this scan was reduced using  automated exposure control, adjustment of the mA and/or kV according  to patient size, or iterative reconstruction technique.    COMPARISON: None.    FINDINGS: There is some mild cerebral atrophy. There is an old left  parietal occipital infarct. There is also an old infarct in the " right  basal ganglia region. Patchy white matter changes are present in both  hemispheres. There is no evidence for mass effect, acute infarct, or  skull fracture. There is near complete opacification of the right  maxillary sinus and there is an air-fluid level in the left sphenoid  sinus.      Impression    IMPRESSION:  1. No evidence for intracranial hemorrhage or any acute brain  pathology.  2. Cerebral atrophy with old bilateral infarcts and chronic appearing  white matter disease.  3. Right maxillary and left sphenoid sinus disease.    ANSLEY GONZALEZ MD   CT Head Neck Angio w/o & w Contrast    Narrative    CTA HEAD NECK WITH CONTRAST 11/21/2018 6:53 PM     HISTORY: Aphasia.    TECHNIQUE: MR angiography was performed through the head and neck  without and with intravenous contrast. 80 mL of Isovue 370 was given.  Multiplanar reconstructions were performed. 3-D reconstructions off a  remote workstation for CT angiography were also acquired. Carotid  stenoses were evaluated by comparing the caliber of the proximal  internal carotid artery to the caliber of the distal internal carotid  artery. Radiation dose for this scan was reduced using automated  exposure control, adjustment of the mA and/or kV according to patient  size, or iterative reconstruction technique.    COMPARISON: 8/25/2018    FINDINGS:  Brachiocephalic vessels: Normal.    Right carotid system: Normal.    Left carotid system: Normal.    Right vertebral artery: Normal nondominant vessel.    Left vertebral artery: Normal.    [place]: Again noted is moderate narrowing of a large left  middle cerebral artery branch proximally which is similar to that seen  on other CT angiogram studies. There is also narrowing of the right P1  segment which is presumably congenital and also unchanged. The distal  internal carotid arteries and basilar artery are patent. The proximal  anterior, middle, and posterior cerebral arteries are patent.      Impression     IMPRESSION: No change from prior CT angiogram. There is some moderate  stenosis of a large left M2 branch similar to that seen on the prior  exam most likely due to atherosclerotic disease.    ANSLEY GONZALEZ MD   XR Chest Port 1 View    Narrative    CHEST ONE VIEW PORTABLE   11/21/2018 8:07 PM     HISTORY: Altered loss of consciousness.     COMPARISON: 8/25/2018      Impression    IMPRESSION: Normal.    LEXIE BISHOP MD       Assessment and Plan   43F hx of ischemic strokes 8/2018, vaginal bleeding, HLD, HTN, prior smoker p/w sudden onset of not speaking but was following commands and was able to walk around on her own with no other neurological deficit. She was admitted OSH in 8/7/2018 with R thalamic, L frontal white matter, and temporo-occipital lobe strokes. Her strokes were deemed embolic, for which she had a large workup that was all negative including BETH and hypercoagulable workup. She was sent home on aspirin. Since her stroke, she has had multiple presentations for intermittent neurologic symptoms such as LUE numbness, R hand weakness, etc with subsequent MRI showing no new strokes. Her last presentation here was 9/12. Family says her mood has been very labile since the stroke as well. She was recently begun on flouxetine 20 mg for depression. Apparently, since Sunday, she has been progressively not speaking. The day prior to admission she stopped speaking completely and only grunts or moans. She came to an OSH and had a CTA showing unchanged L M2 stenosis with no LVO. She was transferred here for further workup. Patient endorsed she is profoundly depressed and cries without cause, but she is not thinking or planning to harm herself. She is not feeling safe.     # Mutism:  # Mood disorder:    is at risk for aphasia given severe M2 stenosis, which is still patent per CTA done last night. Does have a recent history of ischemic infarcts in 8/2018 as well. However from the history and exam she does not seem  to have aphasia. 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 She endorsed that she is depressed. Her speech is progressively worse suggesting it is less likely to be stroke. She was admitted to get MRI and complete stroke work up. Psychiatry consulted this morning and recommended neuropsych testing and  TCU disposition and psychiatry follow up as an outpatient.  --no need for the MRI brain  --continue on aspirin 325mg.   --inflammatory labs including C3,4,anticardiolipin IGG and IGM, ANCA were negative  --PT/OT: recommended TCU    -- continue Fluoxetine to 40 mg daily     # HTN:   --continue pta lisinopril 20mg every day  --continue pta metoprolol 50mg bid  - bl pressure stable around 120s occasionally 140s     # HLD:  --continue pta atorvastatin 40mg every day     # vaginal bleeding:  Due to uterine fibroids. Endometrial biopsy without atypia  --will monitor w/ cbc Hb  Stable around 12 -13  --cares per nursing     Diet: regular diet  PPx: she is fully ambulatory   Code: full  Dispo: is ready for discharge pending finding a place in the TCU      Patient was seen and discussed with Dr. rosa Farris MD  Neuro-intern  [phone]

## 2018-11-27 ENCOUNTER — APPOINTMENT (OUTPATIENT)
Dept: PHYSICAL THERAPY | Facility: CLINIC | Age: 43
DRG: 886 | End: 2018-11-27
Attending: INTERNAL MEDICINE
Payer: COMMERCIAL

## 2018-11-27 PROCEDURE — 25000132 ZZH RX MED GY IP 250 OP 250 PS 637: Performed by: STUDENT IN AN ORGANIZED HEALTH CARE EDUCATION/TRAINING PROGRAM

## 2018-11-27 PROCEDURE — 40000193 ZZH STATISTIC PT WARD VISIT: Performed by: REHABILITATION PRACTITIONER

## 2018-11-27 PROCEDURE — 97116 GAIT TRAINING THERAPY: CPT | Mod: GP | Performed by: REHABILITATION PRACTITIONER

## 2018-11-27 PROCEDURE — 12000001 ZZH R&B MED SURG/OB UMMC

## 2018-11-27 PROCEDURE — 25000132 ZZH RX MED GY IP 250 OP 250 PS 637: Performed by: PSYCHIATRY & NEUROLOGY

## 2018-11-27 PROCEDURE — 97530 THERAPEUTIC ACTIVITIES: CPT | Mod: GP | Performed by: REHABILITATION PRACTITIONER

## 2018-11-27 RX ADMIN — METOPROLOL TARTRATE 50 MG: 50 TABLET, FILM COATED ORAL at 08:10

## 2018-11-27 RX ADMIN — FLUOXETINE HYDROCHLORIDE 40 MG: 20 CAPSULE ORAL at 08:10

## 2018-11-27 RX ADMIN — ATORVASTATIN CALCIUM 40 MG: 40 TABLET, FILM COATED ORAL at 08:10

## 2018-11-27 RX ADMIN — LISINOPRIL 20 MG: 20 TABLET ORAL at 08:10

## 2018-11-27 RX ADMIN — METOPROLOL TARTRATE 50 MG: 50 TABLET, FILM COATED ORAL at 20:43

## 2018-11-27 RX ADMIN — ASPIRIN 325 MG ORAL TABLET 325 MG: 325 PILL ORAL at 08:10

## 2018-11-27 ASSESSMENT — ACTIVITIES OF DAILY LIVING (ADL)
ADLS_ACUITY_SCORE: 12

## 2018-11-27 ASSESSMENT — VISUAL ACUITY
OU: NORMAL ACUITY

## 2018-11-27 NOTE — PROGRESS NOTES
CLINICAL NUTRITION SERVICES - BRIEF NOTE (see RD note on 11/23 for full assessment)    New findings   Oral intake significantly improved over course of 3-day calorie counts (see summary below). Meeting approximately 75-85% minimum nutrition needs as per yesterday's intake.     11/26:  1228 kcal, 67 g PRO   11/25:   998 kcal, 50 g PRO  11/24:   182 kcal, 6 g PRO      Interventions  Continue to encourage oral intake via meals/snacks as able.     Florence Murcia RD, LD  Neuro Dietitian Pager: [phone]

## 2018-11-27 NOTE — PLAN OF CARE
"Problem: Patient Care Overview  Goal: Plan of Care/Patient Progress Review  Blood pressure 130/88, pulse 56, temperature 98.3  F (36.8  C), temperature source Oral, resp. rate 16, height 1.6 m (5' 3\"), weight 92.5 kg (203 lb 14.4 oz), SpO2 99 %.  Status: pt on 6A speech worsening & speech loss   Neuros: A&O x4. PERRLA, all extremities 5/5  GI: BS+. Passing flatus, regular diet w/ veronica counts, no BM over shift  : per report pt.voids w/o difficulty, minimal vaginal bleeding is pt s reported baseline ?  IV: PIV SL  Activity: up w/ SBA  Pain: denied pain over shift  Respiratory/Trach: LS clear, RR WDL, sating in 90s on RA    Skin:WDL  Plan of care: awaiting TCU placement       "

## 2018-11-27 NOTE — PROGRESS NOTES
Calorie Counts  Intake recorded for: 11/26  Kcals: 1228  Protein: 67g  # Meals Recorded: 3 meals (First - 75% apple juice, 50% breakfast sandwich w/ egg, ham & cheese)      (Second - 100% deli sandwich w/ roast beef & tomatoes, iced tea)      (Third - 100% chicken & cheese quesadilla, iced tea, brownie)  # Supplements Recorded: 0

## 2018-11-27 NOTE — PLAN OF CARE
Problem: Patient Care Overview  Goal: Plan of Care/Patient Progress Review  PT - per plan established by the Physical Therapist, according to functional mobility the  discharge recommendation is home with 24/7 assist due to cognitive concerns. Pt is progression well with functional mobility. Pt is SBA to IDN for all bed mobility. SBA for sit to stand from multi Ht surfaces. Pt amb no A.D up to 500'x 2 needing close SBA, but no assist needed. Pt demo no overt LOB but small mis steps no assist needs. Pt demo gait and balance tasks with close SBA due to mild instability. Pt demo up and down 3 steps x 5 with one rail SBA.   Discharge Planner PT   Patient plan for discharge: home to sleep   Current status: see above.   Barriers to return to prior living situation: weakness, decision making.   Recommendations for discharge: home with 24/7 due to cognitive issues   Rationale for recommendations: good support at home.        Entered by: Betito Mae 11/27/2018 3:49 PM

## 2018-11-27 NOTE — PLAN OF CARE
Problem: Stroke (Ischemic) (Adult)  Goal: Signs and Symptoms of Listed Potential Problems Will be Absent, Minimized or Managed (Stroke)  Signs and symptoms of listed potential problems will be absent, minimized or managed by discharge/transition of care (reference Stroke (Ischemic) (Adult) CPG).   Outcome: No Change  VSS. Denies pain. Neuros unchanged; tingling to left thigh that is baseline. Good po intake, ate breakfast and lunch.Voiding spontaneously, bloody vaginal discharge - small amount. Up with SBA. Walked halls with RN x 2, also sat in chair most of afternoon. Plan is to discharge to TCU when financially cleared. Pt is excited that sister will be visiting tonight.

## 2018-11-27 NOTE — PROGRESS NOTES
"[facility], [place]   Neurology Daily Note  Alicia Penaloza  [id number]  11/27/2018    Subjective:  No overnight events. She is waiting for TCU transfer. She did not sleep well yesterday but her mood is better today. BMP and CBC were within normal    Objective:   /83 (BP Location: Left arm)  Pulse 61  Temp 98.1  F (36.7  C) (Oral)  Resp 16  Ht 1.6 m (5' 3\")  Wt 92.5 kg (203 lb 14.4 oz)  SpO2 98%  BMI 36.12 kg/m2  General:  sitting in chair non distressed  HEENT:  normocephalic/atraumatic  Cardio:  RRR  Pulmonary:  no respiratory distress  Abdomen:  soft  Extremities:  no edema  Skin:  intact      Neurologic  Mental status: awake, alert, follows all commands. Speech is appropriate. Cranial nerves: EOMI, face symmetric, VFF, equal to LT, tongue midline/mobile, no dysarthria, eharing intact to convo  Motor: no drift in any extremity  Sensory: equal to LT except in the left UE  Coordination: FTN intact BL      Lab Investigations:   Hemoglobin A1C   Date Value Ref Range Status   11/22/2018 4.7 0 - 5.6 % Final     Comment:     Normal <5.7% Prediabetes 5.7-6.4%  Diabetes 6.5% or higher - adopted from ADA   consensus guidelines.     08/26/2018 4.8 0 - 5.6 % Final     Comment:     Normal <5.7% Prediabetes 5.7-6.4%  Diabetes 6.5% or higher - adopted from ADA   consensus guidelines.       Lab Results   Component Value Date    LDL 53 08/26/2018       Imaging:  Recent Results (from the past 24 hour(s))   CT Head w/o Contrast    Narrative    CT SCAN OF THE HEAD WITHOUT CONTRAST   11/21/2018 6:46 PM     HISTORY: Aphasia.    TECHNIQUE: Axial images of the head and coronal reformations without  IV contrast material.  Radiation dose for this scan was reduced using  automated exposure control, adjustment of the mA and/or kV according  to patient size, or iterative reconstruction technique.    COMPARISON: None.    FINDINGS: There is some mild cerebral atrophy. There is an old left  parietal " occipital infarct. There is also an old infarct in the right  basal ganglia region. Patchy white matter changes are present in both  hemispheres. There is no evidence for mass effect, acute infarct, or  skull fracture. There is near complete opacification of the right  maxillary sinus and there is an air-fluid level in the left sphenoid  sinus.      Impression    IMPRESSION:  1. No evidence for intracranial hemorrhage or any acute brain  pathology.  2. Cerebral atrophy with old bilateral infarcts and chronic appearing  white matter disease.  3. Right maxillary and left sphenoid sinus disease.    ANSLEY GONZALEZ MD   CT Head Neck Angio w/o & w Contrast    Narrative    CTA HEAD NECK WITH CONTRAST 11/21/2018 6:53 PM     HISTORY: Aphasia.    TECHNIQUE: MR angiography was performed through the head and neck  without and with intravenous contrast. 80 mL of Isovue 370 was given.  Multiplanar reconstructions were performed. 3-D reconstructions off a  remote workstation for CT angiography were also acquired. Carotid  stenoses were evaluated by comparing the caliber of the proximal  internal carotid artery to the caliber of the distal internal carotid  artery. Radiation dose for this scan was reduced using automated  exposure control, adjustment of the mA and/or kV according to patient  size, or iterative reconstruction technique.    COMPARISON: 8/25/2018    FINDINGS:  Brachiocephalic vessels: Normal.    Right carotid system: Normal.    Left carotid system: Normal.    Right vertebral artery: Normal nondominant vessel.    Left vertebral artery: Normal.    [place]: Again noted is moderate narrowing of a large left  middle cerebral artery branch proximally which is similar to that seen  on other CT angiogram studies. There is also narrowing of the right P1  segment which is presumably congenital and also unchanged. The distal  internal carotid arteries and basilar artery are patent. The proximal  anterior, middle, and  posterior cerebral arteries are patent.      Impression    IMPRESSION: No change from prior CT angiogram. There is some moderate  stenosis of a large left M2 branch similar to that seen on the prior  exam most likely due to atherosclerotic disease.    ANSLEY GONZALEZ MD   XR Chest Port 1 View    Narrative    CHEST ONE VIEW PORTABLE   11/21/2018 8:07 PM     HISTORY: Altered loss of consciousness.     COMPARISON: 8/25/2018      Impression    IMPRESSION: Normal.    LEXIE BISHOP MD       Assessment and Plan   43F hx of ischemic strokes 8/2018, vaginal bleeding, HLD, HTN, prior smoker p/w sudden onset of not speaking but was following commands and was able to walk around on her own with no other neurological deficit. She was admitted OSH in 8/7/2018 with R thalamic, L frontal white matter, and temporo-occipital lobe strokes. Her strokes were deemed embolic, for which she had a large workup that was all negative including BETH and hypercoagulable workup. She was sent home on aspirin. Since her stroke, she has had multiple presentations for intermittent neurologic symptoms such as LUE numbness, R hand weakness, etc with subsequent MRI showing no new strokes. Her last presentation here was 9/12. Family says her mood has been very labile since the stroke as well. She was recently begun on flouxetine 20 mg for depression. Apparently, since Sunday, she has been progressively not speaking. The day prior to admission she stopped speaking completely and only grunts or moans. She came to an OSH and had a CTA showing unchanged L M2 stenosis with no LVO. She was transferred here for further workup. Patient endorsed she is profoundly depressed and cries without cause, but she is not thinking or planning to harm herself. She is not feeling safe.     # Mutism:  # Mood disorder:    is at risk for aphasia given severe M2 stenosis, which is still patent per CTA done last night. Does have a recent history of ischemic infarcts in 8/2018 as  well. However from the history and exam she does not seem to have aphasia. 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 She endorsed that she is depressed. Her speech is progressively worse suggesting it is less likely to be stroke. She was admitted to get MRI and complete stroke work up. Psychiatry consulted this morning and recommended neuropsych testing and  TCU disposition and psychiatry follow up as an outpatient.  --no need for the MRI brain  --continue on aspirin 325mg.   --inflammatory labs including C3,4,anticardiolipin IGG and IGM, ANCA were negative  --PT/OT: recommended TCU    -- continue Fluoxetine to 40 mg daily     # HTN:   --continue pta lisinopril 20mg every day  --continue pta metoprolol 50mg bid  - bl pressure stable around 120s occasionally 140s     # HLD:  --continue pta atorvastatin 40mg every day     # vaginal bleeding:  Due to uterine fibroids. Endometrial biopsy without atypia  --will monitor w/ cbc Hb  Stable around 12 -13  --cares per nursing     Diet: regular diet  PPx: she is fully ambulatory   Code: full  Dispo: is ready for discharge pending finding a place in the TCU      Patient was seen and discussed with Dr. rosa Farris MD  Neuro-intern  [phone]

## 2018-11-27 NOTE — PLAN OF CARE
Problem: Patient Care Overview  Goal: Plan of Care/Patient Progress Review  Outcome: No Change  Took over care from 7765-8815. Admit for mutism, behavior disorder. Neuros intact ex slow speech. Alert and oriented x4. Appropriate behaviors. Flat withdrawn affect. LLE numbness. Full strengths to all extremities. VSS on RA. Denies pain. PIV SL. Regular diet. Voids without difficulty, slight bloody urine d/t vaginal bleeding which is improving per patient statement. Regular diet with veronica counting. Up SBA. Pending TCU placement when insurance ready. Continue to monitor.

## 2018-11-27 NOTE — PROGRESS NOTES
Social Work Services Progress Note     Hospital Day: 6  Date of Initial Social Work Evaluation:  11/23/18  Collaborated with:  SNF Admissions Coordinator's, 6A nursing and Dr. Farris (Neuro Stroke), patient     Data:  Pt is awaiting rehab placement.        Intervention:  MARIA ANTONIA spoke with the Director of Nursing (Skip) at [facility].  Skip states that he spoke with pt's [facility] Financial Worker who states that they will have a better idea as to whether or not pt will be approved for MA coverage for LTC facility placement after they review the MA for LTC application.  [facility] does not anticipate knowing the outcome by 11am today.  MARIA ANTONIA informed Skip that the jillian was faxed to [facility] Financial Worker.  MARIA ANTONIA phoned [facility] (Tori) and cancelled today's scheduled transport.   Transport re-scheduled to take place at 11am tomorrow pending outcome of insurance coverage.  MARIA ANTONIA received a call from Admissions at [facility] (Alba) and they are still waiting to talk with pt's [facility] Financial Worker.  Per Alba's request, MARIA ANTONIA faxed a copy of pt's PAS referrals and MA for LTC application.    MARIA ANTONIA updated pt who voices understanding of the discharge plan.  With pt's verbal permission, MARIA ANTONIA left a message for pt's sister (Sharita) to call for a discharge planning update.  MARIA ANTONIA updated [facility] nursing and Dr. Farris.     Assessment:  Pt is agreeable to rehab placement and wants placement near to [place], [place].     Plan:    Anticipated Disposition:  Rehab placement in a community SNF    Barriers to d/c plan:  SNF's are waiting confirmation from  [facility] Financial Worker that pt will have coverage for SNF stay.    Follow Up:  MARIA ANTONIA will continue to follow.     ELOY Dhillon  Social Work, 6A  Phone:  [phone]  Pager:  [phone]  11/27/2018

## 2018-11-27 NOTE — PLAN OF CARE
Problem: Stroke (Ischemic) (Adult)  Goal: Signs and Symptoms of Listed Potential Problems Will be Absent, Minimized or Managed (Stroke)  Signs and symptoms of listed potential problems will be absent, minimized or managed by discharge/transition of care (reference Stroke (Ischemic) (Adult) CPG).   Outcome: No Change  Status: 6A s/p worsening speech  VS: HTN within parameters   Neuros: A&O x4. Flat affect.   GI: +BS, regular diet, veronica counts  : Voiding spontaneously, vaginal bleeding-minimal, baseline per patient ?  IV: PIV SL  Activity: Up SBA  Pain: Denies  Respiratory: LS clear   Skin: Intact   Plan of care: Showered this shift and walked halls x1. Continue to monitor and follow current POC.  [facility] called for RN-RN information on patient; will attempt to contact SW tomorrow regarding long term insurance coverage issues. Awaiting TCU placement

## 2018-11-27 NOTE — PROGRESS NOTES
Social Work Services Progress Note    Hospital Day: 6  Date of Initial Social Work Evaluation:  11/23/18  Collaborated with:  SNF Admissions Coordinator's,  nursing and Dr. Farris (Neuro Stroke)    Data:  Pt is awaiting rehab placement.   SW has a tentative w/c transport scheduled with [facility] today at 11am , however, this is pending financial clearance from accepting SNF.  SW received a 11/26/18 4:32pm voice mail message from Stephen More[facility] Financial Counselor asking if pt still needs to be seen for MA LTC jillian.    SW received a 11/26/18 voice mail message from Shani Richardson at [facility] in [place] stating that pt needs to have the LTC benefit before they can get prior auth.  Shani also indicated that they would complete a telephone nursing assessment.  jSW received a 11/26/18 voice mail from Admissions at [facility] ([place]) and due to staffing, they cannot accommodate pt for admit.      Intervention:  MARIA ANTONIA spoke with the Director of Nursing at [facility] ([facility]) who states that they are prepared to accept pt for admit today if pt's [facility] Financial Worker confirms that pt will have coverage for SNF stay.  [facility] states that pt's financial worker is in a meeting until 10:30am and thus, he will contact them at 10:30am.  MARIA ANTONIA left a voice mail message for [place] Financial Counselor Medina Mauricio indicating that MA for LTC jillian is needed. Medina arrived on 6A and completed jillian.  MARIA ANTONIA also spoke with Admissions at [facility] ([place]) and they are also waiting to speak with pt's  [facility] Financial Worker to confirm that pt will have coverage for SNF stay.  MARIA ANTONIA updated pts' floor nurse (Belkis), the 6A Charge Nurse (Deidra) and Dr. Farris.    Assessment:  Pt is agreeable to rehab placement and wants placement near to [place].    Plan:    Anticipated Disposition:  Rehab placement in a community SNF    Barriers to d/c plan:  SNF's are waiting to  speak with pt's  [facility] Financial Worker to confirm that pt will have coverage for SNF stay.    Follow Up:  SW will continue to follow.    ELOY Dhillon  Social Work, 6A  Phone:  [phone]  Pager:  [phone]  11/27/2018

## 2018-11-27 NOTE — PROGRESS NOTES
"Social Work Services Progress Note    Hospital Day: 6  Date of Initial Social Work Evaluation:  11/23/18  Collaborated with:  SNF Admissions Coordinator's, Senior Linkage Line, pt's sister (Sharita), [facility] PAS [place] Level 2, patient    Data:  MARIA ANTONIA is following for discharge planning.    Intervention:  MARIA ANTONIA received a call from Admissions at [facility] ([facility]).  [facility] states that at this time, they will not accept pt for admit but would be open to re-assessing in 2 weeks.  [facility] states that they are concerned about lack of access to mental health services [place].  MARIA ANTONIA received a call from the Senior Linkage Line (Domonique [phone]).  Domonique indicates that she will be referring pt for a Level 2 screen in spite of the fact that pt's PAS referral did not trigger a Level 2 screen. Domonique states that based on the information provided in the PAS referral, they feel that specialized services for MI should be offered.  MARIA ANTONIA received a call from the Director of Nurses at (Skip) at [facility].  Skip states that he talked with the [facility] Financial Worker and based on the outcome of that conversation, they can accept pt for admit on 11/28/18.  Skip states that he is off work on 11/28/18 and thus, the Assistant Director of Nursing ([facility] [phone]) will be the Admissions .  MARIA ANTONIA received a return call from pt's sister, Sharita.  MARIA ANTONIA updated Sharita in regards to the discharge plan.  Sharita states that she is \"happy\" with this plan.  Sharita confirms that she will sign pt's nursing home admissions paperwork on pt's behalf, if needed.  MARIA ANTONIA spoke with Sharita about potential need to pursue guardianship in the future if pt's cognition does not improve.  MARIA ANTONIA spoke with Sharita about behaviors pt exhibited while in her home.  Sharita states that there were no behaviors until 11/20/18.  Sharita states that pt had gone over to a " friends home on 11/18/18 to watch the naaya game.  Sharita states that she had to go to [place] on 11/19/18.  Sharita states that per her , pt stopped talking on 11/20/18 and was only mumbling.  Sharita states that pt had been eating family dinners with them but did not do so on 11/20 or 11/21 but instead ate by herself before family arrived home from work.  Sharita states that on one of these nights, her  woke up to find pt sitting in a recliner watching TV.  When he asked if she was ok, pt reportedly said that she was.  Later in the evening, the dog was barking so Sharita's  again got up to check on pt who stated that she was up due to the dog barking.  Sharita states that pt did not take her medications on 11/20 or 11/21.  Sharita reports that pt had decreased responsiveness and difficulty moving her hand to put meds in her mouth when instructed to take her meds.  Sharita states that pt was unable to recall events occurring on 11/20 and 11/21/18.  MARIA ANTONIA phoned Director of Nurses at ([facility]) at [facility] to inform that a PAS Level 2 is now required.  Thus, [facility] if the Level 2 is complete.  Per discussion, [facility] reports that they have a Mental Health Services NP who will be accessible to pt.  Per [facility], they have a SW starting soon who specializes in mental health and should be available to provide counseling to pt.  MARIA ANTONIA phoned [facility] ([place]) and scheduled 11/29/18, 10am  Wheelchair transport.  MARIA ANTONIA phoned [facility] Level 2 Screener (Hayley [phone])  Who states that Level 2 will be completed by 11/29/18 at 10am.  Per Hayley's request, MARIA ANTONIA faxed pt's H/P to her at (fax [phone]).  MARIA ANTONIA updated pt.    Assessment: Pt voices understanding of the discharge plan and agreement with the discharge plan.     Plan:    Anticipated Disposition:  Rehab placement [facility]    Barriers to d/c plan:  Await PAS Level 2 screen    Follow Up:  SW will coordinate discharge.    ELOY Dhillon  Social Work, 6A  Phone:  [phone]  Pager:  [phone]  11/28/2018

## 2018-11-28 ENCOUNTER — APPOINTMENT (OUTPATIENT)
Dept: OCCUPATIONAL THERAPY | Facility: CLINIC | Age: 43
DRG: 886 | End: 2018-11-28
Attending: INTERNAL MEDICINE
Payer: COMMERCIAL

## 2018-11-28 ENCOUNTER — APPOINTMENT (OUTPATIENT)
Dept: PHYSICAL THERAPY | Facility: CLINIC | Age: 43
DRG: 886 | End: 2018-11-28
Attending: INTERNAL MEDICINE
Payer: COMMERCIAL

## 2018-11-28 LAB
ANION GAP SERPL CALCULATED.3IONS-SCNC: 5 MMOL/L (ref 3–14)
BUN SERPL-MCNC: 18 MG/DL (ref 7–30)
CALCIUM SERPL-MCNC: 8.1 MG/DL (ref 8.5–10.1)
CHLORIDE SERPL-SCNC: 110 MMOL/L (ref 94–109)
CO2 SERPL-SCNC: 24 MMOL/L (ref 20–32)
CREAT SERPL-MCNC: 0.87 MG/DL (ref 0.52–1.04)
ERYTHROCYTE [DISTWIDTH] IN BLOOD BY AUTOMATED COUNT: 20.2 % (ref 10–15)
GFR SERPL CREATININE-BSD FRML MDRD: 71 ML/MIN/1.7M2
GLUCOSE SERPL-MCNC: 82 MG/DL (ref 70–99)
HCT VFR BLD AUTO: 40 % (ref 35–47)
HGB BLD-MCNC: 12.4 G/DL (ref 11.7–15.7)
MCH RBC QN AUTO: 27.7 PG (ref 26.5–33)
MCHC RBC AUTO-ENTMCNC: 31 G/DL (ref 31.5–36.5)
MCV RBC AUTO: 90 FL (ref 78–100)
PLATELET # BLD AUTO: 294 10E9/L (ref 150–450)
POTASSIUM SERPL-SCNC: 3.6 MMOL/L (ref 3.4–5.3)
RBC # BLD AUTO: 4.47 10E12/L (ref 3.8–5.2)
SODIUM SERPL-SCNC: 139 MMOL/L (ref 133–144)
WBC # BLD AUTO: 7.1 10E9/L (ref 4–11)

## 2018-11-28 PROCEDURE — 25000132 ZZH RX MED GY IP 250 OP 250 PS 637: Performed by: STUDENT IN AN ORGANIZED HEALTH CARE EDUCATION/TRAINING PROGRAM

## 2018-11-28 PROCEDURE — 25000132 ZZH RX MED GY IP 250 OP 250 PS 637: Performed by: PSYCHIATRY & NEUROLOGY

## 2018-11-28 PROCEDURE — 80048 BASIC METABOLIC PNL TOTAL CA: CPT | Performed by: PSYCHIATRY & NEUROLOGY

## 2018-11-28 PROCEDURE — 40000133 ZZH STATISTIC OT WARD VISIT

## 2018-11-28 PROCEDURE — 97530 THERAPEUTIC ACTIVITIES: CPT | Mod: GP

## 2018-11-28 PROCEDURE — 40000193 ZZH STATISTIC PT WARD VISIT

## 2018-11-28 PROCEDURE — 85027 COMPLETE CBC AUTOMATED: CPT | Performed by: PSYCHIATRY & NEUROLOGY

## 2018-11-28 PROCEDURE — 12000008 ZZH R&B INTERMEDIATE UMMC

## 2018-11-28 PROCEDURE — 36415 COLL VENOUS BLD VENIPUNCTURE: CPT | Performed by: PSYCHIATRY & NEUROLOGY

## 2018-11-28 PROCEDURE — 97110 THERAPEUTIC EXERCISES: CPT | Mod: GP

## 2018-11-28 PROCEDURE — 97535 SELF CARE MNGMENT TRAINING: CPT | Mod: GO

## 2018-11-28 RX ADMIN — METOPROLOL TARTRATE 50 MG: 50 TABLET, FILM COATED ORAL at 20:38

## 2018-11-28 RX ADMIN — METOPROLOL TARTRATE 50 MG: 50 TABLET, FILM COATED ORAL at 09:08

## 2018-11-28 RX ADMIN — LISINOPRIL 20 MG: 20 TABLET ORAL at 09:08

## 2018-11-28 RX ADMIN — ASPIRIN 325 MG ORAL TABLET 325 MG: 325 PILL ORAL at 09:08

## 2018-11-28 RX ADMIN — FLUOXETINE HYDROCHLORIDE 40 MG: 20 CAPSULE ORAL at 09:08

## 2018-11-28 RX ADMIN — ATORVASTATIN CALCIUM 40 MG: 40 TABLET, FILM COATED ORAL at 09:08

## 2018-11-28 ASSESSMENT — ACTIVITIES OF DAILY LIVING (ADL)
ADLS_ACUITY_SCORE: 12

## 2018-11-28 ASSESSMENT — VISUAL ACUITY
OU: NORMAL ACUITY

## 2018-11-28 NOTE — PLAN OF CARE
Problem: Patient Care Overview  Goal: Plan of Care/Patient Progress Review  Discharge Planner PT   Patient plan for discharge: TCU   Current status: Pt performs functional mobility, supervised - mod indep. Ambulated 2 x 225' without AD and supervision. Completed 5 x 3 steps with unilateral UE support and supervision. To promote maintenance of activity tolerance pt completed 15' on Nu-step at level 4 R. Maintained >55 STM, AVSS.   Barriers to return to prior living situation: medical needs   Recommendations for discharge: TCU   Rationale for recommendations: Pt is performing most functional mobility supervised - mod indep. Limited by cognition       Entered by: Stacy Grace 11/28/2018 3:40 PM        Physical Therapy Discharge Summary    Reason for therapy discharge:    All goals and outcomes met, no further needs identified.    Progress towards therapy goal(s). See goals on Care Plan in [facility] electronic health record for goal details.  Goals met    Therapy recommendation(s):    Continue home exercise program.

## 2018-11-28 NOTE — PROGRESS NOTES
Social Work Services Progress Note    Hospital Day: 7  Date of Initial Social Work Evaluation:  11/23/18      Data:  Discharge to [facility] is anticipated on 11/29/18 at 10am pending completed of [facility] Level 2 screen.      Intervention:  SW phoned [facility] Level 2 Screener (Hayley [phone]), and left a message for her to call to confirm when the Level 2 will be completed.         Plan:    Anticipated Disposition:  Rehab placement at [facility]    Barriers to d/c plan:  Await PAS Level 2 screen    Follow Up:  MARIA ANTONIA will coordinate discharge.    ELOY Dhillon  Social Work, 6A  Phone:  [phone]  Pager:  [phone]  11/28/2018

## 2018-11-28 NOTE — PROGRESS NOTES
Social Work Services Discharge Note      Patient Name:  Alicia Penaloza     Anticipated Discharge Date:  11/29/18    Discharge Disposition:   [facility]  [address] [place] [address]  [phone]    Following MD:  Facility Assignment     Pre-Admission Screening (PAS) online form has been completed.  The Level of Care (LOC) is:  Determined  Confirmation Code is:  [id number].  Patient/caregiver informed of referral to [facility] Line for Pre-Admission Screening for skilled nursing facility (SNF) placement and to expect a phone call post discharge from SNF.    MARIA ANTONIA spoke with [facility] Level 2 screener (Hayley) at 4:19pm and Hayley confirmed completion of the Level 2 screen.     Additional Services/Equipment Arranged:  SW confirmed readiness for discharge with Dr. Farris.  MARIA ANTONIA confirmed acceptance for admit to [facility] with the Director of Nursing, on 11/27/18.  MARIA ANTONIA arranged for [facility] [phone]) to provide w/c transport at 10am.     Patient / Family response to discharge plan:  Pt and sister (Sharita) voice understanding of the discharge plan and agreement with the discharge plan.     Persons notified of above discharge plan:  Pt, sister, 6A nursing and Dr. Farris    Staff Discharge Instructions:  Please fax discharge orders and signed hard scripts for any controlled substances (SW will complete this task).  Please print a packet and send with patient.     CTS Handoff completed:  YES    Medicare Notice of Rights provided to the patient/family:  NO, as per Admissions Facesheet, pt is not on medicare.    ELOY Dhillon  Social Work, 6A  Phone:  [phone]  Pager:  [phone]  11/28/2018

## 2018-11-28 NOTE — PROGRESS NOTES
"[facility] [place]   Neurology Daily Note  Alicia Penaloza  [id number]  11/28/2018    Subjective:  No overnight events. She is waiting for TCU transfer. She did not sleep well yesterday but her mood is better today. BMP and CBC were within normal    Objective:   /87 (BP Location: Left arm)  Pulse 60  Temp 98.1  F (36.7  C) (Oral)  Resp 16  Ht 1.6 m (5' 3\")  Wt 92.5 kg (203 lb 14.4 oz)  SpO2 99%  BMI 36.12 kg/m2  General:  sitting in chair non distressed  HEENT:  normocephalic/atraumatic  Cardio:  RRR  Pulmonary:  no respiratory distress  Abdomen:  soft  Extremities:  no edema  Skin:  intact      Neurologic  Mental status: awake, alert, follows all commands. Speech is appropriate. Cranial nerves: EOMI, face symmetric, VFF, equal to LT, tongue midline/mobile, no dysarthria, eharing intact to convo  Motor: no drift in any extremity  Sensory: equal to LT except in the left UE  Coordination: FTN intact BL      Lab Investigations:   Hemoglobin A1C   Date Value Ref Range Status   11/22/2018 4.7 0 - 5.6 % Final     Comment:     Normal <5.7% Prediabetes 5.7-6.4%  Diabetes 6.5% or higher - adopted from ADA   consensus guidelines.     08/26/2018 4.8 0 - 5.6 % Final     Comment:     Normal <5.7% Prediabetes 5.7-6.4%  Diabetes 6.5% or higher - adopted from ADA   consensus guidelines.       Lab Results   Component Value Date    LDL 53 08/26/2018       Imaging:  Recent Results (from the past 24 hour(s))   CT Head w/o Contrast    Narrative    CT SCAN OF THE HEAD WITHOUT CONTRAST   11/21/2018 6:46 PM     HISTORY: Aphasia.    TECHNIQUE: Axial images of the head and coronal reformations without  IV contrast material.  Radiation dose for this scan was reduced using  automated exposure control, adjustment of the mA and/or kV according  to patient size, or iterative reconstruction technique.    COMPARISON: None.    FINDINGS: There is some mild cerebral atrophy. There is an old left  parietal " occipital infarct. There is also an old infarct in the right  basal ganglia region. Patchy white matter changes are present in both  hemispheres. There is no evidence for mass effect, acute infarct, or  skull fracture. There is near complete opacification of the right  maxillary sinus and there is an air-fluid level in the left sphenoid  sinus.      Impression    IMPRESSION:  1. No evidence for intracranial hemorrhage or any acute brain  pathology.  2. Cerebral atrophy with old bilateral infarcts and chronic appearing  white matter disease.  3. Right maxillary and left sphenoid sinus disease.    ANSLEY GONZALEZ MD   CT Head Neck Angio w/o & w Contrast    Narrative    CTA HEAD NECK WITH CONTRAST 11/21/2018 6:53 PM     HISTORY: Aphasia.    TECHNIQUE: MR angiography was performed through the head and neck  without and with intravenous contrast. 80 mL of Isovue 370 was given.  Multiplanar reconstructions were performed. 3-D reconstructions off a  remote workstation for CT angiography were also acquired. Carotid  stenoses were evaluated by comparing the caliber of the proximal  internal carotid artery to the caliber of the distal internal carotid  artery. Radiation dose for this scan was reduced using automated  exposure control, adjustment of the mA and/or kV according to patient  size, or iterative reconstruction technique.    COMPARISON: 8/25/2018    FINDINGS:  Brachiocephalic vessels: Normal.    Right carotid system: Normal.    Left carotid system: Normal.    Right vertebral artery: Normal nondominant vessel.    Left vertebral artery: Normal.    [place] of [place]: Again noted is moderate narrowing of a large left  middle cerebral artery branch proximally which is similar to that seen  on other CT angiogram studies. There is also narrowing of the right P1  segment which is presumably congenital and also unchanged. The distal  internal carotid arteries and basilar artery are patent. The proximal  anterior, middle, and  posterior cerebral arteries are patent.      Impression    IMPRESSION: No change from prior CT angiogram. There is some moderate  stenosis of a large left M2 branch similar to that seen on the prior  exam most likely due to atherosclerotic disease.    ANSLEY GONZALEZ MD   XR Chest Port 1 View    Narrative    CHEST ONE VIEW PORTABLE   11/21/2018 8:07 PM     HISTORY: Altered loss of consciousness.     COMPARISON: 8/25/2018      Impression    IMPRESSION: Normal.    LEXIE BISHOP MD       Assessment and Plan   43F hx of ischemic strokes 8/2018, vaginal bleeding, HLD, HTN, prior smoker p/w sudden onset of not speaking but was following commands and was able to walk around on her own with no other neurological deficit. She was admitted OSH in 8/7/2018 with R thalamic, L frontal white matter, and temporo-occipital lobe strokes. Her strokes were deemed embolic, for which she had a large workup that was all negative including BETH and hypercoagulable workup. She was sent home on aspirin. Since her stroke, she has had multiple presentations for intermittent neurologic symptoms such as LUE numbness, R hand weakness, etc with subsequent MRI showing no new strokes. Her last presentation here was 9/12. Family says her mood has been very labile since the stroke as well. She was recently begun on flouxetine 20 mg for depression. Apparently, since Sunday, she has been progressively not speaking. The day prior to admission she stopped speaking completely and only grunts or moans. She came to an OSH and had a CTA showing unchanged L M2 stenosis with no LVO. She was transferred here for further workup. Patient endorsed she is profoundly depressed and cries without cause, but she is not thinking or planning to harm herself. She is not feeling safe.     # Mutism:  # Mood disorder (Depression due medical condition):    is at risk for aphasia given severe M2 stenosis, which is still patent per CTA done last night. Does have a recent history  of ischemic infarcts in 8/2018 as well. However from the history and exam she does not seem to have aphasia. 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 She endorsed that she is depressed. Her speech is progressively worse suggesting it is less likely to be stroke. She was admitted to get MRI and complete stroke work up. Psychiatry has been consulted and recommended neuropsych testing and  TCU disposition and psychiatry follow up as an outpatient.  --no need for the MRI brain  --continue on aspirin 325mg.   --inflammatory labs including C3,4,anticardiolipin IGG and IGM, ANCA were negative  --PT/OT: recommended TCU    -- continue Fluoxetine to 40 mg daily     # HTN:   --continue pta lisinopril 20mg every day  --continue pta metoprolol 50mg bid  - bl pressure stable around 120s occasionally 140s     # HLD:  --continue pta atorvastatin 40mg every day     # vaginal bleeding:  Due to uterine fibroids. Endometrial biopsy without atypia  --will monitor w/ cbc Hb  Stable around 12 -13  --cares per nursing     Diet: regular diet  PPx: she is fully ambulatory   Code: full  Dispo: is ready for discharge pending finding a place in the TCU      Patient was seen and discussed with Dr. rosa Farris MD  Neuro-intern  [phone]

## 2018-11-28 NOTE — PLAN OF CARE
Problem: Patient Care Overview  Goal: Plan of Care/Patient Progress Review  Discharge Planner OT   Patient plan for discharge: Rehab  Current status: Pt completed showering and morning ADL task. Pt able to complete all tasks with SBA, however, requiring max cognitive support. (e.g., unable to recall what steps she needs to do before getting into the shower, forgetful of what she was doing, etc)   Barriers to return to prior living situation: Medical status, cognition   Recommendations for discharge: TCU  Rationale for recommendations: To maximize ADL/IADL I       Entered by: Ivette Cloud 11/28/2018 9:08 AM     OT 6A

## 2018-11-28 NOTE — PLAN OF CARE
Problem: Stroke (Ischemic) (Adult)  Goal: Signs and Symptoms of Listed Potential Problems Will be Absent, Minimized or Managed (Stroke)  Signs and symptoms of listed potential problems will be absent, minimized or managed by discharge/transition of care (reference Stroke (Ischemic) (Adult) CPG).   Outcome: No Change  Status: pt on 6A speech worsening & speech loss   Neuros: A&O x4 ex for numbness in LLE (thigh); pt's reported baseline  GI: BS+. Passing flatus, regular diet w/ veronica counts, no BM  : per report pt.voids w/o difficulty, minimal vaginal bleeding is pt s reported baseline ?  IV: PIV SL  Activity: up w/ SBA  Pain: denies pain   Respiratory/Trach: LS clear  Skin:WDL  Plan of care: awaiting TCU placement

## 2018-11-28 NOTE — PLAN OF CARE
Problem: Stroke (Ischemic) (Adult)  Goal: Signs and Symptoms of Listed Potential Problems Will be Absent, Minimized or Managed (Stroke)  Signs and symptoms of listed potential problems will be absent, minimized or managed by discharge/transition of care (reference Stroke (Ischemic) (Adult) CPG).   Outcome: Improving  VSS. Denies pain. Neuros unchanged; tingling to left thigh that is baseline. Good po intake, ate breakfast and lunch.Voiding spontaneously, bloody vaginal discharge - scant amount. Had BM today. Up with SBA. Walked halls with RN x 2, also sat in chair most of afternoon.  Plan is to discharge to TCU, see SW note.

## 2018-11-29 VITALS
WEIGHT: 203.9 LBS | HEIGHT: 63 IN | DIASTOLIC BLOOD PRESSURE: 87 MMHG | TEMPERATURE: 97.6 F | BODY MASS INDEX: 36.13 KG/M2 | RESPIRATION RATE: 15 BRPM | OXYGEN SATURATION: 99 % | HEART RATE: 60 BPM | SYSTOLIC BLOOD PRESSURE: 144 MMHG

## 2018-11-29 PROCEDURE — 25000132 ZZH RX MED GY IP 250 OP 250 PS 637: Performed by: STUDENT IN AN ORGANIZED HEALTH CARE EDUCATION/TRAINING PROGRAM

## 2018-11-29 PROCEDURE — 25000132 ZZH RX MED GY IP 250 OP 250 PS 637: Performed by: PSYCHIATRY & NEUROLOGY

## 2018-11-29 RX ADMIN — ATORVASTATIN CALCIUM 40 MG: 40 TABLET, FILM COATED ORAL at 05:59

## 2018-11-29 RX ADMIN — METOPROLOL TARTRATE 50 MG: 50 TABLET, FILM COATED ORAL at 05:59

## 2018-11-29 RX ADMIN — FLUOXETINE HYDROCHLORIDE 40 MG: 20 CAPSULE ORAL at 05:59

## 2018-11-29 RX ADMIN — LISINOPRIL 20 MG: 20 TABLET ORAL at 05:59

## 2018-11-29 RX ADMIN — ASPIRIN 325 MG ORAL TABLET 325 MG: 325 PILL ORAL at 05:59

## 2018-11-29 ASSESSMENT — VISUAL ACUITY
OU: NORMAL ACUITY
OU: NORMAL ACUITY

## 2018-11-29 ASSESSMENT — ACTIVITIES OF DAILY LIVING (ADL)
ADLS_ACUITY_SCORE: 12

## 2018-11-29 NOTE — PROGRESS NOTES
Pt [facility]. Patient changed into clothing and left with all belongings.

## 2018-11-29 NOTE — PLAN OF CARE
"Problem: Stroke (Ischemic) (Adult)  Goal: Signs and Symptoms of Listed Potential Problems Will be Absent, Minimized or Managed (Stroke)  Signs and symptoms of listed potential problems will be absent, minimized or managed by discharge/transition of care (reference Stroke (Ischemic) (Adult) CPG).   Outcome: No Change  /85  Pulse 60  Temp 98.2  F (36.8  C) (Oral)  Resp 16  Ht 1.6 m (5' 3\")  Wt 92.5 kg (203 lb 14.4 oz)  SpO2 96%  BMI 36.12 kg/m2. A&Ox4. Neuros unchanged with baseline N to L thigh. Pain denied.. Voiding spont. BS+. PIV SL. Up with SBA. Reg. diet. Plan to discharge to [facility] via [facility] at 1000 today. Cont to monitor and with POC.         "

## 2018-11-29 NOTE — PLAN OF CARE
Problem: Patient Care Overview  Goal: Plan of Care/Patient Progress Review  Outcome: Improving  Status: pt admitted to 6A speech worsening & speech loss   Neuros: Alert & orientated x4, numbness in left thigh, pt reports this is her baseline   GI: Reguar diet, adequate intake, passing flatus    : Pt voids without difficulty, minimal vaginal bleeding is pt s reported baseline ?  IV: PIV SL  Activity: up w/ SBA, walked the halls x2 this shift, ambulated the bathroom   Pain: denies pain   Respiratory/Trach: LS clear   Skin:WDL  Plan of care: pt discharging to [facility] tomorrow 11/29 @10am via [facility] transport, continue with plan of care

## 2018-11-29 NOTE — PLAN OF CARE
Problem: Patient Care Overview  Goal: Plan of Care/Patient Progress Review  Occupational Therapy Discharge Summary    Reason for therapy discharge:    Discharged to [facility]    Progress towards therapy goal(s). See goals on Care Plan in [facility] electronic health record for goal details.  Goals partially met.  Barriers to achieving goals:   discharge from facility.    Therapy recommendation(s):    Continued therapy is recommended.  Rationale/Recommendations:  TCU to maximize IND and safety with ADLS.

## 2018-12-04 ENCOUNTER — TRANSFERRED RECORDS (OUTPATIENT)
Dept: HEALTH INFORMATION MANAGEMENT | Facility: CLINIC | Age: 43
End: 2018-12-04

## 2018-12-05 ENCOUNTER — TELEPHONE (OUTPATIENT)
Dept: PSYCHIATRY | Facility: CLINIC | Age: 43
End: 2018-12-05

## 2018-12-05 NOTE — TELEPHONE ENCOUNTER
PSYCHIATRY CLINIC PHONE INTAKE     SERVICES REQUESTED / INTERESTED IN          Med Management    Presenting Problem and Brief History                              What would you like to be seen for? (brief description):  Pt called with Nurse while inpt at a nursing home. She wants med management. Shee is currently taking Prozac and ativan. The medication has been helping with the depression besides feeling tired. She is very fidgety and has trouble remembering things. She sleeps well, she gets more than 8 hours of undisturbed sleep. The pt has had strokes in the past that have led to some cognitive impairment, and she knows it's affecting her memory. She needs assistance with basic care needs, such as being prompted to do the next step in cleaning herself. There are also times when she's talking and trying to think of a word that she can't come up with. Since her stroke, she has moments when she can't talk or mumbles her words. The day before Thanksgiving her depression was so severe, that she wasn't talking. Her family was concerned that it was another stroke, but they found it was anxiety attack. That was the first time she had an anxiety attack and didn't realize what it was. She's not sure when she will be discharged from the nursing.      Have you received a mental health diagnosis? Yes   Which one (s): MDD, Cognitive Functioning and Awareness, and Selective Mutism.   Is there any history of developmental delay?  No   Are you currently seeing a mental health provider?  No            Who / month last seen:  NA  Do you have mental health records elsewhere?  No  Will you sign a release so we can obtain them?  No    Have you ever been hospitalized for psychiatric reasons?  No  Describe:  NA    Do you have current thoughts of self-harm?  No    Do you currently have thoughts of harming others?  No       Substance Use History     Do you have any history of alcohol / illicit drug use?  No  Describe:  NA  Have you ever  received treatment for this?  No    Describe:  NA     Social History     Does the patient have a guardian?  No    Name / number: Namrata Zazueta, Sister - [phone]. She's not sure if she is legally a guardian but she is the responsible for her.  Have you had an ACT team in last 12 months?  No  Describe: NA   Do you have any current or past legal issues?  No  Describe: NA   OK to leave a detailed voicemail?  Yes    Medical/ Surgical History                                   Patient Active Problem List   Diagnosis     Acute ischemic stroke (H)     Hypertensive urgency     Vaginal bleeding     Benign essential hypertension     Acute CVA (cerebrovascular accident) (H)     Obesity (BMI 35.0-39.9) with comorbidity (H)     Hyperlipidemia LDL goal <100     Iron deficiency anemia due to chronic blood loss     Aphasia          Medications             Current Outpatient Prescriptions   Medication Sig Dispense Refill     aspirin 325 MG tablet Take 325 mg by mouth       atorvastatin (LIPITOR) 40 MG tablet Take 1 tablet (40 mg) by mouth daily 90 tablet 1     FLUoxetine (PROZAC) 40 MG capsule 1 capsule (40 mg) by Oral or Feeding Tube route daily 30 capsule      levonorgestrel (MIRENA) 20 MCG/24HR IUD 20 mcg by Intrauterine route       lisinopril (PRINIVIL/ZESTRIL) 20 MG tablet Take 1 tablet (20 mg) by mouth daily 90 tablet 1     LORazepam (ATIVAN) 1 MG tablet Take 0.5-1 tablets (0.5-1 mg) by mouth every 8 hours as needed for anxiety Take 30 minutes prior to departure.  Do not operate a vehicle after taking this medication 12 tablet 0     metoprolol tartrate (LOPRESSOR) 50 MG tablet Take 1 tablet (50 mg) by mouth 2 times daily 180 tablet 3         DISPOSITION      12/05/2018 Intake completed. Scheduled for AGE on 2/7/19 at 10:00am for GEORGINA cruz / Yong Serrano,

## 2018-12-06 ENCOUNTER — TRANSFERRED RECORDS (OUTPATIENT)
Dept: HEALTH INFORMATION MANAGEMENT | Facility: CLINIC | Age: 43
End: 2018-12-06

## 2018-12-17 ENCOUNTER — TRANSFERRED RECORDS (OUTPATIENT)
Dept: HEALTH INFORMATION MANAGEMENT | Facility: CLINIC | Age: 43
End: 2018-12-17

## 2019-01-09 ENCOUNTER — MEDICAL CORRESPONDENCE (OUTPATIENT)
Dept: HEALTH INFORMATION MANAGEMENT | Facility: CLINIC | Age: 44
End: 2019-01-09

## 2019-01-21 ENCOUNTER — TRANSFERRED RECORDS (OUTPATIENT)
Dept: HEALTH INFORMATION MANAGEMENT | Facility: CLINIC | Age: 44
End: 2019-01-21

## 2019-01-22 ENCOUNTER — TRANSFERRED RECORDS (OUTPATIENT)
Dept: HEALTH INFORMATION MANAGEMENT | Facility: CLINIC | Age: 44
End: 2019-01-22

## 2019-02-06 ENCOUNTER — MEDICAL CORRESPONDENCE (OUTPATIENT)
Dept: HEALTH INFORMATION MANAGEMENT | Facility: CLINIC | Age: 44
End: 2019-02-06

## 2019-02-07 ENCOUNTER — OFFICE VISIT (OUTPATIENT)
Dept: PSYCHIATRY | Facility: CLINIC | Age: 44
End: 2019-02-07
Attending: NURSE PRACTITIONER
Payer: COMMERCIAL

## 2019-02-07 ENCOUNTER — ALLIED HEALTH/NURSE VISIT (OUTPATIENT)
Dept: PSYCHIATRY | Facility: CLINIC | Age: 44
End: 2019-02-07
Attending: SOCIAL WORKER
Payer: COMMERCIAL

## 2019-02-07 VITALS
HEART RATE: 62 BPM | WEIGHT: 202.6 LBS | DIASTOLIC BLOOD PRESSURE: 87 MMHG | BODY MASS INDEX: 35.89 KG/M2 | SYSTOLIC BLOOD PRESSURE: 133 MMHG

## 2019-02-07 DIAGNOSIS — Z71.89 COUNSELING AND COORDINATION OF CARE: Primary | ICD-10-CM

## 2019-02-07 DIAGNOSIS — F32.1 CURRENT MODERATE EPISODE OF MAJOR DEPRESSIVE DISORDER, UNSPECIFIED WHETHER RECURRENT (H): ICD-10-CM

## 2019-02-07 PROCEDURE — G0463 HOSPITAL OUTPT CLINIC VISIT: HCPCS | Mod: ZF

## 2019-02-07 RX ORDER — GABAPENTIN 100 MG/1
CAPSULE ORAL
Qty: 150 CAPSULE | Refills: 1 | Status: SHIPPED | OUTPATIENT
Start: 2019-02-07 | End: 2019-03-27

## 2019-02-07 ASSESSMENT — PAIN SCALES - GENERAL: PAINLEVEL: NO PAIN (0)
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
## 2019-02-08 ENCOUNTER — TELEPHONE (OUTPATIENT)
Dept: PSYCHIATRY | Facility: CLINIC | Age: 44
End: 2019-02-08

## 2019-02-08 NOTE — TELEPHONE ENCOUNTER
Social Work  Outgoing Voicemail Message  [facility]      Left a detailed voicemail message for Nemo  at [facility]. Direct number is [phone]. SW calling to introduce self. Writer interested in learning about current placement, plans for moving forward, and how writer can best assist.      Writer requested call back. Included writer's direct contact information and availability.     Plan: SW will follow up with provider within 3 business days to coordinate care and identify alternative options/therapies/placements.        Michelle Sim, RUBI, LICSW

## 2019-02-11 ENCOUNTER — TELEPHONE (OUTPATIENT)
Dept: PSYCHIATRY | Facility: CLINIC | Age: 44
End: 2019-02-11

## 2019-02-11 NOTE — TELEPHONE ENCOUNTER
[facility] Call Center    Phone Message    May a detailed message be left on voicemail: yes    Reason for Call: Other: Pt's nursing home called to request notes from Yong Bernal, last visit/ After Visit Summary. The information can be faxed to [phone]     Action Taken: Other: Mena Rivera

## 2019-02-12 ENCOUNTER — TELEPHONE (OUTPATIENT)
Dept: PSYCHIATRY | Facility: CLINIC | Age: 44
End: 2019-02-12

## 2019-02-12 NOTE — TELEPHONE ENCOUNTER
Writer consulted with clinic  who has completed OWEN for [facility]. Once provider note has been completed, writer to fax to facility.

## 2019-02-12 NOTE — TELEPHONE ENCOUNTER
On 02/07/2019 the patient signed an OWEN authorizing [facility] and [facility] to release/obtain medical  records for the purpose of continued care by another provider. On 02/12/19 I sent this OWEN to Medical Records via the [employer] system. Document placed in scanning and a copy held in Psychiatry until scanning complete/confirmed Gely Dhillon     On 02/07/2019 the patient signed a PHI authorizing  Person to Person communication with Sharita Jaureguigregoryjeremias(sister) and Parul Rodriguez (girlfriend)  for medical information.  I sent this document to scanning on 02/12/19 and kept a copy in Psychiatry until scanning is complete/confirmed. Gely Dhillon

## 2019-02-12 NOTE — TELEPHONE ENCOUNTER
No OWEN on file for [facility], writer unable to locate phone number. Writer faxed blank OWEN to [facility] at [phone] with note requesting OWEN to be completed and sent back to clinic in order for provider notes to be sent.     Writer called pt's caregiver, Sharita [phone], and left message informing that OWEN had been sent to the [facility] to be completed and returned to clinic.

## 2019-02-18 ENCOUNTER — OFFICE VISIT (OUTPATIENT)
Dept: NEUROLOGY | Facility: CLINIC | Age: 44
End: 2019-02-18
Payer: COMMERCIAL

## 2019-02-18 ENCOUNTER — ANCILLARY PROCEDURE (OUTPATIENT)
Dept: CARDIOLOGY | Facility: CLINIC | Age: 44
End: 2019-02-18
Attending: PSYCHIATRY & NEUROLOGY
Payer: COMMERCIAL

## 2019-02-18 ENCOUNTER — CARE COORDINATION (OUTPATIENT)
Dept: NEUROLOGY | Facility: CLINIC | Age: 44
End: 2019-02-18

## 2019-02-18 VITALS — SYSTOLIC BLOOD PRESSURE: 133 MMHG | HEART RATE: 96 BPM | OXYGEN SATURATION: 97 % | DIASTOLIC BLOOD PRESSURE: 80 MMHG

## 2019-02-18 DIAGNOSIS — Z86.73 HISTORY OF STROKE: ICD-10-CM

## 2019-02-18 DIAGNOSIS — R41.89 COGNITIVE CHANGES: Primary | ICD-10-CM

## 2019-02-18 DIAGNOSIS — R41.89 COGNITIVE CHANGES: ICD-10-CM

## 2019-02-18 PROCEDURE — 0296T ZIO PATCH HOLTER ADULT PEDIATRIC GREATER THAN 48 HRS: CPT | Mod: ZF

## 2019-02-18 RX ORDER — FLUOXETINE HYDROCHLORIDE 60 MG/1
60 TABLET, FILM COATED ORAL; ORAL DAILY
COMMUNITY
End: 2019-03-27

## 2019-02-18 RX ORDER — GABAPENTIN 300 MG/1
300 CAPSULE ORAL AT BEDTIME
COMMUNITY
End: 2019-08-09

## 2019-02-18 RX ORDER — TRIAMCINOLONE ACETONIDE 1 MG/G
CREAM TOPICAL 2 TIMES DAILY
COMMUNITY

## 2019-02-18 ASSESSMENT — PAIN SCALES - GENERAL: PAINLEVEL: NO PAIN (0)

## 2019-02-18 NOTE — LETTER
2/18/2019       RE: Alicia Penaloza  [address] [facility] [address]     Dear Colleague,    Thank you for referring your patient, Alicia Penaloza, to the [facility] NEUROLOGY at [facility]. Please see a copy of my visit note below.    Service Date: 02/18/2019      HISTORY OF PRESENT ILLNESS:  Alicia Penaloza is a 43-year-old female here for stroke followup.  She is accompanied by her sister, Oscar.      The patient suffered multifocal acute strokes on 08/07/2018.  She was seen at an outside hospital, given TPA and transferred to [facility] in [place].  It appears her presenting symptoms included an inability to speak and perhaps left-sided weakness or numbness.  She did have a brain MRI scan in [place] that showed acute nonhemorrhagic multifocal infarcts.  One was in the left temporooccipital region and one in the deep left frontal white matter.  There was also an acute stroke with associated hemorrhage in the right thalamus.  MR angiographic studies of the neck vessels did not reveal any significant stenoses.  She did have loss of signal in the left MCA, likely representing a small amount of focal thrombus with patent distal branches.      She had additional testing in [place] which included a transthoracic cardiac echo that was negative.  Blood work included CBC and platelets and this was essentially normal, although she initially had any elevated white count of 15,900.  It came down to 11,700.  Hypercoagulability testing done revealed elevated IgM cardiolipin antibody, but this was subsequently repeated and proved to be negative.  Other negative studies for hypercoagulability included protein C, protein S, factor V Leiden, antithrombin III, prothrombin gene mutation and lupus anticoagulant.  Her LDL cholesterol was elevated at 150.  She was discharged on aspirin and atorvastatin.  She did have risk factors, which included smoking and hypertension and it is notable the  MRI scans also revealed moderately severe chronic small vessel disease.      She was subsequently hospitalized on the Stroke Service at the [facility] from 08/25/2018 through 08/27/2018.  She had an episode of right-sided facial drooping, left-sided numbness and weakness.  She had a followup brain MRI scan done and it revealed evolving strokes that had previously been seen but no acute abnormalities.  She had at that time the anticardiolipin antibody testing redone which came back negative.  She underwent a transesophageal cardiac echo that was negative.  Hemoglobin A1c was 4.8.  Her LDL cholesterol was down to 53.  Cardiac monitoring for 24 hours was negative for atrial fibrillation.  CT angiogram of the head and neck revealed a normal neck CTA.  Again noted was some narrowing of the left middle cerebral artery and also the right posterior cerebral artery.  She also had a normal head MR venogram.  Additional blood testing done on that admission also included testing for lupus anticoagulant which was negative, normal IgG and IgM cardiolipin antibodies, normal beta-2 microglobulin, normal C3 and C4, negative RASHEL.  Her platelet count on this admission was 609,000 with a hemoglobin of 9.5 and hematocrit of 31.2.  No change in her treatment was initiated at that time.      It appears she had recurrent neurologic symptoms and she was again hospitalized on the [facility] Stroke Service from 11/22/2018 through 11/29/2018.  This was because of what was felt to be non-neurologic symptoms of inability to speak but able to follow commands.  It was felt that this was likely selective mutism due to depression and they did not feel she was aphasic.  She was discharged on fluoxetine at that time.  She continued on atorvastatin and aspirin.      Additional laboratory testing done during the November hospitalization included a negative ANCA, normal C3, C4, sedimentation rate, C-reactive protein.  Hemoglobin  A1c was normal.  Lupus anticoagulant negative.      Since her November hospitalization, she has resided in a nursing home in [place], [place].  She is there because of ongoing cognitive issues.  The patient feels her cognition is improving and her sister, Oscar, feels it is getting worse.  There have been no new acute neurologic symptoms.      She has daily headaches but this is not an entirely new problem as she suffered from these for more than 3 years.  She indicates she has them when she wakes up.  She tells me that a client who resides across the [place] from her plays country music all night.      She has had some cognitive testing done.  I received a lot of raw data concerning this.  It was felt it was of moderate impairment.  It appears she had difficulties with memory but to a lesser extent writing, attention, problem solving, reading comprehension.      She did have an MRI scan of the head done on 01/22/2019.  Unfortunately, I do not have the images and cannot compare it to the previous studies.  There were no acute changes of ischemia on the diffusion images.  It was reported she had moderate small vessel disease.  There also was evidence of the previous left temporooccipital infarct.      PAST MEDICAL HISTORY:  Notable for hypertension.      CURRENT MEDICATIONS:  Tylenol, aspirin 325 mg, atorvastatin 40 mg, diphenhydramine, fluoxetine 60 mg, gabapentin 100 mg in the morning, 100 mg in the afternoon, 300 mg at night, Mirena IUD, lisinopril, lorazepam p.r.n., metoprolol, triamcinolone cream and Pepto-Bismol p.r.n.      ALLERGIES:  She has no medical allergies.      SOCIAL HISTORY:  She previously worked as a PCA.  She quit smoking in August.  She does not use alcohol.      PHYSICAL EXAMINATION:  Examination reveals a somewhat quiet and withdrawn female, although she will answer my questions appropriately.  Heart rate 96.  Blood pressure 133/80.      She scored 15/30 on the MoCA examination.  She had errors  with visual spatial tasks, attention, serial 7 subtractions, language, recall and orientation.      She will intermittently bounce her right leg, but this does not appear to be a tremor and it does appear to bre when she is distracted.  Pupils are equal, round, react well to light.  Visual field testing is intact.  Her speech is clear, and otherwise, cranial nerves II-XII intact.  Motor examination reveals normal strength.  On sensory examination, she reports decreased touch involving the left arm below the elbow.  She has intact position and vibratory sense.  Finger-to-nose is done well.  Her muscle tone is normal.  Gait is unremarkable.  Reflexes are 3+.  Plantar responses are flexor.      IMPRESSION:   1.  Multifocal acute strokes on or around 2018 (left temporooccipital cortex, left frontal white matter and right thalamus) superimposed upon chronic small vessel changes.     2.  Ongoing and possibly worsening cognitive issues.      PLAN:  As I explained to the patient and her sister, Oscar, her degree of cognitive impairment appears disproportionate to the acute strokes she suffered in 2018.  I cannot rule out some additional non-neurologic issues such as depression, although she indicates she feels fine in that regard.      She has not had any prolonged cardiac monitoring for possible atrial fibrillation so I am going to set her up for 14 days Zio Patch monitor.      I am going to get her actual MRI films from [facility] that was done on 2019 so I can compare it to earlier studies.      She is going to have formal neuropsychological testing done here at the [facility].      I will be seeing her back to review these.         ARIC PITTS MD             D: 2019   T: 2019   MT: jean      Name:     ZAIN SEXTON   MRN:      [id number]        Account:      [id number]   :      1975           Service Date: 2019      Document: [id number]

## 2019-02-18 NOTE — PROGRESS NOTES
told me to fax the OWEN to [phone] and to call the film room to request images of head mri on 1/22/19 from phone number [phone]

## 2019-02-18 NOTE — PROGRESS NOTES
Per Dr. Fontaine, patient to have 14 day ZIO monitor placed.  Diagnosis: Cognitive changes  Monitor placed: Yes  Patient Instructed: Yes  Patient verbalized understanding: Yes  Holter # [id number]  Placed by Rigo Pascal

## 2019-02-18 NOTE — PROGRESS NOTES
Service Date: 02/18/2019      HISTORY OF PRESENT ILLNESS:  Alicia Penaloza is a 43-year-old female here for stroke followup.  She is accompanied by her sister, Oscar.      The patient suffered multifocal acute strokes on 08/07/2018.  She was seen at an outside hospital, given TPA and transferred to [facility] in [place].  It appears her presenting symptoms included an inability to speak and perhaps left-sided weakness or numbness.  She did have a brain MRI scan in [place] that showed acute nonhemorrhagic multifocal infarcts.  One was in the left temporooccipital region and one in the deep left frontal white matter.  There was also an acute stroke with associated hemorrhage in the right thalamus.  MR angiographic studies of the neck vessels did not reveal any significant stenoses.  She did have loss of signal in the left MCA, likely representing a small amount of focal thrombus with patent distal branches.      She had additional testing in [place] which included a transthoracic cardiac echo that was negative.  Blood work included CBC and platelets and this was essentially normal, although she initially had any elevated white count of 15,900.  It came down to 11,700.  Hypercoagulability testing done revealed elevated IgM cardiolipin antibody, but this was subsequently repeated and proved to be negative.  Other negative studies for hypercoagulability included protein C, protein S, factor V Leiden, antithrombin III, prothrombin gene mutation and lupus anticoagulant.  Her LDL cholesterol was elevated at 150.  She was discharged on aspirin and atorvastatin.  She did have risk factors, which included smoking and hypertension and it is notable the MRI scans also revealed moderately severe chronic small vessel disease.      She was subsequently hospitalized on the Stroke Service at the [facility] from 08/25/2018 through 08/27/2018.  She had an episode of right-sided facial drooping, left-sided numbness and  weakness.  She had a followup brain MRI scan done and it revealed evolving strokes that had previously been seen but no acute abnormalities.  She had at that time the anticardiolipin antibody testing redone which came back negative.  She underwent a transesophageal cardiac echo that was negative.  Hemoglobin A1c was 4.8.  Her LDL cholesterol was down to 53.  Cardiac monitoring for 24 hours was negative for atrial fibrillation.  CT angiogram of the head and neck revealed a normal neck CTA.  Again noted was some narrowing of the left middle cerebral artery and also the right posterior cerebral artery.  She also had a normal head MR venogram.  Additional blood testing done on that admission also included testing for lupus anticoagulant which was negative, normal IgG and IgM cardiolipin antibodies, normal beta-2 microglobulin, normal C3 and C4, negative RASHEL.  Her platelet count on this admission was 609,000 with a hemoglobin of 9.5 and hematocrit of 31.2.  No change in her treatment was initiated at that time.      It appears she had recurrent neurologic symptoms and she was again hospitalized on the [facility] Stroke Service from 11/22/2018 through 11/29/2018.  This was because of what was felt to be non-neurologic symptoms of inability to speak but able to follow commands.  It was felt that this was likely selective mutism due to depression and they did not feel she was aphasic.  She was discharged on fluoxetine at that time.  She continued on atorvastatin and aspirin.      Additional laboratory testing done during the November hospitalization included a negative ANCA, normal C3, C4, sedimentation rate, C-reactive protein.  Hemoglobin A1c was normal.  Lupus anticoagulant negative.      Since her November hospitalization, she has resided in a nursing home in [place], [place].  She is there because of ongoing cognitive issues.  The patient feels her cognition is improving and her sister, Oscar, feels it  is getting worse.  There have been no new acute neurologic symptoms.      She has daily headaches but this is not an entirely new problem as she suffered from these for more than 3 years.  She indicates she has them when she wakes up.  She tells me that a client who resides across the leone from her plays country music all night.      She has had some cognitive testing done.  I received a lot of raw data concerning this.  It was felt it was of moderate impairment.  It appears she had difficulties with memory but to a lesser extent writing, attention, problem solving, reading comprehension.      She did have an MRI scan of the head done on 01/22/2019.  Unfortunately, I do not have the images and cannot compare it to the previous studies.  There were no acute changes of ischemia on the diffusion images.  It was reported she had moderate small vessel disease.  There also was evidence of the previous left temporooccipital infarct.      PAST MEDICAL HISTORY:  Notable for hypertension.      CURRENT MEDICATIONS:  Tylenol, aspirin 325 mg, atorvastatin 40 mg, diphenhydramine, fluoxetine 60 mg, gabapentin 100 mg in the morning, 100 mg in the afternoon, 300 mg at night, Mirena IUD, lisinopril, lorazepam p.r.n., metoprolol, triamcinolone cream and Pepto-Bismol p.r.n.      ALLERGIES:  She has no medical allergies.      SOCIAL HISTORY:  She previously worked as a PCA.  She quit smoking in August.  She does not use alcohol.      PHYSICAL EXAMINATION:  Examination reveals a somewhat quiet and withdrawn female, although she will answer my questions appropriately.  Heart rate 96.  :Blood pressure 133/80.      She scored 15/30 on the MoCA examination.  She had errors with visual spatial tasks, attention, serial 7 subtractions, language, recall and orientation.      She will intermittently bounce her right leg, but this does not appear to be a tremor and it does appear to bre when she is distracted.  Pupils are equal, round, react  well to light.  Visual field testing is intact.  Her speech is clear, and otherwise, cranial nerves II-XII intact.  Motor examination reveals normal strength.  On sensory examination, she reports decreased touch involving the left arm below the elbow.  She has intact position and vibratory sense.  Finger-to-nose is done well.  Her muscle tone is normal.  Gait is unremarkable.  Reflexes are 3+.  Plantar responses are flexor.      IMPRESSION:   1.  Multifocal acute strokes on or around 2018 (left temporooccipital cortex, left frontal white matter and right thalamus) superimposed upon chronic small vessel changes.     2.  Ongoing and possibly worsening cognitive issues.      PLAN:  As I explained to the patient and her sister, Oscar, her degree of cognitive impairment appears disproportionate to the acute strokes she suffered in 2018.  I cannot rule out some additional non-neurologic issues such as depression, although she indicates she feels fine in that regard.      She has not had any prolonged cardiac monitoring for possible atrial fibrillation so I am going to set her up for 14 days Zio Patch monitor.      I am going to get her actual MRI films from [place] that was done on 2019 so I can compare it to earlier studies.      She is going to have formal neuropsychological testing done here at the [facility].      I will be seeing her back to review these.     ADDENDUM 3/8/19: Outside 19 brain MRI now on PACS. Compared with 2018 studies. Overall stable except expected evolution of large left temporal occipital stroke    3/13/19: Ziopatch negative. Discussed result and my review of above MRI with sister Sharita. She has F/U with me in April after Neuropsych testing.         ARIC PITTS MD             D: 2019   T: 2019   MT: jean      Name:     ZAIN SEXTON   MRN:      [id number]        Account:      [id number]   :      1975           Service Date:  02/18/2019      Document: [id number]

## 2019-02-18 NOTE — NURSING NOTE
Chief Complaint   Patient presents with     New Patient     [facility] NEW ISCHEMIC STROKE       Aleja Vega, EMT

## 2019-03-05 ASSESSMENT — PATIENT HEALTH QUESTIONNAIRE - PHQ9: SUM OF ALL RESPONSES TO PHQ QUESTIONS 1-9: 8

## 2019-03-11 NOTE — TELEPHONE ENCOUNTER
"Social Work   Incoming/Outgoing Call  [facility]    Incoming From:  Sharita Pino, patient's sister    Reason for Call:  Sister calling to check in and coordinate services    Response/Plan:  Alicia had appointment scheduled today with Yong Bernal. Her ride never showed up and the appointment is rescheduled until later this month. She reports her sister is in a \"horrible spot.\" She feels like her head's not there and has been begging her girlfriend to let her come home. Sharita feels her sister is becoming more depressed. Her sister tells her that everyone around here (TCU) keeps dying and only 1 person visits her. The only thing she does is ride her bike. Sister is concerned she has given up. Sharita also continues to be concerned about functioning level. For example, Alicia marked yes in a questionnaire about sleeping through the night, but does not actually sleep well at night. She also has trouble completing paperwork on her own. She may not be telling all of her concerns to facility as  thinks she has gotten better. Writer has been unable to find appropriate placement closer to the [facility]. Writer made plans to have phone call with sister in 1 week.      Will route to patient's current psychiatric provider(s) as an FYI.   Please call or EPIC message with any questions or concerns.    RUBI Hawkins, [facility]  [phone]      "

## 2019-03-11 NOTE — TELEPHONE ENCOUNTER
"Social Work  Outgoing Voicemail Message  [facility]      Left a detailed voicemail message for Nemo,  at [facility] center. SW left second message requesting call back to coordinate services.      Writer requested call back. Included writer's direct contact information and availability.     Plan: SW will follow up with additional call in 1-2 business days if needed.        Michelle Sim, MSW, [facility]    ADDENDUM: Nemo returned call. Conference call took place with , who also provides coordination services to Alicia at nursing facility. Nemo reports she is struggling to get a release of information for clinic and has questions about patient's meeting with med provider. SW assisted in problem solving OWEN concerns and reviewing information from provider as note not yet complete.    Care team is looking for places more suitable for Alicia. She was working on her mental health at current placement, but team feels it is no longer the proper placement. There are continued questions as to whether current functioning concerns are related to mental health or past stroke.     SW reviewed TCU observations. Per Nemo, patient's significant other noted she was always a happy go waqar person prior to the stroke. Staff now see Alicia as withdrawn and she often stays in her room. The only activity she is motivated to participate in is the inside trike. Patient eats in her room, needs reminders for \"everything,\" and requested that staff set up her shower. They tried behavior modification, which lasted only for a short period of time. Nemo also related an incident where Alicia had a doctor appointment to go over MRI results. She refused to get out of bed and go to appointment, then 1 hour later was upset that no one asked her to go. TCU is unsure if this is related to memory issues. They have seen little to no progression since she got to TCU.     Patient does not respond to " rehabilitation in similar ways to other post-stroke patients they work with. Patient appears resistant to help/ appears to have no incentive. Nemo reported that she did have some cognitive testing. During this time, Alicia was asked to draw a clock with numbers. Patient reportedly could not draw a clock with the right time.     Patient has upcoming neuropsych testing. Nemo reports most facilities in their area will require neuropsych information and they may not be able to move forward until this is complete.  Patient's overall goal is to live with her significant other (unsure if significant other is in support of this with current functioning); intermediate goal is to stay in facility closer to her sister. SW will attempt to look at resources closer to patient's sister in [place].    Michelle Sim, ALONSOSW

## 2019-03-11 NOTE — TELEPHONE ENCOUNTER
Social Work   Incoming Voicemail  [facility]    Incoming Voicemail From:  Nemo  from care center    Content of Voicemail:    Nemo requesting call back to coordinate Aliica's case    Response/Plan:    SW has not found any promising placement options for Alicia, but has left a few messages at care centers in the area that appear to take patients with past strokes (called [facility]     Nemo reports Alicia appears happier. Her last VEMS was 12 out of 15, indicating moderate impairment. SW relayed concerns from Alicia's sister about functioning and mood.     As noted in previous conversations, other care centers will not accept patient until she has an updated neuropsych. Alicia has appointment scheduled 3/22/19.      Will route to patient's current psychiatric provider(s) as an FYI.   Please call or EPIC message with any questions or concerns.    Michelle Sim, RUBI, LICSW

## 2019-03-27 ENCOUNTER — OFFICE VISIT (OUTPATIENT)
Dept: PSYCHIATRY | Facility: CLINIC | Age: 44
End: 2019-03-27
Attending: NURSE PRACTITIONER
Payer: COMMERCIAL

## 2019-03-27 DIAGNOSIS — F32.1 CURRENT MODERATE EPISODE OF MAJOR DEPRESSIVE DISORDER, UNSPECIFIED WHETHER RECURRENT (H): ICD-10-CM

## 2019-03-27 RX ORDER — GABAPENTIN 100 MG/1
CAPSULE ORAL
Qty: 150 CAPSULE | Refills: 5 | Status: SHIPPED | OUTPATIENT
Start: 2019-03-27 | End: 2019-08-09 | Stop reason: DRUGHIGH

## 2019-03-27 RX ORDER — FLUOXETINE HYDROCHLORIDE 60 MG/1
60 TABLET, FILM COATED ORAL; ORAL DAILY
Qty: 30 TABLET | Refills: 5 | Status: SHIPPED | OUTPATIENT
Start: 2019-03-27 | End: 2019-05-31 | Stop reason: DRUGHIGH

## 2019-03-27 NOTE — PATIENT INSTRUCTIONS
Thank you for coming to the PSYCHIATRY CLINIC.    Lab Testing:  If you had lab testing today and your results are reassuring or normal they will be mailed to you or sent through [facility] within 7 days.   If the lab tests need quick action we will call you with the results.  The phone number we will call with results is # [phone] (home) . If this is not the best number please call our clinic and change the number.    Medication Refills:  If you need any refills please call your pharmacy and they will contact us. Our fax number for refills is [phone]. Please allow three business for refill processing.   If you need to  your refill at a new pharmacy, please contact the new pharmacy directly. The new pharmacy will help you get your medications transferred.     Scheduling:  If you have any concerns about today's visit or wish to schedule another appointment please call our office during normal business hours [phone] (8-5:00 M-F)    Contact Us:  Please call [phone] during business hours (8-5:00 M-F).  If after clinic hours, or on the weekend, please call  [phone].    Financial Assistance [phone]  [facility] Billing [phone]  [facility] Billing [phone]  Medical Records [phone]      MENTAL HEALTH CRISIS NUMBERS:  [facility]:   [facility] - [phone]   Crisis Residence [facility] - [phone]   Walk-In Counseling [facility] [phone]   COPE 24/7 [facility] Mobile Team for Adults - [[phone]]; Child - [[phone]]        [facility]:   [facility] - [phone]   Walk-in counseling [facility] - [phone]   Walk-in counseling [facility] - [phone]   Crisis Residence [facility] - [phone]   Urgent Care Adult Mental Health:   --Drop-in, 24/7 crisis line, and [employer] Mobile Team [[phone]]    CRISIS TEXT LINE: Text [phone] from anywhere,  anytime, any crisis 24/7;    OR SEE [email or web address].org     Poison Control Center - [phone]    CHILD: [facility] needs assessment team - [phone]     [facility] - [phone]; or [employer] [facility] - [phone]    If you have a medical emergency please call 911or go to the nearest ER.                    _____________________________________________    Again thank you for choosing PSYCHIATRY CLINIC and please let us know how we can best partner with you to improve you and your family's health.  You may be receiving a survey in the mail regarding this appointment. We would love to have your feedback, both positive and negative, so please fill out the survey and return it using the provided envelope. The survey is done by an external company, so your answers are anonymous.
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
## 2019-03-28 ENCOUNTER — OFFICE VISIT (OUTPATIENT)
Dept: NEUROPSYCHOLOGY | Facility: CLINIC | Age: 44
End: 2019-03-28
Attending: PSYCHIATRY & NEUROLOGY
Payer: COMMERCIAL

## 2019-03-28 DIAGNOSIS — F09 MENTAL DISORDER DUE TO GENERAL MEDICAL CONDITION: ICD-10-CM

## 2019-03-28 DIAGNOSIS — I67.9 CEREBROVASCULAR DISEASE, UNSPECIFIED: Primary | ICD-10-CM

## 2019-03-28 DIAGNOSIS — F41.9 ANXIETY: ICD-10-CM

## 2019-03-28 NOTE — PROGRESS NOTES
The patient was seen for neuropsychological evaluation at the request of Naveed Fontaine MD for the purposes of diagnostic clarification and treatment planning.  165 minutes of test administration and scoring were provided by this writer.  Please see Dr. Yoshi Mehta's report for a full interpretation of the findings.    Shilpa Villaseñor  Psychometrist
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
## 2019-04-01 NOTE — PROGRESS NOTES
__ Orientation            Time          __-12___        Place        ___1____        Personal Info.     ____2____        Presidents                    4              WAIS-IV   FSIQ____VCI_____PRI_____ WMI___PSI____       Raw  Age SS  __Similarities  __18___ __6___  __Vocabulary  _______ _______  __Information  _______          _______  __Comprehension _______ _______  __Block Design  __8__  __3__  __Matrix Reas.  ______               ______  __Visual Puzzles _______ _______  __Picture Comp. _______ _______  __Figure Weights _______ _______  __Digit Span  __11___ __2___  __Arithmetic  ______                     _____  __L-N Sequencing _______ _______  __Symbol Search _____               _____  __Coding  __23__  ___2__  __Cancellation  _______ _______          HVLT-R  Trial   1 2    3     Total                  4             5              4                13                                                 Raw  T  Immediate Recall            13                         ?20      Delayed Recall                  4                        ?20      Retention (%)                   80%                       39                          Rec/Dis Inc.  # Hits      10      #FPs     2                33                BVMT-R  Trial   1   2     3       Total                  3               2             3             8                                               Raw             T            Immediate Recall            8              <20           Delayed Recall                   4               22           Retention (%)                  >100%          >16  Rec/Dis Inc.  # Hits       5     #FPs     2              1-2%    __Rey-Osterreith/Louise Complex Figure Test    Raw  T-score        %ile  Copy   _9.5__         -         ?1   Recognition __210 __           -       __    __WRAT-4   Reading SS = 72   %ile = 3    GE = 4.6    __COWAT   Raw__34__ Tscore__44___   __[place] Naming Test   Raw = 39 Tscore = 24  __Complex Ideational  Material   Raw = 7/12 Tscore = 10    __Trail Making Test   A =   38         Errors = 0    %ile = 42   B =  D/C 301        Errors = 10    %ile = -    __JoLO   Raw = 14 %ile = 1.5    __Grooved Pegboard   RH = 85          Tscore = 32      Drops = 2   LH =  79          TScore = 41      Drops =0    __BDI-II   Raw__6__ Interp.__ Minimal ___   __BAI     Raw__10__ Interp. __Mild___  __ MMPI-2RF    __ TOMM  Trial1=48  Trial2=49     __ DCT      E-Score=21

## 2019-04-03 ENCOUNTER — TRANSFERRED RECORDS (OUTPATIENT)
Dept: HEALTH INFORMATION MANAGEMENT | Facility: CLINIC | Age: 44
End: 2019-04-03

## 2019-04-03 LAB
ALT SERPL-CCNC: 6 IU-L (ref 5–30)
AST SERPL-CCNC: 10 IU-L (ref 7–31)
CREAT SERPL-MCNC: 1.2 MG/DL (ref 3.6–1.2)
GFR SERPL CREATININE-BSD FRML MDRD: 49 (ref 60–200)
GLUCOSE SERPL-MCNC: 103 MG-DL (ref 70–105)
POTASSIUM SERPL-SCNC: 4.3 MEQ-L (ref 3.5–5.1)

## 2019-04-15 ENCOUNTER — OFFICE VISIT (OUTPATIENT)
Dept: NEUROLOGY | Facility: CLINIC | Age: 44
End: 2019-04-15
Payer: COMMERCIAL

## 2019-04-15 VITALS
SYSTOLIC BLOOD PRESSURE: 118 MMHG | TEMPERATURE: 98 F | HEART RATE: 57 BPM | WEIGHT: 200 LBS | DIASTOLIC BLOOD PRESSURE: 78 MMHG | HEIGHT: 63 IN | OXYGEN SATURATION: 96 % | RESPIRATION RATE: 16 BRPM | BODY MASS INDEX: 35.44 KG/M2

## 2019-04-15 DIAGNOSIS — I63.10 CEREBROVASCULAR ACCIDENT (CVA) DUE TO EMBOLISM OF PRECEREBRAL ARTERY (H): Primary | ICD-10-CM

## 2019-04-15 DIAGNOSIS — R41.9 COGNITIVE COMPLAINTS: ICD-10-CM

## 2019-04-15 ASSESSMENT — PAIN SCALES - GENERAL: PAINLEVEL: NO PAIN (0)

## 2019-04-15 ASSESSMENT — MIFFLIN-ST. JEOR: SCORE: 1521.32

## 2019-04-15 NOTE — LETTER
4/15/2019       RE: Alicia Penaloza  [address] [facility] [address] [address]     Dear Colleague,    Thank you for referring your patient, Alicia Penaloza, to the [facility]. Please see a copy of my visit note below.    Service Date: 04/15/2019      INTERVAL HISTORY:   Alicia Penaloza returns for followup to review testing.  She is accompanied by her sister, Oscar.      She is a patient who suffered multifocal acute strokes on 08/07/2018.  She underwent extensive evaluation, both at [facility] in [place] and here on the Stroke Service at the [facility].  This testing is reviewed in my 02/18/2019 note.  Her stroke risk factors included smoking and hypertension.  She has stopped smoking and is on treatment for hypertension as well as aspirin and atorvastatin now.  She no longer smokes.      When I saw her on 02/18/2019, there was concern that there had been progressive and worsening cognitive issues since the stroke.      She did have a brain MRI scan done on 01/22/2019 and I was able to get those films and compare them with the study done on 08/28/2018.  Overall, it was stable except for the expected evolution of the large left temporal occipital stroke.  There was also stable chronic small vessel changes.      She did have a 14-day ZIO Patch monitor done.  The study was negative for atrial fibrillation.      She also had formal neuropsychological testing done by Dr. Yoshi Mehta on 03/28/2019.  I reviewed the results with the patient and her sister.  Ms. Penaloza was unable to provide consistent effort.  I suspect this relates to underlying psychological issues including anxiety and somatization tendencies.  I do not think there was a willful attempt at doing poorly and I did explain this to her.  She did demonstrate the capacity for normal verbal fluency, cognitive speed, verbal reasoning, verbal recognition memory.  She did report some mild  symptoms of anxiety and her personality assessment was compatible with tendencies toward somatization.  Dr. Mehta suspected psychological factors were a major contributing issue.      She has established care with Yong Bernal in the Psychiatry Clinic here at the [facility].  However, she is not scheduled for a followup appointment until September of this year.  I did send a note to Yong Bernal asking if she could be seen sooner in view of the results of the neuropsychological testing.      I am not making any change in her therapy at this time.  I do note that her blood pressure is under good control now and today it was 118/78. She stopped smoking.     I reviewed warning signs of stroke with the patient and her sister and she should seek immediate care in the future if she has any renewed stroke symptoms and she understands this.        Naveed Fontaine MD        D: 04/15/2019   T: 04/15/2019   MT: JAKE      Name:     ZAIN SEXTON   MRN:      [id number]        Account:      [id number]   :      1975           Service Date: 04/15/2019      Document: [id number]

## 2019-04-15 NOTE — LETTER
4/15/2019       RE: Alicia Penaloza  [address] [facility] [address]     Dear Colleague,    Thank you for referring your patient, Alicia Penaloza, to the [facility] NEUROLOGY at [facility]. Please see a copy of my visit note below.    Service Date: 04/15/2019      INTERVAL HISTORY:   Alicia Penaloza returns for followup to review testing.  She is accompanied by her sister, Oscar.      She is a patient who suffered multifocal acute strokes on 08/07/2018.  She underwent extensive evaluation, both at [facility] in [place] and here on the Stroke Service at the [facility].  This testing is reviewed in my 02/18/2019 note.  Her stroke risk factors included smoking and hypertension.  She has stopped smoking and is on treatment for hypertension as well as aspirin and atorvastatin now.  She no longer smokes.      When I saw her on 02/18/2019, there was concern that there had been progressive and worsening cognitive issues since the stroke.      She did have a brain MRI scan done on 01/22/2019 and I was able to get those films and compare them with the study done on 08/28/2018.  Overall, it was stable except for the expected evolution of the large left temporal occipital stroke.  There was also stable chronic small vessel changes.      She did have a 14-day ZIO Patch monitor done.  The study was negative for atrial fibrillation.      She also had formal neuropsychological testing done by Dr. Yoshi Mehta on 03/28/2019.  I reviewed the results with the patient and her sister.  Ms. Penaloza was unable to provide consistent effort.  I suspect this relates to underlying psychological issues including anxiety and somatization tendencies.  I do not think there was a willful attempt at doing poorly and I did explain this to her.  She did demonstrate the capacity for normal verbal fluency, cognitive speed, verbal reasoning, verbal recognition memory.  She did report some mild  symptoms of anxiety and her personality assessment was compatible with tendencies toward somatization.  Dr. Mehta suspected psychological factors were a major contributing issue.      She has established care with Yong Bernal in the Psychiatry Clinic here at the [facility].  However, she is not scheduled for a followup appointment until September of this year.  I did send a note to Yong Bernal asking if she could be seen sooner in view of the results of the neuropsychological testing.      I am not making any change in her therapy at this time.  I do note that her blood pressure is under good control now and today it was 118/78. She stopped smoking.     I reviewed warning signs of stroke with the patient and her sister and she should seek immediate care in the future if she has any renewed stroke symptoms and she understands this.        Naveed Fontaine MD        D: 04/15/2019   T: 04/15/2019   MT: JAKE      Name:     ZAIN SEXTON   MRN:      [id number]        Account:      [id number]   :      1975           Service Date: 04/15/2019      Document: [id number]

## 2019-04-15 NOTE — NURSING NOTE
Chief Complaint   Patient presents with     Stroke     [facility] RETURN 8 WEEK F/U STROKE       Ward Friedman, EMT

## 2019-04-15 NOTE — PROGRESS NOTES
Service Date: 04/15/2019      INTERVAL HISTORY:   Alicia Penaloza returns for followup to review testing.  She is accompanied by her sister, Oscar.      She is a patient who suffered multifocal acute strokes on 08/07/2018.  She underwent extensive evaluation, both at [facility] in [place] and here on the Stroke Service at the [facility].  This testing is reviewed in my 02/18/2019 note.  Her stroke risk factors included smoking and hypertension.  She has stopped smoking and is on treatment for hypertension as well as aspirin and atorvastatin now.  She no longer smokes.      When I saw her on 02/18/2019, there was concern that there had been progressive and worsening cognitive issues since the stroke.      She did have a brain MRI scan done on 01/22/2019 and I was able to get those films and compare them with the study done on 08/28/2018.  Overall, it was stable except for the expected evolution of the large left temporal occipital stroke.  There was also stable chronic small vessel changes.      She did have a 14-day ZIO Patch monitor done.  The study was negative for atrial fibrillation.      She also had formal neuropsychological testing done by Dr. Yoshi Mehta on 03/28/2019.  I reviewed the results with the patient and her sister.  Ms. Penaloza was unable to provide consistent effort.  I suspect this relates to underlying psychological issues including anxiety and somatization tendencies.  I do not think there was a willful attempt at doing poorly and I did explain this to her.  She did demonstrate the capacity for normal verbal fluency, cognitive speed, verbal reasoning, verbal recognition memory.  She did report some mild symptoms of anxiety and her personality assessment was compatible with tendencies toward somatization.  Dr. Mehta suspected psychological factors were a major contributing issue.      She has established care with Yogn Bernal in the Psychiatry Clinic here at the [facility]  [place].  However, she is not scheduled for a followup appointment until September of this year.  I did send a note to Yong Bernal asking if she could be seen sooner in view of the results of the neuropsychological testing.      I am not making any change in her therapy at this time.  I do note that her blood pressure is under good control now and today it was 118/78. She stopped smoking.     I reviewed warning signs of stroke with the patient and her sister and she should seek immediate care in the future if she has any renewed stroke symptoms and she understands this.        MD ARIC Sawyer MD             D: 04/15/2019   T: 04/15/2019   MT: JAKE      Name:     ZAIN SEXTON   MRN:      [id number]        Account:      [id number]   :      1975           Service Date: 04/15/2019      Document: [id number]

## 2019-04-15 NOTE — LETTER
4/15/2019       RE: Alicia Penaloza  [address] [facility] [address]     Dear Colleague,    Thank you for referring your patient, Alicia Penaloza, to the [facility] NEUROLOGY at [facility]. Please see a copy of my visit note below.    Service Date: 04/15/2019      INTERVAL HISTORY:   Alicia Penaloza returns for followup to review testing.  She is accompanied by her sister, Oscar.      She is a patient who suffered multifocal acute strokes on 08/07/2018.  She underwent extensive evaluation, both at [facility] in [place] and here on the Stroke Service at the [facility].  This testing is reviewed in my 02/18/2019 note.  Her stroke risk factors included smoking and hypertension.  She has stopped smoking and is on treatment for hypertension as well as aspirin and atorvastatin now.  She no longer smokes.      When I saw her on 02/18/2019, there was concern that there had been progressive and worsening cognitive issues since the stroke.      She did have a brain MRI scan done on 01/22/2019 and I was able to get those films and compare them with the study done on 08/28/2018.  Overall, it was stable except for the expected evolution of the large left temporal occipital stroke.  There was also stable chronic small vessel changes.      She did have a 14-day ZIO Patch monitor done.  The study was negative for atrial fibrillation.      She also had formal neuropsychological testing done by Dr. Yoshi Mehta on 03/28/2019.  I reviewed the results with the patient and her sister.  Ms. Penaloza was unable to provide consistent effort.  I suspect this relates to underlying psychological issues including anxiety and somatization tendencies.  I do not think there was a willful attempt at doing poorly and I did explain this to her.  She did demonstrate the capacity for normal verbal fluency, cognitive speed, verbal reasoning, verbal recognition memory.  She did report some mild  symptoms of anxiety and her personality assessment was compatible with tendencies toward somatization.  Dr. Mehta suspected psychological factors were a major contributing issue.      She has established care with Yong Bernal in the Psychiatry Clinic here at the [facility].  However, she is not scheduled for a followup appointment until September of this year.  I did send a note to Yong Bernal asking if she could be seen sooner in view of the results of the neuropsychological testing.      I am not making any change in her therapy at this time.  I do note that her blood pressure is under good control now and today it was 118/78. She stopped smoking.     I reviewed warning signs of stroke with the patient and her sister and she should seek immediate care in the future if she has any renewed stroke symptoms and she understands this.        Naveed Fontaine MD           D: 04/15/2019   T: 04/15/2019   MT: JAKE      Name:     ZAIN SEXTON   MRN:      [id number]        Account:      [id number]   :      1975           Service Date: 04/15/2019      Document: [id number]

## 2019-04-24 ASSESSMENT — PATIENT HEALTH QUESTIONNAIRE - PHQ9: SUM OF ALL RESPONSES TO PHQ QUESTIONS 1-9: 4

## 2019-05-09 ENCOUNTER — TRANSFERRED RECORDS (OUTPATIENT)
Dept: HEALTH INFORMATION MANAGEMENT | Facility: CLINIC | Age: 44
End: 2019-05-09

## 2019-05-10 ENCOUNTER — MEDICAL CORRESPONDENCE (OUTPATIENT)
Dept: HEALTH INFORMATION MANAGEMENT | Facility: CLINIC | Age: 44
End: 2019-05-10

## 2019-05-10 ENCOUNTER — TRANSFERRED RECORDS (OUTPATIENT)
Dept: HEALTH INFORMATION MANAGEMENT | Facility: CLINIC | Age: 44
End: 2019-05-10

## 2019-05-10 LAB — PHQ9 SCORE: 13

## 2019-05-20 ENCOUNTER — TELEPHONE (OUTPATIENT)
Dept: INTERNAL MEDICINE | Facility: CLINIC | Age: 44
End: 2019-05-20

## 2019-05-20 ENCOUNTER — TELEPHONE (OUTPATIENT)
Dept: PSYCHIATRY | Facility: CLINIC | Age: 44
End: 2019-05-20

## 2019-05-20 NOTE — TELEPHONE ENCOUNTER
Writer received 41 pages of incoming records from . Writer sent the originals to scanning, a copy was placed in KEVIN Chavez CNP's folder and a copy was held at writers desk until scanning complete. A message was routed to Yong Bernal

## 2019-05-20 NOTE — TELEPHONE ENCOUNTER
Health Call Center    Phone Message    May a detailed message be left on voicemail: yes    Reason for Call: Other:    Nirmala called from [facility]. She faxed an OWEN on 5/20/19 and asked two questions:    1) Did we receive records from [facility]?  2) Will Yong resume care after the patient discharges from [facility] and goes to the program at [facility] in [place]?    Nirmala can be reached at [phone]        Action Taken: Message routed to:  Other: Marva Ayon RNCC

## 2019-05-20 NOTE — TELEPHONE ENCOUNTER
Reason for Call:  Other call back    Detailed comments: Vidhi calling in regards to patient and states she faxed a release to Dr Thomas today. Please call her back to discuss at [phone].    Phone Number Patient can be reached at:    Best Time: any    Can we leave a detailed message on this number? Not Applicable    Call taken on 5/20/2019 at 12:09 PM by Jacquelin Genao

## 2019-05-21 NOTE — TELEPHONE ENCOUNTER
Nirmala is calling back wondering if this has been addressed. They are needing to know if Dr. Thomas will be able to oversee her care while at [facility]. Would like a call back from him or his nurse as soon as possible. They cannot admit her their care without this information.   Call [phone]  Thank you,  Kavitha Mendes   for [facility]

## 2019-05-21 NOTE — TELEPHONE ENCOUNTER
Writer received incoming phone call from Nirmala with [facility] in [place]. Nirmala is calling with the same request as yesterday. Informed her that records have been received from [facility]. Per Nirmala, the plan is to discharge the pt from [facility] to her location. Before [facility] can admit her, they need confirmation from the provider that he will continue managing her care. While inpatient at [facility], medication changes occurred and pt's care was managed by another provider.     Informed Nirmala that provider is out of clinic today, but agreed to call her later this week after discussing with the provider.

## 2019-05-21 NOTE — TELEPHONE ENCOUNTER
Left detail message on Nirmala VM of the recommendations from Dr. Thomas. Advised Nirmala to call back if she needed any further.

## 2019-05-21 NOTE — TELEPHONE ENCOUNTER
No I can't care for her without seeing her.  I saw her once in September and don't have any update on her lately.  Where she has been or medical condition.  She can come see me and then I can oversee her medical care but not psychiatric care.  I can't sign orders without seeing her over this time. That would have to come from a discharging provider.

## 2019-05-22 ENCOUNTER — TELEPHONE (OUTPATIENT)
Dept: PSYCHIATRY | Facility: CLINIC | Age: 44
End: 2019-05-22

## 2019-05-22 NOTE — TELEPHONE ENCOUNTER
Health Call Center    Phone Message    May a detailed message be left on voicemail: yes    Reason for Call: Other: Megan () called hoping to speak with Yong Bernal CNP or his nurse regarding continuity of care for the patient.     Action Taken: Message routed to: HYUN Vann

## 2019-05-22 NOTE — TELEPHONE ENCOUNTER
Discussed with provider. He is willing to continue providing care going forward. Called Megan, Sharita, and Nirmala with [facility]. None of these people answered.  left for all three. Left clinic phone number for return phone call if they have any additional questions.

## 2019-05-22 NOTE — TELEPHONE ENCOUNTER
Received incoming phone call from pt's sister, Sharita. She is also calling about getting the pt transferred to a new facility. Apologized for the delay and informed her that provider is out of clinic, but writer will discuss with him ASAP. Once writer discusses this with him, writer will call both Sharita and Nirmala with [facility] (see earlier encounters). Sharita can be reached at [phone].

## 2019-05-22 NOTE — TELEPHONE ENCOUNTER
On 5/16/19 the patient signed a PHI authorizing person to person communication with [facility] ([facility] for coordination of care. This writer sent the original copy to scanning on 5/22/19, and a copy was kept in psychiatry until scanning complete.

## 2019-05-23 ENCOUNTER — TELEPHONE (OUTPATIENT)
Dept: PSYCHIATRY | Facility: CLINIC | Age: 44
End: 2019-05-23

## 2019-05-23 NOTE — TELEPHONE ENCOUNTER
On 5/28/2019, writer obtained Dr. Stroud' signature and faxed medication list to Linda Scott at [phone]. The original copies were sent to scanning.Thais Machado LPN    On 5/22/2019, 4 faxed pages of medication list was received for review and signature of provider. Yong Bernal is out of clinic so writer will have Dr. Stroud sign forms. Writer is holding forms in Nurse Triage until signature can be obtained.Thais Machado LPN

## 2019-05-24 ENCOUNTER — MEDICAL CORRESPONDENCE (OUTPATIENT)
Dept: HEALTH INFORMATION MANAGEMENT | Facility: CLINIC | Age: 44
End: 2019-05-24

## 2019-05-28 ENCOUNTER — TRANSFERRED RECORDS (OUTPATIENT)
Dept: HEALTH INFORMATION MANAGEMENT | Facility: CLINIC | Age: 44
End: 2019-05-28

## 2019-05-31 ENCOUNTER — OFFICE VISIT (OUTPATIENT)
Dept: PSYCHIATRY | Facility: CLINIC | Age: 44
End: 2019-05-31
Attending: NURSE PRACTITIONER
Payer: COMMERCIAL

## 2019-05-31 VITALS
HEART RATE: 87 BPM | WEIGHT: 197.6 LBS | BODY MASS INDEX: 35 KG/M2 | DIASTOLIC BLOOD PRESSURE: 94 MMHG | SYSTOLIC BLOOD PRESSURE: 134 MMHG

## 2019-05-31 DIAGNOSIS — F32.1 CURRENT MODERATE EPISODE OF MAJOR DEPRESSIVE DISORDER, UNSPECIFIED WHETHER RECURRENT (H): ICD-10-CM

## 2019-05-31 PROCEDURE — G0463 HOSPITAL OUTPT CLINIC VISIT: HCPCS | Mod: ZF

## 2019-05-31 RX ORDER — FLUOXETINE 40 MG/1
80 CAPSULE ORAL DAILY
Qty: 60 CAPSULE | Refills: 3 | Status: SHIPPED | OUTPATIENT
Start: 2019-05-31 | End: 2019-08-09

## 2019-05-31 ASSESSMENT — PAIN SCALES - GENERAL: PAINLEVEL: NO PAIN (0)

## 2019-05-31 NOTE — PATIENT INSTRUCTIONS
Thank you for coming to the PSYCHIATRY CLINIC.    Lab Testing:  If you had lab testing today and your results are reassuring or normal they will be mailed to you or sent through [employer] within 7 days.   If the lab tests need quick action we will call you with the results.  The phone number we will call with results is # [phone] (home) . If this is not the best number please call our clinic and change the number.    Medication Refills:  If you need any refills please call your pharmacy and they will contact us. Our fax number for refills is [phone]. Please allow three business for refill processing.   If you need to  your refill at a new pharmacy, please contact the new pharmacy directly. The new pharmacy will help you get your medications transferred.     Scheduling:  If you have any concerns about today's visit or wish to schedule another appointment please call our office during normal business hours [phone] (8-5:00 M-F)    Contact Us:  Please call [phone] during business hours (8-5:00 M-F).  If after clinic hours, or on the weekend, please call  [phone].    Financial Assistance [phone]  [employer] Billing [phone]  [employer] Billing [phone]  Medical Records [phone]      MENTAL HEALTH CRISIS NUMBERS:  [facility]:   [facility] - [phone]   Crisis Residence [facility] - [phone]   Walk-In Counseling [facility] [phone]   COPE 24/7 [place] Mobile Team for Adults - [[phone]]; Child - [[phone]]        [facility]:   [facility] - [phone]   Walk-in counseling [facility] - [phone]   Walk-in counseling [facility] - [phone]   Crisis Residence [facility] - [phone]   Urgent Care Adult Mental Health:   --Drop-in, 24/7 crisis line, and [place] Mobile Team [[phone]]    CRISIS TEXT LINE: Text 741-451 from anywhere,  anytime, any crisis 24/7;    OR SEE www.crisistextline.org     Poison Control Center - [phone]    CHILD: [facility] needs assessment team - [phone]     [facility] - [phone]; or [employer] [facility] - [phone]    If you have a medical emergency please call 911or go to the nearest ER.                    _____________________________________________    Again thank you for choosing PSYCHIATRY CLINIC and please let us know how we can best partner with you to improve you and your family's health.  You may be receiving a survey in the mail regarding this appointment. We would love to have your feedback, both positive and negative, so please fill out the survey and return it using the provided envelope. The survey is done by an external company, so your answers are anonymous.
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
## 2019-06-05 ENCOUNTER — TELEPHONE (OUTPATIENT)
Dept: PSYCHIATRY | Facility: CLINIC | Age: 44
End: 2019-06-05

## 2019-06-05 DIAGNOSIS — G47.9 SLEEP DISORDER: ICD-10-CM

## 2019-06-05 DIAGNOSIS — T78.40XA ALLERGIC STATE: Primary | ICD-10-CM

## 2019-06-05 NOTE — TELEPHONE ENCOUNTER
Health Call Center    Phone Message    May a detailed message be left on voicemail: yes    Reason for Call: Other: Pt's  at [facility] called to about the pt's benedryl. She was at a long-term care facility that was managing her presriptions but she's been discharged from that facility and she wants to know who can manage the benedryl; if it should be her primary or if it should be Yong Bernal. Pt didn't get take her medication last night.     Action Taken: Other: Marva Ayon

## 2019-06-06 DIAGNOSIS — T78.40XD ALLERGIC STATE, SUBSEQUENT ENCOUNTER: Primary | ICD-10-CM

## 2019-06-06 DIAGNOSIS — G47.9 SLEEP DISORDER: ICD-10-CM

## 2019-06-06 RX ORDER — PHENOL 1.4 %
10 AEROSOL, SPRAY (ML) MUCOUS MEMBRANE
Qty: 15 TABLET | Refills: 0 | Status: SHIPPED | OUTPATIENT
Start: 2019-06-06 | End: 2020-02-18 | Stop reason: DRUGHIGH

## 2019-06-06 RX ORDER — DIPHENHYDRAMINE HCL 25 MG
50 CAPSULE ORAL DAILY
Qty: 30 CAPSULE | Refills: 0 | Status: SHIPPED | OUTPATIENT
Start: 2019-06-06 | End: 2019-06-18

## 2019-06-06 RX ORDER — DIPHENHYDRAMINE HCL 25 MG
25 CAPSULE ORAL 2 TIMES DAILY
Qty: 30 CAPSULE | Refills: 0 | Status: CANCELLED | OUTPATIENT
Start: 2019-06-06

## 2019-06-06 RX ORDER — PHENOL 1.4 %
10 AEROSOL, SPRAY (ML) MUCOUS MEMBRANE
Qty: 15 TABLET | Refills: 0 | Status: CANCELLED | OUTPATIENT
Start: 2019-06-06

## 2019-06-06 RX ORDER — DIPHENHYDRAMINE HCL 25 MG
50 CAPSULE ORAL DAILY
Qty: 30 CAPSULE | Refills: 0 | Status: SHIPPED | OUTPATIENT
Start: 2019-06-06 | End: 2019-06-06

## 2019-06-06 NOTE — TELEPHONE ENCOUNTER
Yong Bernal, APRN CNP  You 15 hours ago (5:07 PM)      Flash Heredia,     Could you see what the indication for the medication is?  She's taking it twice per day.  Maybe for anxiety but I've never seen it used in psych outside of sleep promotion     Yong    Routing comment      Writer placed a call to Sunitha at [facility] at [phone]. No answer at number provided. LVM, requesting a call back. Clinic number provided.     Writer placed a call to patient at [phone] and sister answered the phone. Sister schedules appts with provider and brings patient to the appt. OWEN on file. Sister mentioned patient is no longer at a long-term care facility and needs meds to last her till 6/19/19. Patient is scheduled to be seen with PCP on 6/19/19 and meds can be refilled at the appt. Till then patient does not have enough Benadryl. Per sister patient takes the benadryl for nasal allergies and nasal cavity inflammation. Requesting the benadryl be sent to [facility]- [address] [place] [address] [address], [place] [address] [phone]. Writer agreed to reach out to provider for approval.

## 2019-06-06 NOTE — TELEPHONE ENCOUNTER
Meds refilled by provider   Med tab changed to reflect this     No further action needed by this writer

## 2019-06-06 NOTE — TELEPHONE ENCOUNTER
Keshawnr received incoming call from nurse Helton [phone]. She informed this writer patient has been taking Benadryl 25 mg- take 2 cap daily at bedtime. She was unsure about the reason for the medication. She also requested if melatonin 10 mg at bedtime be prescribed as well so patient does not have to get it OTC. She also informed this writer patient has been crying and stressed due to this move and change. She is wondering if Ativan 0.5 mg can be prescribed daily instead of PRN due to patients memory. Writer informed Sunitha the med is prescribed by a different provider. Keshawnr agreed to reach out to provider.

## 2019-06-12 ENCOUNTER — TELEPHONE (OUTPATIENT)
Dept: PSYCHIATRY | Facility: CLINIC | Age: 44
End: 2019-06-12

## 2019-06-12 NOTE — TELEPHONE ENCOUNTER
On 6/10/2019 the patient signed an OWEN authorizing medical records to be released from [facility] to [facility]  for the purpose of continuing care. I sent the request to medical records via the tube system and kept a copy in psychiatry until scanning is complete.  Shabana Veliz CMA

## 2019-06-18 DIAGNOSIS — T78.40XD ALLERGIC STATE, SUBSEQUENT ENCOUNTER: ICD-10-CM

## 2019-06-18 ASSESSMENT — PATIENT HEALTH QUESTIONNAIRE - PHQ9: SUM OF ALL RESPONSES TO PHQ QUESTIONS 1-9: 9

## 2019-06-19 ENCOUNTER — TELEPHONE (OUTPATIENT)
Dept: PSYCHIATRY | Facility: CLINIC | Age: 44
End: 2019-06-19

## 2019-06-19 NOTE — TELEPHONE ENCOUNTER
received incoming mail from [facility] addressed for the provider. The purpose of the letter is to inform the provider that pt's fluoxetine 60 mg is no longer covered by her insurance. After reviewing the pt's chart, it appears the pt has since increased the dose to 80 mg (two 40 mg caps). No further action needed at this time. Letter held at 's desk.

## 2019-06-24 RX ORDER — DIPHENHYDRAMINE HCL 25 MG
50 CAPSULE ORAL DAILY
Qty: 30 CAPSULE | Refills: 0 | Status: SHIPPED | OUTPATIENT
Start: 2019-06-24

## 2019-06-24 NOTE — TELEPHONE ENCOUNTER
Medication requested: DIPHENHIST 25 MG CAP  Last refilled: 6/7/19  Qty: 30      Last seen: 5/31/19  RTC: 4-6 WEEKS  Cancel: 0  No-show: 0  Next appt: 7/12/19    Refill decision:   Refilled for 30 days per protocol.

## 2019-07-01 ENCOUNTER — OFFICE VISIT (OUTPATIENT)
Dept: NEUROLOGY | Facility: CLINIC | Age: 44
End: 2019-07-01
Payer: COMMERCIAL

## 2019-07-01 VITALS
DIASTOLIC BLOOD PRESSURE: 66 MMHG | OXYGEN SATURATION: 95 % | WEIGHT: 196 LBS | HEART RATE: 84 BPM | SYSTOLIC BLOOD PRESSURE: 99 MMHG | BODY MASS INDEX: 34.72 KG/M2

## 2019-07-01 DIAGNOSIS — R41.0 EPISODIC CONFUSION: ICD-10-CM

## 2019-07-01 DIAGNOSIS — R41.89 COGNITIVE CHANGES: Primary | ICD-10-CM

## 2019-07-01 DIAGNOSIS — Z86.73 HISTORY OF STROKE: ICD-10-CM

## 2019-07-01 RX ORDER — NICOTINE 21 MG/24HR
1 PATCH, TRANSDERMAL 24 HOURS TRANSDERMAL EVERY 24 HOURS
COMMUNITY
Start: 2019-06-19 | End: 2019-11-20

## 2019-07-01 RX ORDER — RIBOFLAVIN (VITAMIN B2) 400 MG
4 TABLET ORAL
COMMUNITY
Start: 2019-06-19

## 2019-07-01 RX ORDER — CETIRIZINE HYDROCHLORIDE 10 MG/1
10 TABLET ORAL
COMMUNITY
Start: 2019-06-19 | End: 2020-06-18

## 2019-07-01 RX ORDER — LANOLIN ALCOHOL/MO/W.PET/CERES
400 CREAM (GRAM) TOPICAL DAILY
COMMUNITY
Start: 2019-06-19 | End: 2020-08-04

## 2019-07-01 RX ORDER — FLUTICASONE PROPIONATE 50 MCG
2 SPRAY, SUSPENSION (ML) NASAL DAILY
COMMUNITY
Start: 2019-05-13

## 2019-07-01 ASSESSMENT — PAIN SCALES - GENERAL: PAINLEVEL: NO PAIN (0)

## 2019-07-01 NOTE — LETTER
7/1/2019       RE: Alicia Penaloza  [address] [facility] [address]     Dear Colleague,    Thank you for referring your patient, Alicia Penaloza, to the [facility] NEUROLOGY at [facility]. Please see a copy of my visit note below.    Service Date: 07/01/2019      HISTORY OF PRESENT ILLNESS:  Alicia Penaloza returns for followup.  She is accompanied by her sister, Sharita.  She is here today because of further cognitive decline, as well as agitation, anxiety and episodes of confusion.      The patient did suffer multifocal strokes on or around 08/07/2018.   These were left temporal occipital cortex, left frontal white matter and right thalamus.  The etiology of the strokes despite extensive testing was unrevealing.  When I saw her in 02/2019, there was a report of worsening cognition since the strokes.  She did have a ZIO Patch monitor done following that visit that was negative for atrial fibrillation.  She had had a brain MRI scan done in [place] for further evaluation and I compared to the study in 08/2018.  I did not appreciate there had been any new findings.  She subsequently had neuropsychology testing done by Dr. Yoshi Mehta and she was unable to provide consistent effort.  I thought that this probably related to underlying psychological issues and anxiety.  She has seen Yong Bernal in Psychiatry and is now on Prozac.      Over the past several weeks, there has been some decline in her cognition and memory.  She has been described as being intermittently agitated.  She has had some episodes of confusion that she does not have a memory of.  She is quite forgetful.      On 06/19/2019, she had blood work which included a CBC, TSH and comprehensive metabolic panel that were normal.      The changes over the past several weeks have been gradual without any clear acute focal neurologic symptoms.  She does report she is experiencing headaches.      CURRENT MEDICATIONS:   1.  " Aspirin 325 mg.   2.  Lipitor.   3.  Zyrtec.   4.  Diphenhydramine.   5.  Fluoxetine 80 mg.   6.  Flonase.   7.  Gabapentin 100 mg in the morning, 100 mg in the afternoon and 300 mg at night.   8.  Lisinopril.   9.  Lorazepam p.r.n.   10.  Magnesium.   11.  Melatonin.   12.  Nicoderm patch.   13.  Riboflavin.   14.  Mirena IUD.      PHYSICAL EXAMINATION:  Examination reveals the patient is somewhat subdued.  She is alert and cooperative.  Heart rate 76.  Blood pressure 99/66.  Her pulse is regular.  She is oriented to place, but only partially oriented to time.  She indicates that today is Friday, whereas it is Monday and she could not give me the year.  She made 1 error spelling the word \"world\" backwards.  Her spontaneous speech is without error.  Naming and repetition are normal.  She recalled 0/3 objects.  Cranial nerves II-XII are intact.  Motor examination reveals normal strength.  Sensory examination is intact.  Finger-to-nose is done well.  Gait is unremarkable.  Reflexes are 3+.  Plantar responses are flexor.      IMPRESSION:   1.  History of multifocal strokes in 08/2018 affecting the left temporal occipital cortex, left frontal white matter and right thalamus.   2.  Cognitive complaints which reportedly are worsening.   3.  Episodes of confusion with a poor recollection of them.      PLAN:  I have recommended the diphenhydramine be stopped.      She is going to get a followup brain MRI scan with and without contrast to look for any new lesions.      She is going to have a 3-hour EEG because of the episodes of confusion to further evaluate the possibility of nonconvulsive seizures.      I will communicate the results of the MRI scan to Sharita, her sister (at patient's request).  I also plan to see Zain back after the EEG and we will tentatively schedule a followup for 4 weeks.      Naveed Fontaine MD               D: 07/01/2019   T: 07/01/2019   MT: al      Name:     ZAIN SEXTON   MRN:      " [id number]        Account:      [id number]   :      1975           Service Date: 2019      Document: [id number]

## 2019-07-01 NOTE — PROGRESS NOTES
Service Date: 07/01/2019      HISTORY OF PRESENT ILLNESS:  Alicia Penaloza returns for followup.  She is accompanied by her sister, Sharita.  She is here today because of further cognitive decline, as well as agitation, anxiety and episodes of confusion.      The patient did suffer multifocal strokes on or around 08/07/2018.   These were left temporal occipital cortex, left frontal white matter and right thalamus.  The etiology of the strokes despite extensive testing was unrevealing.  When I saw her in 02/2019, there was a report of worsening cognition since the strokes.  She did have a ZIO Patch monitor done following that visit that was negative for atrial fibrillation.  She had had a brain MRI scan done in [place] for further evaluation and I compared to the study in 08/2018.  I did not appreciate there had been any new findings.  She subsequently had neuropsychology testing done by Dr. Yoshi Mehta and she was unable to provide consistent effort.  I thought that this probably related to underlying psychological issues and anxiety.  She has seen Yong Bernal in Psychiatry and is now on Prozac.      Over the past several weeks, there has been some decline in her cognition and memory.  She has been described as being intermittently agitated.  She has had some episodes of confusion that she does not have a memory of.  She is quite forgetful.      On 06/19/2019, she had blood work which included a CBC, TSH and comprehensive metabolic panel that were normal.      The changes over the past several weeks have been gradual without any clear acute focal neurologic symptoms.  She does report she is experiencing headaches.      CURRENT MEDICATIONS:   1.  Aspirin 325 mg.   2.  Lipitor.   3.  Zyrtec.   4.  Diphenhydramine.   5.  Fluoxetine 80 mg.   6.  Flonase.   7.  Gabapentin 100 mg in the morning, 100 mg in the afternoon and 300 mg at night.   8.  Lisinopril.   9.  Lorazepam p.r.n.   10.  Magnesium.   11.  Melatonin.  "  12.  Nicoderm patch.   13.  Riboflavin.   14.  Mirena IUD.      PHYSICAL EXAMINATION:  Examination reveals the patient is somewhat subdued.  She is alert and cooperative.  Heart rate 76.  Blood pressure 99/66.  Her pulse is regular.  She is oriented to place, but only partially oriented to time.  She indicates that today is Friday, whereas it is Monday and she could not give me the year.  She made 1 error spelling the word \"world\" backwards.  Her spontaneous speech is without error.  Naming and repetition are normal.  She recalled 0/3 objects.  Cranial nerves II-XII are intact.  Motor examination reveals normal strength.  Sensory examination is intact.  Finger-to-nose is done well.  Gait is unremarkable.  Reflexes are 3+.  Plantar responses are flexor.      IMPRESSION:   1.  History of multifocal strokes in 2018 affecting the left temporal occipital cortex, left frontal white matter and right thalamus.   2.  Cognitive complaints which reportedly are worsening.   3.  Episodes of confusion with a poor recollection of them.      PLAN:  I have recommended the diphenhydramine be stopped.      She is going to get a followup brain MRI scan with and without contrast to look for any new lesions.      She is going to have a 3-hour EEG because of the episodes of confusion to further evaluate the possibility of nonconvulsive seizures.      I will communicate the results of the MRI scan to Sharita, her sister (at patient's request).  I also plan to see Zain back after the EEG and we will tentatively schedule a followup for 4 weeks.     ADDENDUM 19: MRI reviewed and discussed with sister Sharita. No new findings compared to 19 study.     MD ARIC Brewster MD             D: 2019   T: 2019   MT: al      Name:     ZAIN SEXTON   MRN:      [id number]        Account:      [id number]   :      1975           Service Date: 2019      Document: [id number]      "

## 2019-07-01 NOTE — NURSING NOTE
Chief Complaint   Patient presents with     RECHECK     [facility] RETURN 3 MO F/U ISCHEMIC STROKE       Aleja Vega, EMT

## 2019-07-12 ENCOUNTER — OFFICE VISIT (OUTPATIENT)
Dept: PSYCHIATRY | Facility: CLINIC | Age: 44
End: 2019-07-12
Attending: NURSE PRACTITIONER
Payer: COMMERCIAL

## 2019-07-12 VITALS
DIASTOLIC BLOOD PRESSURE: 90 MMHG | SYSTOLIC BLOOD PRESSURE: 138 MMHG | HEART RATE: 60 BPM | WEIGHT: 193 LBS | BODY MASS INDEX: 34.19 KG/M2

## 2019-07-12 DIAGNOSIS — F32.1 CURRENT MODERATE EPISODE OF MAJOR DEPRESSIVE DISORDER, UNSPECIFIED WHETHER RECURRENT (H): ICD-10-CM

## 2019-07-12 DIAGNOSIS — T78.40XD ALLERGIC STATE, SUBSEQUENT ENCOUNTER: ICD-10-CM

## 2019-07-12 DIAGNOSIS — G47.9 SLEEP DISORDER: ICD-10-CM

## 2019-07-12 PROCEDURE — G0463 HOSPITAL OUTPT CLINIC VISIT: HCPCS | Mod: ZF

## 2019-07-12 RX ORDER — ERGOCALCIFEROL (VITAMIN D2) 10 MCG
1000 TABLET ORAL DAILY
COMMUNITY
End: 2021-04-20

## 2019-07-12 RX ORDER — PHENOL 1.4 %
10 AEROSOL, SPRAY (ML) MUCOUS MEMBRANE
Qty: 15 TABLET | Refills: 0 | Status: CANCELLED | OUTPATIENT
Start: 2019-07-12

## 2019-07-12 RX ORDER — GABAPENTIN 100 MG/1
CAPSULE ORAL
Qty: 150 CAPSULE | Refills: 5 | Status: CANCELLED | OUTPATIENT
Start: 2019-07-12

## 2019-07-12 RX ORDER — FLUOXETINE 40 MG/1
80 CAPSULE ORAL DAILY
Qty: 60 CAPSULE | Refills: 3 | Status: CANCELLED | OUTPATIENT
Start: 2019-07-12

## 2019-07-12 RX ORDER — DIPHENHYDRAMINE HCL 25 MG
50 CAPSULE ORAL DAILY
Qty: 30 CAPSULE | Refills: 0 | Status: CANCELLED | OUTPATIENT
Start: 2019-07-12

## 2019-07-12 ASSESSMENT — PAIN SCALES - GENERAL: PAINLEVEL: MODERATE PAIN (4)
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
## 2019-07-16 ENCOUNTER — OFFICE VISIT (OUTPATIENT)
Dept: NEUROLOGY | Facility: CLINIC | Age: 44
End: 2019-07-16
Attending: PSYCHIATRY & NEUROLOGY
Payer: COMMERCIAL

## 2019-07-16 ENCOUNTER — ANCILLARY PROCEDURE (OUTPATIENT)
Dept: MRI IMAGING | Facility: CLINIC | Age: 44
End: 2019-07-16
Attending: PSYCHIATRY & NEUROLOGY
Payer: COMMERCIAL

## 2019-07-16 DIAGNOSIS — R41.89 COGNITIVE CHANGES: ICD-10-CM

## 2019-07-16 DIAGNOSIS — R41.0 EPISODIC CONFUSION: ICD-10-CM

## 2019-07-16 DIAGNOSIS — Z86.73 HISTORY OF STROKE: ICD-10-CM

## 2019-07-16 RX ORDER — GADOBUTROL 604.72 MG/ML
7.5 INJECTION INTRAVENOUS ONCE
Status: COMPLETED | OUTPATIENT
Start: 2019-07-16 | End: 2019-07-16

## 2019-07-16 RX ADMIN — GADOBUTROL 7.5 ML: 604.72 INJECTION INTRAVENOUS at 17:00

## 2019-07-16 NOTE — Clinical Note
7/16/2019       RE: Alicia Penaloza  [address] [facility] [address]     Dear Colleague,    Thank you for referring your patient, Alicia Penaloza, to the EEG [facility] OUTPATIENT at [facility]. Please see a copy of my visit note below.    OP VEEG CSC  Dr Al  [facility] [address]    Again, thank you for allowing me to participate in the care of your patient.      Sincerely,    EEG Tech

## 2019-07-16 NOTE — DISCHARGE INSTRUCTIONS
MRI Contrast Discharge Instructions    The IV contrast you received today will pass out of your body in your  urine. This will happen in the next 24 hours. You will not feel this process.  Your urine will not change color.    Drink at least 4 extra glasses of water or juice today (unless your doctor  has restricted your fluids). This reduces the stress on your kidneys.  You may take your regular medicines.    If you are on dialysis: It is best to have dialysis today.    If you have a reaction: Most reactions happen right away. If you have  any new symptoms after leaving the hospital (such as hives or swelling),  call your hospital at the correct number below. Or call your family doctor.  If you have breathing distress or wheezing, call 911.    Special instructions: ***    I have read and understand the above information.    Signature:______________________________________ Date:___________    Staff:__________________________________________ Date:___________     Time:__________    [facility] Departments:    ___[place]: [phone]  ___[facility]: [phone]  ___[place]: [phone] ___[facility]: [phone]  ___[facility]: [phone]  ___[facility]: [phone]  ___[phone]  ___[facility]: [phone]  ___[phone]

## 2019-07-17 NOTE — PROCEDURES
Procedure Date: 07/16/2019      EEG #:  .      DATE OF STUDY:  07/16/2019.      SOURCE FILE DURATION:  2 hours 22 minutes 36 seconds.      CLINICAL SUMMARY:  The patient is a 44-year-old female with history of multifocal strokes, cognitive decline, anxiety and episodes of confusion.  EEG was performed to evaluate for seizures.     TECHNICAL SUMMARY: This continuous video- EEG monitoring procedure was performed with 23 scalp electrodes in 10-20 electrode system placement, and additional scalp, precordial and other surface electrodes used for electrical referencing and artifact detection.  Video monitoring was utilized and periodically reviewed by EEG technologists and the physician for electroclinical correlations.     INTERICTAL ACTIVITIES:  During quiet wakefulness, there was 10 Hz alpha activity over the posterior head regions which was better developed and more sustained over the right posterior head region.  Focal theta-delta slowing was present over the left hemisphere, maximal left temporal head region.  Drowsiness was manifested as dropout of the posterior dominant rhythm and diffuse theta activity and horizontal eye movements.  Sleep was not recorded.  No clear epileptiform discharges were present.      ACTIVATION MANEUVERS:  Photic stimulation did not elicit an abnormal activity on the EEG.      CLINICAL/ICTAL EVENTS:  No electrographic or clinical seizures or paroxysmal behavioral events were recorded.      IMPRESSION:  Abnormal video EEG.  There was left hemispheric slowing, maximal left temporal consistent with structural abnormality and neuronal dysfunction there.  No clear epileptiform discharges were present.  No electrographic or clinical seizures were recorded.  Longer monitoring with recording EEG during sleep or capturing spells is recommended if clinical suspicion for seizures is high.         LYLY HURTADO MD             D: 07/16/2019   T: 07/16/2019   MT: al      Name:     DARSHAN  ZAIN   MRN:      [id number]        Account:        [id number]   :      1975           Procedure Date: 2019      Document: [id number]

## 2019-07-23 ENCOUNTER — TRANSFERRED RECORDS (OUTPATIENT)
Dept: HEALTH INFORMATION MANAGEMENT | Facility: CLINIC | Age: 44
End: 2019-07-23

## 2019-07-25 ENCOUNTER — TRANSFERRED RECORDS (OUTPATIENT)
Dept: HEALTH INFORMATION MANAGEMENT | Facility: CLINIC | Age: 44
End: 2019-07-25

## 2019-07-26 ENCOUNTER — DOCUMENTATION ONLY (OUTPATIENT)
Dept: NEUROLOGY | Facility: CLINIC | Age: 44
End: 2019-07-26

## 2019-07-29 ENCOUNTER — TELEPHONE (OUTPATIENT)
Dept: PSYCHIATRY | Facility: CLINIC | Age: 44
End: 2019-07-29

## 2019-07-29 ENCOUNTER — OFFICE VISIT (OUTPATIENT)
Dept: NEUROLOGY | Facility: CLINIC | Age: 44
End: 2019-07-29
Payer: COMMERCIAL

## 2019-07-29 VITALS — DIASTOLIC BLOOD PRESSURE: 89 MMHG | SYSTOLIC BLOOD PRESSURE: 118 MMHG | OXYGEN SATURATION: 98 % | HEART RATE: 76 BPM

## 2019-07-29 DIAGNOSIS — Z86.73 HISTORY OF STROKE: Primary | ICD-10-CM

## 2019-07-29 DIAGNOSIS — R41.3 MEMORY LOSS: ICD-10-CM

## 2019-07-29 DIAGNOSIS — R41.0 CONFUSION: ICD-10-CM

## 2019-07-29 ASSESSMENT — PAIN SCALES - GENERAL: PAINLEVEL: NO PAIN (0)

## 2019-07-29 NOTE — LETTER
2019      RE: Alicia Sexton  [address]  [facility] [address]       Service Date: 2019      INTERVAL HISTORY:  Alicia Sexton returns to review testing.  She is accompanied by her sister, Oscar, as well as a friend, Livia, today.      She is a patient who had multifocal strokes in 2018 affecting the left temporal and occipital cortex as well as the left frontal white matter in the right thalamus.  She was reporting worsening of her cognition and episodes of confusion when I last saw her.      She has now had a followup brain MRI scan done on 2019 and the findings are stable compared to a study done in January of this year.  There were no new strokes.  She also had a 3-hour EEG which demonstrated left hemisphere slowing, which was not unanticipated.  There were no epileptiform changes.      I reviewed the significance of these findings with the patient and her sister and friend.  She certainly has reasons to have difficulty with her memory and confusion on occasion, but there also underlying psychological issues that coexist.      She is seeing Yong Bernal in Psychiatry and I have encouraged her to follow up with him.      They are looking into an adult foster care facility with memory care capacity and I encouraged her to move forward with that.      Followup with me will be as needed, but I indicated I would be happy to see Alicia back in the future.      Naveed Fontaine MD           D: 2019   T: 2019   MT: al      Name:     ALICIA SEXTON   MRN:      [id number]        Account:      [id number]   :      1975           Service Date: 2019      Document: [id number]

## 2019-07-29 NOTE — LETTER
7/29/2019       RE: Alicia Penaloza  [address] [facility] [address]     Dear Colleague,    Thank you for referring your patient, Alicia Penaloza, to the [facility] NEUROLOGY at [facility]. Please see a copy of my visit note below.    No notes on file    Again, thank you for allowing me to participate in the care of your patient.      Sincerely,    Naveed Fontaine MD

## 2019-07-29 NOTE — NURSING NOTE
Chief Complaint   Patient presents with     RECHECK     [facility] RETURN - FOLLOW UP       Shashank Leonard, EMT

## 2019-07-29 NOTE — TELEPHONE ENCOUNTER
Writer received incoming phone call from pt's sister and care taker, Sharita (consent to communicate on file). Per Sharita, the pt had to be moved to her previous nursing home on Friday. When she moved back, Sharita believes the provider at the nursing home decreased her Prozac to 60 mg vs 80 mg. Sharita would like to confirm with Yong Bernal if he'd prefer she's on 80 mg or if 60 mg will be sufficient. The pt is scheduled to see the provider on 8/7, but she thought she would ask prior to that, as the pt will be attending the appt with a friend. Writer reviewed provider's last office visit note, but this is still incomplete. Will confirm with provider what dose of Prozac Alicia should currently be taking.

## 2019-07-29 NOTE — LETTER
2019       RE: Alicia Sexton  [address] [facility] [address]     Dear Colleague,    Thank you for referring your patient, Alicia Sexton, to the [facility] NEUROLOGY at [facility]. Please see a copy of my visit note below.    Service Date: 2019      INTERVAL HISTORY:  Alicia Sexton returns to review testing.  She is accompanied by her sister, Oscar, as well as a friend, Livia, today.      She is a patient who had multifocal strokes in 2018 affecting the left temporal and occipital cortex as well as the left frontal white matter in the right thalamus.  She was reporting worsening of her cognition and episodes of confusion when I last saw her.      She has now had a followup brain MRI scan done on 2019 and the findings are stable compared to a study done in January of this year.  There were no new strokes.  She also had a 3-hour EEG which demonstrated left hemisphere slowing, which was not unanticipated.  There were no epileptiform changes.      I reviewed the significance of these findings with the patient and her sister and friend.  She certainly has reasons to have difficulty with her memory and confusion on occasion, but there also underlying psychological issues that coexist.      She is seeing Yong Bernal in Psychiatry and I have encouraged her to follow up with him.      They are looking into an adult foster care facility with memory care capacity and I encouraged her to move forward with that.      Followup with me will be as needed, but I indicated I would be happy to see Alicia back in the future.      Naveed Fontaine MD                 D: 2019   T: 2019   MT: al      Name:     ALICIA SEXTON   MRN:      [id number]        Account:      [id number]   :      1975           Service Date: 2019      Document: [id number]

## 2019-07-29 NOTE — PROGRESS NOTES
Service Date: 2019      INTERVAL HISTORY:  Alicia Sexton returns to review testing.  She is accompanied by her sister, Oscar, as well as a friend, Livia, today.      She is a patient who had multifocal strokes in 2018 affecting the left temporal and occipital cortex as well as the left frontal white matter in the right thalamus.  She was reporting worsening of her cognition and episodes of confusion when I last saw her.      She has now had a followup brain MRI scan done on 2019 and the findings are stable compared to a study done in January of this year.  There were no new strokes.  She also had a 3-hour EEG which demonstrated left hemisphere slowing, which was not unanticipated.  There were no epileptiform changes.      I reviewed the significance of these findings with the patient and her sister and friend.  She certainly has reasons to have difficulty with her memory and confusion on occasion, but there also underlying psychological issues that coexist.      She is seeing Yong Bernal in Psychiatry and I have encouraged her to follow up with him.      They are looking into an adult foster care facility with memory care capacity and I encouraged her to move forward with that.      Followup with me will be as needed, but I indicated I would be happy to see Alicia back in the future.      MD ARIC Brewster MD             D: 2019   T: 2019   MT: al      Name:     ALICIA SEXTON   MRN:      [id number]        Account:      [id number]   :      1975           Service Date: 2019      Document: [id number]

## 2019-07-30 NOTE — TELEPHONE ENCOUNTER
"Yong Bernal, Marva Lagunas CNP, RN   Caller: Unspecified (Yesterday, 11:37 AM)             Flash Durham,     I guess I'd like to know why it was reduced.  Do we know who the provider is?     Yong          Writer called Sharita to gather more information. She was unable to provide additional information. Writer asked to call the nursing home. Sharita provided this writer with [facility] Casas's number: [phone]. Eventually, writer was transferred to pt's CMLinda. She informed this writer that Dr. Gimenez made a decrease to the pt's Prozac based on their protocol regarding psychotropic medications. Per , if one of their residents has been on a psychotropic medication for a long period of time or if there's any reason to believe it's no longer providing benefit, they have the right to slowly taper the medication. Because the pt's sister and this writer raised alarm, Dr. Gimenez increased the dose back to 80 mg. Now that the pt's care team is aware of that Yong Bernal is managing her psych medications, they will not make adjustments without consulting with her care team at the clinic. She also informed this writer that she may began seeing an \"in-house\" NP who specializes in psychiatry at the nursing home, because she has regressed since moving back to that home. That NP will not make any changes without consulting with Yong first.   "

## 2019-08-01 ENCOUNTER — TELEPHONE (OUTPATIENT)
Dept: PSYCHIATRY | Facility: CLINIC | Age: 44
End: 2019-08-01

## 2019-08-01 NOTE — TELEPHONE ENCOUNTER
On 7/31/2019 the patient signed an OWEN authorizing medical records to be released from [facility] to [facility] for the purpose of care coordination. I sent the request to medical records via the tube system and kept a copy in psychiatry until scanning is complete.  Shabana Veliz [facility]

## 2019-08-01 NOTE — TELEPHONE ENCOUNTER
received incoming phone call from Jaenll, therapist with [facility]. She is calling to request records from visits with Yong Benral. No OWEN on file, but Janell has one that she will fax to the nurse triage room. Currently awaiting this. Once received,  will fax all 4 office visit notes to the appropriate fax number.

## 2019-08-02 ENCOUNTER — TELEPHONE (OUTPATIENT)
Dept: PSYCHIATRY | Facility: CLINIC | Age: 44
End: 2019-08-02

## 2019-08-02 ENCOUNTER — TELEPHONE (OUTPATIENT)
Dept: SCHEDULING | Facility: CLINIC | Age: 44
End: 2019-08-02

## 2019-08-02 NOTE — TELEPHONE ENCOUNTER
Writer received 12 pages of incoming records from [facility]. Writer placed the originals in scanning, placed a copy in KEVIN Chavez CNP's folder and a message was routed to Yong Bernal.

## 2019-08-06 NOTE — TELEPHONE ENCOUNTER
Writer received incoming phone call from Janell requesting the records. Writer apologized for the delay and agreed to send these ASAP. Located signed OWEN. Last four office visit notes printed and faxed to Janell at [facility] at [phone].

## 2019-08-07 ENCOUNTER — OFFICE VISIT (OUTPATIENT)
Dept: PSYCHIATRY | Facility: CLINIC | Age: 44
End: 2019-08-07
Attending: NURSE PRACTITIONER
Payer: COMMERCIAL

## 2019-08-07 VITALS — DIASTOLIC BLOOD PRESSURE: 87 MMHG | SYSTOLIC BLOOD PRESSURE: 124 MMHG | HEART RATE: 70 BPM

## 2019-08-07 DIAGNOSIS — F32.1 CURRENT MODERATE EPISODE OF MAJOR DEPRESSIVE DISORDER, UNSPECIFIED WHETHER RECURRENT (H): ICD-10-CM

## 2019-08-07 DIAGNOSIS — F41.9 ANXIETY: Primary | ICD-10-CM

## 2019-08-07 PROCEDURE — G0463 HOSPITAL OUTPT CLINIC VISIT: HCPCS | Mod: ZF

## 2019-08-07 ASSESSMENT — PAIN SCALES - GENERAL: PAINLEVEL: NO PAIN (0)
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
## 2019-08-09 ENCOUNTER — TELEPHONE (OUTPATIENT)
Dept: PSYCHIATRY | Facility: CLINIC | Age: 44
End: 2019-08-09

## 2019-08-09 RX ORDER — GABAPENTIN 300 MG/1
300 CAPSULE ORAL AT BEDTIME
Qty: 30 CAPSULE | Refills: 1 | Status: SHIPPED | OUTPATIENT
Start: 2019-08-09 | End: 2019-10-22 | Stop reason: ALTCHOICE

## 2019-08-09 RX ORDER — FLUOXETINE 40 MG/1
80 CAPSULE ORAL DAILY
Qty: 60 CAPSULE | Refills: 3 | Status: SHIPPED | OUTPATIENT
Start: 2019-08-09 | End: 2019-12-17

## 2019-08-15 ENCOUNTER — TELEPHONE (OUTPATIENT)
Dept: PSYCHIATRY | Facility: CLINIC | Age: 44
End: 2019-08-15

## 2019-08-15 NOTE — TELEPHONE ENCOUNTER
Writer received multiple incoming phone calls from Choco, another staff member with the nursing home. They said the pt needs hospitalization and asked if the provider would be able to assist in this process. Asked if staff is concerned for her safety at this time, which they denied, but they confirmed that she has decompensated in the last week and voices not wanting to live. They also said she does not complete ADLs and lays in bed all day. It does not sound like the pt is catatonic, as she will have conversations with staff and often becomes frustrated when asked to complete tasks.     Informed Yessenia and  that pt could go to any ED to be assessed and the care team in the clinic could call that ED to relay concerns. Informed her that this care team would not have any direct say in whether the pt is admitted or not and instead it would depend on the ED provider's assessment. Also relayed the option of having the pt come in for a clinic visit so she can be assessed by Yong Bernal and possibly admitted. They quickly called Sharita and relayed these options. They agreed to schedule the pt for 8/27. If needed, they will bring the pt to the ED prior to this.      also asked if the nursing home NP can assess the pt next week prior to the visit with Yong. Informed her that it would be a good idea. That way, the NP can assess the pt and monitor her for safety. Asked if she has any medications recommendations, that these are relayed to this writer and the provider in clinic first before changes are made. They agreed to this plan.

## 2019-08-15 NOTE — TELEPHONE ENCOUNTER
Mere Do Laura, RN   Phone Number: [phone]             Sharita, pt's sister would like a call back to get Referral for Alicia and to discuss getting Alicia committed. This is all the info given.     Thanks!

## 2019-08-15 NOTE — TELEPHONE ENCOUNTER
Writer called Sharita, pt's sister to gather more information. Per Sharita, there was a quarterly review on the pt yesterday. Both nursing home staff and Sharita have noticed that the pt is decompensating. Sharita said the pt is not returning calls, will not get out of bed, and does not complete ADLs. When staff tries to get the pt out of bed and complete her ADLs, the pt becomes angry and refuses. The pt will also say there's no reason to get out of bed or to smile.    Previously, there was discussion of adult foster care, but now the nursing home's SW does not believe she will qualify for this as she's unwilling/unable to care for herself. Sharita said they're thinking a referral for the pt to institutionalized is necessary, but Sharita is unsure how to start this process.    Sharita said writer/care team can contact Yessenia, the pt's SW at her nursing home. Her number is [phone]. Writer actually tried calling Yessenia yesterday after missing a call from her. Still awaiting a return phone call. Agreed to try calling her again tomorrow if she does not c/b today.    In the meantime, writer agreed to forward this information to the provider and SW, as future coordination may be necessary.

## 2019-08-16 ENCOUNTER — TELEPHONE (OUTPATIENT)
Dept: PSYCHIATRY | Facility: CLINIC | Age: 44
End: 2019-08-16

## 2019-08-16 NOTE — TELEPHONE ENCOUNTER
Spoke with Parul's sister, Sharita, about concerns presented during last appointment at which time Alicia and her accompanying friends report that her cognitive functioning continues to diminish.  W consulted with pharmacy and relayed information to Sharita which stated that Gabapentin may be contributing to slowed cognition and that the Prozac should not be contributing.  Decided to discontinue AM and afternoon dose of Gabapentin and continue HS dose.  Sharita was in agreement.
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
## 2019-08-22 ENCOUNTER — TELEPHONE (OUTPATIENT)
Dept: PSYCHIATRY | Facility: CLINIC | Age: 44
End: 2019-08-22

## 2019-08-22 NOTE — TELEPHONE ENCOUNTER
Writer received incoming phone call from DUKE Mckeon with pt's living facility. The pt's PCP is recommending Aricept 10 mg daily to help with her memory. He would like to know if this is safe to take with the pt's psych medications.  would like a c/b at [phone] with this information.

## 2019-08-27 ENCOUNTER — OFFICE VISIT (OUTPATIENT)
Dept: PSYCHIATRY | Facility: CLINIC | Age: 44
End: 2019-08-27
Attending: NURSE PRACTITIONER
Payer: COMMERCIAL

## 2019-08-27 ENCOUNTER — TELEPHONE (OUTPATIENT)
Dept: PSYCHIATRY | Facility: CLINIC | Age: 44
End: 2019-08-27

## 2019-08-27 VITALS
BODY MASS INDEX: 32.59 KG/M2 | SYSTOLIC BLOOD PRESSURE: 106 MMHG | DIASTOLIC BLOOD PRESSURE: 75 MMHG | WEIGHT: 184 LBS | HEART RATE: 64 BPM

## 2019-08-27 DIAGNOSIS — F32.1 CURRENT MODERATE EPISODE OF MAJOR DEPRESSIVE DISORDER, UNSPECIFIED WHETHER RECURRENT (H): ICD-10-CM

## 2019-08-27 DIAGNOSIS — F41.9 ANXIETY: ICD-10-CM

## 2019-08-27 DIAGNOSIS — G47.9 SLEEP DISORDER: ICD-10-CM

## 2019-08-27 DIAGNOSIS — T78.40XD ALLERGIC STATE, SUBSEQUENT ENCOUNTER: ICD-10-CM

## 2019-08-27 PROCEDURE — G0463 HOSPITAL OUTPT CLINIC VISIT: HCPCS | Mod: ZF

## 2019-08-27 RX ORDER — FLUOXETINE 40 MG/1
80 CAPSULE ORAL DAILY
Qty: 60 CAPSULE | Refills: 3 | Status: CANCELLED | OUTPATIENT
Start: 2019-08-27

## 2019-08-27 RX ORDER — PHENOL 1.4 %
10 AEROSOL, SPRAY (ML) MUCOUS MEMBRANE
Qty: 15 TABLET | Refills: 0 | Status: CANCELLED | OUTPATIENT
Start: 2019-08-27

## 2019-08-27 RX ORDER — GABAPENTIN 300 MG/1
300 CAPSULE ORAL AT BEDTIME
Qty: 30 CAPSULE | Refills: 1 | Status: CANCELLED | OUTPATIENT
Start: 2019-08-27

## 2019-08-27 RX ORDER — DONEPEZIL HYDROCHLORIDE 10 MG/1
10 TABLET, FILM COATED ORAL AT BEDTIME
COMMUNITY
End: 2019-11-20

## 2019-08-27 RX ORDER — MULTIVIT-MIN/IRON/FOLIC ACID/K 18-600-40
1000 CAPSULE ORAL DAILY
COMMUNITY

## 2019-08-27 RX ORDER — DIPHENHYDRAMINE HCL 25 MG
50 CAPSULE ORAL DAILY
Qty: 30 CAPSULE | Refills: 0 | Status: CANCELLED | OUTPATIENT
Start: 2019-08-27

## 2019-08-27 ASSESSMENT — PAIN SCALES - GENERAL: PAINLEVEL: NO PAIN (0)
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
## 2019-08-27 NOTE — TELEPHONE ENCOUNTER
On 8/27/2019 the patient signed a PHI authorizing person to person communication with  Yessenia Wilson for scheduling and medical information.  I sent this document to scanning on 8/27/2019 and kept a copy in Psychiatry until scanning is confirmed. Shabana Veliz, [facility]

## 2019-09-03 ASSESSMENT — PATIENT HEALTH QUESTIONNAIRE - PHQ9: SUM OF ALL RESPONSES TO PHQ QUESTIONS 1-9: 18

## 2019-09-23 ASSESSMENT — PATIENT HEALTH QUESTIONNAIRE - PHQ9: SUM OF ALL RESPONSES TO PHQ QUESTIONS 1-9: 10

## 2019-09-25 ENCOUNTER — OFFICE VISIT (OUTPATIENT)
Dept: PSYCHIATRY | Facility: CLINIC | Age: 44
End: 2019-09-25
Attending: NURSE PRACTITIONER
Payer: COMMERCIAL

## 2019-09-25 VITALS
WEIGHT: 180.6 LBS | BODY MASS INDEX: 31.99 KG/M2 | HEART RATE: 78 BPM | SYSTOLIC BLOOD PRESSURE: 122 MMHG | DIASTOLIC BLOOD PRESSURE: 88 MMHG

## 2019-09-25 DIAGNOSIS — F43.21 ADJUSTMENT DISORDER WITH DEPRESSED MOOD: Primary | ICD-10-CM

## 2019-09-25 PROCEDURE — G0463 HOSPITAL OUTPT CLINIC VISIT: HCPCS | Mod: ZF

## 2019-09-25 ASSESSMENT — PAIN SCALES - GENERAL: PAINLEVEL: NO PAIN (0)

## 2019-09-25 NOTE — PATIENT INSTRUCTIONS
Thank you for coming to the PSYCHIATRY CLINIC.    Lab Testing:  If you had lab testing today and your results are reassuring or normal they will be mailed to you or sent through [employer] within 7 days.   If the lab tests need quick action we will call you with the results.  The phone number we will call with results is # [phone] (home) . If this is not the best number please call our clinic and change the number.    Medication Refills:  If you need any refills please call your pharmacy and they will contact us. Our fax number for refills is [phone]. Please allow three business for refill processing.   If you need to  your refill at a new pharmacy, please contact the new pharmacy directly. The new pharmacy will help you get your medications transferred.     Scheduling:  If you have any concerns about today's visit or wish to schedule another appointment please call our office during normal business hours [phone] (8-5:00 M-F)    Contact Us:  Please call [phone] during business hours (8-5:00 M-F).  If after clinic hours, or on the weekend, please call  [phone].    Financial Assistance [phone]  [facility] Billing [phone]  [place] Billing [facility], [facility]: [phone]  [facility]illing [phone]  Medical Records [phone]      MENTAL HEALTH CRISIS NUMBERS:  [facility]:   [facility] - [phone]   Crisis Residence [facility] - [phone]   Walk-In Counseling [facility] [phone]   COPE 24/7 [place] Mobile Team for Adults - [[phone]]; Child - [[phone]]        [facility]:   [facility] - [phone]   Walk-in counseling [facility] - [phone]   Walk-in counseling [facility] - [phone]   Crisis Residence [facility] - [phone]   Urgent Care Adult Mental Health:   --Drop-in, 24/7 crisis line, and [facility] Mobile Team  [[phone]]    CRISIS TEXT LINE: Text [phone] from anywhere, anytime, any crisis 24/7;    OR SEE www.crisistextline.org     Poison Control Center - [phone]    CHILD: [facility] needs assessment team - [phone]     [facility] - [phone]; or [facility] - [phone]    If you have a medical emergency please call 911or go to the nearest ER.                    _____________________________________________    Again thank you for choosing PSYCHIATRY CLINIC and please let us know how we can best partner with you to improve you and your family's health.  You may be receiving a survey in the mail regarding this appointment. We would love to have your feedback, both positive and negative, so please fill out the survey and return it using the provided envelope. The survey is done by an external company, so your answers are anonymous.
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
## 2019-10-14 ENCOUNTER — TELEPHONE (OUTPATIENT)
Dept: PSYCHIATRY | Facility: CLINIC | Age: 44
End: 2019-10-14

## 2019-10-14 ENCOUNTER — MEDICAL CORRESPONDENCE (OUTPATIENT)
Dept: HEALTH INFORMATION MANAGEMENT | Facility: CLINIC | Age: 44
End: 2019-10-14

## 2019-10-14 NOTE — TELEPHONE ENCOUNTER
"Writer received incoming phone call from Linda Scott with pt's nursing home. She called this writer to provider updates and to receive recommendations from the provider.    Per , over the weekend, staff members were initially concerned, as the pt seemed more forgetful, strange, and required more guidance. Then the pt eloped. This is the first time she had ever done this so she was not wearing a \"wander guard.\" The police were called and she was brought back to the nursing facility. She eloped two more times on Saturday and Sunday. One time, she left wearing a T-shirt and shorts with no shoes, which was very concerning. She had the wander guard on those two times so staff was able to locate her quickly. The pt told staff that she does not recall any of the elopements or the events leading up to these.     called the pt's sister to update her on these attempted elopements. Per the sister, the pt said she did this because she's trying to get out of the nursing facility.     At this time,  is wondering if medications need to be adjusted and whether the provider feels the pt is safe to stay at the current facility. She discussed Ativan and administering this as scheduled vs PRN. Although, it looks like [facility] does not prescribe this. Informed her that provider will likely not make any medication changes based on the above information, but agreed to discuss this with him. Will also discuss her safety and then call  back at [phone].   "

## 2019-10-18 NOTE — TELEPHONE ENCOUNTER
Writer received voicemail from Linda Scott with pt's nursing facility. She states the pt tried eloping again. She's just wondering if the provider has any recommendations.     Called  and confirmed the pt tried eloping again since she spoke with writer on Monday. Although, the pt is very honest about doing this intentionally now, as she feels she's not appropriate for the nursing facility. Staff has been administering PRNs, but again, this is not helpful.      is wondering if she can talk with the provider before the pt's appt on Tuesday, 10/22. She's concerned the pt is not entirely honest with the provider.     Agreed to discuss all of this with the provider and will try and schedule a time for the provider to call . She agreed with this plan. Will call her back with recommendations. She can be reached at the number in the previous message.

## 2019-10-22 ENCOUNTER — TELEPHONE (OUTPATIENT)
Dept: PSYCHIATRY | Facility: CLINIC | Age: 44
End: 2019-10-22

## 2019-10-22 ENCOUNTER — OFFICE VISIT (OUTPATIENT)
Dept: PSYCHIATRY | Facility: CLINIC | Age: 44
End: 2019-10-22
Attending: NURSE PRACTITIONER
Payer: COMMERCIAL

## 2019-10-22 VITALS
BODY MASS INDEX: 33.13 KG/M2 | DIASTOLIC BLOOD PRESSURE: 88 MMHG | WEIGHT: 187 LBS | HEART RATE: 63 BPM | SYSTOLIC BLOOD PRESSURE: 135 MMHG

## 2019-10-22 DIAGNOSIS — F32.1 CURRENT MODERATE EPISODE OF MAJOR DEPRESSIVE DISORDER, UNSPECIFIED WHETHER RECURRENT (H): Primary | ICD-10-CM

## 2019-10-22 PROCEDURE — G0463 HOSPITAL OUTPT CLINIC VISIT: HCPCS | Mod: ZF

## 2019-10-22 RX ORDER — MIRTAZAPINE 7.5 MG/1
7.5 TABLET, FILM COATED ORAL AT BEDTIME
Qty: 30 TABLET | Refills: 0 | Status: SHIPPED | OUTPATIENT
Start: 2019-10-22 | End: 2019-12-17

## 2019-10-22 ASSESSMENT — PAIN SCALES - GENERAL: PAINLEVEL: NO PAIN (0)

## 2019-10-22 NOTE — PATIENT INSTRUCTIONS
Thank you for coming to the PSYCHIATRY CLINIC.    Lab Testing:  If you had lab testing today and your results are reassuring or normal they will be mailed to you or sent through "[employer] within 7 days.   If the lab tests need quick action we will call you with the results.  The phone number we will call with results is # [phone] (home) . If this is not the best number please call our clinic and change the number.    Medication Refills:  If you need any refills please call your pharmacy and they will contact us. Our fax number for refills is [phone]. Please allow three business for refill processing.   If you need to  your refill at a new pharmacy, please contact the new pharmacy directly. The new pharmacy will help you get your medications transferred.     Scheduling:  If you have any concerns about today's visit or wish to schedule another appointment please call our office during normal business hours [phone] (8-5:00 M-F)    Contact Us:  Please call [phone] during business hours (8-5:00 M-F).  If after clinic hours, or on the weekend, please call  [phone].    Financial Assistance [phone]  [facility]illing [phone]  [place] Billing Office, [facility]: [phone]  [place] Billing [phone]  Medical Records [phone]      MENTAL HEALTH CRISIS NUMBERS:  [facility]:   [facility] - [phone]   Crisis Residence [facility] - [phone]   Walk-In Counseling [facility] [phone]   COPE 24/7 [place] Mobile Team for Adults - [[phone]]; Child - [[phone]]        [facility]:   [facility] - [phone]   Walk-in counseling [facility] - [phone]   Walk-in counseling [facility] - [phone]   Crisis Residence [facility] - [phone]   Urgent Care Adult Mental Health:   --Drop-in, 24/7 crisis line, and [facility] Mobile Team  [[phone]]    CRISIS TEXT LINE: Text [phone] from anywhere, anytime, any crisis 24/7;    OR SEE [email or web address]     Poison Control Center - [phone]    CHILD: [facility] needs assessment team - [phone]     [facility] - [phone]; or [facility] Lifeline - [phone]    If you have a medical emergency please call 911or go to the nearest ER.                    _____________________________________________    Again thank you for choosing PSYCHIATRY CLINIC and please let us know how we can best partner with you to improve you and your family's health.  You may be receiving a survey in the mail regarding this appointment. We would love to have your feedback, both positive and negative, so please fill out the survey and return it using the provided envelope. The survey is done by an external company, so your answers are anonymous.

## 2019-10-22 NOTE — TELEPHONE ENCOUNTER
Spoke with Gita and  at assisted living who report concerns regarding forgetfulness (cannot remember where kitchen is).  Also concerned about lack of motivation.  They are trying to get her into a more appropriate placement but there exists issues with her citizenship.  Possible 6 month wait.  Requesting assistance with how to better manage behavior.  Staff also reports that another neurology consult was scheduled.
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
## 2019-10-23 ASSESSMENT — PATIENT HEALTH QUESTIONNAIRE - PHQ9: SUM OF ALL RESPONSES TO PHQ QUESTIONS 1-9: 16

## 2019-11-20 ENCOUNTER — OFFICE VISIT (OUTPATIENT)
Dept: PSYCHIATRY | Facility: CLINIC | Age: 44
End: 2019-11-20
Attending: NURSE PRACTITIONER
Payer: COMMERCIAL

## 2019-11-20 VITALS
BODY MASS INDEX: 33.83 KG/M2 | WEIGHT: 191 LBS | HEART RATE: 80 BPM | SYSTOLIC BLOOD PRESSURE: 108 MMHG | DIASTOLIC BLOOD PRESSURE: 77 MMHG

## 2019-11-20 DIAGNOSIS — F43.21 ADJUSTMENT DISORDER WITH DEPRESSED MOOD: Primary | ICD-10-CM

## 2019-11-20 PROCEDURE — G0463 HOSPITAL OUTPT CLINIC VISIT: HCPCS | Mod: ZF

## 2019-11-20 RX ORDER — GABAPENTIN 100 MG/1
100 CAPSULE ORAL 2 TIMES DAILY PRN
COMMUNITY

## 2019-11-20 ASSESSMENT — PAIN SCALES - GENERAL: PAINLEVEL: NO PAIN (0)

## 2019-11-20 NOTE — PATIENT INSTRUCTIONS
Thank you for coming to the PSYCHIATRY CLINIC.    Lab Testing:  If you had lab testing today and your results are reassuring or normal they will be mailed to you or sent through [employer] within 7 days.   If the lab tests need quick action we will call you with the results.  The phone number we will call with results is # [phone] (home) . If this is not the best number please call our clinic and change the number.    Medication Refills:  If you need any refills please call your pharmacy and they will contact us. Our fax number for refills is [phone]. Please allow three business for refill processing.   If you need to  your refill at a new pharmacy, please contact the new pharmacy directly. The new pharmacy will help you get your medications transferred.     Scheduling:  If you have any concerns about today's visit or wish to schedule another appointment please call our office during normal business hours [phone] (8-5:00 M-F)    Contact Us:  Please call [phone] during business hours (8-5:00 M-F).  If after clinic hours, or on the weekend, please call  [phone].    Financial Assistance [phone]  [facility] Billing [phone]  [place] Billing Office, [facility]: [phone]  [place] Billing [phone]  Medical Records [phone]      MENTAL HEALTH CRISIS NUMBERS:  [facility]:   [facility] [place] - [phone]   Crisis Residence [facility] - [phone]   Walk-In Counseling [facility] [phone]   COPE 24/7 [place] Mobile Team for Adults - [[phone]]; Child - [[phone]]        [facility]:   [facility] - [phone]   Walk-in counseling [facility] - [phone]   Walk-in counseling [facility] - [phone]   Crisis Residence [facility] - [phone]   Urgent Care Adult Mental Health:   --Drop-in, 24/7 crisis line, and [facility] [place] Mobile Team  [[phone]]    CRISIS TEXT LINE: Text [phone] from anywhere, anytime, any crisis 24/7;    OR SEE www.crisistextline.org     Poison Control Center - [phone]    CHILD: [facility] needs assessment team - [phone]     [facility] - [phone]; or [facility] Lif[facility] - [phone]    If you have a medical emergency please call 911or go to the nearest ER.                    _____________________________________________    Again thank you for choosing PSYCHIATRY CLINIC and please let us know how we can best partner with you to improve you and your family's health.  You may be receiving a survey in the mail regarding this appointment. We would love to have your feedback, both positive and negative, so please fill out the survey and return it using the provided envelope. The survey is done by an external company, so your answers are anonymous.
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
## 2019-11-22 ENCOUNTER — TELEPHONE (OUTPATIENT)
Dept: PSYCHIATRY | Facility: CLINIC | Age: 44
End: 2019-11-22

## 2019-11-22 ASSESSMENT — PATIENT HEALTH QUESTIONNAIRE - PHQ9: SUM OF ALL RESPONSES TO PHQ QUESTIONS 1-9: 10

## 2019-11-22 NOTE — TELEPHONE ENCOUNTER
Attempted to call [facility] [phone] to inform nursing staff to discontinue Mirtazapine 7.5mg.  Phone line was busy after multiple attempts.  Called Alicia's sister to inform of situation.  She will call over weekend and try to get them to hold medication until I can confirm next week.

## 2019-11-26 ENCOUNTER — TELEPHONE (OUTPATIENT)
Dept: PSYCHIATRY | Facility: CLINIC | Age: 44
End: 2019-11-26

## 2019-11-26 NOTE — TELEPHONE ENCOUNTER
Received signed letter from the provider. Faxed to Mary at [phone] and held at writer's desk.

## 2019-11-26 NOTE — TELEPHONE ENCOUNTER
Writer received incoming phone call from Mary with pt's assisted living facility. She received the provider's message regarding the discontinuation of mirtazapine. The facility needs a signed letter from the provider with this med change.     Letter printed and placed in provider's folder for signature. Message routed to the provider.     Once letter is signed, letter can be faxed to [facility] at [phone].

## 2019-11-26 NOTE — LETTER
November 26, 2019      RE: Alicia Penaloza  [address] [facility] [address]         To Whom it May Concern,    Please make the following medication change for Alicia Penaloza:    -Discontinue mirtazapine (Remeron)1 tablet (7.5 mg) by mouth at bedtime    If you have any questions regarding this order, please call the clinic at the number listed above.     Sincerely,      KEVIN Chavez DNP

## 2019-12-17 ENCOUNTER — OFFICE VISIT (OUTPATIENT)
Dept: PSYCHIATRY | Facility: CLINIC | Age: 44
End: 2019-12-17
Attending: NURSE PRACTITIONER
Payer: COMMERCIAL

## 2019-12-17 VITALS
BODY MASS INDEX: 33.04 KG/M2 | DIASTOLIC BLOOD PRESSURE: 87 MMHG | SYSTOLIC BLOOD PRESSURE: 116 MMHG | WEIGHT: 186.5 LBS | HEART RATE: 92 BPM

## 2019-12-17 DIAGNOSIS — F32.1 CURRENT MODERATE EPISODE OF MAJOR DEPRESSIVE DISORDER, UNSPECIFIED WHETHER RECURRENT (H): ICD-10-CM

## 2019-12-17 PROCEDURE — G0463 HOSPITAL OUTPT CLINIC VISIT: HCPCS | Mod: ZF

## 2019-12-17 RX ORDER — POLYETHYLENE GLYCOL 3350 17 G/17G
1 POWDER, FOR SOLUTION ORAL DAILY PRN
COMMUNITY

## 2019-12-17 RX ORDER — FLUOXETINE 40 MG/1
80 CAPSULE ORAL DAILY
Qty: 60 CAPSULE | Refills: 1 | Status: SHIPPED | OUTPATIENT
Start: 2019-12-17 | End: 2020-02-18

## 2019-12-17 ASSESSMENT — PAIN SCALES - GENERAL: PAINLEVEL: NO PAIN (0)

## 2019-12-17 ASSESSMENT — PATIENT HEALTH QUESTIONNAIRE - PHQ9: SUM OF ALL RESPONSES TO PHQ QUESTIONS 1-9: 11

## 2019-12-17 NOTE — NURSING NOTE
Chief Complaint   Patient presents with     Recheck Medication     Adjustment disorder with depressed mood
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
## 2019-12-17 NOTE — PATIENT INSTRUCTIONS
Thank you for coming to the PSYCHIATRY CLINIC.    Lab Testing:  If you had lab testing today and your results are reassuring or normal they will be mailed to you or sent through [employer] within 7 days.   If the lab tests need quick action we will call you with the results.  The phone number we will call with results is # [phone] (home) . If this is not the best number please call our clinic and change the number.    Medication Refills:  If you need any refills please call your pharmacy and they will contact us. Our fax number for refills is [phone]. Please allow three business for refill processing.   If you need to  your refill at a new pharmacy, please contact the new pharmacy directly. The new pharmacy will help you get your medications transferred.     Scheduling:  If you have any concerns about today's visit or wish to schedule another appointment please call our office during normal business hours [phone] (8-5:00 M-F)    Contact Us:  Please call [phone] during business hours (8-5:00 M-F).  If after clinic hours, or on the weekend, please call  [phone].    Financial Assistance [phone]  [facility] Billing [phone]  [facility] Billing [facility], [facility]: [phone]  [facility] Billing [phone]  Medical Records [phone]      MENTAL HEALTH CRISIS NUMBERS:  [facility]:   [facility] - [phone]   Crisis Residence [facility] - [phone]   Walk-In Counseling [facility] [phone]   COPE 24/7 [facility] Mobile Team for Adults - [[phone]]; Child - [[phone]]        [facility]:   [facility] - [phone]   Walk-in counseling [facility] - [phone]   Walk-in counseling [facility] - [phone]   Crisis Residence [facility] - [phone]   Urgent Care Adult Mental Health:   --Drop-in, 24/7 crisis line, and [facility] Mobile Team  [[phone]]    CRISIS TEXT LINE: Text [phone] from anywhere, anytime, any crisis 24/7;    OR SEE [email or web address]     Poison Control Center - [phone]    CHILD: [facility] needs assessment team - [phone]     [facility] - [phone]; or [facility] Lifeline - [phone]    If you have a medical emergency please call 911or go to the nearest ER.                    _____________________________________________    Again thank you for choosing PSYCHIATRY CLINIC and please let us know how we can best partner with you to improve you and your family's health.  You may be receiving a survey in the mail regarding this appointment. We would love to have your feedback, both positive and negative, so please fill out the survey and return it using the provided envelope. The survey is done by an external company, so your answers are anonymous.

## 2020-01-21 ENCOUNTER — OFFICE VISIT (OUTPATIENT)
Dept: PSYCHIATRY | Facility: CLINIC | Age: 45
End: 2020-01-21
Attending: NURSE PRACTITIONER
Payer: COMMERCIAL

## 2020-01-21 VITALS
HEART RATE: 78 BPM | BODY MASS INDEX: 32.95 KG/M2 | DIASTOLIC BLOOD PRESSURE: 85 MMHG | SYSTOLIC BLOOD PRESSURE: 121 MMHG | WEIGHT: 186 LBS

## 2020-01-21 DIAGNOSIS — F32.1 CURRENT MODERATE EPISODE OF MAJOR DEPRESSIVE DISORDER, UNSPECIFIED WHETHER RECURRENT (H): Primary | ICD-10-CM

## 2020-01-21 PROCEDURE — G0463 HOSPITAL OUTPT CLINIC VISIT: HCPCS | Mod: ZF

## 2020-01-21 ASSESSMENT — PAIN SCALES - GENERAL: PAINLEVEL: NO PAIN (0)
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
## 2020-01-21 NOTE — PATIENT INSTRUCTIONS
Thank you for coming to the PSYCHIATRY CLINIC.    Lab Testing:  If you had lab testing today and your results are reassuring or normal they will be mailed to you or sent through [employer] within 7 days.   If the lab tests need quick action we will call you with the results.  The phone number we will call with results is # [phone] (home) . If this is not the best number please call our clinic and change the number.    Medication Refills:  If you need any refills please call your pharmacy and they will contact us. Our fax number for refills is [phone]. Please allow three business for refill processing.   If you need to  your refill at a new pharmacy, please contact the new pharmacy directly. The new pharmacy will help you get your medications transferred.     Scheduling:  If you have any concerns about today's visit or wish to schedule another appointment please call our office during normal business hours [phone] (8-5:00 M-F)    Contact Us:  Please call [phone] during business hours (8-5:00 M-F).  If after clinic hours, or on the weekend, please call  [phone].    Financial Assistance [phone]  [facility] Billing [phone]  [facility] Billing Office, [facility]: [phone]  [facility] Billing [phone]  Medical Records [phone]      MENTAL HEALTH CRISIS NUMBERS:  [facility]:   [facility] - [phone]   Crisis Residence [facility] - [phone]   Walk-In Counseling [facility] [phone]   COPE 24/7 [place] Mobile Team for Adults - [[phone]]; Child - [[phone]]        [facility]:   [facility] - [phone]   Walk-in counseling [facility] - [phone]   Walk-in counseling [facility] - [phone]   Crisis Residence [facility] - [phone]   Urgent Care Adult Mental Health:   --Drop-in, 24/7 crisis line, and [facility] Mobile Team  [[phone]]    CRISIS TEXT LINE: Text [phone] from anywhere, anytime, any crisis 24/7;    OR SEE www.crisistextline.org     National Suicide Prevention Lifeline: 583-570-MGUZ (644-511-0150) or dial 988    Poison Control Center - 7-783-413-4724    CHILD: [facility] needs assessment team - [phone]     [facility] Lifeline - [phone]; or [facility] Lifeline - 1-761.277.8410    If you have a medical emergency please call 911or go to the nearest ER.                    _____________________________________________    Again thank you for choosing PSYCHIATRY CLINIC and please let us know how we can best partner with you to improve you and your family's health.  You may be receiving a survey regarding this appointment. We would love to have your feedback, both positive and negative. The survey is done by an external company, so your answers are anonymous.

## 2020-02-09 DIAGNOSIS — F32.1 CURRENT MODERATE EPISODE OF MAJOR DEPRESSIVE DISORDER, UNSPECIFIED WHETHER RECURRENT (H): ICD-10-CM

## 2020-02-11 RX ORDER — FLUOXETINE 40 MG/1
CAPSULE ORAL
Qty: 60 CAPSULE | Refills: 11 | OUTPATIENT
Start: 2020-02-11

## 2020-02-18 ENCOUNTER — OFFICE VISIT (OUTPATIENT)
Dept: PSYCHIATRY | Facility: CLINIC | Age: 45
End: 2020-02-18
Attending: NURSE PRACTITIONER
Payer: COMMERCIAL

## 2020-02-18 VITALS
DIASTOLIC BLOOD PRESSURE: 75 MMHG | BODY MASS INDEX: 32.17 KG/M2 | SYSTOLIC BLOOD PRESSURE: 109 MMHG | WEIGHT: 181.6 LBS | HEART RATE: 104 BPM

## 2020-02-18 DIAGNOSIS — F43.22 ADJUSTMENT DISORDER WITH ANXIOUS MOOD: ICD-10-CM

## 2020-02-18 DIAGNOSIS — G47.9 SLEEP DISORDER: ICD-10-CM

## 2020-02-18 DIAGNOSIS — F32.1 CURRENT MODERATE EPISODE OF MAJOR DEPRESSIVE DISORDER, UNSPECIFIED WHETHER RECURRENT (H): ICD-10-CM

## 2020-02-18 PROCEDURE — G0463 HOSPITAL OUTPT CLINIC VISIT: HCPCS | Mod: ZF

## 2020-02-18 RX ORDER — DIVALPROEX SODIUM 250 MG/1
250 TABLET, EXTENDED RELEASE ORAL DAILY
Qty: 30 TABLET | Refills: 0 | Status: SHIPPED | OUTPATIENT
Start: 2020-02-18 | End: 2020-03-19

## 2020-02-18 RX ORDER — FLUOXETINE 40 MG/1
80 CAPSULE ORAL DAILY
Qty: 60 CAPSULE | Refills: 1 | Status: SHIPPED | OUTPATIENT
Start: 2020-02-18 | End: 2020-04-14

## 2020-02-18 RX ORDER — LORAZEPAM 1 MG/1
0.5-1 TABLET ORAL DAILY PRN
Qty: 30 TABLET | Refills: 0 | Status: SHIPPED | OUTPATIENT
Start: 2020-02-18 | End: 2020-04-30

## 2020-02-18 ASSESSMENT — PAIN SCALES - GENERAL: PAINLEVEL: NO PAIN (0)
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
## 2020-02-18 NOTE — NURSING NOTE
Chief Complaint   Patient presents with     Recheck Medication     Current moderate episode of major depressive disorder, unspecified whether recurrent

## 2020-02-18 NOTE — PATIENT INSTRUCTIONS
Thank you for coming to the PSYCHIATRY CLINIC.    Lab Testing:  If you had lab testing today and your results are reassuring or normal they will be mailed to you or sent through [facility] within 7 days.   If the lab tests need quick action we will call you with the results.  The phone number we will call with results is # [phone] (home) . If this is not the best number please call our clinic and change the number.    Medication Refills:  If you need any refills please call your pharmacy and they will contact us. Our fax number for refills is [phone]. Please allow three business for refill processing.   If you need to  your refill at a new pharmacy, please contact the new pharmacy directly. The new pharmacy will help you get your medications transferred.     Scheduling:  If you have any concerns about today's visit or wish to schedule another appointment please call our office during normal business hours [phone] (8-5:00 M-F)    Contact Us:  Please call [phone] during business hours (8-5:00 M-F).  If after clinic hours, or on the weekend, please call  [phone].    Financial Assistance [phone]  [facility] Billing [phone]  [facility] Billing [facility], [facility]: [phone]  [facility] Billing [phone]  Medical Records [phone]      MENTAL HEALTH CRISIS NUMBERS:  [facility]:   [facility] - [phone]   Crisis Residence [facility] - [phone]   Walk-In Counseling [facility] [phone]   COPE 24/7 [place] Mobile Team for Adults - [[phone]]; Child - [[phone]]        [facility]:   [facility] - [phone]   Walk-in counseling [facility] - [phone]   Walk-in counseling [facility] - [phone]   Crisis Residence [facility] - [phone]   Urgent Care Adult Mental Health:   --Drop-in, 24/7 crisis line, and [facility] Mobile Team  [[phone]]    CRISIS TEXT LINE: Text [phone] from anywhere, anytime, any crisis 24/7;    OR SEE www.crisistextline.org     National Suicide Prevention Lifeline: 625-479-TSSR (856-945-3033)    Poison Control Center - 1-680-844-2658    CHILD: [facility] needs assessment team - [phone]     [facility] Lifeline - 1-767.564.7037; or [facility] Lifeline - 1-599.137.3689    If you have a medical emergency please call 911or go to the nearest ER.                    _____________________________________________    Again thank you for choosing PSYCHIATRY CLINIC and please let us know how we can best partner with you to improve you and your family's health.  You may be receiving a survey regarding this appointment. We would love to have your feedback, both positive and negative. The survey is done by an external company, so your answers are anonymous.

## 2020-02-20 DIAGNOSIS — F43.22 ADJUSTMENT DISORDER WITH ANXIOUS MOOD: ICD-10-CM

## 2020-02-24 DIAGNOSIS — F43.22 ADJUSTMENT DISORDER WITH ANXIOUS MOOD: ICD-10-CM

## 2020-02-24 RX ORDER — DIVALPROEX SODIUM 250 MG/1
TABLET, EXTENDED RELEASE ORAL
Qty: 30 TABLET | Refills: 11 | OUTPATIENT
Start: 2020-02-24

## 2020-02-25 ENCOUNTER — TELEPHONE (OUTPATIENT)
Dept: PSYCHIATRY | Facility: CLINIC | Age: 45
End: 2020-02-25

## 2020-02-25 NOTE — TELEPHONE ENCOUNTER
Yong Bernal APRN CNP Labossiere, Laura, RN   Caller: Unspecified (Today, 10:14 AM)             She did but not recently.  I just haven't remembered to get release.  She did start a new medication recently, Depakote, but side effect now is unusual.  May need more information.  Could try her sister?        Writer will try calling sister for additional information, as there's no OWEN/consent to communicate for new living facility on file.

## 2020-02-25 NOTE — TELEPHONE ENCOUNTER
Per Sharita, at pt's last office visit, she complained of worsening anxiety. Provider prescribed new medication to address this. Sharita is unaware if anxiety has worsened since then.    She will call [facility] and see if they have a OWEN/consent and ask that it's faxed to the clinic. If not, she will have [facility] call the clinic and ask to speak with this writer directly.

## 2020-02-25 NOTE — TELEPHONE ENCOUNTER
[facility] Call Center    Phone Message    May a detailed message be left on voicemail: yes     Reason for Call: Symptoms or Concerns     If patient has red-flag symptoms, warm transfer to triage line    Current symptom or concern: High Anxiety    Symptoms have been present for:  1 day(s)    Has patient previously been seen for this? No    By Yong Farley:     Date: 02/24/2020    Are there any new or worsening symptoms? No      Action Taken: Message routed to:  Other: Nurse    Travel Screening: Not Applicable

## 2020-02-25 NOTE — TELEPHONE ENCOUNTER
Writer unable to find OWEN/consent to communicate on file with caller. Per provider's last office visit note, pt likely moved living facilities. Will try and gather more information from pt's provider.

## 2020-02-26 RX ORDER — DIVALPROEX SODIUM 250 MG/1
TABLET, EXTENDED RELEASE ORAL
Qty: 30 TABLET | Refills: 11 | OUTPATIENT
Start: 2020-02-26

## 2020-03-01 DIAGNOSIS — F43.22 ADJUSTMENT DISORDER WITH ANXIOUS MOOD: ICD-10-CM

## 2020-03-03 RX ORDER — DIVALPROEX SODIUM 250 MG/1
TABLET, EXTENDED RELEASE ORAL
Qty: 30 TABLET | Refills: 11 | OUTPATIENT
Start: 2020-03-03

## 2020-03-11 ENCOUNTER — TELEPHONE (OUTPATIENT)
Dept: PSYCHIATRY | Facility: CLINIC | Age: 45
End: 2020-03-11

## 2020-03-11 NOTE — TELEPHONE ENCOUNTER
On 3/11/2020 the patient signed an OWEN authorizing medical records to be released from [facility] to  [facility] for the purpose of continuing care. I sent the request to medical records via the tube system and kept a copy in psychiatry until scanning is complete.  Shabana Veliz, CMA

## 2020-03-18 DIAGNOSIS — F43.22 ADJUSTMENT DISORDER WITH ANXIOUS MOOD: ICD-10-CM

## 2020-03-19 DIAGNOSIS — F43.22 ADJUSTMENT DISORDER WITH ANXIOUS MOOD: ICD-10-CM

## 2020-03-19 RX ORDER — DIVALPROEX SODIUM 250 MG/1
TABLET, EXTENDED RELEASE ORAL
Qty: 14 TABLET | Refills: 11 | OUTPATIENT
Start: 2020-03-19

## 2020-03-19 RX ORDER — DIVALPROEX SODIUM 250 MG/1
250 TABLET, EXTENDED RELEASE ORAL DAILY
Qty: 30 TABLET | Refills: 0 | Status: SHIPPED | OUTPATIENT
Start: 2020-03-19 | End: 2020-03-23

## 2020-03-19 NOTE — TELEPHONE ENCOUNTER
Last seen: 2/18/20  RTC: 4-6 weeks  Cancel: 3/18/20  No-show: none  Next appt: 4/14/20     Incoming refill from [facility] via phone call      Medication requested: divalproex sodium extended-release (DEPAKOTE ER) 250 MG PO 24 hr tablet   Directions: Take 1 tablet (250 mg) by mouth daily  Qty: 30  Last refilled: approximately 2/18/20 per med tab     Medication requested: LORazepam 1 MG PO tablet  Directions: Take 0.5-1 tablets (0.5-1 mg) by mouth daily as needed for anxiety  Qty: 30  Last refilled: 2/19/20, 1/15/20, and 9/17/19    Routed to provider for approval due to yesterday's cancellation

## 2020-03-19 NOTE — TELEPHONE ENCOUNTER
Refill sent earlier today for 30 d/s. Current request is for 30 d/s with 11 refills. Will routed to provider for approval or refusal.

## 2020-03-23 RX ORDER — DIVALPROEX SODIUM 250 MG/1
TABLET, EXTENDED RELEASE ORAL
Qty: 30 TABLET | Refills: 0 | Status: SHIPPED | OUTPATIENT
Start: 2020-03-23 | End: 2020-04-14

## 2020-03-23 NOTE — TELEPHONE ENCOUNTER
Yong Bernal, Marva Lagunas CNP, [facility],     Do not want to refill for year. Have not been able to assess if med is effective        Sent additional 30 d/s. This will be enough medication to get pt to 4/14 office visit with provider. No further refills until provider can assess effectiveness.

## 2020-03-26 NOTE — TELEPHONE ENCOUNTER
Medication refilled by the provider in separate encounter at an earlier date. No further action needed.

## 2020-03-27 ENCOUNTER — TELEPHONE (OUTPATIENT)
Dept: INTERNAL MEDICINE | Facility: CLINIC | Age: 45
End: 2020-03-27

## 2020-03-27 NOTE — TELEPHONE ENCOUNTER
Patient is due for a PHQ-9.  Index start date:12/09/2019  Index end date:4/07/2020    Please call patient.

## 2020-04-01 NOTE — TELEPHONE ENCOUNTER
I have attempted to contact the pt to update a PHQ-9. Pt sister states pt is now in assisted living and sees a doctor through them. PHQ-9 missed. Jessica Stern CMA ([facility])

## 2020-04-04 DIAGNOSIS — F32.1 CURRENT MODERATE EPISODE OF MAJOR DEPRESSIVE DISORDER, UNSPECIFIED WHETHER RECURRENT (H): ICD-10-CM

## 2020-04-07 RX ORDER — FLUOXETINE 40 MG/1
CAPSULE ORAL
Qty: 60 CAPSULE | Refills: 11 | OUTPATIENT
Start: 2020-04-07

## 2020-04-07 NOTE — TELEPHONE ENCOUNTER
Medication requested:  FLUOXETINE 40MG CAP Take 2 capsules (80 mg) by mouth daily   Last refilled: 2/18/20  Qty: 60/1  [facility]  [address], [place] [address]     Last seen: 2/18/20  RTC:  month  Cancel: 1  No-show: 0  Next appt: 4/14/20    Refill decision:      adequate to next appt 4/14/20/ LFD 2/18/20 #60/1 rfl

## 2020-04-14 ENCOUNTER — TELEPHONE (OUTPATIENT)
Dept: PSYCHIATRY | Facility: CLINIC | Age: 45
End: 2020-04-14

## 2020-04-14 ENCOUNTER — VIRTUAL VISIT (OUTPATIENT)
Dept: PSYCHIATRY | Facility: CLINIC | Age: 45
End: 2020-04-14
Attending: NURSE PRACTITIONER
Payer: COMMERCIAL

## 2020-04-14 DIAGNOSIS — F32.1 CURRENT MODERATE EPISODE OF MAJOR DEPRESSIVE DISORDER, UNSPECIFIED WHETHER RECURRENT (H): ICD-10-CM

## 2020-04-14 DIAGNOSIS — F43.22 ADJUSTMENT DISORDER WITH ANXIOUS MOOD: ICD-10-CM

## 2020-04-14 RX ORDER — DIVALPROEX SODIUM 250 MG/1
250 TABLET, EXTENDED RELEASE ORAL DAILY
Qty: 30 TABLET | Refills: 0 | Status: SHIPPED | OUTPATIENT
Start: 2020-04-14 | End: 2020-04-30

## 2020-04-14 NOTE — TELEPHONE ENCOUNTER
On 4/14/2020, at 1359, writer called patient at home phone to confirm Virtual Visit. Writer unable to make contact with patient. Writer left detailed voice message for callback. [phone], left as call back number. STORM Counsell, EMT
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
## 2020-04-30 ENCOUNTER — TELEPHONE (OUTPATIENT)
Dept: PSYCHIATRY | Facility: CLINIC | Age: 45
End: 2020-04-30

## 2020-04-30 DIAGNOSIS — F43.22 ADJUSTMENT DISORDER WITH ANXIOUS MOOD: ICD-10-CM

## 2020-04-30 RX ORDER — DIVALPROEX SODIUM 500 MG/1
500 TABLET, EXTENDED RELEASE ORAL DAILY
Qty: 30 TABLET | Refills: 1 | Status: SHIPPED | OUTPATIENT
Start: 2020-04-30 | End: 2020-06-01

## 2020-04-30 RX ORDER — LORAZEPAM 1 MG/1
1 TABLET ORAL DAILY PRN
Qty: 30 TABLET | Refills: 1
Start: 2020-04-30 | End: 2020-07-01

## 2020-04-30 NOTE — TELEPHONE ENCOUNTER
Discussed situation with the provider. He gave the following VORB:    -Increase depakote to 500 mg daily to target irritability and stabilize mood    -Refill Ativan 1 mg tabs, but change directions to 1 tab daily PRN for anxiety/agitation    Called Sharita, pt's sister and legal guardian and relayed the above. She voiced understanding and asked that writer call Estella Valiente at [phone] and relay the above.    Called pt's living facility. Staff asked that writer call jomar Bee RN to relay the med changes. Her number is [phone]. Writer did so. She agreed to the med changes as well. Cristal was a little concerned about the lack of a PRN in the afternoon. Asked that she call the clinic if the increased Depakote is not affecting the pt's mood. She agreed and will also try and push back the Ativan dose to the afternoon instead of in the morning.    Prescription for increased Depakote sent to the pharmacy. Ativan phoned to Karla cardona with [facility] at [phone]. Med tab changed to reflect this. Cristal said the pharmacy will send her a paper copy of the med orders so they don't need anything further from the clinic.

## 2020-04-30 NOTE — TELEPHONE ENCOUNTER
Received call from patient's sister, Sharita, who has been contacted today by  staff. Sharita reports that Alicia has become increasingly irritable and mean. She has been refusing to eat the meals provided at the  and demanding grilled cheese sandwiches. Sharita isn't sure how long these behaviors have been going on. Shraita reports that Alicia utilizes her full dosage of PRN ativan early in the day so has nothing else to take in the afternoon.  staff asked that Sharita reach out to the provider for recommendations. Writer will pass along.

## 2020-05-04 DIAGNOSIS — F32.1 CURRENT MODERATE EPISODE OF MAJOR DEPRESSIVE DISORDER, UNSPECIFIED WHETHER RECURRENT (H): ICD-10-CM

## 2020-05-05 NOTE — TELEPHONE ENCOUNTER
FLUoxetine (PROZAC) 20 MG   Last refilled:4/14/20  Qty: 90    Last seen: 4/14/20  RTC: 4-6  WEEKS  Cancel: 0  No-show: 0  Next appt: NONE  Refill decision:30 day milly refill sent to the pharmacy - including instructions for patient to call the clinic and schedule an appointment.  Scheduling has been notified to contact the pt for appointment.  UNSIGNED NOTE

## 2020-05-20 ENCOUNTER — MEDICAL CORRESPONDENCE (OUTPATIENT)
Dept: HEALTH INFORMATION MANAGEMENT | Facility: CLINIC | Age: 45
End: 2020-05-20

## 2020-05-20 ENCOUNTER — TELEPHONE (OUTPATIENT)
Dept: PSYCHIATRY | Facility: CLINIC | Age: 45
End: 2020-05-20

## 2020-05-20 ENCOUNTER — VIRTUAL VISIT (OUTPATIENT)
Dept: PSYCHIATRY | Facility: CLINIC | Age: 45
End: 2020-05-20
Attending: NURSE PRACTITIONER
Payer: COMMERCIAL

## 2020-05-20 DIAGNOSIS — F32.1 CURRENT MODERATE EPISODE OF MAJOR DEPRESSIVE DISORDER, UNSPECIFIED WHETHER RECURRENT (H): Primary | ICD-10-CM

## 2020-05-20 NOTE — TELEPHONE ENCOUNTER
On 5/20/2020, at 0956, writer called patient at home to confirm Virtual Visit. Writer unable to make contact with patient. Writer left detailed voice message for callback. [phone], left as call back number. STORM Davidson, EMT
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
## 2020-05-21 ENCOUNTER — TELEPHONE (OUTPATIENT)
Dept: PSYCHIATRY | Facility: CLINIC | Age: 45
End: 2020-05-21

## 2020-05-22 NOTE — TELEPHONE ENCOUNTER
[facility] Call Center    Phone Message    May a detailed message be left on voicemail: yes     Reason for Call: Order(s): Other:   Reason for requested: Change in patient medication (beginning a taper off Fluoxetine and starting new medication)   Date needed: ASAP. Group homes needs to make sure that their charts and files are up to date  Provider name: Yong Bernal      Action Taken: Message routed to:  Other: [facility]    Travel Screening: Not Applicable                                                                                                                                
Yong Bernal APRN CNP You 17 hours ago (5:19 PM)       I just called the home and told them to stay with Prozac 60 until I can confirm how we would fax orders.  I will call them again tomorrow.    Message text       Yong Bernal APRN CNP You 17 hours ago (5:12 PM)       Yep...still working on it.  Do you know how we could fax orders to her nursing home?    Message text       You  You; Yong Bernal APRN CNP 17 hours ago (4:42 PM)       Yong:     Was Alicia's Prozac changed yesterday?     Devendra        
The patient is a 50y Male complaining of chest pain.

## 2020-05-26 ENCOUNTER — TELEPHONE (OUTPATIENT)
Dept: PSYCHIATRY | Facility: CLINIC | Age: 45
End: 2020-05-26

## 2020-05-26 DIAGNOSIS — F32.1 CURRENT MODERATE EPISODE OF MAJOR DEPRESSIVE DISORDER, UNSPECIFIED WHETHER RECURRENT (H): ICD-10-CM

## 2020-05-26 RX ORDER — FLUOXETINE 40 MG/1
40 CAPSULE ORAL DAILY
Qty: 7 CAPSULE | Refills: 0 | Status: SHIPPED | OUTPATIENT
Start: 2020-05-26 | End: 2020-07-01 | Stop reason: ALTCHOICE

## 2020-05-26 NOTE — TELEPHONE ENCOUNTER
[facility] Call Center    Phone Message    May a detailed message be left on voicemail: yes     Reason for Call: Other: The assisted living facitlity called to get orders of the pt's updated med changes. She stated she called last Friday, but hasn't recieved them yet. The orders can be faxed to [phone]     Action Taken: Other: [facility]    Travel Screening: Not Applicable

## 2020-05-27 ENCOUNTER — TELEPHONE (OUTPATIENT)
Dept: PSYCHIATRY | Facility: CLINIC | Age: 45
End: 2020-05-27

## 2020-05-27 NOTE — TELEPHONE ENCOUNTER
On 5/27/2020 an updated medication order was faxed to [place] at [phone], by Skyla Veliz CMA per fax cover sheet.  I sent the original to scanning, and held a copy in psychiatry until scanning is complete and routed this to Yong Bernal.   Shabana Veliz CMA

## 2020-05-31 DIAGNOSIS — F32.1 CURRENT MODERATE EPISODE OF MAJOR DEPRESSIVE DISORDER, UNSPECIFIED WHETHER RECURRENT (H): ICD-10-CM

## 2020-05-31 DIAGNOSIS — F43.22 ADJUSTMENT DISORDER WITH ANXIOUS MOOD: ICD-10-CM

## 2020-06-01 RX ORDER — DIVALPROEX SODIUM 500 MG/1
TABLET, EXTENDED RELEASE ORAL
Qty: 30 TABLET | Refills: 11 | Status: SHIPPED | OUTPATIENT
Start: 2020-06-01 | End: 2021-03-02

## 2020-06-01 NOTE — TELEPHONE ENCOUNTER
sertraline (ZOLOFT) 50 MG      Last refilled: 5/26/20  Qty: 30    DEPAKOTE  MG  Last refilled: 4/30/20  Qty: 30  : 1  Last seen:  5/20 20  RTC:  4 -6 WEEKS  Cancel: 0  No-show: 0  Next appt: 7/1/20  Refill decision: UNSIGNED  / INCOMPLETE NOTE

## 2020-06-30 ENCOUNTER — MEDICAL CORRESPONDENCE (OUTPATIENT)
Dept: HEALTH INFORMATION MANAGEMENT | Facility: CLINIC | Age: 45
End: 2020-06-30

## 2020-06-30 ENCOUNTER — TELEPHONE (OUTPATIENT)
Dept: PSYCHIATRY | Facility: CLINIC | Age: 45
End: 2020-06-30

## 2020-06-30 NOTE — TELEPHONE ENCOUNTER
On 6/30/2020, 8 pages of records were received from [facility]. This writer sent the records to urgent scanning, will appear in the media tab with in 6 hours.  I sent the original documents to scanning on 6/30/2020 and held a copy in Psychiatry until scanning is confirmed. Skyla Veliz, CMA

## 2020-07-01 ENCOUNTER — TELEPHONE (OUTPATIENT)
Dept: PSYCHIATRY | Facility: CLINIC | Age: 45
End: 2020-07-01

## 2020-07-01 ENCOUNTER — VIRTUAL VISIT (OUTPATIENT)
Dept: PSYCHIATRY | Facility: CLINIC | Age: 45
End: 2020-07-01
Attending: NURSE PRACTITIONER
Payer: COMMERCIAL

## 2020-07-01 DIAGNOSIS — F32.1 CURRENT MODERATE EPISODE OF MAJOR DEPRESSIVE DISORDER, UNSPECIFIED WHETHER RECURRENT (H): ICD-10-CM

## 2020-07-01 DIAGNOSIS — F43.22 ADJUSTMENT DISORDER WITH ANXIOUS MOOD: ICD-10-CM

## 2020-07-01 RX ORDER — LORAZEPAM 0.5 MG/1
0.5 TABLET ORAL 2 TIMES DAILY PRN
Qty: 60 TABLET | Refills: 1 | Status: SHIPPED | OUTPATIENT
Start: 2020-07-01 | End: 2020-08-12

## 2020-07-01 RX ORDER — LORAZEPAM 1 MG/1
1 TABLET ORAL DAILY PRN
Qty: 30 TABLET | Refills: 1 | Status: SHIPPED | OUTPATIENT
Start: 2020-07-01 | End: 2020-07-01

## 2020-07-01 RX ORDER — SERTRALINE HYDROCHLORIDE 100 MG/1
100 TABLET, FILM COATED ORAL DAILY
Qty: 30 TABLET | Refills: 1 | Status: SHIPPED | OUTPATIENT
Start: 2020-07-01 | End: 2020-08-12

## 2020-07-01 ASSESSMENT — PAIN SCALES - GENERAL: PAINLEVEL: NO PAIN (0)
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
## 2020-07-01 NOTE — LETTER
July 1, 2020      RE: Alicia Penaloza  [address] [address] [address]       To Whom it May Concern,    Please make the following medication changes for Alicia Penaloza:    1. Discontinue LORazepam (ATIVAN) 1 MG tablet: Take 1 tablet (1 mg) by mouth daily as needed for anxiety    2. Start LORazepam (ATIVAN) 0.5 MG tablet: Take 1 tablet (0.5 mg) by mouth 2 times daily as needed for agitation or anxiety    3. Discontinue sertraline (ZOLOFT) 50 MG tablet: TAKE 1 TABLET BY MOUTH ONCE DAILY     4. Start sertraline (ZOLOFT) 100 MG tablet: Take 1 tablet (100 mg) by mouth daily    If you have additional questions regarding these orders, please call the clinic at the number listed above.      Sincerely,      Kim Henry, MSN, RN on behalf of  KEVIN Rodriguez DNP (working remotely)

## 2020-07-01 NOTE — TELEPHONE ENCOUNTER
[facility] Call Center    Phone Message    May a detailed message be left on voicemail: yes     Reason for Call: Order(s): Other:   Reason for requested: The caller stated that they need an order for the discontinuation of lorazepam 1mg for the pt's file at her group home.   Date needed: as soon as possible (please email to [email or web address].)  Provider name: Yong Bernal CNP      Action Taken: Message routed to:  Other: [facility]    Travel Screening: Not Applicable

## 2020-07-01 NOTE — TELEPHONE ENCOUNTER
Writer reviewed med tab. Provider made the following med changes at today's visit:    1. LORazepam (ATIVAN) 0.5 MG tablet: Take 1 tablet (0.5 mg) by mouth 2 times daily as needed for agitation or anxiety    -Ativan 1 mg tabs discontinued    2. sertraline (ZOLOFT) 100 MG tablet:Take 1 tablet (100 mg) by mouth daily    -Sertraline 50 mg discontinued    Will confirm this with the provider first, as he's still working on pt's note from today's visit.

## 2020-07-01 NOTE — TELEPHONE ENCOUNTER
Letter printed, signed, and emailed as requested to [email or web address].  Sent to scanning.

## 2020-07-01 NOTE — TELEPHONE ENCOUNTER
Yong Bernal, APRN Marva Baltazar, RN    Caller: Unspecified (Today,  1:50 PM)               Flash Durham,     Yes.  Increased Zoloft to 100mg and changed Ativan to 0.5mg BID        Writer drafted letter and asked fellow RNCC to print, sign and e-mail to pt's . OWEN signed for communication with Geneva from pt's [facility] via e-mail.

## 2020-07-01 NOTE — TELEPHONE ENCOUNTER
[facility] Call Center    Phone Message    May a detailed message be left on voicemail: yes     Reason for Call: Other: Pt's group home called back to state the sertraline dosage was increased to 100mg and she needs a DC order for the 50 mg.      Action Taken: Other: [email or web address]    Travel Screening: Not Applicable

## 2020-07-01 NOTE — PATIENT INSTRUCTIONS
Thank you for coming to the PSYCH[facility].    Lab Testing:  If you had lab testing today and your results are reassuring or normal they will be mailed to you or sent through [employer] within 7 days. If the lab tests need quick action we will call you with the results. The phone number we will call with results is # [phone] (home) . If this is not the best number please call our clinic and change the number.    Medication Refills:  If you need any refills please call your pharmacy and they will contact us. Our fax number for refills is [phone]. Please allow three business for refill processing. If you need to  your refill at a new pharmacy, please contact the new pharmacy directly. The new pharmacy will help you get your medications transferred.     Scheduling:  If you have any concerns about today's visit or wish to schedule another appointment please call our office during normal business hours [phone] (8-5:00 M-F)    Contact Us:  Please call [phone] during business hours (8-5:00 M-F).  If after clinic hours, or on the weekend, please call  [phone].    Financial Assistance [phone]  [facility] Billing [phone]  [place] Billing Office, [facility]: [phone]  [place] Billing [phone]  Medical Records [phone]      MENTAL HEALTH CRISIS NUMBERS:  For a medical emergency please call  911 or go to the nearest ER.     [facility]:   [facility] -[phone]   Crisis Residence [facility]idence -[phone]   Walk-In Counseling [facility] -[phone]   COPE 24/7 [place] Mobile Team -[phone] (adults)/[phone] (child)  CHILD: [facility] needs assessment team - [phone]      [facility]:   [facility] - [phone]   Walk-in counseling [facility] - [phone]   Walk-in counseling [facility] - [phone]   Crisis Residence [facility] - [phone]  Urgent  [facility] Adult Mental X[facility]ons[facility]-[phone] mobile unit/ 24/7 crisis line    National Crisis Numbers:   National Suicide Prevention Lifeline: 1-200-878-TALK (847-431-9489)  Poison Control Center - 8-720-935-3428  [employer]/resources for a list of additional resources (SOS)  Trans Lifeline a hotline for transgender people 0-248-198-1928  [employer] a hotline for LGBT youth 1-959.599.4943  Crisis Text Line: For any crisis 24/7   To: 476827  see www.crisistextline.org  - IF MAKING A CALL FEELS TOO HARD, send a text!         Again thank you for choosing [facility] and please let us know how we can best partner with you to improve you and your family's health.    You may be receiving a survey regarding this appointment. We would love to have your feedback, both positive and negative. The survey is done by an external company, so your answers are anonymous.

## 2020-07-29 DIAGNOSIS — F32.1 CURRENT MODERATE EPISODE OF MAJOR DEPRESSIVE DISORDER, UNSPECIFIED WHETHER RECURRENT (H): ICD-10-CM

## 2020-07-30 ENCOUNTER — TELEPHONE (OUTPATIENT)
Dept: PSYCHIATRY | Facility: CLINIC | Age: 45
End: 2020-07-30

## 2020-07-30 RX ORDER — SERTRALINE HYDROCHLORIDE 100 MG/1
TABLET, FILM COATED ORAL
Qty: 30 TABLET | Refills: 11 | OUTPATIENT
Start: 2020-07-30

## 2020-07-30 NOTE — TELEPHONE ENCOUNTER
Thais Machado LPN Labossiere, Laura, [facility]    Cc: Shabana Veliz CMA    Caller: Unspecified (Today, 10:17 AM)               Flash Durham,     I got [place] to sign the form and I will fax it shortly.     Thanks,     Thais

## 2020-07-30 NOTE — TELEPHONE ENCOUNTER
Writer received incoming phone call from ARNOL Rodriguez with pt's living facility. She informed this writer that she faxed a Professional Statement of Need Form to the clinic on Friday, 7/24. Intake staff confirmed that pt has the right fax number.     Writer asked that Jennifer refax this. In the meantime,  will reach out to clinic staff to see if it was received. If there are any issues, Jennifer can be reached at [phone].

## 2020-07-31 NOTE — TELEPHONE ENCOUNTER
4 faxed pages, including OWEN, was received from [facility] requesting Professional Statement of Need form completed. The completed/signed forms were faxed to - Jennifer Daily - [facility] at [phone]. The original copies were sent to scanning.Thais Machado LPN

## 2020-07-31 NOTE — TELEPHONE ENCOUNTER
Refill denied    script sent 7/1/20  #30-1 RF   to fu in august  Scheduling has been notified to contact the pt for appointment.

## 2020-08-04 ENCOUNTER — APPOINTMENT (OUTPATIENT)
Dept: CT IMAGING | Facility: CLINIC | Age: 45
End: 2020-08-04
Attending: FAMILY MEDICINE
Payer: COMMERCIAL

## 2020-08-04 ENCOUNTER — ANESTHESIA EVENT (OUTPATIENT)
Dept: SURGERY | Facility: CLINIC | Age: 45
End: 2020-08-04
Payer: COMMERCIAL

## 2020-08-04 ENCOUNTER — ANESTHESIA (OUTPATIENT)
Dept: SURGERY | Facility: CLINIC | Age: 45
End: 2020-08-04
Payer: COMMERCIAL

## 2020-08-04 ENCOUNTER — HOSPITAL ENCOUNTER (OUTPATIENT)
Facility: CLINIC | Age: 45
Discharge: HOME | End: 2020-08-05
Attending: FAMILY MEDICINE | Admitting: SPECIALIST
Payer: COMMERCIAL

## 2020-08-04 DIAGNOSIS — N39.0 ACUTE UTI: ICD-10-CM

## 2020-08-04 DIAGNOSIS — G89.18 POST-OP PAIN: Primary | ICD-10-CM

## 2020-08-04 DIAGNOSIS — R11.0 POSTOPERATIVE NAUSEA: ICD-10-CM

## 2020-08-04 DIAGNOSIS — K35.209 ACUTE APPENDICITIS WITH GENERALIZED PERITONITIS, WITHOUT ABSCESS, UNSPECIFIED WHETHER GANGRENE PRESENT, UNSPECIFIED WHETHER PERFORATION PRESENT: ICD-10-CM

## 2020-08-04 DIAGNOSIS — Z98.890 POSTOPERATIVE NAUSEA: ICD-10-CM

## 2020-08-04 DIAGNOSIS — Z20.822 COVID-19 VIRUS NOT DETECTED: ICD-10-CM

## 2020-08-04 LAB
ALBUMIN SERPL-MCNC: 3.3 G/DL (ref 3.4–5)
ALBUMIN UR-MCNC: NEGATIVE MG/DL
ALP SERPL-CCNC: 86 U/L (ref 40–150)
ALT SERPL W P-5'-P-CCNC: 35 U/L (ref 0–50)
ANION GAP SERPL CALCULATED.3IONS-SCNC: 5 MMOL/L (ref 3–14)
APPEARANCE UR: ABNORMAL
AST SERPL W P-5'-P-CCNC: 19 U/L (ref 0–45)
BACTERIA #/AREA URNS HPF: ABNORMAL /HPF
BASOPHILS # BLD AUTO: 0.1 10E9/L (ref 0–0.2)
BASOPHILS NFR BLD AUTO: 0.4 %
BILIRUB SERPL-MCNC: 0.2 MG/DL (ref 0.2–1.3)
BILIRUB UR QL STRIP: NEGATIVE
BUN SERPL-MCNC: 23 MG/DL (ref 7–30)
CALCIUM SERPL-MCNC: 9 MG/DL (ref 8.5–10.1)
CHLORIDE SERPL-SCNC: 110 MMOL/L (ref 94–109)
CO2 SERPL-SCNC: 26 MMOL/L (ref 20–32)
COLOR UR AUTO: ABNORMAL
CREAT SERPL-MCNC: 1.15 MG/DL (ref 0.52–1.04)
CRP SERPL-MCNC: <2.9 MG/L (ref 0–8)
DIFFERENTIAL METHOD BLD: ABNORMAL
EOSINOPHIL NFR BLD AUTO: 1.2 %
ERYTHROCYTE [DISTWIDTH] IN BLOOD BY AUTOMATED COUNT: 12.4 % (ref 10–15)
ERYTHROCYTE [SEDIMENTATION RATE] IN BLOOD BY WESTERGREN METHOD: 11 MM/H (ref 0–20)
GFR SERPL CREATININE-BSD FRML MDRD: 57 ML/MIN/{1.73_M2}
GLUCOSE SERPL-MCNC: 124 MG/DL (ref 70–99)
GLUCOSE UR STRIP-MCNC: NEGATIVE MG/DL
HCG UR QL: NEGATIVE
HCT VFR BLD AUTO: 39.2 % (ref 35–47)
HGB BLD-MCNC: 12.8 G/DL (ref 11.7–15.7)
HGB UR QL STRIP: NEGATIVE
HYALINE CASTS #/AREA URNS LPF: 3 /LPF (ref 0–2)
IMM GRANULOCYTES # BLD: 0.1 10E9/L (ref 0–0.4)
IMM GRANULOCYTES NFR BLD: 0.4 %
KETONES UR STRIP-MCNC: NEGATIVE MG/DL
LEUKOCYTE ESTERASE UR QL STRIP: ABNORMAL
LIPASE SERPL-CCNC: 187 U/L (ref 73–393)
LYMPHOCYTES # BLD AUTO: 1.2 10E9/L (ref 0.8–5.3)
LYMPHOCYTES NFR BLD AUTO: 6.5 %
MCH RBC QN AUTO: 30.1 PG (ref 26.5–33)
MCHC RBC AUTO-ENTMCNC: 32.7 G/DL (ref 31.5–36.5)
MCV RBC AUTO: 92 FL (ref 78–100)
MONOCYTES # BLD AUTO: 1.2 10E9/L (ref 0–1.3)
MONOCYTES NFR BLD AUTO: 6.4 %
MUCOUS THREADS #/AREA URNS LPF: PRESENT /LPF
NEUTROPHILS # BLD AUTO: 15.8 10E9/L (ref 1.6–8.3)
NEUTROPHILS NFR BLD AUTO: 85.1 %
NITRATE UR QL: NEGATIVE
NRBC # BLD AUTO: 0 10*3/UL
NRBC BLD AUTO-RTO: 0 /100
PH UR STRIP: 7 PH (ref 5–7)
PLATELET # BLD AUTO: 265 10E9/L (ref 150–450)
POTASSIUM SERPL-SCNC: 4 MMOL/L (ref 3.4–5.3)
PROT SERPL-MCNC: 6.9 G/DL (ref 6.8–8.8)
RBC # BLD AUTO: 4.25 10E12/L (ref 3.8–5.2)
RBC #/AREA URNS AUTO: 4 /HPF (ref 0–2)
SODIUM SERPL-SCNC: 141 MMOL/L (ref 133–144)
SOURCE: ABNORMAL
SP GR UR STRIP: 1.02 (ref 1–1.03)
SQUAMOUS #/AREA URNS AUTO: 7 /HPF (ref 0–1)
TRANS CELLS #/AREA URNS HPF: 3 /HPF
UROBILINOGEN UR STRIP-MCNC: 0 MG/DL (ref 0–2)
WBC # BLD AUTO: 18.6 10E9/L (ref 4–11)
WBC #/AREA URNS AUTO: 68 /HPF (ref 0–5)

## 2020-08-04 PROCEDURE — 37000009 ZZH ANESTHESIA TECHNICAL FEE, EACH ADDTL 15 MIN: Performed by: SPECIALIST

## 2020-08-04 PROCEDURE — 99285 EMERGENCY DEPT VISIT HI MDM: CPT | Mod: 25 | Performed by: FAMILY MEDICINE

## 2020-08-04 PROCEDURE — 27110028 ZZH OR GENERAL SUPPLY NON-STERILE: Performed by: SPECIALIST

## 2020-08-04 PROCEDURE — 80053 COMPREHEN METABOLIC PANEL: CPT | Performed by: FAMILY MEDICINE

## 2020-08-04 PROCEDURE — 25000125 ZZHC RX 250: Performed by: NURSE ANESTHETIST, CERTIFIED REGISTERED

## 2020-08-04 PROCEDURE — 71000014 ZZH RECOVERY PHASE 1 LEVEL 2 FIRST HR: Performed by: SPECIALIST

## 2020-08-04 PROCEDURE — 25800030 ZZH RX IP 258 OP 636: Performed by: NURSE ANESTHETIST, CERTIFIED REGISTERED

## 2020-08-04 PROCEDURE — C9803 HOPD COVID-19 SPEC COLLECT: HCPCS | Performed by: FAMILY MEDICINE

## 2020-08-04 PROCEDURE — 86140 C-REACTIVE PROTEIN: CPT | Performed by: FAMILY MEDICINE

## 2020-08-04 PROCEDURE — 74177 CT ABD & PELVIS W/CONTRAST: CPT

## 2020-08-04 PROCEDURE — 44970 LAPAROSCOPY APPENDECTOMY: CPT | Performed by: SPECIALIST

## 2020-08-04 PROCEDURE — 85652 RBC SED RATE AUTOMATED: CPT | Performed by: FAMILY MEDICINE

## 2020-08-04 PROCEDURE — 93010 ELECTROCARDIOGRAM REPORT: CPT | Mod: Z6 | Performed by: FAMILY MEDICINE

## 2020-08-04 PROCEDURE — 37000008 ZZH ANESTHESIA TECHNICAL FEE, 1ST 30 MIN: Performed by: SPECIALIST

## 2020-08-04 PROCEDURE — 25000128 H RX IP 250 OP 636: Performed by: FAMILY MEDICINE

## 2020-08-04 PROCEDURE — 25000128 H RX IP 250 OP 636: Performed by: NURSE ANESTHETIST, CERTIFIED REGISTERED

## 2020-08-04 PROCEDURE — U0003 INFECTIOUS AGENT DETECTION BY NUCLEIC ACID (DNA OR RNA); SEVERE ACUTE RESPIRATORY SYNDROME CORONAVIRUS 2 (SARS-COV-2) (CORONAVIRUS DISEASE [COVID-19]), AMPLIFIED PROBE TECHNIQUE, MAKING USE OF HIGH THROUGHPUT TECHNOLOGIES AS DESCRIBED BY CMS-2020-01-R: HCPCS | Performed by: FAMILY MEDICINE

## 2020-08-04 PROCEDURE — 93005 ELECTROCARDIOGRAM TRACING: CPT | Mod: 59 | Performed by: FAMILY MEDICINE

## 2020-08-04 PROCEDURE — 99203 OFFICE O/P NEW LOW 30 MIN: CPT | Mod: 57 | Performed by: SPECIALIST

## 2020-08-04 PROCEDURE — 96365 THER/PROPH/DIAG IV INF INIT: CPT | Mod: 59 | Performed by: FAMILY MEDICINE

## 2020-08-04 PROCEDURE — 25800030 ZZH RX IP 258 OP 636: Performed by: FAMILY MEDICINE

## 2020-08-04 PROCEDURE — 81001 URINALYSIS AUTO W/SCOPE: CPT | Performed by: FAMILY MEDICINE

## 2020-08-04 PROCEDURE — 87086 URINE CULTURE/COLONY COUNT: CPT | Performed by: FAMILY MEDICINE

## 2020-08-04 PROCEDURE — 25000125 ZZHC RX 250: Performed by: FAMILY MEDICINE

## 2020-08-04 PROCEDURE — 36000056 ZZH SURGERY LEVEL 3 1ST 30 MIN: Performed by: SPECIALIST

## 2020-08-04 PROCEDURE — 96375 TX/PRO/DX INJ NEW DRUG ADDON: CPT | Performed by: FAMILY MEDICINE

## 2020-08-04 PROCEDURE — 81025 URINE PREGNANCY TEST: CPT | Performed by: FAMILY MEDICINE

## 2020-08-04 PROCEDURE — 96361 HYDRATE IV INFUSION ADD-ON: CPT | Mod: 59 | Performed by: FAMILY MEDICINE

## 2020-08-04 PROCEDURE — 25000564 ZZH DESFLURANE, EA 15 MIN: Performed by: SPECIALIST

## 2020-08-04 PROCEDURE — 27210794 ZZH OR GENERAL SUPPLY STERILE: Performed by: SPECIALIST

## 2020-08-04 PROCEDURE — 83690 ASSAY OF LIPASE: CPT | Performed by: FAMILY MEDICINE

## 2020-08-04 PROCEDURE — 85025 COMPLETE CBC W/AUTO DIFF WBC: CPT | Performed by: FAMILY MEDICINE

## 2020-08-04 PROCEDURE — 36000058 ZZH SURGERY LEVEL 3 EA 15 ADDTL MIN: Performed by: SPECIALIST

## 2020-08-04 RX ORDER — SODIUM CHLORIDE 9 MG/ML
INJECTION, SOLUTION INTRAVENOUS CONTINUOUS
Status: DISCONTINUED | OUTPATIENT
Start: 2020-08-04 | End: 2020-08-05 | Stop reason: HOSPADM

## 2020-08-04 RX ORDER — IOPAMIDOL 755 MG/ML
500 INJECTION, SOLUTION INTRAVASCULAR ONCE
Status: COMPLETED | OUTPATIENT
Start: 2020-08-04 | End: 2020-08-04

## 2020-08-04 RX ORDER — AMPICILLIN AND SULBACTAM 2; 1 G/1; G/1
3 INJECTION, POWDER, FOR SOLUTION INTRAMUSCULAR; INTRAVENOUS ONCE
Status: COMPLETED | OUTPATIENT
Start: 2020-08-04 | End: 2020-08-04

## 2020-08-04 RX ORDER — KETOROLAC TROMETHAMINE 30 MG/ML
30 INJECTION, SOLUTION INTRAMUSCULAR; INTRAVENOUS ONCE
Status: COMPLETED | OUTPATIENT
Start: 2020-08-04 | End: 2020-08-04

## 2020-08-04 RX ADMIN — SODIUM CHLORIDE 1000 ML: 9 INJECTION, SOLUTION INTRAVENOUS at 21:25

## 2020-08-04 RX ADMIN — DEXAMETHASONE SODIUM PHOSPHATE 5 MG: 10 INJECTION, SOLUTION INTRAMUSCULAR; INTRAVENOUS at 23:13

## 2020-08-04 RX ADMIN — LIDOCAINE HYDROCHLORIDE 80 MG: 20 INJECTION, SOLUTION INFILTRATION; PERINEURAL at 23:58

## 2020-08-04 RX ADMIN — SODIUM CHLORIDE: 9 INJECTION, SOLUTION INTRAVENOUS at 22:56

## 2020-08-04 RX ADMIN — ONDANSETRON 4 MG: 2 INJECTION INTRAMUSCULAR; INTRAVENOUS at 23:16

## 2020-08-04 RX ADMIN — ROCURONIUM BROMIDE 5 MG: 10 INJECTION INTRAVENOUS at 23:57

## 2020-08-04 RX ADMIN — SODIUM CHLORIDE, POTASSIUM CHLORIDE, SODIUM LACTATE AND CALCIUM CHLORIDE: 600; 310; 30; 20 INJECTION, SOLUTION INTRAVENOUS at 23:42

## 2020-08-04 RX ADMIN — IOPAMIDOL 90 ML: 755 INJECTION, SOLUTION INTRAVENOUS at 22:16

## 2020-08-04 RX ADMIN — AMPICILLIN SODIUM AND SULBACTAM SODIUM 3 G: 2; 1 INJECTION, POWDER, FOR SOLUTION INTRAMUSCULAR; INTRAVENOUS at 22:58

## 2020-08-04 RX ADMIN — KETOROLAC TROMETHAMINE 30 MG: 30 INJECTION, SOLUTION INTRAMUSCULAR at 21:25

## 2020-08-04 RX ADMIN — FENTANYL CITRATE 50 MCG: 50 INJECTION, SOLUTION INTRAMUSCULAR; INTRAVENOUS at 23:49

## 2020-08-04 RX ADMIN — SODIUM CHLORIDE 60 ML: 9 INJECTION, SOLUTION INTRAVENOUS at 22:16

## 2020-08-04 ASSESSMENT — LIFESTYLE VARIABLES: TOBACCO_USE: 1

## 2020-08-04 ASSESSMENT — MIFFLIN-ST. JEOR: SCORE: 1429.68

## 2020-08-05 VITALS
OXYGEN SATURATION: 95 % | DIASTOLIC BLOOD PRESSURE: 49 MMHG | HEIGHT: 63 IN | RESPIRATION RATE: 16 BRPM | TEMPERATURE: 97.1 F | SYSTOLIC BLOOD PRESSURE: 133 MMHG | BODY MASS INDEX: 33.63 KG/M2 | HEART RATE: 96 BPM | WEIGHT: 189.82 LBS

## 2020-08-05 PROBLEM — Z90.49 S/P APPENDECTOMY: Status: ACTIVE | Noted: 2020-08-05

## 2020-08-05 LAB
GLUCOSE BLDC GLUCOMTR-MCNC: 134 MG/DL (ref 70–99)
LABORATORY COMMENT REPORT: NORMAL
SARS-COV-2 RNA SPEC QL NAA+PROBE: NEGATIVE
SARS-COV-2 RNA SPEC QL NAA+PROBE: NORMAL
SPECIMEN SOURCE: NORMAL
SPECIMEN SOURCE: NORMAL

## 2020-08-05 PROCEDURE — 25000128 H RX IP 250 OP 636: Performed by: NURSE ANESTHETIST, CERTIFIED REGISTERED

## 2020-08-05 PROCEDURE — 88304 TISSUE EXAM BY PATHOLOGIST: CPT | Performed by: SPECIALIST

## 2020-08-05 PROCEDURE — 25000125 ZZHC RX 250: Performed by: NURSE ANESTHETIST, CERTIFIED REGISTERED

## 2020-08-05 PROCEDURE — 88304 TISSUE EXAM BY PATHOLOGIST: CPT | Mod: 26 | Performed by: SPECIALIST

## 2020-08-05 PROCEDURE — 25000132 ZZH RX MED GY IP 250 OP 250 PS 637: Performed by: SPECIALIST

## 2020-08-05 PROCEDURE — 25000125 ZZHC RX 250: Performed by: SPECIALIST

## 2020-08-05 PROCEDURE — 82962 GLUCOSE BLOOD TEST: CPT

## 2020-08-05 PROCEDURE — 25800030 ZZH RX IP 258 OP 636: Performed by: FAMILY MEDICINE

## 2020-08-05 RX ORDER — LIDOCAINE HYDROCHLORIDE 20 MG/ML
INJECTION, SOLUTION INFILTRATION; PERINEURAL PRN
Status: DISCONTINUED | OUTPATIENT
Start: 2020-08-04 | End: 2020-08-05

## 2020-08-05 RX ORDER — ONDANSETRON 4 MG/1
4 TABLET, ORALLY DISINTEGRATING ORAL EVERY 6 HOURS PRN
Status: DISCONTINUED | OUTPATIENT
Start: 2020-08-05 | End: 2020-08-05 | Stop reason: HOSPADM

## 2020-08-05 RX ORDER — SODIUM CHLORIDE, SODIUM LACTATE, POTASSIUM CHLORIDE, CALCIUM CHLORIDE 600; 310; 30; 20 MG/100ML; MG/100ML; MG/100ML; MG/100ML
INJECTION, SOLUTION INTRAVENOUS CONTINUOUS PRN
Status: DISCONTINUED | OUTPATIENT
Start: 2020-08-04 | End: 2020-08-05

## 2020-08-05 RX ORDER — HYDROMORPHONE HYDROCHLORIDE 1 MG/ML
0.2 INJECTION, SOLUTION INTRAMUSCULAR; INTRAVENOUS; SUBCUTANEOUS
Status: DISCONTINUED | OUTPATIENT
Start: 2020-08-05 | End: 2020-08-05 | Stop reason: HOSPADM

## 2020-08-05 RX ORDER — BUPIVACAINE HYDROCHLORIDE AND EPINEPHRINE 2.5; 5 MG/ML; UG/ML
INJECTION, SOLUTION INFILTRATION; PERINEURAL PRN
Status: DISCONTINUED | OUTPATIENT
Start: 2020-08-05 | End: 2020-08-05 | Stop reason: HOSPADM

## 2020-08-05 RX ORDER — ONDANSETRON 2 MG/ML
4 INJECTION INTRAMUSCULAR; INTRAVENOUS EVERY 6 HOURS PRN
Status: DISCONTINUED | OUTPATIENT
Start: 2020-08-05 | End: 2020-08-05 | Stop reason: HOSPADM

## 2020-08-05 RX ORDER — DEXAMETHASONE SODIUM PHOSPHATE 10 MG/ML
INJECTION, SOLUTION INTRAMUSCULAR; INTRAVENOUS PRN
Status: DISCONTINUED | OUTPATIENT
Start: 2020-08-04 | End: 2020-08-05

## 2020-08-05 RX ORDER — SODIUM CHLORIDE, SODIUM LACTATE, POTASSIUM CHLORIDE, CALCIUM CHLORIDE 600; 310; 30; 20 MG/100ML; MG/100ML; MG/100ML; MG/100ML
INJECTION, SOLUTION INTRAVENOUS CONTINUOUS
Status: DISCONTINUED | OUTPATIENT
Start: 2020-08-05 | End: 2020-08-05 | Stop reason: HOSPADM

## 2020-08-05 RX ORDER — ONDANSETRON 4 MG/1
4-8 TABLET, ORALLY DISINTEGRATING ORAL EVERY 8 HOURS PRN
Qty: 10 TABLET | Refills: 3 | Status: SHIPPED | OUTPATIENT
Start: 2020-08-05

## 2020-08-05 RX ORDER — LIDOCAINE 40 MG/G
CREAM TOPICAL
Status: DISCONTINUED | OUTPATIENT
Start: 2020-08-05 | End: 2020-08-05 | Stop reason: HOSPADM

## 2020-08-05 RX ORDER — OXYCODONE AND ACETAMINOPHEN 5; 325 MG/1; MG/1
1-2 TABLET ORAL EVERY 6 HOURS PRN
Qty: 12 TABLET | Refills: 0 | Status: SHIPPED | OUTPATIENT
Start: 2020-08-05

## 2020-08-05 RX ORDER — FENTANYL CITRATE 50 UG/ML
25-50 INJECTION, SOLUTION INTRAMUSCULAR; INTRAVENOUS
Status: DISCONTINUED | OUTPATIENT
Start: 2020-08-05 | End: 2020-08-05 | Stop reason: HOSPADM

## 2020-08-05 RX ORDER — ONDANSETRON 4 MG/1
4 TABLET, ORALLY DISINTEGRATING ORAL EVERY 30 MIN PRN
Status: DISCONTINUED | OUTPATIENT
Start: 2020-08-05 | End: 2020-08-05 | Stop reason: HOSPADM

## 2020-08-05 RX ORDER — OXYCODONE AND ACETAMINOPHEN 5; 325 MG/1; MG/1
1-2 TABLET ORAL EVERY 4 HOURS PRN
Status: DISCONTINUED | OUTPATIENT
Start: 2020-08-05 | End: 2020-08-05 | Stop reason: HOSPADM

## 2020-08-05 RX ORDER — NALOXONE HYDROCHLORIDE 0.4 MG/ML
.1-.4 INJECTION, SOLUTION INTRAMUSCULAR; INTRAVENOUS; SUBCUTANEOUS
Status: DISCONTINUED | OUTPATIENT
Start: 2020-08-05 | End: 2020-08-05 | Stop reason: HOSPADM

## 2020-08-05 RX ORDER — ONDANSETRON 2 MG/ML
4 INJECTION INTRAMUSCULAR; INTRAVENOUS EVERY 30 MIN PRN
Status: DISCONTINUED | OUTPATIENT
Start: 2020-08-05 | End: 2020-08-05 | Stop reason: HOSPADM

## 2020-08-05 RX ORDER — PROCHLORPERAZINE MALEATE 5 MG
10 TABLET ORAL EVERY 6 HOURS PRN
Status: DISCONTINUED | OUTPATIENT
Start: 2020-08-05 | End: 2020-08-05 | Stop reason: HOSPADM

## 2020-08-05 RX ORDER — FENTANYL CITRATE 50 UG/ML
INJECTION, SOLUTION INTRAMUSCULAR; INTRAVENOUS PRN
Status: DISCONTINUED | OUTPATIENT
Start: 2020-08-04 | End: 2020-08-05

## 2020-08-05 RX ORDER — HYDROMORPHONE HYDROCHLORIDE 1 MG/ML
.3-.5 INJECTION, SOLUTION INTRAMUSCULAR; INTRAVENOUS; SUBCUTANEOUS EVERY 10 MIN PRN
Status: DISCONTINUED | OUTPATIENT
Start: 2020-08-05 | End: 2020-08-05 | Stop reason: HOSPADM

## 2020-08-05 RX ORDER — FAMOTIDINE 10 MG
20 TABLET ORAL 2 TIMES DAILY
Status: DISCONTINUED | OUTPATIENT
Start: 2020-08-05 | End: 2020-08-05 | Stop reason: HOSPADM

## 2020-08-05 RX ORDER — PROPOFOL 10 MG/ML
INJECTION, EMULSION INTRAVENOUS PRN
Status: DISCONTINUED | OUTPATIENT
Start: 2020-08-05 | End: 2020-08-05

## 2020-08-05 RX ORDER — ONDANSETRON 2 MG/ML
INJECTION INTRAMUSCULAR; INTRAVENOUS PRN
Status: DISCONTINUED | OUTPATIENT
Start: 2020-08-04 | End: 2020-08-05

## 2020-08-05 RX ORDER — MEPERIDINE HYDROCHLORIDE 25 MG/ML
12.5 INJECTION INTRAMUSCULAR; INTRAVENOUS; SUBCUTANEOUS
Status: DISCONTINUED | OUTPATIENT
Start: 2020-08-05 | End: 2020-08-05 | Stop reason: HOSPADM

## 2020-08-05 RX ADMIN — SUGAMMADEX 200 MG: 100 INJECTION, SOLUTION INTRAVENOUS at 00:54

## 2020-08-05 RX ADMIN — Medication 1 UNITS: at 00:13

## 2020-08-05 RX ADMIN — OXYCODONE HYDROCHLORIDE AND ACETAMINOPHEN 1 TABLET: 5; 325 TABLET ORAL at 10:43

## 2020-08-05 RX ADMIN — ROCURONIUM BROMIDE 40 MG: 10 INJECTION INTRAVENOUS at 00:05

## 2020-08-05 RX ADMIN — SODIUM CHLORIDE: 9 INJECTION, SOLUTION INTRAVENOUS at 02:39

## 2020-08-05 RX ADMIN — PROPOFOL 50 MG: 10 INJECTION, EMULSION INTRAVENOUS at 00:13

## 2020-08-05 RX ADMIN — FAMOTIDINE 20 MG: 10 TABLET, FILM COATED ORAL at 09:15

## 2020-08-05 RX ADMIN — HYDROMORPHONE HYDROCHLORIDE 0.5 MG: 1 INJECTION, SOLUTION INTRAMUSCULAR; INTRAVENOUS; SUBCUTANEOUS at 01:41

## 2020-08-05 RX ADMIN — FENTANYL CITRATE 50 MCG: 50 INJECTION, SOLUTION INTRAMUSCULAR; INTRAVENOUS at 00:14

## 2020-08-05 ASSESSMENT — MIFFLIN-ST. JEOR: SCORE: 1465.13

## 2020-08-05 NOTE — BRIEF OP NOTE
[facility] Brief Operative Note    Pre-operative diagnosis: Acute Appendicitis   Post-operative diagnosis Same   Procedure: Procedure(s):  APPENDECTOMY, LAPAROSCOPIC   Surgeon(s): Surgeon(s) and Role:     * Best Cunningham MD - Primary   Estimated blood loss: * No values recorded between 8/5/2020 12:29 AM and 8/5/2020 12:56 AM *    Specimens: ID Type Source Tests Collected by Time Destination   A : Appendix Tissue Appendix SURGICAL PATHOLOGY EXAM Best Cunningham MD 8/5/2020 12:13 AM       Findings: Inflamed appendix with exudate         Best Cunningham MD, FACS      #[id number]

## 2020-08-05 NOTE — ANESTHESIA PREPROCEDURE EVALUATION
Anesthesia Pre-Procedure Evaluation    Patient: Alicia Penaloza   MRN: [id number] : 1975          Preoperative Diagnosis: * No pre-op diagnosis entered *    Procedure(s):  APPENDECTOMY, LAPAROSCOPIC    Past Medical History:   Diagnosis Date     Hypertension      Stroke (H) 2018     No past surgical history on file.    Anesthesia Evaluation     . Pt has not had prior anesthetic            ROS/MED HX    ENT/Pulmonary:     (+)tobacco use, Past use , . .    Neurologic:     (+)CVA date:  with deficits- left arm weakness and memory loss,     Cardiovascular:     (+) Dyslipidemia, hypertension----. : . . . :. . Previous cardiac testing date:results:date: results:ECG reviewed date:20 results:SR date: results:          METS/Exercise Tolerance:     Hematologic:     (+) Anemia, -      Musculoskeletal:  - neg musculoskeletal ROS       GI/Hepatic:     (+) appendicitis,       Renal/Genitourinary:  - ROS Renal section negative       Endo:     (+) Obesity, .      Psychiatric:     (+) psychiatric history anxiety and depression      Infectious Disease:  - neg infectious disease ROS       Malignancy:      - no malignancy   Other:    - neg other ROS                      Physical Exam  Normal systems: cardiovascular and pulmonary    Airway   Mallampati: II  TM distance: >3 FB  Neck ROM: full    Dental     Cardiovascular   Rhythm and rate: regular and normal      Pulmonary    breath sounds clear to auscultation            Lab Results   Component Value Date    WBC 18.6 (H) 2020    HGB 12.8 2020    HCT 39.2 2020     2020    CRP <2.9 2020    SED 11 2020     2020    POTASSIUM 4.0 2020    CHLORIDE 110 (H) 2020    CO2 26 2020    BUN 23 2020    CR 1.15 (H) 2020     (H) 2020    LULY 9.0 2020    ALBUMIN 3.3 (L) 2020    PROTTOTAL 6.9 2020    ALT 35 2020    AST 19 2020    ALKPHOS 86 2020     "BILITOTAL 0.2 08/04/2020    LIPASE 187 08/04/2020    PTT 34 11/21/2018    INR 1.07 11/21/2018    TSH 0.87 08/26/2018    T4 1.11 08/26/2018    HCG Negative 08/04/2020       Preop Vitals  BP Readings from Last 3 Encounters:   08/04/20 133/85   07/29/19 118/89   07/01/19 99/66    Pulse Readings from Last 3 Encounters:   08/04/20 88   07/29/19 76   07/01/19 84      Resp Readings from Last 3 Encounters:   08/04/20 15   04/15/19 16   11/29/18 15    SpO2 Readings from Last 3 Encounters:   08/04/20 99%   07/29/19 98%   07/01/19 95%      Temp Readings from Last 1 Encounters:   08/04/20 98.2  F (36.8  C) (Oral)    Ht Readings from Last 1 Encounters:   08/04/20 1.6 m (5' 3\")      Wt Readings from Last 1 Encounters:   08/04/20 82.6 kg (182 lb)    Estimated body mass index is 32.24 kg/m  as calculated from the following:    Height as of this encounter: 1.6 m (5' 3\").    Weight as of this encounter: 82.6 kg (182 lb).       Anesthesia Plan      History & Physical Review  History and physical reviewed and following examination; no interval change.    ASA Status:  2 emergent.    NPO Status:  Full stomach    Plan for General with Intravenous and Propofol induction. Maintenance will be Balanced.    PONV prophylaxis:  Ondansetron (or other 5HT-3) and Dexamethasone or Solumedrol         Postoperative Care  Postoperative pain management:  IV analgesics and Oral pain medications.      Consents  Anesthetic plan, risks, benefits and alternatives discussed with:  Patient.  Use of blood products discussed: No .   .                 KEVIN Isaac CRNA  "

## 2020-08-05 NOTE — ED TRIAGE NOTES
Upper abdominal pain more so in the left upper abdomen for the last couple of hours.  Was sweating earlier but denies any further symptoms.

## 2020-08-05 NOTE — CONSULTS
[facility] Emergency Department Surgery Consult    Alicia Penaloza MRN# [id number]   Age: 45 year old YOB: 1975     Date of Admission:  2020    Reason for consult: Acute appendicitis       Requesting physician: Dr. Fernandez       Level of consult: Consult, follow and place orders           Impression and Plan:   Impression:   Acute appendicitis      Plan:   Lap appendectomy.   The procedure, risks, benefits, and alternatives were discussed and the patient agrees to proceed.             Chief Complaint:   Abdominal pain     History is obtained from the patient         History of Present Illness:   This 45 year old group home female (poor historian) who developed generalized abd pain this afternoon.  It progressively worsened and the group home sent her to the ER.  CT in the ER c/w acute appendicitis.  No prior hx or hx of colon CA.  Denies nausea, vomiting, fever or chills.  Currently resting comfortably on the stretcher.           Past Medical History:     Past Medical History:   Diagnosis Date     Hypertension      Stroke (H) 2018             Past Surgical History:   No past surgical history on file.          Social History:     Social History     Tobacco Use     Smoking status: Former Smoker     Last attempt to quit: 2018     Years since quittin.0     Smokeless tobacco: Never Used   Substance Use Topics     Alcohol use: No             Family History:   No family history on file.           Allergies:   No Known Allergies          Medications:     Current Facility-Administered Medications   Medication     sodium chloride 0.9% infusion     Current Outpatient Medications   Medication Sig     ACETAMINOPHEN PO Take 650 mg by mouth     aspirin 325 MG tablet Take 325 mg by mouth     atorvastatin (LIPITOR) 40 MG tablet Take 1 tablet (40 mg) by mouth daily     bismuth subsalicylate (PEPTO-BISMOL MAX STRENGTH) 525 MG/15ML Take by mouth once as needed     divalproex sodium  extended-release (DEPAKOTE ER) 500 MG 24 hr tablet TAKE 1 TABLET BY MOUTH ONCE DAILY     ergocalciferol (VITAMIN D2) 400 units (10 mcg) TABS tablet Take 1,000 Units by mouth daily     gabapentin (NEURONTIN) 100 MG capsule Take 100 mg by mouth 2 times daily as needed (FOR PAIN)     lisinopril (PRINIVIL/ZESTRIL) 20 MG tablet Take 1 tablet (20 mg) by mouth daily     LORazepam (ATIVAN) 0.5 MG tablet Take 1 tablet (0.5 mg) by mouth 2 times daily as needed for agitation or anxiety     magnesium oxide (MAG-OX) 400 (240 Mg) MG tablet Take 400 mg by mouth daily      polyethylene glycol (MIRALAX/GLYCOLAX) packet Take 1 packet by mouth daily     sertraline (ZOLOFT) 100 MG tablet Take 1 tablet (100 mg) by mouth daily     Vitamin D, Cholecalciferol, 1000 units TABS Take 1,000 Units by mouth daily     diphenhydrAMINE (DIPHENHIST) 25 MG capsule Take 2 capsules (50 mg) by mouth daily (Patient not taking: Reported on 12/17/2019)     fluticasone (FLONASE) 50 MCG/ACT nasal spray      levonorgestrel (MIRENA) 20 MCG/24HR IUD 20 mcg by Intrauterine route     melatonin 5 MG PO tablet Take 1 tablet (5 mg) by mouth nightly as needed for sleep     Menthol, Topical Analgesic, 4 % GEL Apply 1 Application topically     nicotine (NICORETTE) 2 MG gum Take 2 mg by mouth     Riboflavin 400 MG TABS Take 4 tablets by mouth Totaling 400mg per day     triamcinolone (KENALOG) 0.1 % external cream Apply topically 2 times daily             Review of Systems:   The review of systems was positive for the following findings.  abdomen  The remainder of the review of systems was unremarkable.          Physical Exam:   PE:  B/P: 133/85, T: 98.2, P: 88, R: 15  General: well developed, well nourished WF who appears their stated age  HEENT: NC/AT, EOMI, (-)icterus, (-)injection  Neck: Supple, No JVD  Chest: CTA  Heart: S1, S2, (-)m/r/g  Abd: Soft, non distended, RLQ tenderness with guarding, no masses  Ext; Warm, no edema  Psych: AAOx3, poor memory  Neuro: No  focal motor deficits  .          Data:   All laboratory data reviewed  Results for orders placed or performed during the hospital encounter of 08/04/20 (from the past 24 hour(s))   CBC with platelets differential   Result Value Ref Range    WBC 18.6 (H) 4.0 - 11.0 10e9/L    RBC Count 4.25 3.8 - 5.2 10e12/L    Hemoglobin 12.8 11.7 - 15.7 g/dL    Hematocrit 39.2 35.0 - 47.0 %    MCV 92 78 - 100 fl    MCH 30.1 26.5 - 33.0 pg    MCHC 32.7 31.5 - 36.5 g/dL    RDW 12.4 10.0 - 15.0 %    Platelet Count 265 150 - 450 10e9/L    Diff Method Automated Method     % Neutrophils 85.1 %    % Lymphocytes 6.5 %    % Monocytes 6.4 %    % Eosinophils 1.2 %    % Basophils 0.4 %    % Immature Granulocytes 0.4 %    Nucleated RBCs 0 0 /100    Absolute Neutrophil 15.8 (H) 1.6 - 8.3 10e9/L    Absolute Lymphocytes 1.2 0.8 - 5.3 10e9/L    Absolute Monocytes 1.2 0.0 - 1.3 10e9/L    Absolute Basophils 0.1 0.0 - 0.2 10e9/L    Abs Immature Granulocytes 0.1 0 - 0.4 10e9/L    Absolute Nucleated RBC 0.0    Comprehensive metabolic panel   Result Value Ref Range    Sodium 141 133 - 144 mmol/L    Potassium 4.0 3.4 - 5.3 mmol/L    Chloride 110 (H) 94 - 109 mmol/L    Carbon Dioxide 26 20 - 32 mmol/L    Anion Gap 5 3 - 14 mmol/L    Glucose 124 (H) 70 - 99 mg/dL    Urea Nitrogen 23 7 - 30 mg/dL    Creatinine 1.15 (H) 0.52 - 1.04 mg/dL    GFR Estimate 57 (L) >60 mL/min/[1.73_m2]    GFR Estimate If Black 66 >60 mL/min/[1.73_m2]    Calcium 9.0 8.5 - 10.1 mg/dL    Bilirubin Total 0.2 0.2 - 1.3 mg/dL    Albumin 3.3 (L) 3.4 - 5.0 g/dL    Protein Total 6.9 6.8 - 8.8 g/dL    Alkaline Phosphatase 86 40 - 150 U/L    ALT 35 0 - 50 U/L    AST 19 0 - 45 U/L   Lipase   Result Value Ref Range    Lipase 187 73 - 393 U/L   CRP inflammation   Result Value Ref Range    CRP Inflammation <2.9 0.0 - 8.0 mg/L   Erythrocyte sedimentation rate auto   Result Value Ref Range    Sed Rate 11 0 - 20 mm/h   UA with Microscopic   Result Value Ref Range    Color Urine Lucina     Appearance  Urine Cloudy     Glucose Urine Negative NEG^Negative mg/dL    Bilirubin Urine Negative NEG^Negative    Ketones Urine Negative NEG^Negative mg/dL    Specific Gravity Urine 1.017 1.003 - 1.035    Blood Urine Negative NEG^Negative    pH Urine 7.0 5.0 - 7.0 pH    Protein Albumin Urine Negative NEG^Negative mg/dL    Urobilinogen mg/dL 0.0 0.0 - 2.0 mg/dL    Nitrite Urine Negative NEG^Negative    Leukocyte Esterase Urine Large (A) NEG^Negative    Source Midstream Urine     WBC Urine 68 (H) 0 - 5 /HPF    RBC Urine 4 (H) 0 - 2 /HPF    Bacteria Urine Few (A) NEG^Negative /HPF    Squamous Epithelial /HPF Urine 7 (H) 0 - 1 /HPF    Transitional Epi 3 (A) FEW^Few /HPF    Mucous Urine Present (A) NEG^Negative /LPF    Hyaline Casts 3 (H) 0 - 2 /LPF   HCG qualitative urine   Result Value Ref Range    HCG Qual Urine Negative NEG^Negative   CT Abdomen Pelvis w Contrast    Narrative    EXAM: CT ABDOMEN PELVIS W CONTRAST  LOCATION: [facility]  DATE/TIME: 8/4/2020 10:11 PM    INDICATION: Lower abdominal pain and tenderness.  COMPARISON: None.  TECHNIQUE: CT scan of the abdomen and pelvis was performed following injection of IV contrast. Multiplanar reformats were obtained. Dose reduction techniques were used.  CONTRAST: 90mLs Isovue 370    FINDINGS:   LOWER CHEST: Normal.    HEPATOBILIARY: Normal.  PANCREAS: Normal.  SPLEEN: Normal.  ADRENAL GLANDS: Normal.  KIDNEYS/BLADDER: Tiny benign renal cysts and angiomyolipomas bilaterally. No stone or hydronephrosis.    BOWEL: Dilated inflamed appendix consistent with appendicitis. There is no suggestion for perforation.    Bowel otherwise unremarkable.    LYMPH NODES: Normal.  VASCULATURE: Unremarkable.  PELVIC ORGANS: IUD present. Multiple uterine fibroids. No adnexal lesion or free fluid.    MUSCULOSKELETAL: Normal.      Impression    IMPRESSION:   1.  Acute appendicitis.     All imaging studies reviewed by me.     Best Cunningham MD, FACS

## 2020-08-05 NOTE — PROGRESS NOTES
Transfer from pacu to Room med/surg 271  Transferred to bed via cart  S: 44 y/o F S/P Lap appy  Anesthesia Type: general  Surgeon: Dr. Cunningham  Allergies: See Medication Reconciliation Record  DNR: NO   B: Pertinent Medical History:   Past Medical History:   Diagnosis Date     Hypertension      Stroke (H) 08/07/2018   Surgical History: No past surgical history on file.  A: EBL: 20 ml  IVF: 1200 ml LR  UOP: none  NPO: ___Yes X  Vomiting: ___Yes __X_No   Drainage: none  Skin Integrity: intact except abd   RFO: ___Yes__X_No  SSI Patient? ___Yes_X__No  Brace/sling/equipment: ___Yes_X__No  See PACU record for ongoing assessment, vital signs and pain assessment.  R: Post-Op vitals and assessments as ordered/indicated per patient's condition.  Follow Post-Op orders and notify Physician prn.  Continue to involve patient/family in plan of care and discharge planning.  Reinforce Pre-Operative education.  Implement skin safety interventions as appropriate.  Name: Tarah LOPEZ RN, report given to [facility] Teran

## 2020-08-05 NOTE — PROGRESS NOTES
Name: Alicia Penaloza    Admit Date and Time: 8/4/2020  8:52 PM    MRN#: [id number]    Reason for Hospitalization: Acute UTI [N39.0]  Acute appendicitis with generalized peritonitis, without abscess, unspecified whether gangrene present, unspecified whether perforation present [K35.20]    Discharge Date: 8/5/2020    Patient / Family response to discharge plan: in agreement    Other Providers (Care Coordinator, County Services, PCA services etc): No    CTS Hand Off Completed: Not needed    PAS #: N/A    MIRIAM Score: N/A    Future Appointments:   Future Appointments   Date Time Provider Department Center   8/12/2020 12:30 PM Yong Bernal APRN CNP [facility]   8/17/2020  2:15 PM Best Cunningham MD [facility]       Discharge Disposition: home return to Group Home.  Yellow cab cancelled ride through [employer].  [employer] rescheduled ride with [employer] at 1245.    Discharge Planner   Discharge Plans in progress: Return to Group Home  Barriers to discharge plan: None  Follow up plan: PCP/surgeon    ELOY Bello  [facility] [phone]/ [facility] [phone]         Entered by: Charissa Graff 08/05/2020 11:58 AM

## 2020-08-05 NOTE — ANESTHESIA POSTPROCEDURE EVALUATION
Patient: Alicia Penaloza    Procedure(s):  APPENDECTOMY, LAPAROSCOPIC    Diagnosis:* No pre-op diagnosis entered *  Diagnosis Additional Information: No value filed.    Anesthesia Type:  General    Note:  Anesthesia Post Evaluation    Patient location during evaluation: Phase 2 and Bedside  Patient participation: Able to fully participate in evaluation  Level of consciousness: awake  Pain management: adequate  Airway patency: patent  Cardiovascular status: acceptable and hemodynamically stable  Respiratory status: acceptable, room air and nonlabored ventilation  Hydration status: stable  PONV: none     Anesthetic complications: None    Comments: Patient was happy with the anesthesia care received and no anesthesia related complications were noted.  I will follow up with the patient again if it is needed.        Last vitals:  Vitals:    08/05/20 0140 08/05/20 0144 08/05/20 0145   BP: 106/70  112/70   Pulse: 89  89   Resp: 16 16 16   Temp:  97.3  F (36.3  C)    SpO2: 95% 94% 94%         Electronically Signed By: KEVIN Isaac CRNA  August 5, 2020  1:47 AM

## 2020-08-05 NOTE — OP NOTE
Procedure Date: 08/05/2020      PREOPERATIVE DIAGNOSIS:  Acute appendicitis.      POSTOPERATIVE DIAGNOSIS:  Acute appendicitis.      PROCEDURE:  Laparoscopic appendectomy.      SURGEON:  Best Cunningham MD, [facility]      ANESTHESIA:  General with double endotracheal tube.      INDICATIONS FOR PROCEDURE:  This is a 45-year-old lady who after eating this afternoon developed generalized abdominal pain.  The pain progressively worsened and localized to the right lower quadrant.  She presented to the ER.  Subsequent CT in the ER was consistent with acute appendicitis.  For this reason, it was elected to take her to the operative for laparoscopic appendectomy.      OPERATIVE FINDINGS:  Inflamed appendix with exudate.      DETAILS OF PROCEDURE:  The patient was taken to the operating room and placed on the table in supine position.  After induction of general anesthesia, the abdomen was prepped and draped in sterile fashion.  A timeout was performed confirming the patient as well as the procedure to be performed.  A supraumbilical incision was then made.  A Visiport was then inserted into the abdomen under direct visualization.  The abdomen insufflated to 15 mmHg pressure.  A left lower quadrant 5 mm trocar and 5 mm suprapubic trocars were placed.  The cecum was grasped and then the appendix with exudate and an inflamed dilated appendix with exudate was readily seen in the right lower quadrant.  The appendiceal mesentery was grasped and the mesentery was then taken down using cautery.  The appendiceal artery was then clipped and cauterized.  We then eventually dissected back to the base of the appendix, an Endo-GI stapler was fired across the base and the appendix was removed via an EndoCatch bag and submitted as specimen.  We then reinspected the area.  The area was copiously irrigated.  We inspected the mesentery as well as the staple line was no evidence of any bleeding.  All trocars were removed without evidence of any  bleeding.  The supraumbilical trocar was closed using a single stitch of #1 PDS.  All skin incisions were closed using 3-0 Vicryl and Exofin glue.  Sterile dressings were applied.  The patient was taken from the operating room to the recovery room in stable condition to be sent home.         JOSE DANIEL NOBLE MD, FACS             D: 2020   T: 2020   MT:       Name:     ZAIN SEXTON   MRN:      [id number]        Account:        [id number]   :      1975           Procedure Date: 2020      Document: [id number]

## 2020-08-05 NOTE — ED PROVIDER NOTES
History     Chief Complaint   Patient presents with     Abdominal Pain     The history is provided by the patient.     Alicia Penaloza is a 45 year old female who presents to the emergency department for abdominal pain. Patient reports having constant upper abdominal pain that started this afternoon. She states it started prior to dinner, had lasagna for dinner at about 1700, which made the pain worse. She states the pain is worse when she moves or walks. She reports sweating earlier but that has gotten better. She denies any nausea, fever, shortness of breath or swelling in legs. She tried Pepto Bismol and Tylenol with no relief. Her last bowel movement was yesterday and normal. She has been passing gas today. She denies having had any surgeries on her abdomen.    Allergies:  No Known Allergies    Problem List:    Patient Active Problem List    Diagnosis Date Noted     Aphasia 2018     Priority: Medium     Iron deficiency anemia due to chronic blood loss 10/09/2018     Priority: Medium     Hyperlipidemia LDL goal <100 10/02/2018     Priority: Medium     Obesity (BMI 35.0-39.9) with comorbidity (H) 2018     Priority: Medium     Acute CVA (cerebrovascular accident) (H) 2018     Priority: Medium     Benign essential hypertension 2018     Priority: Medium     Acute ischemic stroke (H) 2018     Priority: Medium     Vaginal bleeding 2018     Priority: Medium     Hypertensive urgency 2018     Priority: Medium        Past Medical History:    Past Medical History:   Diagnosis Date     Hypertension      Stroke (H) 2018       Past Surgical History:    No past surgical history on file.    Family History:    No family history on file.    Social History:  Marital Status:  Single [1]  Social History     Tobacco Use     Smoking status: Former Smoker     Last attempt to quit: 2018     Years since quittin.0     Smokeless tobacco: Never Used   Substance Use Topics     Alcohol  "use: No     Drug use: No        Medications:    ACETAMINOPHEN PO  aspirin 325 MG tablet  atorvastatin (LIPITOR) 40 MG tablet  bismuth subsalicylate (PEPTO-BISMOL MAX STRENGTH) 525 MG/15ML  diphenhydrAMINE (DIPHENHIST) 25 MG capsule  divalproex sodium extended-release (DEPAKOTE ER) 500 MG 24 hr tablet  ergocalciferol (VITAMIN D2) 400 units (10 mcg) TABS tablet  fluticasone (FLONASE) 50 MCG/ACT nasal spray  gabapentin (NEURONTIN) 100 MG capsule  levonorgestrel (MIRENA) 20 MCG/24HR IUD  lisinopril (PRINIVIL/ZESTRIL) 20 MG tablet  LORazepam (ATIVAN) 0.5 MG tablet  melatonin 5 MG PO tablet  Menthol, Topical Analgesic, 4 % GEL  nicotine (NICORETTE) 2 MG gum  polyethylene glycol (MIRALAX/GLYCOLAX) packet  Riboflavin 400 MG TABS  sertraline (ZOLOFT) 100 MG tablet  triamcinolone (KENALOG) 0.1 % external cream  Vitamin D, Cholecalciferol, 1000 units TABS          Review of Systems   All other systems reviewed and are negative.      Physical Exam   BP: 116/83  Pulse: 70  Temp: 98.2  F (36.8  C)  Resp: 15  Height: 160 cm (5' 3\")  Weight: 82.6 kg (182 lb)  SpO2: 100 %      Physical Exam  Vitals signs and nursing note reviewed.   Constitutional:       General: She is not in acute distress.     Appearance: She is well-developed.      Comments: Lying comfortably in bed in no acute distress.  She was a bit standoffish with nursing staff but was just fine when I was interviewing her and answered my questions readily.   HENT:      Mouth/Throat:      Mouth: Mucous membranes are moist.      Pharynx: Oropharynx is clear.   Cardiovascular:      Rate and Rhythm: Normal rate and regular rhythm.   Pulmonary:      Effort: Pulmonary effort is normal.      Breath sounds: Normal breath sounds.   Abdominal:      Palpations: Abdomen is soft.      Tenderness: There is abdominal tenderness (mild lower, no upper tenderness). There is rebound (mild to percussion). There is no right CVA tenderness, left CVA tenderness or guarding.   Musculoskeletal: " Normal range of motion.         General: No swelling or tenderness.   Skin:     General: Skin is warm and dry.   Neurological:      General: No focal deficit present.      Mental Status: She is alert and oriented to person, place, and time.         ED Course         Procedures                  EKG Interpretation:      Interpreted by Steve Fernandez MD  Time reviewed:11:10 PM    Symptoms at time of EKG: none, pre op   Rhythm: normal sinus   Rate: normal  Axis: NORMAL  Ectopy: none  Conduction: normal  ST Segments/ T Waves: No ST-T wave changes  Q Waves: none  Comparison to prior: Compared to the EKG from 11/21/2018, the mild diffuse ST depression has resolved.    Clinical Impression: normal EKG       Critical Care time:  none               Results for orders placed or performed during the hospital encounter of 08/04/20 (from the past 24 hour(s))   CBC with platelets differential   Result Value Ref Range    WBC 18.6 (H) 4.0 - 11.0 10e9/L    RBC Count 4.25 3.8 - 5.2 10e12/L    Hemoglobin 12.8 11.7 - 15.7 g/dL    Hematocrit 39.2 35.0 - 47.0 %    MCV 92 78 - 100 fl    MCH 30.1 26.5 - 33.0 pg    MCHC 32.7 31.5 - 36.5 g/dL    RDW 12.4 10.0 - 15.0 %    Platelet Count 265 150 - 450 10e9/L    Diff Method Automated Method     % Neutrophils 85.1 %    % Lymphocytes 6.5 %    % Monocytes 6.4 %    % Eosinophils 1.2 %    % Basophils 0.4 %    % Immature Granulocytes 0.4 %    Nucleated RBCs 0 0 /100    Absolute Neutrophil 15.8 (H) 1.6 - 8.3 10e9/L    Absolute Lymphocytes 1.2 0.8 - 5.3 10e9/L    Absolute Monocytes 1.2 0.0 - 1.3 10e9/L    Absolute Basophils 0.1 0.0 - 0.2 10e9/L    Abs Immature Granulocytes 0.1 0 - 0.4 10e9/L    Absolute Nucleated RBC 0.0    Comprehensive metabolic panel   Result Value Ref Range    Sodium 141 133 - 144 mmol/L    Potassium 4.0 3.4 - 5.3 mmol/L    Chloride 110 (H) 94 - 109 mmol/L    Carbon Dioxide 26 20 - 32 mmol/L    Anion Gap 5 3 - 14 mmol/L    Glucose 124 (H) 70 - 99 mg/dL    Urea Nitrogen 23 7  - 30 mg/dL    Creatinine 1.15 (H) 0.52 - 1.04 mg/dL    GFR Estimate 57 (L) >60 mL/min/[1.73_m2]    GFR Estimate If Black 66 >60 mL/min/[1.73_m2]    Calcium 9.0 8.5 - 10.1 mg/dL    Bilirubin Total 0.2 0.2 - 1.3 mg/dL    Albumin 3.3 (L) 3.4 - 5.0 g/dL    Protein Total 6.9 6.8 - 8.8 g/dL    Alkaline Phosphatase 86 40 - 150 U/L    ALT 35 0 - 50 U/L    AST 19 0 - 45 U/L   Lipase   Result Value Ref Range    Lipase 187 73 - 393 U/L   CRP inflammation   Result Value Ref Range    CRP Inflammation <2.9 0.0 - 8.0 mg/L   Erythrocyte sedimentation rate auto   Result Value Ref Range    Sed Rate 11 0 - 20 mm/h   UA with Microscopic   Result Value Ref Range    Color Urine Lucina     Appearance Urine Cloudy     Glucose Urine Negative NEG^Negative mg/dL    Bilirubin Urine Negative NEG^Negative    Ketones Urine Negative NEG^Negative mg/dL    Specific Gravity Urine 1.017 1.003 - 1.035    Blood Urine Negative NEG^Negative    pH Urine 7.0 5.0 - 7.0 pH    Protein Albumin Urine Negative NEG^Negative mg/dL    Urobilinogen mg/dL 0.0 0.0 - 2.0 mg/dL    Nitrite Urine Negative NEG^Negative    Leukocyte Esterase Urine Large (A) NEG^Negative    Source Midstream Urine     WBC Urine 68 (H) 0 - 5 /HPF    RBC Urine 4 (H) 0 - 2 /HPF    Bacteria Urine Few (A) NEG^Negative /HPF    Squamous Epithelial /HPF Urine 7 (H) 0 - 1 /HPF    Transitional Epi 3 (A) FEW^Few /HPF    Mucous Urine Present (A) NEG^Negative /LPF    Hyaline Casts 3 (H) 0 - 2 /LPF   HCG qualitative urine   Result Value Ref Range    HCG Qual Urine Negative NEG^Negative   CT Abdomen Pelvis w Contrast    Narrative    EXAM: CT ABDOMEN PELVIS W CONTRAST  LOCATION: [facility]  DATE/TIME: 8/4/2020 10:11 PM    INDICATION: Lower abdominal pain and tenderness.  COMPARISON: None.  TECHNIQUE: CT scan of the abdomen and pelvis was performed following injection of IV contrast. Multiplanar reformats were obtained. Dose reduction techniques were used.  CONTRAST: 90mLs Isovue 370    FINDINGS:    LOWER CHEST: Normal.    HEPATOBILIARY: Normal.  PANCREAS: Normal.  SPLEEN: Normal.  ADRENAL GLANDS: Normal.  KIDNEYS/BLADDER: Tiny benign renal cysts and angiomyolipomas bilaterally. No stone or hydronephrosis.    BOWEL: Dilated inflamed appendix consistent with appendicitis. There is no suggestion for perforation.    Bowel otherwise unremarkable.    LYMPH NODES: Normal.  VASCULATURE: Unremarkable.  PELVIC ORGANS: IUD present. Multiple uterine fibroids. No adnexal lesion or free fluid.    MUSCULOSKELETAL: Normal.      Impression    IMPRESSION:   1.  Acute appendicitis.       Medications   0.9% sodium chloride BOLUS (0 mLs Intravenous Stopped 8/4/20 2157)     Followed by   sodium chloride 0.9% infusion ( Intravenous New Bag 8/4/20 2256)   ampicillin-sulbactam (UNASYN) 3 g vial to attach to  mL bag (3 g Intravenous New Bag 8/4/20 2258)   ketorolac (TORADOL) injection 30 mg (30 mg Intravenous Given 8/4/20 2125)   iopamidol (ISOVUE-370) solution 500 mL (90 mLs Intravenous Given 8/4/20 2216)   Sodium Chloride 0.9 % bag 100mL for CT scan (60 mLs Intravenous Given 8/4/20 2216)   sodium chloride (PF) 0.9% PF flush 3 mL (10 mLs Intracatheter Given 8/4/20 2216)       Assessments & Plan   45-year-old female with upper abdominal pain starting this afternoon worsened after eating dinner at about 5 PM.  On exam her tenderness is in the lower abdomen not the upper abdomen.  Her white count came back elevated at 18.6.  Abdominal CT showed acute appendicitis without evidence of perforation.  Her urine is significant for large leukocyte esterase, 68 WBCs, 4 RBCs.  UC sent.  No CT evidence of pyelonephritis.    She was given 3 g of Unasyn IV and I spoke with Dr. Cunningham from general surgery.  He is on his way to see her and will take her to the OR for laparoscopic appendectomy.    EKG was normal.  She is cleared for surgery and anesthesia of choice.       I have reviewed the nursing notes.    I have reviewed the findings,  diagnosis, plan and need for follow up with the patient.       New Prescriptions    No medications on file       Final diagnoses:   Acute appendicitis with generalized peritonitis, without abscess, unspecified whether gangrene present, unspecified whether perforation present   Acute UTI - UC pending     This document serves as a record of services personally performed by Daquan Fernandez MD. It was created on their behalf by Najma Carroll, a trained medical scribe. The creation of this record is based on the provider's personal observations and the statements of the patient. This document has been checked and approved by the attending provider.    Note: Chart documentation done in part with Dragon Voice Recognition software. Although reviewed after completion, some word and grammatical errors may remain.    8/4/2020   [facility] EMERGENCY DEPARTMENT     Steve Fernandez MD  08/04/20 8855

## 2020-08-05 NOTE — ED NOTES
Pt reports last time she ate was around 1700. Pt lives at [facility] in [place]. With pt permission writer called to update them and reviewed medication list, this was done with Aleshia. Pts sister Sharita also updated per pt request and her number is [phone], she states she is out of state at this time and is unable to give pt a ride home. The staff at the [facility] provided the on-call nurse info if needed. The nurses name is Ben and her number is [phone].

## 2020-08-05 NOTE — ANESTHESIA CARE TRANSFER NOTE
Patient: Alicia Penaloza    Procedure(s):  APPENDECTOMY, LAPAROSCOPIC    Diagnosis: * No pre-op diagnosis entered *  Diagnosis Additional Information: No value filed.    Anesthesia Type:   General     Note:  Airway :Face Mask  Patient transferred to:PACU  Handoff Report: Identifed the Patient, Identified the Reponsible Provider, Reviewed the pertinent medical history, Discussed the surgical course, Reviewed Intra-OP anesthesia mangement and issues during anesthesia, Set expectations for post-procedure period and Allowed opportunity for questions and acknowledgement of understanding      Vitals: (Last set prior to Anesthesia Care Transfer)    CRNA VITALS  8/5/2020 0050 - 8/5/2020 0126      8/5/2020             Pulse:  94    SpO2:  98 %                Electronically Signed By: KEVIN Isaac CRNA  August 5, 2020  1:26 AM

## 2020-08-05 NOTE — ED NOTES
Pt reports eating supper tonight and almost immediately afterward she developed sharp abd pain all over. Pt denies any nausea or vomiting.

## 2020-08-05 NOTE — CONSULTS
CARE TRANSITION SOCIAL WORK INITIAL ASSESSMENT:      Met with: Patient.    DATA  Active Problems:    S/P appendectomy        ASSESSMENT  Cognitive Status: awake, alert and oriented.        Other Resources: Transportation Services      Insurance Concerns: No Insurance issues identified     This writer met with pt introduced self and role. Patient states she doesn't have a ride home.  She lives at [facility].  Writer called Jeni from the [facility] to see if she can transport patient home [phone].  Jeni states she would like other transport options explored first, but if unable to find her a ride she could take her home after her appt later this afternoon.    PLAN    Return to [facility]    ELOY Bello  [facility] [phone]/ [facility] [phone]

## 2020-08-05 NOTE — PLAN OF CARE
S-(situation): Patient discharged to group home via w/c with [place] cab.    B-(background): Observation goals met     A-(assessment): Pain managed. VSS.     R-(recommendations): Discharge instructions reviewed with patient and group home nurse. Listed belongings gathered and returned to patient.  Patient Education resolved: Yes  New medications-Pt. Has been educated about reason of use and side effects Yes  Home and hospital acquired medications returned to patient Yes  Medication Bin checked and emptied on discharge Yes

## 2020-08-05 NOTE — PROGRESS NOTES
S-(situation): Patient registered to Observation. Patient arrived to room 271 via cart from surgery.    B-(background): Appendectomy    A-(assessment): BP: 120/78, P: 93, R: 16, T: 97.9.  Lung sounds clear. A&O x4.  Pt denies pain. Bowel sounds normoactive.  Has not voided since coming to floor. Surgical incisions clean, dry and intact. IV site asymptomatic running NS @ 125 ml/hr.     R-(recommendations): Orders and observation goals reviewed with patient.    Nursing Observation criteria listed below was met:    Skin issues/needs documented:Yes  Isolation needs addressed and Signage up: NA  Fall Prevention: Education given and documented: Yes  Education Assessment documented:Yes  Education Documented: Yes  OBS video/handout Reviewed & Documented: Yes  Allergies Reviewed: Yes  Medication Reconciliation Complete: Yes  New medication patient education completed and documented (Possible Side Effects of Common Medications handout): Yes  Home medications if not able to send immediately home with family stored here: NA  Reminder note placed in discharge instructions: NA  Patient has discharge needs (If yes, please explain): No

## 2020-08-06 LAB
BACTERIA SPEC CULT: NORMAL
COPATH REPORT: NORMAL
Lab: NORMAL
SPECIMEN SOURCE: NORMAL

## 2020-08-06 NOTE — PROGRESS NOTES
"[facility] Same Day Surgery  Discharge Call Back  Alicia Penaloza  1975  MRN: [id number]  Home: [phone] (home)   PCP: Augusto Thomas    We are calling to see how you're doing since your surgery/procedure with us?   Comments: \"my stomach still hurts but it started itching last night so I know it's healing\".  Clinical Questions  1. Have you had time to look at your discharge instructions? Do you have any questions in regards to the instructions?   Comment: yes, no  2. Do you feel your pain is being controlled with the regimen the surgeon sent you home on? (ie: prescription medications, over the counter pain medications, ice packs)   Comments: yes  3. Have you noticed any drainage on your dressing? Do you know what to do if you have bleeding as a result of your procedure?   Comments: no  4. Have you had any nausea/vomiting? Do you know how to treat this?   Comment: no  5. Have you had any signs/symptoms of infection? (ie: fever, swelling, heat, drainage or redness) Do you know what to do if you have?   Comment: Reports scratching incision in sleep and seeing a little pus come out of it. Group home nurse states incision looks ok and clean. Advised to keep clean and covered if it continues to drain and call if pus continues or incision looks more infected. Nurse states understanding.   6. Do you have a follow up appointment made with your surgeon? Do you have a number to contact them at if you need it?   Comment: yes, yes  Retained Foreign Object (STACEY, Hemovac, Penrose, Wound Packing, Vaginal Packing, Nasal Splints, Urethral Stents, Ordonez Catheter)  1. Do you still have na in place?   2. If the item is still in place, can you review the plan for removal with me? na      You may be randomly selected to fill out a [facility] survey. We would appreciate you taking the time to fill this out. It is important to us if you would answer all of the questions on the survey.              "

## 2020-08-09 DIAGNOSIS — F32.1 CURRENT MODERATE EPISODE OF MAJOR DEPRESSIVE DISORDER, UNSPECIFIED WHETHER RECURRENT (H): ICD-10-CM

## 2020-08-12 ENCOUNTER — VIRTUAL VISIT (OUTPATIENT)
Dept: PSYCHIATRY | Facility: CLINIC | Age: 45
End: 2020-08-12
Attending: NURSE PRACTITIONER
Payer: COMMERCIAL

## 2020-08-12 DIAGNOSIS — F32.1 CURRENT MODERATE EPISODE OF MAJOR DEPRESSIVE DISORDER, UNSPECIFIED WHETHER RECURRENT (H): ICD-10-CM

## 2020-08-12 DIAGNOSIS — F43.22 ADJUSTMENT DISORDER WITH ANXIOUS MOOD: ICD-10-CM

## 2020-08-12 DIAGNOSIS — G47.00 INSOMNIA, UNSPECIFIED TYPE: Primary | ICD-10-CM

## 2020-08-12 RX ORDER — SERTRALINE HYDROCHLORIDE 100 MG/1
100 TABLET, FILM COATED ORAL DAILY
Qty: 30 TABLET | Refills: 1 | Status: SHIPPED | OUTPATIENT
Start: 2020-08-12 | End: 2020-09-16

## 2020-08-12 RX ORDER — LORAZEPAM 0.5 MG/1
0.5 TABLET ORAL 2 TIMES DAILY PRN
Qty: 60 TABLET | Refills: 1 | Status: SHIPPED | OUTPATIENT
Start: 2020-08-12 | End: 2021-11-03

## 2020-08-12 RX ORDER — TRAZODONE HYDROCHLORIDE 50 MG/1
25-50 TABLET, FILM COATED ORAL
Qty: 30 TABLET | Refills: 1 | Status: SHIPPED | OUTPATIENT
Start: 2020-08-12 | End: 2020-09-16
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
## 2020-08-13 RX ORDER — SERTRALINE HYDROCHLORIDE 100 MG/1
TABLET, FILM COATED ORAL
Qty: 30 TABLET | Refills: 11 | OUTPATIENT
Start: 2020-08-13

## 2020-09-15 ENCOUNTER — TRANSFERRED RECORDS (OUTPATIENT)
Dept: HEALTH INFORMATION MANAGEMENT | Facility: CLINIC | Age: 45
End: 2020-09-15

## 2020-09-16 ENCOUNTER — VIRTUAL VISIT (OUTPATIENT)
Dept: PSYCHIATRY | Facility: CLINIC | Age: 45
End: 2020-09-16
Attending: NURSE PRACTITIONER
Payer: COMMERCIAL

## 2020-09-16 DIAGNOSIS — F32.1 CURRENT MODERATE EPISODE OF MAJOR DEPRESSIVE DISORDER, UNSPECIFIED WHETHER RECURRENT (H): ICD-10-CM

## 2020-09-16 DIAGNOSIS — G47.00 INSOMNIA, UNSPECIFIED TYPE: ICD-10-CM

## 2020-09-16 RX ORDER — SERTRALINE HYDROCHLORIDE 100 MG/1
100 TABLET, FILM COATED ORAL DAILY
Qty: 30 TABLET | Refills: 2 | Status: SHIPPED | OUTPATIENT
Start: 2020-09-16 | End: 2020-11-09

## 2020-09-16 RX ORDER — TRAZODONE HYDROCHLORIDE 50 MG/1
50 TABLET, FILM COATED ORAL AT BEDTIME
Qty: 30 TABLET | Refills: 1 | Status: SHIPPED | OUTPATIENT
Start: 2020-09-16 | End: 2020-11-09
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
## 2020-10-05 ENCOUNTER — TELEPHONE (OUTPATIENT)
Dept: PSYCHIATRY | Facility: CLINIC | Age: 45
End: 2020-10-05

## 2020-10-05 NOTE — TELEPHONE ENCOUNTER
On 10/5/2020, 3 pages of records were received from [facility]. This writer sent the records to urgent scanning, which will appear in the media tab in 6 hours, this was also routed to [facility].  I sent the original documents to scanning on 10/5/2020 and held a copy in Psychiatry until scanning is confirmed. Skyla Veliz, CMA

## 2020-10-21 DIAGNOSIS — G47.00 INSOMNIA, UNSPECIFIED TYPE: ICD-10-CM

## 2020-10-23 RX ORDER — TRAZODONE HYDROCHLORIDE 50 MG/1
TABLET, FILM COATED ORAL
Qty: 30 TABLET | Refills: 11 | OUTPATIENT
Start: 2020-10-23

## 2020-11-09 ENCOUNTER — VIRTUAL VISIT (OUTPATIENT)
Dept: PSYCHIATRY | Facility: CLINIC | Age: 45
End: 2020-11-09
Attending: NURSE PRACTITIONER
Payer: COMMERCIAL

## 2020-11-09 ENCOUNTER — MEDICAL CORRESPONDENCE (OUTPATIENT)
Dept: HEALTH INFORMATION MANAGEMENT | Facility: CLINIC | Age: 45
End: 2020-11-09

## 2020-11-09 DIAGNOSIS — G47.00 INSOMNIA, UNSPECIFIED TYPE: ICD-10-CM

## 2020-11-09 DIAGNOSIS — F43.22 ADJUSTMENT DISORDER WITH ANXIOUS MOOD: ICD-10-CM

## 2020-11-09 DIAGNOSIS — F32.1 CURRENT MODERATE EPISODE OF MAJOR DEPRESSIVE DISORDER, UNSPECIFIED WHETHER RECURRENT (H): ICD-10-CM

## 2020-11-09 PROCEDURE — 99213 OFFICE O/P EST LOW 20 MIN: CPT | Mod: 95 | Performed by: NURSE PRACTITIONER

## 2020-11-09 RX ORDER — SERTRALINE HYDROCHLORIDE 100 MG/1
100 TABLET, FILM COATED ORAL DAILY
Qty: 30 TABLET | Refills: 2 | Status: SHIPPED | OUTPATIENT
Start: 2020-11-09 | End: 2020-12-30

## 2020-11-09 RX ORDER — TRAZODONE HYDROCHLORIDE 100 MG/1
100 TABLET ORAL AT BEDTIME
Qty: 30 TABLET | Refills: 2 | Status: SHIPPED | OUTPATIENT
Start: 2020-11-09 | End: 2020-12-30
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
## 2020-11-11 ENCOUNTER — TELEPHONE (OUTPATIENT)
Dept: PSYCHIATRY | Facility: CLINIC | Age: 45
End: 2020-11-11

## 2020-11-11 ENCOUNTER — MEDICAL CORRESPONDENCE (OUTPATIENT)
Dept: HEALTH INFORMATION MANAGEMENT | Facility: CLINIC | Age: 45
End: 2020-11-11

## 2020-11-11 NOTE — TELEPHONE ENCOUNTER
5 faxed pages of forms was received from [facility] requesting medication check and any new orders. Completed forms were faxed to [phone] attn Ben Daily. The original copies were sent to scanning.Thais Machado LPN

## 2020-12-30 ENCOUNTER — TELEPHONE (OUTPATIENT)
Dept: PSYCHIATRY | Facility: CLINIC | Age: 45
End: 2020-12-30

## 2020-12-30 ENCOUNTER — VIRTUAL VISIT (OUTPATIENT)
Dept: PSYCHIATRY | Facility: CLINIC | Age: 45
End: 2020-12-30
Attending: NURSE PRACTITIONER
Payer: COMMERCIAL

## 2020-12-30 DIAGNOSIS — G47.00 INSOMNIA, UNSPECIFIED TYPE: ICD-10-CM

## 2020-12-30 DIAGNOSIS — F32.1 CURRENT MODERATE EPISODE OF MAJOR DEPRESSIVE DISORDER, UNSPECIFIED WHETHER RECURRENT (H): ICD-10-CM

## 2020-12-30 PROCEDURE — 99213 OFFICE O/P EST LOW 20 MIN: CPT | Mod: 95 | Performed by: NURSE PRACTITIONER

## 2020-12-30 RX ORDER — SERTRALINE HYDROCHLORIDE 100 MG/1
100 TABLET, FILM COATED ORAL DAILY
Qty: 30 TABLET | Refills: 2 | Status: SHIPPED | OUTPATIENT
Start: 2020-12-30 | End: 2021-03-02

## 2020-12-30 RX ORDER — TRAZODONE HYDROCHLORIDE 100 MG/1
100 TABLET ORAL AT BEDTIME
Qty: 30 TABLET | Refills: 2 | Status: SHIPPED | OUTPATIENT
Start: 2020-12-30 | End: 2021-03-02
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
## 2021-01-04 ENCOUNTER — MEDICAL CORRESPONDENCE (OUTPATIENT)
Dept: HEALTH INFORMATION MANAGEMENT | Facility: CLINIC | Age: 46
End: 2021-01-04

## 2021-03-02 ENCOUNTER — VIRTUAL VISIT (OUTPATIENT)
Dept: PSYCHIATRY | Facility: CLINIC | Age: 46
End: 2021-03-02
Attending: NURSE PRACTITIONER
Payer: COMMERCIAL

## 2021-03-02 DIAGNOSIS — G47.00 INSOMNIA, UNSPECIFIED TYPE: ICD-10-CM

## 2021-03-02 DIAGNOSIS — Z79.899 ENCOUNTER FOR LONG-TERM (CURRENT) USE OF MEDICATIONS: Primary | ICD-10-CM

## 2021-03-02 DIAGNOSIS — F32.1 CURRENT MODERATE EPISODE OF MAJOR DEPRESSIVE DISORDER, UNSPECIFIED WHETHER RECURRENT (H): ICD-10-CM

## 2021-03-02 DIAGNOSIS — F43.22 ADJUSTMENT DISORDER WITH ANXIOUS MOOD: ICD-10-CM

## 2021-03-02 PROCEDURE — 99213 OFFICE O/P EST LOW 20 MIN: CPT | Mod: 95 | Performed by: NURSE PRACTITIONER

## 2021-03-02 RX ORDER — TRAZODONE HYDROCHLORIDE 100 MG/1
150 TABLET ORAL AT BEDTIME
Qty: 45 TABLET | Refills: 2 | Status: SHIPPED | OUTPATIENT
Start: 2021-03-02 | End: 2021-05-04

## 2021-03-02 RX ORDER — DIVALPROEX SODIUM 500 MG/1
500 TABLET, EXTENDED RELEASE ORAL DAILY
Qty: 30 TABLET | Refills: 2 | Status: SHIPPED | OUTPATIENT
Start: 2021-03-02 | End: 2021-05-04

## 2021-03-02 RX ORDER — SERTRALINE HYDROCHLORIDE 100 MG/1
100 TABLET, FILM COATED ORAL DAILY
Qty: 30 TABLET | Refills: 2 | Status: SHIPPED | OUTPATIENT
Start: 2021-03-02 | End: 2021-05-04
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
## 2021-04-02 ENCOUNTER — TRANSFERRED RECORDS (OUTPATIENT)
Dept: HEALTH INFORMATION MANAGEMENT | Facility: CLINIC | Age: 46
End: 2021-04-02

## 2021-04-13 ENCOUNTER — TELEPHONE (OUTPATIENT)
Dept: PSYCHIATRY | Facility: CLINIC | Age: 46
End: 2021-04-13

## 2021-04-13 LAB
ABS BASOPHILS: 0.05 CELLS/MM3
ABS EOSINOPHILS: 0.3 CELLS/MM3
ABS LYMPHOCYTES: 1.95 CELLS/MM3
ABS MONOCYTE: 0.57 CELLS/MM3
ABS NEUTROPHILS: 4.56 CELLS/MM3
ALBUMIN SERPL-MCNC: 3.8 G/DL
ALBUMIN/GLOB SERPL: 1.2 {RATIO}
ALP SERPL-CCNC: 96 U/L
ALT SERPL-CCNC: 17 U/L
AST SERPL-CCNC: 24 U/L
BASOPHILS NFR BLD AUTO: 0.7 %
BILIRUB SERPL-MCNC: 0.2 MG/DL
BUN SERPL-MCNC: 18 MG/DL
BUN/CREATININE RATIO: 20
CALCIUM SERPL-MCNC: 9.3 MG/DL
CHLORIDE SERPLBLD-SCNC: 106 MMOL/L
CHOLEST SERPL-MCNC: 163 MG/DL
CO2 SERPL-SCNC: 29 MMOL/L
CREAT SERPL-MCNC: 0.9 MG/DL
EOSINOPHIL NFR BLD AUTO: 4 %
ERYTHROCYTE [DISTWIDTH] IN BLOOD BY AUTOMATED COUNT: 12.2 %
GFR SERPL CREATININE-BSD FRML MDRD: 71 ML/MIN/1.73M2
GLOBULIN: 3.2 G/DL
GLUCOSE SERPL-MCNC: 87 MG/DL (ref 70–99)
HBA1C MFR BLD: 5.1 % (ref 0–5.6)
HCT VFR BLD AUTO: 43.2 %
HDLC SERPL-MCNC: 35 MG/DL
HEMOGLOBIN: 13.7 G/DL (ref 11.7–15.7)
LDLC SERPL CALC-MCNC: 100 MG/DL
LYMPHOCYTES NFR BLD AUTO: 26.3 %
MCH RBC QN AUTO: 29.6 PG
MCHC RBC AUTO-ENTMCNC: 31.7 %
MCV RBC AUTO: 93.3 FL
MONOCYTES NFR BLD AUTO: 7.7 %
NEUTROPHILS NFR BLD AUTO: 61.2 %
PLATELET # BLD AUTO: 291 10^9/L
PMV BLD AUTO: 10.4 FL
POTASSIUM SERPL-SCNC: 4.1 MMOL/L
PROT SERPL-MCNC: 7 G/DL
RBC # BLD AUTO: 4.63 10^12/L
SODIUM SERPL-SCNC: 141 MMOL/L
TRIGL SERPL-MCNC: 138 MG/DL
TSH SERPL-ACNC: 2.89 MCU/ML
VITAMIN D 25: 42
VLDL CHOLESTEROL: 28 MG/DL
WBC # BLD AUTO: 7.45 10^9/L

## 2021-04-13 NOTE — TELEPHONE ENCOUNTER
Received results of CMP, Lipid, CBC W/ DIFF, HGB A1c from [facility] drawn on 4/2/2021 at 0805  Results entered into EMR by Skyla Veliz CMA on 4/13/2021  Routed to Yong Bernal and HYUN Ansari for review  Document placed in scanning and a copy held in Psychiatry until scanning confirmed.  Skyla Veliz CMA

## 2021-04-20 ENCOUNTER — HOSPITAL ENCOUNTER (EMERGENCY)
Facility: CLINIC | Age: 46
Discharge: HOME | End: 2021-04-20
Attending: NURSE PRACTITIONER | Admitting: NURSE PRACTITIONER
Payer: COMMERCIAL

## 2021-04-20 VITALS
RESPIRATION RATE: 14 BRPM | WEIGHT: 208 LBS | SYSTOLIC BLOOD PRESSURE: 125 MMHG | TEMPERATURE: 98.2 F | OXYGEN SATURATION: 98 % | DIASTOLIC BLOOD PRESSURE: 85 MMHG | HEART RATE: 74 BPM | BODY MASS INDEX: 36.85 KG/M2

## 2021-04-20 DIAGNOSIS — M54.50 RIGHT-SIDED LOW BACK PAIN WITHOUT SCIATICA: ICD-10-CM

## 2021-04-20 DIAGNOSIS — R82.81 PYURIA: ICD-10-CM

## 2021-04-20 LAB
ALBUMIN UR-MCNC: NEGATIVE MG/DL
ANION GAP SERPL CALCULATED.3IONS-SCNC: 6 MMOL/L (ref 3–14)
APPEARANCE UR: ABNORMAL
BILIRUB UR QL STRIP: NEGATIVE
BUN SERPL-MCNC: 19 MG/DL (ref 7–30)
CALCIUM SERPL-MCNC: 8.6 MG/DL (ref 8.5–10.1)
CHLORIDE SERPL-SCNC: 108 MMOL/L (ref 94–109)
CO2 SERPL-SCNC: 21 MMOL/L (ref 20–32)
COLOR UR AUTO: ABNORMAL
CREAT SERPL-MCNC: 1.01 MG/DL (ref 0.52–1.04)
GFR SERPL CREATININE-BSD FRML MDRD: 66 ML/MIN/{1.73_M2}
GLUCOSE SERPL-MCNC: 78 MG/DL (ref 70–99)
GLUCOSE UR STRIP-MCNC: NEGATIVE MG/DL
HGB UR QL STRIP: NEGATIVE
HYALINE CASTS #/AREA URNS LPF: 19 /LPF (ref 0–2)
KETONES UR STRIP-MCNC: NEGATIVE MG/DL
LEUKOCYTE ESTERASE UR QL STRIP: ABNORMAL
MUCOUS THREADS #/AREA URNS LPF: PRESENT /LPF
NITRATE UR QL: NEGATIVE
PH UR STRIP: 5 PH (ref 5–7)
POTASSIUM SERPL-SCNC: 5.3 MMOL/L (ref 3.4–5.3)
RBC #/AREA URNS AUTO: 0 /HPF (ref 0–2)
SODIUM SERPL-SCNC: 135 MMOL/L (ref 133–144)
SOURCE: ABNORMAL
SP GR UR STRIP: 1.02 (ref 1–1.03)
SQUAMOUS #/AREA URNS AUTO: 2 /HPF (ref 0–1)
UROBILINOGEN UR STRIP-MCNC: 0 MG/DL (ref 0–2)
WBC #/AREA URNS AUTO: 5 /HPF (ref 0–5)

## 2021-04-20 PROCEDURE — 99284 EMERGENCY DEPT VISIT MOD MDM: CPT | Performed by: NURSE PRACTITIONER

## 2021-04-20 PROCEDURE — 81001 URINALYSIS AUTO W/SCOPE: CPT | Performed by: NURSE PRACTITIONER

## 2021-04-20 PROCEDURE — 80048 BASIC METABOLIC PNL TOTAL CA: CPT | Performed by: NURSE PRACTITIONER

## 2021-04-20 PROCEDURE — 99283 EMERGENCY DEPT VISIT LOW MDM: CPT | Performed by: NURSE PRACTITIONER

## 2021-04-20 PROCEDURE — 36415 COLL VENOUS BLD VENIPUNCTURE: CPT | Performed by: NURSE PRACTITIONER

## 2021-04-20 RX ORDER — DOCUSATE SODIUM 100 MG/1
100 CAPSULE, LIQUID FILLED ORAL DAILY PRN
COMMUNITY

## 2021-04-20 RX ORDER — ACETAMINOPHEN 325 MG/1
650 TABLET ORAL EVERY 4 HOURS PRN
COMMUNITY
Start: 2020-12-07

## 2021-04-20 RX ORDER — CETIRIZINE HYDROCHLORIDE 10 MG/1
10 TABLET ORAL DAILY
COMMUNITY
Start: 2021-03-26

## 2021-04-20 RX ORDER — BISMUTH SUBSALICYLATE 262 MG/1
262-524 TABLET, CHEWABLE ORAL 3 TIMES DAILY PRN
COMMUNITY
Start: 2020-05-19

## 2021-04-20 RX ORDER — NITROFURANTOIN 25; 75 MG/1; MG/1
100 CAPSULE ORAL 2 TIMES DAILY
Qty: 6 CAPSULE | Refills: 0 | Status: SHIPPED | OUTPATIENT
Start: 2021-04-20

## 2021-04-20 RX ORDER — BENZOCAINE/MENTHOL 6 MG-10 MG
1 LOZENGE MUCOUS MEMBRANE
COMMUNITY

## 2021-04-20 RX ORDER — CALCIUM CARBONATE 500 MG/1
1-2 TABLET, CHEWABLE ORAL 3 TIMES DAILY PRN
COMMUNITY

## 2021-04-20 RX ORDER — MULTIVIT-MIN/IRON/FOLIC ACID/K 18-600-40
1000 CAPSULE ORAL DAILY
COMMUNITY

## 2021-04-20 RX ORDER — VIT A/VIT C/VIT E/ZINC/COPPER 7160-113
1 TABLET, DELAYED RELEASE (ENTERIC COATED) ORAL 2 TIMES DAILY
COMMUNITY

## 2021-04-20 ASSESSMENT — ENCOUNTER SYMPTOMS
SLEEP DISTURBANCE: 0
COUGH: 0
ABDOMINAL DISTENTION: 0
DIARRHEA: 0

## 2021-04-20 NOTE — DISCHARGE INSTRUCTIONS
Urinalysis showed few white cells which can indicate early UTI in such start Macrobid for 3 days.    Also showed hyaline casts- nonspecific causes for.  Have urine rechecked in a week with Augusto Thomas    Creatinine (renal function) normal

## 2021-04-20 NOTE — ED TRIAGE NOTES
Here with right sided back pain times two weeks. States she has a history of kidney disorder and today her urine was dark. Would like to know id the pain is related to her kidneys.

## 2021-04-20 NOTE — ED PROVIDER NOTES
Triage Note  1136 Here with right sided back pain times two weeks. States she has a history of kidney disorder and today her urine was dark. Would like to know id the pain is related to her kidneys.       History     Chief Complaint   Patient presents with     Back Pain     HPI  Alicia Penaloza is a 45 year old female who has history of renal failure when she was a child in the [facility] of unknown cause presenting with right-sided low back pain for the past 2 weeks.  She reports she notices the pain when she is in bed and she does not really want to get up but does need to urinate and after she urinates the pain continues to be there.  She reports her urine is darker in color.  She is concerned that she may have kidney issues again and would like to be evaluated for this.  She denies associated fever, chills, nausea and vomiting, abdominal pain, dysuria, hematuria, urinary urgency and frequency, no recent sore throat or upper respiratory infections, denies tea colored urine.    She reports she has not taken anything for her pain which is constant throbbing ache 4 out of 10 no alleviating and no exacerbating factors other than holding her urine.    PCP: Augusto Thomas     Allergies:  No Known Allergies    Problem List:    Patient Active Problem List    Diagnosis Date Noted     S/P appendectomy 08/05/2020     Priority: Medium     Aphasia 11/22/2018     Priority: Medium     Iron deficiency anemia due to chronic blood loss 10/09/2018     Priority: Medium     Hyperlipidemia LDL goal <100 10/02/2018     Priority: Medium     Obesity (BMI 35.0-39.9) with comorbidity (H) 09/14/2018     Priority: Medium     Acute CVA (cerebrovascular accident) (H) 08/25/2018     Priority: Medium     Benign essential hypertension 08/22/2018     Priority: Medium     Acute ischemic stroke (H) 08/07/2018     Priority: Medium     Vaginal bleeding 03/31/2018     Priority: Medium     Hypertensive urgency 03/28/2018     Priority: Medium         Past Medical History:    Past Medical History:   Diagnosis Date     Anxiety      Chronic kidney disease      Depressive disorder      Hypertension      Stroke (H) 2018       Past Surgical History:    Past Surgical History:   Procedure Laterality Date     LAPAROSCOPIC APPENDECTOMY N/A 2020    Procedure: APPENDECTOMY, LAPAROSCOPIC;  Surgeon: Best Cunningham MD;  Location: [facility] OR       Family History:    History reviewed. No pertinent family history.    Social History:  Marital Status:  Single [1]  Social History     Tobacco Use     Smoking status: Former Smoker     Quit date: 2018     Years since quittin.7     Smokeless tobacco: Never Used   Substance Use Topics     Alcohol use: No     Drug use: No        Medications:    acetaminophen (TYLENOL) 325 MG tablet  aspirin 325 MG tablet  atorvastatin (LIPITOR) 40 MG tablet  benzocaine-menthol (CHLORASEPTIC) 6-10 MG lozenge  BISMATROL 262 MG chewable tablet  bismuth subsalicylate (PEPTO BISMOL) 262 MG/15ML suspension  calcium carbonate (TUMS) 500 MG chewable tablet  cetirizine (ZYRTEC) 10 MG tablet  diphenhydrAMINE (DIPHENHIST) 25 MG capsule  divalproex sodium extended-release (DEPAKOTE ER) 500 MG 24 hr tablet  docusate sodium (COLACE) 100 MG capsule  fluticasone (FLONASE) 50 MCG/ACT nasal spray  gabapentin (NEURONTIN) 100 MG capsule  guaiFENesin (ROBITUSSIN) 100 MG/5ML SYRP  hypromellose (ARTIFICIAL TEARS) 0.5 % SOLN ophthalmic solution  lisinopril (PRINIVIL/ZESTRIL) 20 MG tablet  LORazepam (ATIVAN) 0.5 MG tablet  magnesium hydroxide (MILK OF MAGNESIA) 400 MG/5ML suspension  magnesium oxide (MAG-OX) 400 (241.3 Mg) MG tablet  melatonin 5 MG PO tablet  menthol (COUGH DROP) 7 MG LOZG  Menthol, Topical Analgesic, 4 % GEL  Multiple Vitamins-Minerals (ICAPS AREDS FORMULA) TABS  nitroFURantoin macrocrystal-monohydrate (MACROBID) 100 MG capsule  ondansetron (ZOFRAN-ODT) 4 MG ODT tab  polyethylene glycol (MIRALAX/GLYCOLAX) packet  psyllium (METAMUCIL/KONSYL)  58.6 % powder  Riboflavin 400 MG TABS  sertraline (ZOLOFT) 100 MG tablet  traZODone (DESYREL) 100 MG tablet  triamcinolone (KENALOG) 0.1 % external cream  Vitamin D, Cholecalciferol, 1000 units TABS  Vitamin D, Cholecalciferol, 25 MCG (1000 UT) TABS  levonorgestrel (MIRENA) 20 MCG/24HR IUD  oxyCODONE-acetaminophen (PERCOCET) 5-325 MG tablet          Review of Systems   Respiratory: Negative for cough.    Gastrointestinal: Negative for abdominal distention and diarrhea.   Skin: Negative for rash.   Psychiatric/Behavioral: Negative for sleep disturbance.       Physical Exam   BP: 125/85  Pulse: 74  Temp: 98.2  F (36.8  C)  Resp: 14  Weight: 94.3 kg (208 lb)  SpO2: 98 %      Physical Exam  Vitals signs and nursing note reviewed.   Constitutional:       Appearance: Normal appearance. She is not ill-appearing or toxic-appearing.   HENT:      Head: Normocephalic.   Eyes:      Conjunctiva/sclera: Conjunctivae normal.   Abdominal:      Palpations: Abdomen is soft.      Tenderness: There is no abdominal tenderness. There is no right CVA tenderness or left CVA tenderness.   Musculoskeletal:         General: Tenderness present.      Lumbar back: She exhibits tenderness.        Back:    Skin:     General: Skin is warm and dry.   Neurological:      General: No focal deficit present.      Mental Status: She is alert.   Psychiatric:         Mood and Affect: Mood normal.         Behavior: Behavior normal.         ED Course  45-year-old female with 2-week history of right sided low back pain concerning for kidney issues as she has had renal failure as a child.  She is not displaying signs of pyelonephritis, urolithiasis, renal failure.  Her past CT in August 2020 which revealed acute appendicitis did not show any significant acute renal issues.  She did have some benign renal cysts.  I discussed with the patient we will thus proceed with BMP evaluating her renal function in addition to a urinalysis to eval for hematuria, elevated  levels of protein, and infection risk.  If it is unremarkable or nonspecific I suspect her pain is related to musculoskeletal issues and recommend she starts on daily Motrin or Aleve and Tylenol for pain and then have follow-up with her PCP.  Patient is amenable to plan of care.    1304 reviewed with the patient and her caregiver in regards to she does have a few hyaline casts which is indeterminant on her urinalysis.  In order to diagnose true glomera nephropathy's or glomerulonephritis they generally need to have a renal biopsy.  I reviewed also that she has white blood cells in her urine and such we will place her on Macrobid for 3 days.  I have instructed to the patient she has a normal creatinine and GFR.  I have requested that they follow-up with her PCP within the next week to have urinalysis rechecked and if the hyaline casts increase they may want to consider further evaluation for glomerular neuroarthropathy.        Procedures         Results for orders placed or performed during the hospital encounter of 04/20/21 (from the past 24 hour(s))   Basic metabolic panel   Result Value Ref Range    Sodium 135 133 - 144 mmol/L    Potassium 5.3 3.4 - 5.3 mmol/L    Chloride 108 94 - 109 mmol/L    Carbon Dioxide 21 20 - 32 mmol/L    Anion Gap 6 3 - 14 mmol/L    Glucose 78 70 - 99 mg/dL    Urea Nitrogen 19 7 - 30 mg/dL    Creatinine 1.01 0.52 - 1.04 mg/dL    GFR Estimate 66 >60 mL/min/[1.73_m2]    GFR Estimate If Black 76 >60 mL/min/[1.73_m2]    Calcium 8.6 8.5 - 10.1 mg/dL   UA reflex to Microscopic and Culture    Specimen: Unspecified Urine   Result Value Ref Range    Color Urine Lucina     Appearance Urine Cloudy     Glucose Urine Negative NEG^Negative mg/dL    Bilirubin Urine Negative NEG^Negative    Ketones Urine Negative NEG^Negative mg/dL    Specific Gravity Urine 1.024 1.003 - 1.035    Blood Urine Negative NEG^Negative    pH Urine 5.0 5.0 - 7.0 pH    Protein Albumin Urine Negative NEG^Negative mg/dL     Urobilinogen mg/dL 0.0 0.0 - 2.0 mg/dL    Nitrite Urine Negative NEG^Negative    Leukocyte Esterase Urine Small (A) NEG^Negative    Source Unspecified Urine     RBC Urine 0 0 - 2 /HPF    WBC Urine 5 0 - 5 /HPF    Squamous Epithelial /HPF Urine 2 (H) 0 - 1 /HPF    Mucous Urine Present (A) NEG^Negative /LPF    Hyaline Casts 19 (H) 0 - 2 /LPF       Medications - No data to display    Assessments & Plan (with Medical Decision Making)     I have reviewed the nursing notes.    I have reviewed the findings, diagnosis, plan and need for follow up with the patient.    New Prescriptions    NITROFURANTOIN MACROCRYSTAL-MONOHYDRATE (MACROBID) 100 MG CAPSULE    Take 1 capsule (100 mg) by mouth 2 times daily       Final diagnoses:   Pyuria   Right-sided low back pain without sciatica       4/20/2021   [facility] EMERGENCY DEPT     Jia Sapp, KEVIN CNP  04/20/21 0751

## 2021-05-04 ENCOUNTER — VIRTUAL VISIT (OUTPATIENT)
Dept: PSYCHIATRY | Facility: CLINIC | Age: 46
End: 2021-05-04
Attending: NURSE PRACTITIONER
Payer: COMMERCIAL

## 2021-05-04 DIAGNOSIS — F43.22 ADJUSTMENT DISORDER WITH ANXIOUS MOOD: ICD-10-CM

## 2021-05-04 DIAGNOSIS — F32.1 CURRENT MODERATE EPISODE OF MAJOR DEPRESSIVE DISORDER, UNSPECIFIED WHETHER RECURRENT (H): ICD-10-CM

## 2021-05-04 DIAGNOSIS — G47.00 INSOMNIA, UNSPECIFIED TYPE: ICD-10-CM

## 2021-05-04 PROCEDURE — 99213 OFFICE O/P EST LOW 20 MIN: CPT | Mod: 95 | Performed by: NURSE PRACTITIONER

## 2021-05-04 RX ORDER — TRAZODONE HYDROCHLORIDE 100 MG/1
150 TABLET ORAL AT BEDTIME
Qty: 45 TABLET | Refills: 2 | Status: SHIPPED | OUTPATIENT
Start: 2021-05-04 | End: 2021-07-21

## 2021-05-04 RX ORDER — DIVALPROEX SODIUM 500 MG/1
500 TABLET, EXTENDED RELEASE ORAL DAILY
Qty: 30 TABLET | Refills: 2 | Status: SHIPPED | OUTPATIENT
Start: 2021-05-04 | End: 2021-08-16

## 2021-05-04 RX ORDER — SERTRALINE HYDROCHLORIDE 100 MG/1
100 TABLET, FILM COATED ORAL DAILY
Qty: 30 TABLET | Refills: 2 | Status: SHIPPED | OUTPATIENT
Start: 2021-05-04 | End: 2021-08-16
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
## 2021-07-19 DIAGNOSIS — G47.00 INSOMNIA, UNSPECIFIED TYPE: ICD-10-CM

## 2021-07-21 RX ORDER — TRAZODONE HYDROCHLORIDE 100 MG/1
150 TABLET ORAL AT BEDTIME
Qty: 42 TABLET | Refills: 0 | Status: SHIPPED | OUTPATIENT
Start: 2021-07-21 | End: 2021-09-02

## 2021-07-21 NOTE — TELEPHONE ENCOUNTER
Medication requested: TRAZODONE 100MG TAB  Last refilled: 7/16/21  Qty: 42      Last seen: 5/4/21  RTC: 3 months  Cancel: 0  No-show: 0  Next appt: 0    Refill decision:   30 day milly refill sent to the pharmacy - including instructions for patient to call the clinic and schedule an appointment.  Scheduling has been notified to contact the pt for appointment.    Warnings Override History for traZODone (DESYREL) 100 MG tablet [[id number]]    Overridden by Yong Bernal APRN CNP on May 4, 2021 1:50 PM   Drug-Drug   1. SELECTIVE SEROTONIN REUPTAKE INHIBITORS / SEROTONERGIC NON-OPIOID CNS DEPRESSANTS [Level: Major] [Reason: Tolerated medication/side effects in past]   Other Orders: sertraline (ZOLOFT) 100 MG tablet

## 2021-07-25 DIAGNOSIS — F32.1 CURRENT MODERATE EPISODE OF MAJOR DEPRESSIVE DISORDER, UNSPECIFIED WHETHER RECURRENT (H): ICD-10-CM

## 2021-07-25 DIAGNOSIS — F43.22 ADJUSTMENT DISORDER WITH ANXIOUS MOOD: ICD-10-CM

## 2021-07-27 RX ORDER — DIVALPROEX SODIUM 500 MG/1
TABLET, EXTENDED RELEASE ORAL
Qty: 28 TABLET | Refills: 11 | OUTPATIENT
Start: 2021-07-27

## 2021-07-27 RX ORDER — SERTRALINE HYDROCHLORIDE 100 MG/1
TABLET, FILM COATED ORAL
Qty: 28 TABLET | Refills: 11 | OUTPATIENT
Start: 2021-07-27

## 2021-08-12 DIAGNOSIS — F32.1 CURRENT MODERATE EPISODE OF MAJOR DEPRESSIVE DISORDER, UNSPECIFIED WHETHER RECURRENT (H): ICD-10-CM

## 2021-08-12 DIAGNOSIS — F43.22 ADJUSTMENT DISORDER WITH ANXIOUS MOOD: ICD-10-CM

## 2021-08-16 NOTE — TELEPHONE ENCOUNTER
DEPAKOTE  MG   sertraline 100 MG  Last refilled: 5/4/21  Qty: 30 : 2      Last seen: 5/4/21  RTC:  3 MOS  Cancel: 0  No-show: 0  Next appt: NONE  Scheduling has been notified to contact the pt for appointment.  Refill decision: 30 day milly refill sent to the pharmacy - including instructions for patient to call the clinic and schedule an appointment.

## 2021-08-17 RX ORDER — DIVALPROEX SODIUM 500 MG/1
500 TABLET, EXTENDED RELEASE ORAL DAILY
Qty: 30 TABLET | Refills: 0 | Status: SHIPPED | OUTPATIENT
Start: 2021-08-17 | End: 2021-09-02

## 2021-08-17 RX ORDER — SERTRALINE HYDROCHLORIDE 100 MG/1
100 TABLET, FILM COATED ORAL DAILY
Qty: 30 TABLET | Refills: 0 | Status: SHIPPED | OUTPATIENT
Start: 2021-08-17 | End: 2021-09-02

## 2021-08-30 DIAGNOSIS — G47.00 INSOMNIA, UNSPECIFIED TYPE: ICD-10-CM

## 2021-09-02 ENCOUNTER — VIRTUAL VISIT (OUTPATIENT)
Dept: PSYCHIATRY | Facility: CLINIC | Age: 46
End: 2021-09-02
Attending: NURSE PRACTITIONER
Payer: COMMERCIAL

## 2021-09-02 DIAGNOSIS — F32.1 CURRENT MODERATE EPISODE OF MAJOR DEPRESSIVE DISORDER, UNSPECIFIED WHETHER RECURRENT (H): ICD-10-CM

## 2021-09-02 DIAGNOSIS — F43.22 ADJUSTMENT DISORDER WITH ANXIOUS MOOD: ICD-10-CM

## 2021-09-02 DIAGNOSIS — G47.00 INSOMNIA, UNSPECIFIED TYPE: ICD-10-CM

## 2021-09-02 PROCEDURE — 99213 OFFICE O/P EST LOW 20 MIN: CPT | Mod: 95 | Performed by: NURSE PRACTITIONER

## 2021-09-02 RX ORDER — SERTRALINE HYDROCHLORIDE 100 MG/1
100 TABLET, FILM COATED ORAL DAILY
Qty: 90 TABLET | Refills: 0 | Status: SHIPPED | OUTPATIENT
Start: 2021-09-02 | End: 2021-11-03

## 2021-09-02 RX ORDER — DIVALPROEX SODIUM 500 MG/1
500 TABLET, EXTENDED RELEASE ORAL DAILY
Qty: 90 TABLET | Refills: 0 | Status: SHIPPED | OUTPATIENT
Start: 2021-09-02 | End: 2021-11-03

## 2021-09-02 RX ORDER — TRAZODONE HYDROCHLORIDE 100 MG/1
TABLET ORAL
Qty: 42 TABLET | Refills: 11 | OUTPATIENT
Start: 2021-09-02

## 2021-09-02 RX ORDER — TRAZODONE HYDROCHLORIDE 100 MG/1
150 TABLET ORAL AT BEDTIME
Qty: 135 TABLET | Refills: 0 | Status: SHIPPED | OUTPATIENT
Start: 2021-09-02 | End: 2021-11-03

## 2021-09-02 NOTE — TELEPHONE ENCOUNTER
Medication requested: TRAZODONE 100MG TAB   Last refilled: 7/21/21  Qty: 42/0     Last seen: 5/4/21  RTC:  3 MOS  Cancel: 0  No-show: 0  Next appt: 9/2/21    Refill decision:   Appt today
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
## 2021-11-03 ENCOUNTER — VIRTUAL VISIT (OUTPATIENT)
Dept: PSYCHIATRY | Facility: CLINIC | Age: 46
End: 2021-11-03
Attending: NURSE PRACTITIONER
Payer: COMMERCIAL

## 2021-11-03 DIAGNOSIS — G47.00 INSOMNIA, UNSPECIFIED TYPE: ICD-10-CM

## 2021-11-03 DIAGNOSIS — F32.1 CURRENT MODERATE EPISODE OF MAJOR DEPRESSIVE DISORDER, UNSPECIFIED WHETHER RECURRENT (H): ICD-10-CM

## 2021-11-03 DIAGNOSIS — F43.22 ADJUSTMENT DISORDER WITH ANXIOUS MOOD: ICD-10-CM

## 2021-11-03 PROCEDURE — 99213 OFFICE O/P EST LOW 20 MIN: CPT | Mod: 95 | Performed by: NURSE PRACTITIONER

## 2021-11-03 RX ORDER — DIVALPROEX SODIUM 500 MG/1
500 TABLET, EXTENDED RELEASE ORAL DAILY
Qty: 90 TABLET | Refills: 0 | Status: SHIPPED | OUTPATIENT
Start: 2021-11-03

## 2021-11-03 RX ORDER — TRAZODONE HYDROCHLORIDE 100 MG/1
150 TABLET ORAL AT BEDTIME
Qty: 135 TABLET | Refills: 0 | Status: SHIPPED | OUTPATIENT
Start: 2021-11-03

## 2021-11-03 RX ORDER — SERTRALINE HYDROCHLORIDE 100 MG/1
100 TABLET, FILM COATED ORAL DAILY
Qty: 90 TABLET | Refills: 0 | Status: SHIPPED | OUTPATIENT
Start: 2021-11-03
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
## 2021-11-04 ENCOUNTER — TELEPHONE (OUTPATIENT)
Dept: PSYCHIATRY | Facility: CLINIC | Age: 46
End: 2021-11-04

## 2021-11-04 NOTE — TELEPHONE ENCOUNTER
Social Work   Incoming/Outgoing Call  [facility]    Outgoing Call To: Livia, director of nursing at Alicia's group home  Callback number: [phone]    Reason for Call:  Alicia requested assistance with finding a new provider, and I was told connecting with her group [facility] staff would be the best option.    Response/Plan:  I talked with Livia and gave her two [place] numbers to call to get Alicia scheduled with a new provider after Yong Bernal leaves. I provided Behavioral Health Access ([facility]) [phone] and Behavioral Healthcare Providers ([facility]) [phone].    Will route to patient's current psychiatric provider(s) as an FYI.   Please call or EPIC message with any questions or concerns.    RUBI Damon, [facility]  [phone]

## 2021-12-09 ENCOUNTER — TRANSFERRED RECORDS (OUTPATIENT)
Dept: HEALTH INFORMATION MANAGEMENT | Facility: CLINIC | Age: 46
End: 2021-12-09
Payer: COMMERCIAL

## 2022-01-12 DIAGNOSIS — F43.22 ADJUSTMENT DISORDER WITH ANXIOUS MOOD: ICD-10-CM

## 2022-01-12 DIAGNOSIS — F32.1 CURRENT MODERATE EPISODE OF MAJOR DEPRESSIVE DISORDER, UNSPECIFIED WHETHER RECURRENT (H): ICD-10-CM

## 2022-01-12 DIAGNOSIS — G47.00 INSOMNIA, UNSPECIFIED TYPE: ICD-10-CM

## 2022-01-17 DIAGNOSIS — F43.22 ADJUSTMENT DISORDER WITH ANXIOUS MOOD: ICD-10-CM

## 2022-01-17 RX ORDER — DIVALPROEX SODIUM 500 MG/1
TABLET, EXTENDED RELEASE ORAL
Qty: 28 TABLET | Refills: 11 | OUTPATIENT
Start: 2022-01-17

## 2022-01-17 RX ORDER — SERTRALINE HYDROCHLORIDE 100 MG/1
TABLET, FILM COATED ORAL
Qty: 28 TABLET | Refills: 11 | OUTPATIENT
Start: 2022-01-17

## 2022-01-17 RX ORDER — TRAZODONE HYDROCHLORIDE 100 MG/1
TABLET ORAL
Qty: 42 TABLET | Refills: 11 | OUTPATIENT
Start: 2022-01-17

## 2022-01-25 DIAGNOSIS — G47.00 INSOMNIA, UNSPECIFIED TYPE: ICD-10-CM

## 2022-01-25 DIAGNOSIS — F43.22 ADJUSTMENT DISORDER WITH ANXIOUS MOOD: ICD-10-CM

## 2022-01-28 RX ORDER — DIVALPROEX SODIUM 500 MG/1
TABLET, EXTENDED RELEASE ORAL
Qty: 28 TABLET | Refills: 11 | OUTPATIENT
Start: 2022-01-28

## 2022-01-28 RX ORDER — TRAZODONE HYDROCHLORIDE 100 MG/1
TABLET ORAL
Qty: 42 TABLET | Refills: 11 | OUTPATIENT
Start: 2022-01-28

## 2022-09-23 ENCOUNTER — OFFICE VISIT (OUTPATIENT)
Dept: OBGYN | Facility: OTHER | Age: 47
End: 2022-09-23
Payer: COMMERCIAL

## 2022-09-23 VITALS — BODY MASS INDEX: 45.88 KG/M2 | WEIGHT: 259 LBS | SYSTOLIC BLOOD PRESSURE: 136 MMHG | DIASTOLIC BLOOD PRESSURE: 94 MMHG

## 2022-09-23 DIAGNOSIS — N92.1 BREAKTHROUGH BLEEDING WITH IUD: Primary | ICD-10-CM

## 2022-09-23 DIAGNOSIS — R10.2 PELVIC PAIN IN FEMALE: ICD-10-CM

## 2022-09-23 DIAGNOSIS — Z97.5 BREAKTHROUGH BLEEDING WITH IUD: Primary | ICD-10-CM

## 2022-09-23 DIAGNOSIS — Z97.5 IUD (INTRAUTERINE DEVICE) IN PLACE: ICD-10-CM

## 2022-09-23 PROCEDURE — 99204 OFFICE O/P NEW MOD 45 MIN: CPT | Performed by: OBSTETRICS & GYNECOLOGY

## 2022-09-23 RX ORDER — SODIUM CHLORIDE 0.65 %
AEROSOL, SPRAY (ML) NASAL
COMMUNITY
Start: 2022-06-29

## 2022-09-23 RX ORDER — LORAZEPAM 0.5 MG/1
TABLET ORAL
COMMUNITY
Start: 2022-04-11

## 2022-09-23 NOTE — PATIENT INSTRUCTIONS
If you have labs or imaging done, the results will automatically release in [place] without an interpretation.  Your health care professional will review those results and send an interpretation with recommendations as soon as possible, but this may be 1-3 business days.    If you have any questions regarding your visit, please contact your care team.     [facility]: [phone]  [facility] CLINIC HOURS TELEPHONE NUMBER     Vania DO Gris Logan - Certified Medical Assistant    Nayla Fischer-  Mary-     Monday- [place]  8:00 a.m - 5:00 p.m    Tuesday- [place]  8:00 a.m - 5:00 p.m    Friday- [place]  8:00 a.m - 5:00 p.m.    Typical Surgery Day: Thursday [facility]  [address]  [place] [address]  Phone: [phone]   Fax: [phone]   Imaging Scheduling- [phone]    [facility] Labor and Delivery  [address] [place] [address] [place] [address]  [phone]    [address] [address]  Phone: [phone]   Fax: [phone]   Imaging Scheduling- [phone]       **Surgeries** Our Surgery Schedulers will contact you to schedule. If you do not receive a call within 3 business days, please call [phone].    Urgent Care locations:  [facility] Monday-Friday  10 am - 8 pm  Saturday and Sunday   9 am - 5 pm  Monday-Friday   10 am - 8 pm  Saturday and Sunday   9 am - 5 pm   [phone]     If you need a medication refill, please contact your pharmacy. Please allow 3 business days for your refill to be completed.  As always, Thank you for trusting us with your healthcare needs!    see additional instructions from your care team below

## 2022-09-23 NOTE — PROGRESS NOTES
Subjective  47 year old non-pregnant female presents today for an IUD check.  Patient currently lives in a group home and is here with a nurse from there.  Patient had the Mirena IUD placed in 2018 due to menorrhagia.  Per the RN patient had a stroke around that time and due to the heavy bleeding and IUD was placed.  Patient has not had any issues with the IUD since placement until recently.  Patient along with RN states that for the last 6 months she has had some vaginal spotting and pelvic pain.  Patient states the pain is intermittent and manageable.  The vaginal spotting is intermittent as well.  It is not like a normal menses.  It is very light.  Patient denies any problems urinating.  Patient is having normal bowel movements.  Patient is not sexually active.  Patient has not had any children.  We discussed possible causes for the vaginal spotting and pelvic pain with the IUD in place.  I did recommend a pelvic ultrasound and patient and RN state that they will schedule that later.  Patient will likely need an endometrial biopsy however we will review the ultrasound results first.      I personally reviewed the CT of the abdomen and pelvis from  and it did show multiple uterine fibroids.    I also reviewed notes from previous office visits by Dr. Garg.    ROS: 10 point ROS neg other than the symptoms noted above in the HPI.  Past Medical History:   Diagnosis Date     Anxiety      Chronic kidney disease      Depressive disorder      Hypertension      Stroke (H) 2018     Past Surgical History:   Procedure Laterality Date     LAPAROSCOPIC APPENDECTOMY N/A 2020    Procedure: APPENDECTOMY, LAPAROSCOPIC;  Surgeon: Best Cunningham MD;  Location: [facility] OR     History reviewed. No pertinent family history.  Social History     Tobacco Use     Smoking status: Former Smoker     Quit date: 2018     Years since quittin.1     Smokeless tobacco: Never Used   Substance Use Topics     Alcohol use: No          Objective  Vitals: BP (!) 136/94 (BP Location: Left arm, Cuff Size: Adult Regular)   Wt 117.5 kg (259 lb)   Breastfeeding No   BMI 45.88 kg/m    BMI= Body mass index is 45.88 kg/m .    General appearance=well developed, well-nourished female  Gait=normal  Psych=mood is stable, alert and oriented x3  Abd=soft, Nontender/nondistended, no masses, no signs of hernias, no evidence of hepatosplenomegaly  PELVIC:    External genitalia: normal without lesions or masses  Urethral meatus: no lesions or prolapse noted, normal size  Urethra: no masses, non tender  Bladder: non tender, no fullness  Vagina: normal mucosa and rugae, no discharge.  Cervix: normal without lesion, no cervical motion tenderness, healthy, nulliparous, IUD strings coming from the os  Uterus: small, mobile, nontender.  Adnexa: non tender, without masses  Rectal: deffered  Ext=no clubbing or cyanosis, no swelling        CT abdomen and pelvis=8/4/2020:  INDICATION: Lower abdominal pain and tenderness.  COMPARISON: None.  TECHNIQUE: CT scan of the abdomen and pelvis was performed following injection of IV contrast. Multiplanar reformats were obtained. Dose reduction techniques were used.  CONTRAST: 90mLs Isovue 370     FINDINGS:   LOWER CHEST: Normal.     HEPATOBILIARY: Normal.  PANCREAS: Normal.  SPLEEN: Normal.  ADRENAL GLANDS: Normal.  KIDNEYS/BLADDER: Tiny benign renal cysts and angiomyolipomas bilaterally. No stone or hydronephrosis.     BOWEL: Dilated inflamed appendix consistent with appendicitis. There is no suggestion for perforation.     Bowel otherwise unremarkable.     LYMPH NODES: Normal.  VASCULATURE: Unremarkable.  PELVIC ORGANS: IUD present. Multiple uterine fibroids. No adnexal lesion or free fluid.     MUSCULOSKELETAL: Normal.                                                                      IMPRESSION:   1.  Acute appendicitis.      Assessment  1.)  IUD in place  2.)  Vaginal spotting  3.)  Intermittent pelvic pain  4.)   History of stroke      Plan  1.)  Schedule pelvic ultrasound  2.)  Follow-up to discuss treatment options      One undiagnosed new problem with uncertain prognosis and interpretation of CT findings ordered by a different provider and notes written by a different provider.  Nursing notes read and reviewed    Vania Logan DO

## 2022-09-28 ENCOUNTER — ANCILLARY PROCEDURE (OUTPATIENT)
Dept: ULTRASOUND IMAGING | Facility: OTHER | Age: 47
End: 2022-09-28
Attending: OBSTETRICS & GYNECOLOGY
Payer: COMMERCIAL

## 2022-09-28 DIAGNOSIS — N92.1 BREAKTHROUGH BLEEDING WITH IUD: ICD-10-CM

## 2022-09-28 DIAGNOSIS — Z97.5 BREAKTHROUGH BLEEDING WITH IUD: ICD-10-CM

## 2022-09-28 PROCEDURE — 76830 TRANSVAGINAL US NON-OB: CPT | Mod: TC | Performed by: RADIOLOGY

## 2022-09-28 PROCEDURE — 76856 US EXAM PELVIC COMPLETE: CPT | Mod: TC | Performed by: RADIOLOGY

## 2023-01-31 DIAGNOSIS — N18.32 CHRONIC KIDNEY DISEASE (CKD) STAGE G3B/A1, MODERATELY DECREASED GLOMERULAR FILTRATION RATE (GFR) BETWEEN 30-44 ML/MIN/1.73 SQUARE METER AND ALBUMINURIA CREATININE RATIO LESS THAN 30 MG/G (H): Primary | ICD-10-CM

## 2023-03-06 ENCOUNTER — TELEPHONE (OUTPATIENT)
Dept: OBGYN | Facility: CLINIC | Age: 48
End: 2023-03-06
Payer: COMMERCIAL

## 2023-03-06 NOTE — TELEPHONE ENCOUNTER
Reason for Call:  Appointment Request    Patient requesting this type of appt:  She would like to get in to be seen to talk about a hysterectomy      Requested provider: beverley    Reason patient unable to be scheduled: Not within requested timeframe    When does patient want to be seen/preferred time: Anything sooner     Comments: She would like to get in sooner if possible can call her lpn at [phone] her name is Jennifer Valenzuelaay to leave a detailed message?: Yes at Other phone number:  [phone]    Call taken on 3/6/2023 at 3:13 PM by Ainsley Montes

## 2023-03-06 NOTE — TELEPHONE ENCOUNTER
Spoke with Jennifer - offered 10:45 this Friday but unable. [facility]   Gris Pérez, CMA

## 2023-03-07 ENCOUNTER — LAB (OUTPATIENT)
Dept: LAB | Facility: CLINIC | Age: 48
End: 2023-03-07
Payer: COMMERCIAL

## 2023-03-07 DIAGNOSIS — N18.32 CHRONIC KIDNEY DISEASE (CKD) STAGE G3B/A1, MODERATELY DECREASED GLOMERULAR FILTRATION RATE (GFR) BETWEEN 30-44 ML/MIN/1.73 SQUARE METER AND ALBUMINURIA CREATININE RATIO LESS THAN 30 MG/G (H): ICD-10-CM

## 2023-03-07 LAB
ALBUMIN MFR UR ELPH: 12.7 MG/DL (ref 1–14)
ALBUMIN SERPL BCG-MCNC: 4.2 G/DL (ref 3.5–5.2)
ALBUMIN UR-MCNC: NEGATIVE MG/DL
ANION GAP SERPL CALCULATED.3IONS-SCNC: 9 MMOL/L (ref 7–15)
APPEARANCE UR: ABNORMAL
BASOPHILS # BLD AUTO: 0.1 10E3/UL (ref 0–0.2)
BASOPHILS NFR BLD AUTO: 1 %
BILIRUB UR QL STRIP: NEGATIVE
BUN SERPL-MCNC: 22 MG/DL (ref 6–20)
CALCIUM SERPL-MCNC: 9.4 MG/DL (ref 8.6–10)
CHLORIDE SERPL-SCNC: 105 MMOL/L (ref 98–107)
COLOR UR AUTO: ABNORMAL
CREAT SERPL-MCNC: 1.29 MG/DL (ref 0.51–0.95)
CREAT UR-MCNC: 208 MG/DL
DEPRECATED HCO3 PLAS-SCNC: 27 MMOL/L (ref 22–29)
EOSINOPHIL # BLD AUTO: 0.4 10E3/UL (ref 0–0.7)
EOSINOPHIL NFR BLD AUTO: 4 %
ERYTHROCYTE [DISTWIDTH] IN BLOOD BY AUTOMATED COUNT: 12.6 % (ref 10–15)
GFR SERPL CREATININE-BSD FRML MDRD: 51 ML/MIN/1.73M2
GLUCOSE SERPL-MCNC: 122 MG/DL (ref 70–99)
GLUCOSE UR STRIP-MCNC: NEGATIVE MG/DL
HCT VFR BLD AUTO: 43.7 % (ref 35–47)
HGB BLD-MCNC: 13.5 G/DL (ref 11.7–15.7)
HGB UR QL STRIP: NEGATIVE
HYALINE CASTS: 3 /LPF
IMM GRANULOCYTES # BLD: 0.1 10E3/UL
IMM GRANULOCYTES NFR BLD: 1 %
KETONES UR STRIP-MCNC: NEGATIVE MG/DL
LEUKOCYTE ESTERASE UR QL STRIP: ABNORMAL
LYMPHOCYTES # BLD AUTO: 2 10E3/UL (ref 0.8–5.3)
LYMPHOCYTES NFR BLD AUTO: 21 %
MAGNESIUM SERPL-MCNC: 2.3 MG/DL (ref 1.7–2.3)
MCH RBC QN AUTO: 28.7 PG (ref 26.5–33)
MCHC RBC AUTO-ENTMCNC: 30.9 G/DL (ref 31.5–36.5)
MCV RBC AUTO: 93 FL (ref 78–100)
MONOCYTES # BLD AUTO: 0.7 10E3/UL (ref 0–1.3)
MONOCYTES NFR BLD AUTO: 7 %
MUCOUS THREADS #/AREA URNS LPF: PRESENT /LPF
NEUTROPHILS # BLD AUTO: 6.1 10E3/UL (ref 1.6–8.3)
NEUTROPHILS NFR BLD AUTO: 66 %
NITRATE UR QL: NEGATIVE
NRBC # BLD AUTO: 0 10E3/UL
NRBC BLD AUTO-RTO: 0 /100
PH UR STRIP: 5 [PH] (ref 5–7)
PHOSPHATE SERPL-MCNC: 3.2 MG/DL (ref 2.5–4.5)
PLATELET # BLD AUTO: 288 10E3/UL (ref 150–450)
POTASSIUM SERPL-SCNC: 5.1 MMOL/L (ref 3.4–5.3)
PROT/CREAT 24H UR: 0.06 MG/MG CR (ref 0–0.2)
RBC # BLD AUTO: 4.7 10E6/UL (ref 3.8–5.2)
RBC URINE: 2 /HPF
SODIUM SERPL-SCNC: 141 MMOL/L (ref 136–145)
SP GR UR STRIP: 1.02 (ref 1–1.03)
SQUAMOUS EPITHELIAL: 3 /HPF
TRANSITIONAL EPI: 2 /HPF
UROBILINOGEN UR STRIP-MCNC: NORMAL MG/DL
WBC # BLD AUTO: 9.3 10E3/UL (ref 4–11)
WBC URINE: 24 /HPF

## 2023-03-07 PROCEDURE — 84156 ASSAY OF PROTEIN URINE: CPT

## 2023-03-07 PROCEDURE — 81001 URINALYSIS AUTO W/SCOPE: CPT

## 2023-03-07 PROCEDURE — 83735 ASSAY OF MAGNESIUM: CPT

## 2023-03-07 PROCEDURE — 80069 RENAL FUNCTION PANEL: CPT

## 2023-03-07 PROCEDURE — 36415 COLL VENOUS BLD VENIPUNCTURE: CPT

## 2023-03-07 PROCEDURE — 85025 COMPLETE CBC W/AUTO DIFF WBC: CPT

## 2023-04-09 ENCOUNTER — HEALTH MAINTENANCE LETTER (OUTPATIENT)
Age: 48
End: 2023-04-09

## 2023-04-17 ENCOUNTER — OFFICE VISIT (OUTPATIENT)
Dept: OBGYN | Facility: OTHER | Age: 48
End: 2023-04-17
Payer: COMMERCIAL

## 2023-04-17 ENCOUNTER — TELEPHONE (OUTPATIENT)
Dept: OBGYN | Facility: CLINIC | Age: 48
End: 2023-04-17

## 2023-04-17 VITALS — WEIGHT: 265 LBS | SYSTOLIC BLOOD PRESSURE: 135 MMHG | DIASTOLIC BLOOD PRESSURE: 84 MMHG | BODY MASS INDEX: 46.94 KG/M2

## 2023-04-17 DIAGNOSIS — Z97.5 IUD (INTRAUTERINE DEVICE) IN PLACE: ICD-10-CM

## 2023-04-17 DIAGNOSIS — I63.9 ACUTE CVA (CEREBROVASCULAR ACCIDENT) (H): ICD-10-CM

## 2023-04-17 DIAGNOSIS — N94.6 DYSMENORRHEA: ICD-10-CM

## 2023-04-17 DIAGNOSIS — D25.1 INTRAMURAL LEIOMYOMA OF UTERUS: ICD-10-CM

## 2023-04-17 DIAGNOSIS — N92.1 METRORRHAGIA: Primary | ICD-10-CM

## 2023-04-17 PROCEDURE — 99214 OFFICE O/P EST MOD 30 MIN: CPT | Mod: 25 | Performed by: OBSTETRICS & GYNECOLOGY

## 2023-04-17 PROCEDURE — 88305 TISSUE EXAM BY PATHOLOGIST: CPT | Performed by: PATHOLOGY

## 2023-04-17 PROCEDURE — 58100 BIOPSY OF UTERUS LINING: CPT | Performed by: OBSTETRICS & GYNECOLOGY

## 2023-04-17 NOTE — TELEPHONE ENCOUNTER
[facility] SURGERY PLANNING/SCHEDULING WORKSHEET                                                     Alicia Penaloza                :  1975  MRN:  [id number]  Home Phone [phone]   Work Phone Not on file.   Mobile [phone]         Surgeon: Vania Logan DO     DIAGNOSIS:   Metrorrhagia, dysmenorrhea, uterine fibroids, IUD in place     SURGICAL PROCEDURE: Total laparoscopic hysterectomy with bilateral salpingectomy     Surgery Location:  [facility]  Patient Surgery Class:  SDS  Length of Procedure:  120 minutes  Type of anesthesia:  General     Multi-surgeon case: Yes.  Dr. Arrington  OR Assistant needed:   Yes  Vendor needed: No  Positioning:  Lithotomy  Laterality:  NA  Date requested:  When able     Special Equipment: Ligassure  Special Instructions for patient:  Not needed  Precautions:  NONE  :  NOT NEEDED     Sterilization consent:  Yes and was signed on 2023.     Preop: Pre-op options: PCP  Pre-surgery consult needed:  Not applicable.  Postop evaluation needed:  2 weeks     ALLERGIES: No Known Allergies   BMI:There is no height or weight on file to calculate BMI.      The proposed surgical procedure is considered INTERMEDIATE risk.        Vania Logan DO    2023  SURGERY SCHEDULING AND PRECERTIFICATION    Medical Record Number: [id number]  Alicia Penaloza  YOB: 1975   Phone: [phone] (home)   Primary Provider: Augusto Thomas    Reason for Admit:  ICD-10 CODE:  Metrorrhagia, dysmenorrhea, uterine fibroids, IUD in place  N92.1 N94.6 D25.1 Z97.5     Surgeon: Vania Logan DO  Surgical Procedure: Total laparoscopic hysterectomy with bilateral salpingectomy    Date of Surgery  Time of Surgery 7:30 a.m.  Surgery to be performed at:  [facility]  Status: Outpatient  Type of Anesthesia Anticipated: General    Sterilization consent:  Yes and was signed on .    Pre-Op: Per Jennifer at the Group Home, this will  be performed by their [place] provider   COVID testing:  Per Provider's discretion Covid testing is not indicated.     Post-Op:  2 weeks on 07/28 with Dr Logan at [place]    Pre-certification routed to [employer]:  Yes    Surgery packet mailed to patient's home address: Yes  Patient instructed NPO 12 hours prior to surgery, arrive 1.5 hours  prior to surgery, must have a .  Patient understood and agrees to the plan.      Requestor:  Thuy Trivedi / Mary Grissom    Location:  [place] [phone]

## 2023-04-17 NOTE — PROGRESS NOTES
Subjective  47 year old non-pregnant female presents today complaining of metrorrhagia and dysmenorrhea.   Patient is here with her LMN from the [facility].  Patient had the Mirena IUD placed in 2018 due to menorrhagia.  She does not have a normal menses.  She spots more often then not.  She has significant pelvic pain and it is asso with her menses.  Last night was terrible.  The pain hurt her so bad she wasn't able to walk.  She has lower back pain and has been working with physical therapy for this.  It was thought that might help with her abdominal pain however it hasn't at all.  She does not use pads or tampons.  The bleeding is light and isn't a flow.  No clots.  Usually with wiping.  No problems urinating.  Normal bowel movements.  No children.  Patient is not sexually active.  Patient had a lap appy 2 years ago.  Patient lives at the assisted living home due to a stroke in 2018 due to an aneursym.  Patient sees a Neurologist for this.  We discussed possible causes for her metrorrhagia and dysmenorrhea.  We reviewed her ultrasound results in detail.  We discussed uterine fibroids.  I recommended an endometrial biopsy today and patient is wanting to proceed with that.  We discussed possibly removing her IUD to see if it helps with the irregular spotting however patient is wanting a hysterectomy.  She does not want to have the significant pain anymore.  We discussed that a hysterectomy may not alleviate her abdominal pain however patient is still wanting to proceed with that.  I discussed risks, benefits, and complications of surgery including but not limited to bleeding, infection, damage to nearby organs including but not limited to bladder, bowel, ureters, nerves, and blood vessels as well as anesthesia risks.  Injury may result at the time of surgery or in a separate procedure.  We also discussed the possibility of a reoperation if the pathology came back abnormal.  All questions answered, and accepting  these risks, the patient elects to proceed with the procedure.          I personally reviewed the ultrasound and the findings showed multiple uterine fibroids.      ROS: 10 point ROS neg other than the symptoms noted above in the HPI.  Past Medical History:   Diagnosis Date     Anxiety      Chronic kidney disease      Depressive disorder      Hypertension      Stroke (H) 2018     Past Surgical History:   Procedure Laterality Date     LAPAROSCOPIC APPENDECTOMY N/A 2020    Procedure: APPENDECTOMY, LAPAROSCOPIC;  Surgeon: Best Cunningham MD;  Location:  OR     History reviewed. No pertinent family history.  Social History     Tobacco Use     Smoking status: Former     Types: Cigarettes     Quit date: 2018     Years since quittin.7     Smokeless tobacco: Never   Vaping Use     Vaping status: Never Used   Substance Use Topics     Alcohol use: No         Objective  Vitals: /84 (BP Location: Left arm, Cuff Size: Adult Regular)   Wt 120.2 kg (265 lb)   BMI 46.94 kg/m    BMI= Body mass index is 46.94 kg/m .    General appearance=well developed, well-nourished female  Gait=normal  Psych=mood is stable, alert and oriented x3  Abd=soft, Nontender/nondistended, no masses, no signs of hernias, no evidence of hepatosplenomegaly  PELVIC:    External genitalia: normal without lesions or masses  Urethral meatus: no lesions or prolapse noted, normal size  Urethra: no masses, non tender  Bladder: non tender, no fullness  Vagina: normal mucosa and rugae, no discharge.  Cervix: normal without lesion, no cervical motion tenderness, healthy, nulliparous, IUD strings coming from os  Uterus: small, mobile, nontender.  Adnexa: non tender, without masses  Rectal: deffered  Ext=no clubbing or cyanosis, no swelling      Pelvic ultrasound=2022:  FINDINGS:     UTERUS: 7.9 x 5.8 x 4.6 cm. Normal in size and position. There are  multiple intramural fibroids, largest measuring up to 3.3 cm involving  the fundus, not  significantly changed from prior CT given differences  in modalities. No definite fibroid with significant  submucosal/intracavitary component.     ENDOMETRIUM: 4 mm. Normal smooth endometrium. Intrauterine device in  appropriate position.     RIGHT OVARY: 2.8 x 2.4 x 1.4 cm. Normal with flow demonstrated.     LEFT OVARY: 2.0 x 1.9 x 1.3 cm. Normal with flow demonstrated.     No significant free fluid.                                                                      IMPRESSION:  1.  Multiple uterine fibroids, not significantly changed from prior CT  given differences in modalities.  2.  Appropriate position of intrauterine device.        Procedure:  Endometrial biopsy    Indication: Metrorrhagia with age >35                     Discussed risk of bleeding, infection, uterine perforation, cramping pain.  Pt agreed to proceed with procedure after all questions answered.    Speculum placed and cervix visualized.  Cervix cleansed with betadine x 3.  Allis clamp placed on anterior lip of the cervix.  Endometrial biopsy pipelle passed through cervix and uterus sounded to 7 cm.  Biopsy specimen collected with one pass with return of moderate amount of pink tissue.  Specimen placed in a labeled container and set aside to be sent to pathology.  Allis clamp removed from the cervix and sites hemostatic.  No bleeding noted from cervical os.     Patient tolerated the procedure well.  There were no apparent complications and bleeding was minimal.    She is instructed to use no tampons and have no intercourse for the next 5 days.        Assessment  1.)  Metrorrhagia  2.)  Dysmenorrhea  3.)  IUD in place  4.)  Multiple uterine fibroids  5.)  History of stroke, living at assisted living  6.)  History of brain aneurysm      Plan  1.)  EMB today  2.)  Schedule [facility] with bilateral salpingectomy with partner to assist=hysterectomy consent signed today  3.)  Schedule preop with PCP        One undiagnosed new problem with uncertain  prognosis, procedure performed, and major surgery scheduled  Nursing notes read and reviewed    Vania Logan, DO    Call [facility] to schedule

## 2023-04-17 NOTE — PATIENT INSTRUCTIONS
-Endometrial biopsy performed today.  Await pathology results.  This may take up to a week.    -Surgery scheduler will reach out to help assist in scheduling hysterectomy.        Endometrial Biopsy  Endometrial biopsy is a procedure used to study the endometrium (lining of the uterus). It is usually done in your healthcare provider s office. During the biopsy, small tissue samples are taken from the uterine lining. These are then sent to a lab for study. If any problems are found, you and your healthcare provider will discuss treatment options. The biopsy usually takes less than 20 minutes, and you can often go back to your normal routine as soon as the procedure is over.   Reasons for the Procedure  Endometrial biopsy may help pinpoint the cause of certain problems. These include:  Bleeding after menopause  Heavy or irregular periods  Bleeding associated with hormone replacement therapy  Prolonged bleeding  Abnormal Pap test results  Trouble getting pregnant (fertility problems)    What Are the Risks?  Problems with endometrial biopsy are rare, but can include:  Bleeding  Infection  Damage to the uterine wall (very rare)  Getting Ready for the Procedure  Your doctor will ask about your health and any medications you take, such as blood thinners. Before your biopsy, you may have tests to make sure you re not pregnant or have an infection. You may also be asked to sign a consent form. A day or two before the procedure:   Avoid using creams or other vaginal medications.  Avoid douching.  Ask your healthcare provider if you should take pain medications shortly before the test.   During the Biopsy  You will be asked to lie on an exam table with your knees bent, just as you do for a Pap test.  You may have a brief pelvic exam. An instrument called a speculum is then inserted into the vagina to hold it open.  An antiseptic solution is applied to the cervix. The cervix may also be numbed with an anesthetic or dilated to  widen the opening.  A small suction tube is passed through the cervix into the uterus.  It is normal to feel some cramping when the tube is inserted. But tell your healthcare provider if you have severe cramping or are very uncomfortable.  Using mild suction, samples are taken from the uterine lining. You may feel pinching or additional cramping when this is done.  The tube and speculum are then removed and the samples sent to a lab for study.  After the Procedure  If you feel lightheaded or dizzy, you can rest on the table until you re ready to get dressed.  For a few hours, you may feel some mild cramping. This can usually be relieved with over-the-counter pain medications.  You may have some bleeding for a few days. Use pads instead of tampons.  Don t douche or use any vaginal medications unless your healthcare provider says it s okay.  Ask your healthcare provider when it s okay to have sex again.  Follow-Up  It will take about a week for the biopsy results to come back from the lab. Then you and your healthcare provider can discuss the results. These may show that no treatment is required. Or, you may be scheduled for a follow-up appointment and further tests. If your biopsy was done for fertility problems, be sure to record the day when your next period begins.     Call your healthcare provider if you have:  Heavy bleeding (more than a pad an hour for 2 hours).  Severe cramping, or increasing pain.  Fever over 101 F.  Foul-smelling or unusual vaginal discharge.     What you may expect after an endometrial biopsy:  Mild cramping for less than 48 hours is to be expected, if you have can take ibuprofen or Motrin you may use this for the cramping.   A small amount of bleeding would be considered normal as well.    You may resume your normal activities including sexual intercourse as soon as you feel ready.       WARNING SIGNS:  If you are experiencing:  Fever  Foul smelling vaginal discharge  Cramping lasting  longer than 48 hours  Severe cramping  Bleeding heavier than a period  CALL THE CLINIC IMMEDIATELY    You will be contacted with the results of your biopsy in about one week.   A follow up plan will be made with you when your results are available.     Vania Logan, DO

## 2023-04-17 NOTE — TELEPHONE ENCOUNTER
[facility] SURGERY PLANNING/SCHEDULING WORKSHEET                                                     Alicia Penaloza                :  1975  MRN:  [id number]  Home Phone [phone]   Work Phone Not on file.   Mobile [phone]         Surgeon: Vania Logan DO    DIAGNOSIS:   Metrorrhagia, dysmenorrhea, uterine fibroids, IUD in place    SURGICAL PROCEDURE: Total laparoscopic hysterectomy with bilateral salpingectomy    Surgery Location:  [facility]  Patient Surgery Class:  SDS  Length of Procedure:  120 minutes  Type of anesthesia:  General    Multi-surgeon case: Yes.  Dr. Arrington  OR Assistant needed:   Yes  Vendor needed: No  Positioning:  Lithotomy  Laterality:  NA  Date requested:  When able    Special Equipment: Ligassure  Special Instructions for patient:  Not needed  Precautions:  NONE  :  NOT NEEDED    Sterilization consent:  Yes and was signed on 2023.    Preop: Pre-op options: PCP  Pre-surgery consult needed:  Not applicable.  Postop evaluation needed:  2 weeks    ALLERGIES: No Known Allergies   BMI:There is no height or weight on file to calculate BMI.     The proposed surgical procedure is considered INTERMEDIATE risk.      Vania Logan DO    2023

## 2023-04-19 LAB
PATH REPORT.COMMENTS IMP SPEC: NORMAL
PATH REPORT.COMMENTS IMP SPEC: NORMAL
PATH REPORT.FINAL DX SPEC: NORMAL
PATH REPORT.GROSS SPEC: NORMAL
PATH REPORT.MICROSCOPIC SPEC OTHER STN: NORMAL
PATH REPORT.RELEVANT HX SPEC: NORMAL
PHOTO IMAGE: NORMAL

## 2023-04-19 NOTE — TELEPHONE ENCOUNTER
I called the Group Home to let them know the surgery has been scheduled and Raegan the nurse was not available.  Message was left for her to call us back.     Mary Grissom  /Surgery Scheduler

## 2023-04-20 NOTE — TELEPHONE ENCOUNTER
Jennifer from [facility] called and stated patient will have her pre-op with one of the [facility] Providers a week or two prior to scheduled surgery 07/14.  I scheduled patients post op for 07/28 with Dr Logan in [place].  They requested the surgery packet be mailed to home address on file for patient.

## 2023-04-24 NOTE — TELEPHONE ENCOUNTER
PB DOS: 7/14/23  Type of Procedure: Total laparoscopic hysterectomy with bilateral salpingectomy  CPT Codes: 43964  ICD10 Codes: Metrorrhagia, dysmenorrhea, uterine fibroids, IUD in place  N92.1 N94.6 D25.1 Z97.5     Surgeon/Ordering provider: Vania Logan DO    Pre-cert/Authorization completed: no PA required   Payer: [employer]   Spoke to PA list   Ref. # / Auth #   Valid Dates:     [place] and [place] [facility]     This is not a guarantee of payment. Payment is subject to the patient's plan benefits, medical necessity and eligibility at the time services are rendered.

## 2023-07-18 ENCOUNTER — TELEPHONE (OUTPATIENT)
Dept: OBGYN | Facility: CLINIC | Age: 48
End: 2023-07-18

## 2023-07-18 NOTE — TELEPHONE ENCOUNTER
RN called and spoke with Erika at pt's group home relaying results and advised to call back with any concerns/questions.    Elsi Smith RN on 7/18/2023 at 1:21 PM

## 2023-07-18 NOTE — TELEPHONE ENCOUNTER
Specimen:    Uterus, Cervix, Bilateral Fallopian Tubes                                                  Final Diagnosis      Uterus, cervix, fallopian tubes, hysterectomy and bilateral salpingectomy: Leiomyomata       Please call patient (or the RN at her group home) with her normal pathology results from surgery.  Let her know that there are no signs of cancer.  Thanks!    ~Vania Logan, DO

## 2023-07-27 NOTE — PATIENT INSTRUCTIONS
If you have labs or imaging done, the results will automatically release in [employer] without an interpretation.  Your health care professional will review those results and send an interpretation with recommendations as soon as possible, but this may be 1-3 business days.    If you have any questions regarding your visit, please contact your care team.     [employer] Access Services: [phone]  [facility] CLINIC HOURS TELEPHONE NUMBER     Vania DO Gris Logan - Certified Medical Assistant    Elsi Fischer -   Mary -     Monday- [place]  8:00 a.m - 5:00 p.m    Tuesday- [place]  7:00 a.m - 5:00 p.m    Friday- [place]  9:00 a.m - 5:00 p.m.    Typical Surgery Day: Thursday [facility]  [address]  [place] [address]  Phone: [phone]   Fax: [phone]   Imaging Scheduling- [phone]    [facility] Labor and Delivery  [address] [place] [address] [place] [address]  [phone]    [address] [address]  Phone: [phone]   Fax: [phone]   Imaging Scheduling- [phone]       **Surgeries** Our Surgery Schedulers will contact you to schedule. If you do not receive a call within 3 business days, please call [phone].    Urgent Care locations:  [facility] Monday-Friday  10 am - 8 pm  Saturday and Sunday   9 am - 5 pm  Monday-Friday   10 am - 8 pm  Saturday and Sunday   9 am - 5 pm   [phone]     If you need a medication refill, please contact your pharmacy. Please allow 3 business days for your refill to be completed.  As always, Thank you for trusting us with your healthcare needs!    see additional instructions from your care team below

## 2023-07-28 ENCOUNTER — OFFICE VISIT (OUTPATIENT)
Dept: OBGYN | Facility: OTHER | Age: 48
End: 2023-07-28
Payer: COMMERCIAL

## 2023-07-28 VITALS — DIASTOLIC BLOOD PRESSURE: 87 MMHG | SYSTOLIC BLOOD PRESSURE: 138 MMHG | BODY MASS INDEX: 46.11 KG/M2 | WEIGHT: 260.3 LBS

## 2023-07-28 DIAGNOSIS — Z90.710 S/P LAPAROSCOPIC HYSTERECTOMY: Primary | ICD-10-CM

## 2023-07-28 PROBLEM — D25.1 INTRAMURAL LEIOMYOMA OF UTERUS: Status: RESOLVED | Noted: 2023-04-17 | Resolved: 2023-07-28

## 2023-07-28 PROBLEM — N94.6 DYSMENORRHEA: Status: RESOLVED | Noted: 2023-04-17 | Resolved: 2023-07-28

## 2023-07-28 PROBLEM — Z97.5 IUD (INTRAUTERINE DEVICE) IN PLACE: Status: RESOLVED | Noted: 2022-09-23 | Resolved: 2023-07-28

## 2023-07-28 PROBLEM — N93.9 VAGINAL BLEEDING: Status: RESOLVED | Noted: 2018-03-31 | Resolved: 2023-07-28

## 2023-07-28 PROCEDURE — 99024 POSTOP FOLLOW-UP VISIT: CPT | Performed by: OBSTETRICS & GYNECOLOGY

## 2023-07-28 NOTE — PROGRESS NOTES
Subjective  48 year old non-pregnant female presents today as a post-op from a [facility] with bilateral salpingectomy on 2023 secondary to metrorrhagia, dysmenorrhea, and uterine fibroids.  Patient states she is doing well.  No pain.  No vaginal bleeding, however she did have some a few days after surgery but this has resolved.  No problems urinating.  Normal bowel movements.  No fevers or chills.  No back pain out of the norm.  We reviewed her pictures and pathology from surgery and all questions were answered.        ROS: 10 point ROS neg other than the symptoms noted above in the HPI.  Past Medical History:   Diagnosis Date    Anxiety     Chronic kidney disease     Depressive disorder     Hypertension     Stroke (H) 2018     Past Surgical History:   Procedure Laterality Date    LAPAROSCOPIC APPENDECTOMY N/A 2020    Procedure: APPENDECTOMY, LAPAROSCOPIC;  Surgeon: Best Cunningham MD;  Location:  OR     History reviewed. No pertinent family history.  Social History     Tobacco Use    Smoking status: Former     Types: Cigarettes     Quit date: 2018     Years since quittin.9    Smokeless tobacco: Never   Substance Use Topics    Alcohol use: No         Objective  Vitals: /87 (BP Location: Right arm, Cuff Size: Adult Large)   Wt 118.1 kg (260 lb 4.8 oz)   LMP 2018   BMI 46.11 kg/m    BMI= Body mass index is 46.11 kg/m .      Abd=soft, Nontender/nondistended, incisions=healed well      Pathology=2023:  Specimen:    Uterus, Cervix, Bilateral Fallopian Tubes                                                Final Diagnosis       Uterus, cervix, fallopian tubes, hysterectomy and bilateral salpingectomy: Leiomyomata   Electronically signed by Kehrberg, Eric N, MD on        Assessment  1.)  S/p TLH with bilateral salpingectomy secondary to metrorrhagia, dysmenorrhea, and uterine fibroids= benign pathology      Plan  1.)  Schedule follow up in 4 weeks    25 minutes were spent on the date  of the encounter doing chart review, history and exam, documentation, and further activities as noted above.    Vania Logan, DO

## 2023-08-29 NOTE — PATIENT INSTRUCTIONS
If you have labs or imaging done, the results will automatically release in [employer] without an interpretation.  Your health care professional will review those results and send an interpretation with recommendations as soon as possible, but this may be 1-3 business days.    If you have any questions regarding your visit, please contact your care team.     [employer] Access Services: [phone]  [facility] CLINIC HOURS TELEPHONE NUMBER     Vania DO Gris Logan - Certified Medical Assistant    Elsi Fischer -   Mary -     Monday- [place]  8:00 a.m - 5:00 p.m    Tuesday- [place]  7:00 a.m - 5:00 p.m    Friday- [place]  9:00 a.m - 5:00 p.m.    Typical Surgery Day: Thursday [facility]  [address]  [place] [address]  Phone: [phone]   Fax: [phone]   Imaging Scheduling- [phone]    [facility] Labor and Delivery  [address] [place] [address] [place] [address]  [phone]    [address] [address]  Phone: [phone]   Fax: [phone]   Imaging Scheduling- [phone]       **Surgeries** Our Surgery Schedulers will contact you to schedule. If you do not receive a call within 3 business days, please call [phone].    Urgent Care locations:  [facility] Monday-Friday  10 am - 8 pm  Saturday and Sunday   9 am - 5 pm  Monday-Friday   10 am - 8 pm  Saturday and Sunday   9 am - 5 pm   [phone]     If you need a medication refill, please contact your pharmacy. Please allow 3 business days for your refill to be completed.  As always, Thank you for trusting us with your healthcare needs!    see additional instructions from your care team below

## 2023-09-01 ENCOUNTER — OFFICE VISIT (OUTPATIENT)
Dept: OBGYN | Facility: OTHER | Age: 48
End: 2023-09-01
Payer: COMMERCIAL

## 2023-09-01 VITALS — WEIGHT: 260.4 LBS | BODY MASS INDEX: 46.13 KG/M2 | SYSTOLIC BLOOD PRESSURE: 127 MMHG | DIASTOLIC BLOOD PRESSURE: 83 MMHG

## 2023-09-01 DIAGNOSIS — Z90.710 S/P LAPAROSCOPIC HYSTERECTOMY: Primary | ICD-10-CM

## 2023-09-01 PROCEDURE — 99024 POSTOP FOLLOW-UP VISIT: CPT | Performed by: OBSTETRICS & GYNECOLOGY

## 2023-09-01 NOTE — PROGRESS NOTES
Subjective  48 year old non-pregnant female presents today as a post-op from a [facility] with bilateral salpingectomy on 2023 secondary to metrorrhagia, dysmenorrhea, and uterine fibroids.  Patient states she is doing well.  No pain.  No vaginal bleeding.  No problems urinating.  Normal bowel movements.  No fevers or chills.  No back pain out of the norm.        ROS: 10 point ROS neg other than the symptoms noted above in the HPI.  Past Medical History:   Diagnosis Date    Anxiety     Chronic kidney disease     Depressive disorder     Hypertension     Stroke (H) 2018     Past Surgical History:   Procedure Laterality Date    LAPAROSCOPIC APPENDECTOMY N/A 2020    Procedure: APPENDECTOMY, LAPAROSCOPIC;  Surgeon: Best Cunningham MD;  Location: [facility] OR    LAPAROSCOPIC HYSTERECTOMY TOTAL, SALPINGECTOMY BILATERAL Bilateral 2023    TLH with BL salpingectomy secondary to metrorrhagia, dysmenorrhea, and uterine fibroids     History reviewed. No pertinent family history.  Social History     Tobacco Use    Smoking status: Former     Types: Cigarettes     Quit date: 2018     Years since quittin.0    Smokeless tobacco: Never   Substance Use Topics    Alcohol use: No         Objective  Vitals: /83 (BP Location: Right arm, Cuff Size: Adult Regular)   Wt 118.1 kg (260 lb 6.4 oz)   LMP 2018   BMI 46.13 kg/m    BMI= Body mass index is 46.13 kg/m .    Abd=soft, Nontender/nondistended, incisions=healed well, suture trimmed from LLQ incision  PELVIC:    External genitalia: normal without lesion  Vagina: normal mucosa and rugae, no discharge, vaginal cuff appears intact  Cervix: absent  Uterus: absent  Adnexa: non tender, without masses  Rectal: deffered  Ext=no clubbing or cyanosis      Assessment  1.)  S/p TLH with bilateral salpingectomy secondary to metrorrhagia, dysmenorrhea, and uterine fibroids= benign pathology       Plan  1.)  Follow-up as needed     25 minutes were spent on the date of the  encounter doing chart review, history and exam, documentation, and further activities as noted above.  Vania Logan, DO

## 2023-09-09 ENCOUNTER — HEALTH MAINTENANCE LETTER (OUTPATIENT)
Age: 48
End: 2023-09-09

## 2023-11-07 ENCOUNTER — MEDICAL CORRESPONDENCE (OUTPATIENT)
Dept: HEALTH INFORMATION MANAGEMENT | Facility: CLINIC | Age: 48
End: 2023-11-07

## 2023-11-07 ENCOUNTER — TRANSFERRED RECORDS (OUTPATIENT)
Dept: HEALTH INFORMATION MANAGEMENT | Facility: CLINIC | Age: 48
End: 2023-11-07

## 2023-11-10 ENCOUNTER — MEDICAL CORRESPONDENCE (OUTPATIENT)
Dept: HEALTH INFORMATION MANAGEMENT | Facility: CLINIC | Age: 48
End: 2023-11-10

## 2023-11-28 ENCOUNTER — TRANSCRIBE ORDERS (OUTPATIENT)
Dept: OTHER | Age: 48
End: 2023-11-28

## 2023-11-28 DIAGNOSIS — F33.1 MODERATE EPISODE OF RECURRENT MAJOR DEPRESSIVE DISORDER (H): ICD-10-CM

## 2023-11-28 DIAGNOSIS — G47.10 HYPERSOMNIA, UNSPECIFIED: ICD-10-CM

## 2023-11-28 DIAGNOSIS — M54.50 CHRONIC BILATERAL LOW BACK PAIN WITHOUT SCIATICA: ICD-10-CM

## 2023-11-28 DIAGNOSIS — G47.30 SLEEP APNEA, UNSPECIFIED: Primary | ICD-10-CM

## 2023-11-28 DIAGNOSIS — G47.00 INSOMNIA: ICD-10-CM

## 2023-11-28 DIAGNOSIS — G89.29 CHRONIC BILATERAL LOW BACK PAIN WITHOUT SCIATICA: ICD-10-CM

## 2023-11-28 DIAGNOSIS — R06.83 SNORING: ICD-10-CM

## 2024-04-01 ENCOUNTER — DOCUMENTATION ONLY (OUTPATIENT)
Dept: LAB | Facility: CLINIC | Age: 49
End: 2024-04-01
Payer: COMMERCIAL

## 2024-04-01 NOTE — PROGRESS NOTES
Alicia Penaloza has an upcoming lab appointment:    Future Appointments   Date Time Provider Department [place]   4/3/2024 12:30 PM [facility]   4/19/2024  2:30 PM Laith Young PA-C [facility]     Patient is scheduled for the following lab(s): HMPO due    There is no order available. Please review and place either future orders or HMPO (Review of Health Maintenance Protocol Orders), as appropriate.    Health Maintenance Due   Topic    HIV SCREENING     HEPATITIS C SCREENING     LIPID      Sarah Lucas

## 2024-04-02 NOTE — PROGRESS NOTES
Attempted to call x2.    Cell number is disconnected, and home phone rolls over to fax machine.    [employer] message sent to patient.    Thuy Pérez XRO/

## 2024-04-03 ENCOUNTER — LAB (OUTPATIENT)
Dept: LAB | Facility: CLINIC | Age: 49
End: 2024-04-03
Payer: COMMERCIAL

## 2024-04-03 DIAGNOSIS — E55.9 VITAMIN D DEFICIENCY: ICD-10-CM

## 2024-04-03 DIAGNOSIS — N18.32 CHRONIC KIDNEY DISEASE (CKD) STAGE G3B/A1, MODERATELY DECREASED GLOMERULAR FILTRATION RATE (GFR) BETWEEN 30-44 ML/MIN/1.73 SQUARE METER AND ALBUMINURIA CREATININE RATIO LESS THAN 30 MG/G (H): Primary | ICD-10-CM

## 2024-04-03 LAB
ALBUMIN MFR UR ELPH: 29.9 MG/DL
ANION GAP SERPL CALCULATED.3IONS-SCNC: 10 MMOL/L (ref 7–15)
BUN SERPL-MCNC: 20.2 MG/DL (ref 6–20)
CALCIUM SERPL-MCNC: 9.6 MG/DL (ref 8.6–10)
CHLORIDE SERPL-SCNC: 104 MMOL/L (ref 98–107)
CREAT SERPL-MCNC: 1.2 MG/DL (ref 0.51–0.95)
CREAT UR-MCNC: 364.2 MG/DL
DEPRECATED HCO3 PLAS-SCNC: 26 MMOL/L (ref 22–29)
EGFRCR SERPLBLD CKD-EPI 2021: 55 ML/MIN/1.73M2
GLUCOSE SERPL-MCNC: 91 MG/DL (ref 70–99)
HGB BLD-MCNC: 13.6 G/DL (ref 11.7–15.7)
PHOSPHATE SERPL-MCNC: 3.4 MG/DL (ref 2.5–4.5)
POTASSIUM SERPL-SCNC: 4.7 MMOL/L (ref 3.4–5.3)
PROT/CREAT 24H UR: 0.08 MG/MG CR (ref 0–0.2)
SODIUM SERPL-SCNC: 140 MMOL/L (ref 135–145)

## 2024-04-03 PROCEDURE — 80048 BASIC METABOLIC PNL TOTAL CA: CPT

## 2024-04-03 PROCEDURE — 84100 ASSAY OF PHOSPHORUS: CPT

## 2024-04-03 PROCEDURE — 85018 HEMOGLOBIN: CPT

## 2024-04-03 PROCEDURE — 83970 ASSAY OF PARATHORMONE: CPT

## 2024-04-03 PROCEDURE — 84156 ASSAY OF PROTEIN URINE: CPT

## 2024-04-03 PROCEDURE — 82306 VITAMIN D 25 HYDROXY: CPT

## 2024-04-04 LAB — PTH-INTACT SERPL-MCNC: 43 PG/ML (ref 15–65)

## 2024-04-09 LAB
DEPRECATED CALCIDIOL+CALCIFEROL SERPL-MC: <48 UG/L (ref 20–75)
VITAMIN D2 SERPL-MCNC: <5 UG/L
VITAMIN D3 SERPL-MCNC: 43 UG/L

## 2024-04-19 ENCOUNTER — OFFICE VISIT (OUTPATIENT)
Dept: SLEEP MEDICINE | Facility: CLINIC | Age: 49
End: 2024-04-19
Payer: COMMERCIAL

## 2024-04-19 VITALS
HEART RATE: 88 BPM | BODY MASS INDEX: 44.08 KG/M2 | HEIGHT: 63 IN | WEIGHT: 248.8 LBS | OXYGEN SATURATION: 96 % | SYSTOLIC BLOOD PRESSURE: 110 MMHG | DIASTOLIC BLOOD PRESSURE: 82 MMHG

## 2024-04-19 DIAGNOSIS — R06.83 SNORING: ICD-10-CM

## 2024-04-19 DIAGNOSIS — M54.50 CHRONIC BILATERAL LOW BACK PAIN WITHOUT SCIATICA: ICD-10-CM

## 2024-04-19 DIAGNOSIS — G89.29 CHRONIC BILATERAL LOW BACK PAIN WITHOUT SCIATICA: ICD-10-CM

## 2024-04-19 DIAGNOSIS — R29.818 SUSPECTED SLEEP APNEA: ICD-10-CM

## 2024-04-19 DIAGNOSIS — F33.1 MODERATE EPISODE OF RECURRENT MAJOR DEPRESSIVE DISORDER (H): ICD-10-CM

## 2024-04-19 DIAGNOSIS — G47.10 HYPERSOMNIA, UNSPECIFIED: ICD-10-CM

## 2024-04-19 DIAGNOSIS — F51.04 PSYCHOPHYSIOLOGIC INSOMNIA: Primary | ICD-10-CM

## 2024-04-19 PROCEDURE — 99203 OFFICE O/P NEW LOW 30 MIN: CPT | Performed by: PHYSICIAN ASSISTANT

## 2024-04-19 ASSESSMENT — SLEEP AND FATIGUE QUESTIONNAIRES
HOW LIKELY ARE YOU TO NOD OFF OR FALL ASLEEP WHILE SITTING AND READING: WOULD NEVER DOZE
HOW LIKELY ARE YOU TO NOD OFF OR FALL ASLEEP WHILE WATCHING TV: WOULD NEVER DOZE
HOW LIKELY ARE YOU TO NOD OFF OR FALL ASLEEP WHEN YOU ARE A PASSENGER IN A CAR FOR AN HOUR WITHOUT A BREAK: WOULD NEVER DOZE
HOW LIKELY ARE YOU TO NOD OFF OR FALL ASLEEP IN A CAR, WHILE STOPPED FOR A FEW MINUTES IN TRAFFIC: WOULD NEVER DOZE
HOW LIKELY ARE YOU TO NOD OFF OR FALL ASLEEP WHILE SITTING AND TALKING TO SOMEONE: WOULD NEVER DOZE
HOW LIKELY ARE YOU TO NOD OFF OR FALL ASLEEP WHILE SITTING INACTIVE IN A PUBLIC PLACE: WOULD NEVER DOZE
HOW LIKELY ARE YOU TO NOD OFF OR FALL ASLEEP WHILE LYING DOWN TO REST IN THE AFTERNOON WHEN CIRCUMSTANCES PERMIT: WOULD NEVER DOZE
HOW LIKELY ARE YOU TO NOD OFF OR FALL ASLEEP WHILE SITTING QUIETLY AFTER LUNCH WITHOUT ALCOHOL: WOULD NEVER DOZE

## 2024-04-19 NOTE — PROGRESS NOTES
Does Alicia have a CPAP/Bipap?  No     [place] Sleep Scale:  0  Stop Ban  BMI:44.07  Neck:40 cm    Outpatient Sleep Medicine Consultation:      Name: Alicia Penaloza MRN# [id number]   Age: 48 year old YOB: 1975     Date of Consultation: 2024  Consultation is requested by: Kyra Malave  No address on file Kyra Malave  Primary care provider: Augusto Thomas       Reason for Sleep Consult:     Alicia Penaloza is sent by Kyra Malave for a sleep consultation regarding poor quality sleep, snoring. .    Patient s Reason for visit  Alicia Penaloza main reason for visit: get better sleep  Patient states problem(s) started: at least a year  Alicia Penaloza's goals for this visit: get better sleep deep sleep           Assessment and Plan:     Summary Sleep Diagnoses:  Suspected sleep apnea- snoring, frequent awakenings.  Psychophysiological Insomnia- sleep disrupted due to room mates.. She is spending up to 12-13 hours in bed. Will have her limit it to 8 hours in bed and follow a consistent bed and wake time.     Comorbid Diagnoses:  HX CVA  HTN  Anxiety/depression  Iron Def Anemia      Summary Recommendations:  Suspected sleep apnea-  Lab study ordered for further evaluation.   Insomnia- discussed sleep schedule, limiting time in bed. Referral placed for sleep psychology.   No orders of the defined types were placed in this encounter.        Summary Counseling:    Sleep Testing Reviewed  Obstructive Sleep Apnea Reviewed  Complications of Untreated Sleep Apnea Reviewed      Medical Decision-making:   Educational materials provided in instructions    Total time spent reviewing medical records, history and physical examination, review of previous testing and interpretation as well as documentation on this date:45 min    CC: Kyra Malave          History of Present Illness:     Past Sleep Evaluations:    SLEEP-WAKE SCHEDULE:     Work/School Days: Patient goes to school/work: No   Usually gets  into bed at 9  Takes patient about couple hours plus to fall asleep  Has trouble falling asleep 7 nights per week  Wakes up in the middle of the night 3 times.  Wakes up due to Snorting self awake;External stimuli (bed partner, pets, noise, etc);Use the bathroom  She has trouble falling back asleep   times a week.   It usually takes   to get back to sleep  Patient is usually up at 1030  Uses alarm: No    Weekends/Non-work Days/All Other Days:  Usually gets into bed at 9   Takes patient about hours to fall asleep  Patient is usually up at 1030  Uses alarm: No    Sleep Need  Patient gets  9 sleep on average   Patient thinks she needs about 8 sleep    Alicia Penaloza prefers to sleep in this position(s): Back   Patient states they do the following activities in bed: Watch TV    Naps  Patient takes a purposeful nap 0 times a week and naps are usually 0 in duration  She feels better after a nap: No  She dozes off unintentionally 0 days per week  Patient has had a driving accident or near-miss due to sleepiness/drowsiness: No      SLEEP DISRUPTIONS:    Breathing/Snoring  Patient snores:Yes  Other people complain about her snoring: Yes  Patient has been told she stops breathing in her sleep:No  She has issues with the following: Morning headaches;Morning mouth dryness;Stuffy nose when you wake up;Getting up to urinate more than once    Movement:  Patient gets pain, discomfort, with an urge to move:  No  It happens when she is resting:  No  It happens more at night:  No  Patient has been told she kicks her legs at night:  No     Behaviors in Sleep:  Alicia Penaloza has experienced the following behaviors while sleeping: Eating  She has experienced sudden muscle weakness during the day: No      Is there anything else you would like your sleep provider to know: I have 4 roomates. My 9 hours is not good consecutive sleep.I'm in bed for 12 or more hours just to get 8 or less hours of sleep        CAFFEINE AND OTHER  SUBSTANCES:    Patient consumes caffeinated beverages per day:  0  Last caffeine use is usually:    List of any prescribed or over the counter stimulants that patient takes:    List of any prescribed or over the counter sleep medication patient takes: melatonin 10mg  Belsomra 15mg Doxepin 10mg  Divalproex 750mg  List of previous sleep medications that patient has tried: don't have list  Patient drinks alcohol to help them sleep: No  Patient drinks alcohol near bedtime: No    Family History:  Patient has a family member been diagnosed with a sleep disorder: No            SCALES:    EPWORTH SLEEPINESS SCALE         4/19/2024     2:41 PM    Fowler Sleepiness Scale ( LUIS Lucero  [id number]<br>ESS - [place]/English - Final version - 21 Nov 07 - [facility] [place].)   Sitting and reading Would never doze   Watching TV Would never doze   Sitting, inactive in a public place (e.g. a theatre or a meeting) Would never doze   As a passenger in a car for an hour without a break Would never doze   Lying down to rest in the afternoon when circumstances permit Would never doze   Sitting and talking to someone Would never doze   Sitting quietly after a lunch without alcohol Would never doze   In a car, while stopped for a few minutes in traffic Would never doze   Fowler Score (MC) 0   Fowler Score (Sleep) 0         INSOMNIA SEVERITY INDEX (KAYLEE)          4/19/2024     2:24 PM   Insomnia Severity Index (KAYLEE)   Difficulty falling asleep 4   Difficulty staying asleep 3   Problems waking up too early 0   How SATISFIED/DISSATISFIED are you with your CURRENT sleep pattern? 4   How NOTICEABLE to others do you think your sleep problem is in terms of impairing the quality of your life? 0   How WORRIED/DISTRESSED are you about your current sleep problem? 4   To what extent do you consider your sleep problem to INTERFERE with your daily functioning (e.g. daytime fatigue, mood, ability to function at work/daily chores, concentration, memory,  "mood, etc.) CURRENTLY? 4   KALYEE Total Score 19       Guidelines for Scoring/Interpretation:  Total score categories:  0-7 = No clinically significant insomnia   8-14 = Subthreshold insomnia   15-21 = Clinical insomnia (moderate severity)  22-28 = Clinical insomnia (severe)  Used via courtesy of www.[employer].va.gov with permission from Kody Morgan PhD., [facility]      STOP BANG         4/19/2024     2:43 PM   STOP BANG Questionnaire (  2008, the American Society of Anesthesiologists, Inc. [employer]   1. Snoring - Do you snore loudly (louder than talking or loud enough to be heard through closed doors)? Yes   2. Tired - Do you often feel tired, fatigued, or sleepy during daytime? Yes   3. Observed - Has anyone observed you stop breathing during your sleep? No   4. Blood pressure - Do you have or are you being treated for high blood pressure? Yes   5. BMI - BMI more than 35 kg/m2? Yes   6. Age - Age over 50 yr old? No   7. Neck circumference - Neck circumference greater than 40 cm? No   8. Gender - Gender male? No   STOP BANG Score (MC): 5 (High risk of ALAINA)         GAD7         No data to display                  CAGE-AID        2/7/2019    10:00 AM   CAGE-AID Flowsheet   Have you ever felt you should Cut down on your drinking or drug use?    Have people Annoyed you by criticizing your drinking or drug use?    Have you ever felt bad or Guilty about your drinking or drug use?    Have you ever had a drink or used drugs first thing in the morning to steady your nerves or to get rid of a hangover? (Eye opener)    CAGE-AID SCORE        Information is confidential and restricted. Go to Review Flowsheets to unlock data.       CAGE-AID reprinted with permission from the [place] Medical Journal, AMINA Henriquez. and QI Cornejo, \"Conjoint screening questionnaires for alcohol and drug abuse\" [place] Medical Journal 94: 135-140, 1995.      PATIENT HEALTH QUESTIONNAIRE-9 (PHQ - 9)        " 12/17/2019     2:30 PM   PHQ-9 ([employer])   1.  Little interest or pleasure in doing things 1   2.  Feeling down, depressed, or hopeless 2   3.  Trouble falling or staying asleep, or sleeping too much 1   4.  Feeling tired or having little energy 3   5.  Poor appetite or overeating 0   6.  Feeling bad about yourself - or that you are a failure or have let yourself or your family down 0   7.  Trouble concentrating on things, such as reading the newspaper or watching television 2   8.  Moving or speaking so slowly that other people could have noticed. Or the opposite - being so fidgety or restless that you have been moving around a lot more than usual 2   9.  Thoughts that you would be better off dead, or of hurting yourself in some way 0   PHQ-9 Total Score 11   6.  Feeling bad about yourself 0   7.  Trouble concentrating 2   8.  Moving slowly or restless 2   9.  Suicidal or self-harm thoughts 0       Developed by Yohan Parker, Sushma Patel, Tres Bernardo and colleagues, with an educational bijal from [employer] No permission required to reproduce, translate, display or distribute.        Allergies:    No Known Allergies    Medications:    Current Outpatient Medications   Medication Sig Dispense Refill    acetaminophen (TYLENOL) 325 MG tablet Take 650 mg by mouth every 4 hours as needed for pain or fever Do not exceed 4000mg in 24 hours (factor acetaminophen from all sources)      aspirin 325 MG tablet Take 325 mg by mouth daily       benzocaine-menthol (CHLORASEPTIC) 6-10 MG lozenge Place 1 lozenge inside cheek every 2 hours as needed for moderate pain      BISMATROL 262 MG chewable tablet Take 262-524 mg by mouth 3 times daily as needed for pain In stomach      bismuth subsalicylate (PEPTO BISMOL) 262 MG/15ML suspension Take 20 mLs by mouth every 6 hours as needed for indigestion      calcium carbonate (TUMS) 500 MG chewable tablet Take 1-2 chew tab by mouth 3 times daily as needed for heartburn       cetirizine (ZYRTEC) 10 MG tablet Take 10 mg by mouth daily      DEEP SEA NASAL SPRAY 0.65 % nasal spray       divalproex sodium extended-release (DEPAKOTE ER) 500 MG 24 hr tablet Take 1 tablet (500 mg) by mouth daily 90 tablet 0    docusate sodium (COLACE) 100 MG capsule Take 100 mg by mouth daily as needed for constipation      fluticasone (FLONASE) 50 MCG/ACT nasal spray Spray 2 sprays into both nostrils daily       gabapentin (NEURONTIN) 100 MG capsule Take 100 mg by mouth 2 times daily as needed (FOR PAIN)      guaiFENesin (ROBITUSSIN) 100 MG/5ML SYRP Take 20 mLs by mouth every 4 hours as needed for cough      hypromellose (ARTIFICIAL TEARS) 0.5 % SOLN ophthalmic solution Place 1-2 drops into both eyes every hour as needed for dry eyes      levonorgestrel (MIRENA) 20 MCG/24HR IUD 20 mcg by Intrauterine route      lisinopril (PRINIVIL/ZESTRIL) 20 MG tablet Take 1 tablet (20 mg) by mouth daily 90 tablet 1    LORazepam (ATIVAN) 0.5 MG tablet       magnesium hydroxide (MILK OF MAGNESIA) 400 MG/5ML suspension Take 60 mLs by mouth daily as needed      magnesium oxide (MAG-OX) 400 (241.3 Mg) MG tablet Take 400 mg by mouth daily      melatonin 5 MG PO tablet Take 1 tablet (5 mg) by mouth nightly as needed for sleep      menthol (COUGH DROP) 7 MG LOZG Take 1 lozenge by mouth every hour as needed for cough      Menthol, Topical Analgesic, 4 % GEL Apply 1 Application topically every 12 hours as needed (pain) To back and shoulders      Multiple Vitamins-Minerals (ICAPS AREDS FORMULA) TABS Take 1 tablet by mouth 2 times daily 10am and 8pm      ondansetron (ZOFRAN-ODT) 4 MG ODT tab Take 1-2 tablets (4-8 mg) by mouth every 8 hours as needed for nausea Dissolve ON the tongue. 10 tablet 3    oxyCODONE-acetaminophen (PERCOCET) 5-325 MG tablet Take 1-2 tablets by mouth every 6 hours as needed for pain (moderate to severe) 12 tablet 0    polyethylene glycol (MIRALAX/GLYCOLAX) packet Take 1 packet by mouth daily as needed for  constipation Mixed with 8 ounces of water      psyllium (METAMUCIL/KONSYL) 58.6 % powder Take 1 teaspoonful by mouth 3 times daily as needed for constipation In 6-8 oz juice or water      Riboflavin 400 MG TABS Take 4 tablets by mouth Totaling 400mg per day      sertraline (ZOLOFT) 100 MG tablet Take 1 tablet (100 mg) by mouth daily 90 tablet 0    traZODone (DESYREL) 100 MG tablet Take 1.5 tablets (150 mg) by mouth At Bedtime 135 tablet 0    triamcinolone (KENALOG) 0.1 % external cream Apply topically 2 times daily      Vitamin D, Cholecalciferol, 1000 units TABS Take 1,000 Units by mouth daily      Vitamin D, Cholecalciferol, 25 MCG (1000 UT) TABS Take 1,000 Units by mouth daily      atorvastatin (LIPITOR) 40 MG tablet Take 1 tablet (40 mg) by mouth daily (Patient not taking: Reported on 9/1/2023) 90 tablet 1    diphenhydrAMINE (DIPHENHIST) 25 MG capsule Take 2 capsules (50 mg) by mouth daily (Patient not taking: Reported on 9/1/2023) 30 capsule 0    nitroFURantoin macrocrystal-monohydrate (MACROBID) 100 MG capsule Take 1 capsule (100 mg) by mouth 2 times daily (Patient not taking: Reported on 4/17/2023) 6 capsule 0       Problem List:  Patient Active Problem List    Diagnosis Date Noted    S/P laparoscopic hysterectomy 07/28/2023     Priority: Medium    Metrorrhagia 04/17/2023     Priority: Medium    S/P appendectomy 08/05/2020     Priority: Medium    Aphasia 11/22/2018     Priority: Medium    Iron deficiency anemia due to chronic blood loss 10/09/2018     Priority: Medium    Hyperlipidemia LDL goal <100 10/02/2018     Priority: Medium    Obesity (BMI 35.0-39.9) with comorbidity (H) 09/14/2018     Priority: Medium    Acute CVA (cerebrovascular accident) (H) 08/25/2018     Priority: Medium    Benign essential hypertension 08/22/2018     Priority: Medium    Acute ischemic stroke (H) 08/07/2018     Priority: Medium    Hypertensive urgency 03/28/2018     Priority: Medium        Past Medical/Surgical History:  Past  Medical History:   Diagnosis Date    Anxiety     Chronic kidney disease     Depressive disorder     Hypertension     Stroke (H) 2018     Past Surgical History:   Procedure Laterality Date    LAPAROSCOPIC APPENDECTOMY N/A 2020    Procedure: APPENDECTOMY, LAPAROSCOPIC;  Surgeon: Best Cunningham MD;  Location: [facility] OR    LAPAROSCOPIC HYSTERECTOMY TOTAL, SALPINGECTOMY BILATERAL Bilateral 2023    TLH with BL salpingectomy secondary to metrorrhagia, dysmenorrhea, and uterine fibroids       Social History:  Social History     Socioeconomic History    Marital status: Single     Spouse name: Not on file    Number of children: Not on file    Years of education: Not on file    Highest education level: Not on file   Occupational History    Not on file   Tobacco Use    Smoking status: Former     Current packs/day: 0.00     Types: Cigarettes     Quit date: 2018     Years since quittin.7    Smokeless tobacco: Never   Vaping Use    Vaping status: Never Used   Substance and Sexual Activity    Alcohol use: No    Drug use: No    Sexual activity: Not Currently   Other Topics Concern    Not on file   Social History Narrative    Not on file     Social Determinants of Health     Financial Resource Strain: Not on file   Food Insecurity: Not on file   Transportation Needs: Not on file   Physical Activity: Not on file   Stress: Not on file   Social Connections: Not on file   Interpersonal Safety: Not on file   Housing Stability: Not on file       Family History:  No family history on file.    Review of Systems:  A complete review of systems reviewed by me is negative with the exeption of what has been mentioned in the history of present illness.  In the last TWO WEEKS have you experienced any of the following symptoms?  Fevers: No  Night Sweats: No  Weight Gain: No  Pain at Night: No  Double Vision: No  Changes in Vision: No  Difficulty Breathing through Nose: No  Sore Throat in Morning: No  Dry Mouth in the  "Morning: Yes  Shortness of Breath Lying Flat: No  Shortness of Breath With Activity: No  Awakening with Shortness of Breath: No  Increased Cough: No  Heart Racing at Night: No  Swelling in Feet or Legs: No  Diarrhea at Night: No  Heartburn at Night: No  Urinating More than Once at Night: Yes  Losing Control of Urine at Night: No  Joint Pains at Night: No  Headaches in Morning: Yes  Weakness in Arms or Legs: No  Depressed Mood: No  Anxiety: Yes     Physical Examination:  Vitals: /82   Pulse 88   Ht 1.6 m (5' 3\")   Wt 112.9 kg (248 lb 12.8 oz)   LMP 09/24/2018   SpO2 96%   BMI 44.07 kg/m    BMI= Body mass index is 44.07 kg/m .    Neck Cir (cm): 40 cm      GENERAL APPEARANCE: alert, no distress, and over weight  EYES: Eyes grossly normal to inspection, PERRL, and conjunctivae and sclerae normal  HENT: nose and mouth without ulcers or lesions and oropharynx crowded  NECK: no adenopathy, no asymmetry, masses, or scars, and trachea midline and normal to palpation  RESP: lungs clear to auscultation - no rales, rhonchi or wheezes  CV: regular rates and rhythm, normal S1 S2, no S3 or S4, and no murmur, click or rub  MS: extremities normal- no gross deformities noted  PSYCH: mentation appears normal and affect normal/bright  Mallampati Class: II.  Tonsillar Stage: 1  hidden by pillars.         Data: All pertinent previous laboratory data reviewed     Recent Labs   Lab Test 04/03/24  1433 03/07/23  1324    141   POTASSIUM 4.7 5.1   CHLORIDE 104 105   CO2 26 27   ANIONGAP 10 9   GLC 91 122*   BUN 20.2* 22.0*   CR 1.20* 1.29*   LULY 9.6 9.4       Recent Labs   Lab Test 04/03/24  1433 03/07/23  1324   WBC  --  9.3   RBC  --  4.70   HGB 13.6 13.5   HCT  --  43.7   MCV  --  93   MCH  --  28.7   MCHC  --  30.9*   RDW  --  12.6   PLT  --  288       Recent Labs   Lab Test 03/07/23  1324 04/02/21  0805   PROTTOTAL  --  7.0   ALBUMIN 4.2 3.8   BILITOTAL  --  0.2   ALKPHOS  --  96.00   AST  --  24   ALT  --  17       TSH " "  Date Value   04/02/2021 2.89 mcU/mL   08/26/2018 0.87 mU/L       No results found for: \"UAMP\", \"UBARB\", \"BENZODIAZEUR\", \"UCANN\", \"UCOC\", \"OPIT\", \"UPCP\"    Ferritin   Date/Time Value Ref Range Status   11/07/2018 08:15  (H) 8 - 252 ng/mL Final       No results found for: \"PH\", \"PHARTERIAL\", \"PO2\", \"MH0ZVPANJIE\", \"SAT\", \"PCO2\", \"HCO3\", \"BASEEXCESS\", \"JOE\", \"BEB\"    @LABRCNTIPR(phv:4,pco2v:4,po2v:4,hco3v:4,iesha:4,o2per:4)@    Echocardiology: No results found for this or any previous visit (from the past 4320 hour(s)).    Chest x-ray: No results found for this or any previous visit from the past 365 days.      Chest CT: No results found for this or any previous visit from the past 365 days.      PFT: Most Recent [facility] Pulmonary Function Testing    No results found for: \"20001\"  No results found for: \"20002\"  No results found for: \"20003\"  No results found for: \"20015\"  No results found for: \"20016\"  No results found for: \"20027\"  No results found for: \"20028\"  No results found for: \"20029\"  No results found for: \"20079\"  No results found for: \"20080\"  No results found for: \"20081\"  No results found for: \"20335\"  No results found for: \"20105\"  No results found for: \"20053\"  No results found for: \"20054\"  No results found for: \"20055\"      Taran Hicks, [facility] 4/19/2024           "

## 2024-04-19 NOTE — PATIENT INSTRUCTIONS
[facility]  Cognitive Behavioral Therapy for Insomnia       Core Sleep Training Module    Now that you understand a bit more about how sleep works and what causes insomnia, you are ready to begin CBT-I Core Sleep Training.  This process involves five key strategies that will:    Strengthen your sleep drive and circadian sleep rhythm  Strengthen the link between your bed and sleep    It will be important that you continue to keep track of your sleep using your Insomnia  Ron or your paper sleep diary.      Core Sleep Strategies    Much like physical activity and healthy eating strengthens our body, studies show that the following Core strategies are key to achieving stronger, healthier sleep. The focus is on quality of sleep (not quantity) and improving how you feel during the day.     Reduce Your Time in Bed    Spending extra time in bed trying to get more sleep can cause more sleep disruption and weaken sleep efficiency. Sleep efficiency is simply the percentage of time a person spends asleep while in bed. Normal sleep efficiency is thought to be 85% or greater.  By reducing your time in bed, you will be awake longer during the day leading to quicker and deeper sleep at night. This strategy does not reduce the amount of sleep you get, just the time you are awake in bed.                                             Your provider has recommended the following initial sleep schedule determined by your  estimate of average sleep time over the past two weeks plus 30 minutes.  It also consider the best time for you to sleep.     Your Sleep Schedule Prescription                       Total Time in Bed:  8 hours                     Bedtime:  No earlier than 11                      Wake-up Time:  Out of bed every day by 7      Don't go to bed until you feel sleepy (not tired or fatigued)     This helps increase your sleep drive by keeping you awake longer.  If you go to bed when you're not sleepy, it gives your  brain the wrong message and can lead to frustration.     If you feel sleepy before your prescribed bedtime, find activities that can help you stay awake until it is time for you to go to bed. Even brief naps in the evening can be very disruptive of sleep at night.      Don't stay in bed unless you are sleeping      If you are unable to fall asleep or return to sleep after about 30 minutes, get out of bed. This helps train your brain that the bed is for sleep. It is harmful to your sleep when you are worried or frustrated in bed. Do not read, eat, worry, think about sleep, use mobile phones or tablets, or watch TV in bed. Do not watch the clock during the night.     Go to another room and do something relaxing. Plan things you can do when you get out of bed. Avoid use of mobile devices or computers when out of bed.    Return to bed only when you feel sleepy again.  Repeat as often as needed throughout the night.      Get out of bed at the same time every day    Getting up at the same time helps set your biological clock. It is important to stick to your wake time no matter how much sleep you got the night before or how you feel in the morning.    Varying your wake can have the same effect as jet lag.  It disrupts your biological clock and makes you feel more tired and exhausted.  Trying to  catch up  on sleep on the weekends only makes things worse and sets you up for a cycle of poor sleep the following weekdays.      Make sure you set an alarm and keep it away from the bed to prevent looking at the clock during the night.       Avoid daytime napping    Avoid daytime napping if possible. Napping partially fulfills your need for sleep and weakens your sleep drive at night.    However, if you find yourself sleepy (not just tired) you can take a brief 15-30-minute nap during the day if needed.  Naps within 7-8 hours of your prescribed wake time are less likely to affect your sleep the coming night.  Sleepy people make  more mistakes and may hurt themselves or others. Therefore, safety trumps all other sleep prescriptions.  Never drive or operate equipment if drowsy or sleepy.     Changing Your Sleep Schedule Prescription    After a week, you can adjust your sleep schedule prescription based on how well you are sleeping. Use the following guidelines to change your prescribed time in bed based on information from your Insomnia  jillian or paper sleep diary.          Increase Time in Bed by 15 Minutes IF:    Your Insomnia  jillian progress tracker estimates that your sleep efficiency has been greater than 90% over the past week (press Progress icon on the home screen and swipe left for this value).    OR you are falling asleep in less than 30 minutes AND awake less than 30 minutes during the night.              Decrease Time in Bed by 15 Minutes IF:    Your Insomnia  jillian sleep diary progress tracker estimates that your sleep efficiency has been less than 80% over the past week (press Progress icon on the home screen and swipe left for this value).    OR it is taking longer than 30 minutes to fall asleep OR you are awake more than 30 minutes during the night.      DO NOT decrease your sleep schedule below 5.5  hours     Change is Challenging    Research shows that making significant changes in sleep habits improves sleep quality and how you feel. Improvement takes time and is not always steady. Change is challenging and can be stressful.  This is especially true if old habits - like spending more time in bed -- were a way to avoid worry about getting enough sleep.

## 2024-04-23 ENCOUNTER — TELEPHONE (OUTPATIENT)
Dept: SLEEP MEDICINE | Facility: CLINIC | Age: 49
End: 2024-04-23
Payer: COMMERCIAL

## 2024-04-23 NOTE — TELEPHONE ENCOUNTER
Spoke with nurse. Patient takes ativan, melatonin and belsomra for sleep. Please advise if all three are ok to take night of study.    Manisha ROBERTSON RN  [facility]

## 2024-04-23 NOTE — TELEPHONE ENCOUNTER
General Call    Contacts         Type Contact Phone/Fax    04/23/2024 01:54 PM CDT Phone (Incoming) Geneva (Care Coordinator) [phone]     Geneva from [facility]          Reason for Call:  medication question    What are your questions or concerns:  [facility] is wondering if she should take her regular sleep meds before her sleep study.    Date of last appointment with provider: NA    Could we send this information to you in [facility] or would you prefer to receive a phone call?:   Patient would prefer a phone call   Okay to leave a detailed message?: Yes at Other phone number:  [phone] [place] (nurse at [facility])*

## 2024-04-24 NOTE — TELEPHONE ENCOUNTER
Group home nurse will pack all night time medications for patient to take after arrival.     Manisha ROBERTSON RN  [facility]

## 2024-05-21 ENCOUNTER — LAB (OUTPATIENT)
Dept: LAB | Facility: CLINIC | Age: 49
End: 2024-05-21
Payer: COMMERCIAL

## 2024-05-21 DIAGNOSIS — E78.5 HYPERLIPEMIA: ICD-10-CM

## 2024-05-21 DIAGNOSIS — N18.31 CHRONIC KIDNEY DISEASE (CKD) STAGE G3A/A1, MODERATELY DECREASED GLOMERULAR FILTRATION RATE (GFR) BETWEEN 45-59 ML/MIN/1.73 SQUARE METER AND ALBUMINURIA CREATININE RATIO LESS THAN 30 MG/G (H): Primary | ICD-10-CM

## 2024-05-21 DIAGNOSIS — E66.01 MORBID OBESITY (H): ICD-10-CM

## 2024-05-21 LAB
ALBUMIN SERPL BCG-MCNC: 4.1 G/DL (ref 3.5–5.2)
ALP SERPL-CCNC: 127 U/L (ref 40–150)
ALT SERPL W P-5'-P-CCNC: 17 U/L (ref 0–50)
ANION GAP SERPL CALCULATED.3IONS-SCNC: 9 MMOL/L (ref 7–15)
AST SERPL W P-5'-P-CCNC: 20 U/L (ref 0–45)
BILIRUB SERPL-MCNC: 0.3 MG/DL
BUN SERPL-MCNC: 18.8 MG/DL (ref 6–20)
CALCIUM SERPL-MCNC: 9.1 MG/DL (ref 8.6–10)
CHLORIDE SERPL-SCNC: 105 MMOL/L (ref 98–107)
CHOLEST SERPL-MCNC: 217 MG/DL
CREAT SERPL-MCNC: 1.21 MG/DL (ref 0.51–0.95)
DEPRECATED HCO3 PLAS-SCNC: 26 MMOL/L (ref 22–29)
EGFRCR SERPLBLD CKD-EPI 2021: 54 ML/MIN/1.73M2
ERYTHROCYTE [DISTWIDTH] IN BLOOD BY AUTOMATED COUNT: 12.9 % (ref 10–15)
FASTING STATUS PATIENT QL REPORTED: NO
FASTING STATUS PATIENT QL REPORTED: NO
GLUCOSE SERPL-MCNC: 102 MG/DL (ref 70–99)
HBA1C MFR BLD: 5.3 %
HCT VFR BLD AUTO: 41.5 % (ref 35–47)
HDLC SERPL-MCNC: 35 MG/DL
HGB BLD-MCNC: 13.5 G/DL (ref 11.7–15.7)
LDLC SERPL CALC-MCNC: 126 MG/DL
MCH RBC QN AUTO: 29.7 PG (ref 26.5–33)
MCHC RBC AUTO-ENTMCNC: 32.5 G/DL (ref 31.5–36.5)
MCV RBC AUTO: 91 FL (ref 78–100)
NONHDLC SERPL-MCNC: 182 MG/DL
PLATELET # BLD AUTO: 230 10E3/UL (ref 150–450)
POTASSIUM SERPL-SCNC: 4.7 MMOL/L (ref 3.4–5.3)
PROT SERPL-MCNC: 7.1 G/DL (ref 6.4–8.3)
RBC # BLD AUTO: 4.55 10E6/UL (ref 3.8–5.2)
SODIUM SERPL-SCNC: 140 MMOL/L (ref 135–145)
TRIGL SERPL-MCNC: 282 MG/DL
WBC # BLD AUTO: 7.1 10E3/UL (ref 4–11)

## 2024-05-21 PROCEDURE — 85027 COMPLETE CBC AUTOMATED: CPT

## 2024-05-21 PROCEDURE — 36415 COLL VENOUS BLD VENIPUNCTURE: CPT

## 2024-05-21 PROCEDURE — 80053 COMPREHEN METABOLIC PANEL: CPT

## 2024-05-21 PROCEDURE — 80061 LIPID PANEL: CPT

## 2024-05-21 PROCEDURE — 83036 HEMOGLOBIN GLYCOSYLATED A1C: CPT

## 2024-05-29 ENCOUNTER — THERAPY VISIT (OUTPATIENT)
Dept: SLEEP MEDICINE | Facility: CLINIC | Age: 49
End: 2024-05-29
Payer: COMMERCIAL

## 2024-05-29 DIAGNOSIS — R29.818 SUSPECTED SLEEP APNEA: ICD-10-CM

## 2024-05-29 DIAGNOSIS — R06.83 SNORING: ICD-10-CM

## 2024-05-29 DIAGNOSIS — G47.10 HYPERSOMNIA, UNSPECIFIED: ICD-10-CM

## 2024-05-29 PROCEDURE — 95810 POLYSOM 6/> YRS 4/> PARAM: CPT | Performed by: INTERNAL MEDICINE

## 2024-06-10 ENCOUNTER — DOCUMENTATION ONLY (OUTPATIENT)
Dept: SLEEP MEDICINE | Facility: CLINIC | Age: 49
End: 2024-06-10
Payer: COMMERCIAL

## 2024-06-10 DIAGNOSIS — G47.33 OSA (OBSTRUCTIVE SLEEP APNEA): Primary | ICD-10-CM

## 2024-06-10 LAB — SLPCOMP: NORMAL

## 2024-06-10 PROCEDURE — 95810 POLYSOM 6/> YRS 4/> PARAM: CPT | Performed by: INTERNAL MEDICINE

## 2024-06-10 NOTE — PROCEDURES
" SLEEP STUDY INTERPRETATION  DIAGNOSTIC POLYSOMNOGRAPHY REPORT      Patient: ZAIN SEXTON  YOB: 1975  Study Date: 5/29/2024  MRN: [id number]  Referring Provider: Kyra Malave  Ordering Provider: Laith Young PA-C    Indications for Polysomnography: The patient is a 48 year old Female who is 5' 3\" and weighs 248.0 lbs. Her BMI is 43.9, Garber sleepiness scale 0/24 and neck circumference is 40 cm. Relevant medical history includes (CVA, anxiety, HTN, iron def anemia). A diagnostic polysomnogram was performed to evaluate for sleep apnea/PLMS/RLS/RBD/S-S/CSA/hypoventilation/hypoxemia.    Polysomnogram Data: A full night polysomnogram recorded the standard physiologic parameters including EEG, EOG, EMG, ECG, nasal and oral airflow. Respiratory parameters of chest and abdominal movements were recorded with respiratory inductance plethysmography. Oxygen saturation was recorded by pulse oximetry. Hypopnea scoring rule used: 1B 4%.    Sleep Architecture: Summary assessment  Prolonged sleep latency and several arousals related to obstructive events. The total recording time of the polysomnogram was 511.0 minutes. The total sleep time was 355.5 minutes. Sleep latency was increased at 122.5 minutes with the use of melatonin and gabapentin.. REM latency was 152.0 minutes. Arousal index was increased at 57.6 arousals per hour. Sleep efficiency was decreased at 69.6%. Wake after sleep onset was 33.0 minutes. The patient spent 10.8% of total sleep time in Stage N1, 52.9% in Stage N2, 21.9% in Stage N3, and 14.3% in REM. Time in REM supine was 51.0 minutes.    Respiration: Summary assessment  Events ? The polysomnogram revealed a presence of 59 obstructive, 7 central, and 6 mixed apneas resulting in an apnea index of 12.2 events per hour. There were 71 obstructive hypopneas and - central hypopneas resulting in an obstructive hypopnea index of 12.0 and central hypopnea index of - events per hour. The combined " apnea/hypopnea index was 24.1 events per hour (central apnea/hypopnea index was 1.2 events per hour). The REM AHI was 35.3 events per hour. The supine AHI was 23.8 events per hour. The RERA index was 6.2 events per hour.  The RDI was 30.4 events per hour.  Snoring - was reported as moderate  Respiratory rate and pattern - was notable for normal respiratory rate and pattern.  Sustained Sleep Associated Hypoventilation - Transcutaneous carbon dioxide monitoring was not used, however significant hypoventilation was not suggested by oximetry.  Sleep Associated Hypoxemia - (Greater than 5 minutes O2 sat at or below 88%) was present but not persistent. Baseline oxygen saturation was 92.8%. Lowest oxygen saturation was 82.0%. Time spent less than or equal to 88% was 6.8 minutes. Time spent less than or equal to 89% was 12.4 minutes.    Movement Activity: Summary assessment  Periodic Limb Activity - There were 0 PLMs during the entire study. The PLM index was 0 movements per hour. The PLM Arousal Index was 0 per hour.  REM EMG Activity - Excessive transient/sustained muscle activity was not present.  Nocturnal Behavior - Abnormal sleep related behaviors were not noted during/arising out of NREM / REM sleep.   Bruxism - None apparent.    Cardiac Summary: Summary assessment  The average pulse rate was 65.1 bpm. The minimum pulse rate was 50.0 bpm while the maximum pulse rate was 88.0 bpm.  Arrhythmias were not noted.      Assessment:   This study showed moderate obstructive sleep apnea.  There was no significant persistent sleep hypoxemia (T88% 6 minutes).  There was prolonged sleep latency and increased arousals related to obstructive events.      Recommendations:  Based on the presence of mild/moderate obstructive sleep apnea and excessive daytime sleepiness, treatment could be empirically initiated with Auto?titrating PAP therapy with a range of 5 to 15 cmH2O. Recommend clinical follow up with sleep management  team.  Patient may be a candidate for dental appliance through referral to Sleep Dentistry for the treatment of obstructive sleep apnea and/or socially disruptive snoring.  If devices are not acceptable or effective, patient may benefit from evaluation of possible surgical options. If she is interested, would recommend referral to specialized ENT-Sleep provider.  Advice regarding the risks of drowsy driving.  Suggest optimizing sleep schedule and avoiding sleep deprivation.  Weight management (if BMI > 30).  Pharmacologic therapy should be used for management of restless legs syndrome only if present and clinically indicated and not based on the presence of periodic limb movements alone.    Diagnostic Codes:   Obstructive Sleep Apnea G47.33  Sleep Hypoxemia/Hypoventilation G47.36   Unspecified Sleep Disturbance G47.9    5/29/2024 [place] [facility] Sleep Study (248.0 lbs) - AHI 24.1, RDI 30.4, Supine AHI 23.8, REM AHI 35.3, Low O2 82.0%, Time Spent ?88% 6.8 minutes / Time Spent ?89% 12.4 minutes.     _____________________________________   Electronically Signed By:  (6/10/2024)           Range(%) Time in range (min)   0.0 - 89.0 12.4   0.0 - 88.0 6.8         Stage Min(mm Hg) Max(mm Hg)   Wake - -   NREM(1+2+3) - -   REM - -       Range(mmHg) Time in range (min)   55.0 - 100.0 -   Excluded data <20.0 & >65.0 511.0

## 2024-09-19 ENCOUNTER — OFFICE VISIT (OUTPATIENT)
Dept: SLEEP MEDICINE | Facility: CLINIC | Age: 49
End: 2024-09-19
Payer: COMMERCIAL

## 2024-09-19 VITALS
DIASTOLIC BLOOD PRESSURE: 80 MMHG | OXYGEN SATURATION: 95 % | HEIGHT: 63 IN | BODY MASS INDEX: 43.48 KG/M2 | RESPIRATION RATE: 16 BRPM | WEIGHT: 245.4 LBS | SYSTOLIC BLOOD PRESSURE: 113 MMHG | HEART RATE: 85 BPM

## 2024-09-19 DIAGNOSIS — G47.33 OSA (OBSTRUCTIVE SLEEP APNEA): Primary | ICD-10-CM

## 2024-09-19 PROCEDURE — 99214 OFFICE O/P EST MOD 30 MIN: CPT | Performed by: PHYSICIAN ASSISTANT

## 2024-09-19 NOTE — PATIENT INSTRUCTIONS
Your Body mass index is 43.47 kg/m .  Weight management is a personal decision.  If you are interested in exploring weight loss strategies, the following discussion covers the approaches that may be successful. Body mass index (BMI) is one way to tell whether you are at a healthy weight, overweight, or obese. It measures your weight in relation to your height.  A BMI of 18.5 to 24.9 is in the healthy range. A person with a BMI of 25 to 29.9 is considered overweight, and someone with a BMI of 30 or greater is considered obese. More than two-thirds of American adults are considered overweight or obese.  Being overweight or obese increases the risk for further weight gain. Excess weight may lead to heart disease and diabetes.  Creating and following plans for healthy eating and physical activity may help you improve your health.  Weight control is part of healthy lifestyle and includes exercise, emotional health, and healthy eating habits. Careful eating habits lifelong are the mainstay of weight control. Though there are significant health benefits from weight loss, long-term weight loss with diet alone may be very difficult to achieve- studies show long-term success with dietary management in less than 10% of people. Attaining a healthy weight may be especially difficult to achieve in those with severe obesity. In some cases, medications, devices and surgical management might be considered.  What can you do?  If you are overweight or obese and are interested in methods for weight loss, you should discuss this with your provider.   Consider reducing daily calorie intake by 500 calories.   Keep a food journal.   Avoiding skipping meals, consider cutting portions instead.    Diet combined with exercise helps maintain muscle while optimizing fat loss. Strength training is particularly important for building and maintaining muscle mass. Exercise helps reduce stress, increase energy, and improves fitness. Increasing  exercise without diet control, however, may not burn enough calories to loose weight.     Start walking three days a week 10-20 minutes at a time  Work towards walking thirty minutes five days a week   Eventually, increase the speed of your walking for 1-2 minutes at time    In addition, we recommend that you review healthy lifestyles and methods for weight loss available through the National Institutes of Health patient information sites:  http://win.niddk.nih.gov/publications/index.htm    And look into health and wellness programs that may be available through your health insurance provider, employer, local community center, or [employer].

## 2024-09-19 NOTE — NURSING NOTE
DME orders have been automatically faxed to [facility].  Lolis Sharma, CMA

## 2024-09-19 NOTE — PROGRESS NOTES
Sleep Study Follow-Up Visit:    Date on this visit: 9/19/2024    Alicia Penaloza comes in today for follow-up of her sleep study done on 5/29/2024 at the [facility] [place]. A diagnostic polysomnogram was performed to evaluate for sleep apnea.    Polysomnogram as interpreted by Dr. Levi  Sleep Architecture:   Prolonged sleep latency and several arousals related to obstructive events. The total recording time of the polysomnogram was 511.0 minutes. The total sleep time was 355.5 minutes. Sleep latency was increased at 122.5 minutes without the use of a sleep aid. REM latency was 152.0 minutes. Arousal index was increased at 57.6 arousals per hour. Sleep efficiency was decreased at 69.6%. Wake after sleep onset was 33.0 minutes. The patient spent 10.8% of total sleep time in Stage N1, 52.9% in Stage N2, 21.9% in Stage N3, and 14.3% in REM. Time in REM supine was 51.0 minutes.    Respiration: Summary assessment   Events ? The polysomnogram revealed a presence of 59 obstructive, 7 central, and 6 mixed apneas resulting in an apnea index of 12.2 events per hour. There were 71 obstructive hypopneas and - central hypopneas resulting in an obstructive hypopnea index of 12.0 and central hypopnea index of - events per hour. The combined apnea/hypopnea index was 24.1 events per hour (central apnea/hypopnea index was 1.2 events per hour). The REM AHI was 35.3 events per hour. The supine AHI was 23.8 events per hour. The RERA index was 6.2 events per hour. The RDI was 30.4 events per hour.   Snoring - was reported as moderate   Respiratory rate and pattern - was notable for normal respiratory rate and pattern.   Sustained Sleep Associated Hypoventilation - Transcutaneous carbon dioxide monitoring was not used, however significant hypoventilation was not suggested by oximetry.   Sleep Associated Hypoxemia - (Greater than 5 minutes O2 sat at or below 88%) was present but not persistent. Baseline oxygen  saturation was 92.8%. Lowest oxygen saturation was 82.0%. Time spent less than or equal to 88% was 6.8 minutes. Time spent less than or equal to 89% was 12.4 minutes.  Movement Activity: Summary assessment   Periodic Limb Activity - There were 0 PLMs during the entire study. The PLM index was 0 movements per hour. The PLM Arousal Index was 0 per hour.   REM EMG Activity - Excessive transient/sustained muscle activity was not present.   Nocturnal Behavior - Abnormal sleep related behaviors were not noted during/arising out of NREM / REM sleep.    Bruxism - None apparent.  Cardiac Summary: Summary assessment  The average pulse rate was 65.1 bpm. The minimum pulse rate was 50.0 bpm while the maximum pulse rate was 88.0 bpm. Arrhythmias were not noted.          Past medical/surgical history, family history, social history, medications and allergies were reviewed.      Problem List:  Patient Active Problem List    Diagnosis Date Noted    S/P laparoscopic hysterectomy 07/28/2023     Priority: Medium    Metrorrhagia 04/17/2023     Priority: Medium    S/P appendectomy 08/05/2020     Priority: Medium    Aphasia 11/22/2018     Priority: Medium    Iron deficiency anemia due to chronic blood loss 10/09/2018     Priority: Medium    Hyperlipidemia LDL goal <100 10/02/2018     Priority: Medium    Obesity (BMI 35.0-39.9) with comorbidity (H) 09/14/2018     Priority: Medium    Acute CVA (cerebrovascular accident) (H) 08/25/2018     Priority: Medium    Benign essential hypertension 08/22/2018     Priority: Medium    Acute ischemic stroke (H) 08/07/2018     Priority: Medium    Hypertensive urgency 03/28/2018     Priority: Medium        Impression/Plan:    1. Moderate obstructive sleep apnea    Polysomnogram was reviewed in detail today with the patient and her caregiver, Constanza.      Treatment options discussed today including auto-CPAP, oral appliance therapy or surgical options.  Elected treatment with auto-CPAP 6-16 cm/H20  She will  follow up with me in about 3 month(s).     30 minutes spent on day of encounter doing chart review,  history and exam, counseling, coordinating plan of care, documentation and further activities as noted above.       Galina Razo PA-C  Sleep Medicine     CC: Augusto Thomas

## 2024-09-19 NOTE — NURSING NOTE
"Chief Complaint   Patient presents with    Study Results       Initial /80   Pulse 85   Resp 16   Ht 1.6 m (5' 3\")   Wt 111.3 kg (245 lb 6.4 oz)   LMP 09/24/2018   SpO2 95%   BMI 43.47 kg/m   Estimated body mass index is 43.47 kg/m  as calculated from the following:    Height as of this encounter: 1.6 m (5' 3\").    Weight as of this encounter: 111.3 kg (245 lb 6.4 oz).    Medication Reconciliation: complete  ESS: 0  Neck circumference: 15.75 inches / 40 centimeters.  DME: N/A  Lolis Sharma CMA      "

## 2024-09-19 NOTE — NURSING NOTE
My Chart message sent to patient:  Alicia Penaloza  [address] [address] [place] [id number]                 2024     Dear Ms. Penaloza,                 Your provider has placed an order for you to get a new CPAP device. Your medical                    equipment company will try to contact you as soon as possible to schedule your set up.      If you don't hear from the supply company after a few weeks, their contact information is:  [facility]: [phone]  [place]: [phone]  [place]: [phone]  [phone]  [place]: [phone]  [place]: [phone]     Once you know the date of your appointment to pick-up your CPAP machine, please contact our office to schedule a follow up visit with your provider. This appointment should be scheduled 60-90 days from the day that you will be set up on your new device.            [facility]   Schedule line: [phone]        Sincerely,                 Lolis MAY CMA  [facility]

## 2024-10-09 ENCOUNTER — DOCUMENTATION ONLY (OUTPATIENT)
Dept: SLEEP MEDICINE | Facility: CLINIC | Age: 49
End: 2024-10-09
Payer: COMMERCIAL

## 2024-10-09 DIAGNOSIS — G47.33 OSA (OBSTRUCTIVE SLEEP APNEA): Primary | ICD-10-CM

## 2024-10-09 NOTE — PROGRESS NOTES
Patient was offered choice of vendor and chose [facility].  Patient Alicia Penaloza was set up at [place] on October 9, 2024. Patient received a Resmed Airsense 11 Pressures were set at  6-16 cm H2O.   Patient s ramp is 5 cm H2O for Auto and FLEX/EPR is EPR.  Patient received a Resmed Mask name: AIRFIT F30I  Full Face mask size Medium, heated tubing and heated humidifier.  Patient has the following compliance requirements: usage only  Patient has a follow up on TBD with FAYE Chinchilla

## 2024-10-14 ENCOUNTER — DOCUMENTATION ONLY (OUTPATIENT)
Dept: SLEEP MEDICINE | Facility: CLINIC | Age: 49
End: 2024-10-14
Payer: COMMERCIAL

## 2024-10-14 NOTE — PROGRESS NOTES
3 day Sleep therapy management telephone visit    Diagnostic AHI:PS.1         Confirmed with patient at time of call- Yes Patient is still interested in STM service..       Subjective measures:  Patient has minimal use of CPAP she was waiting to do a mask exchange. Patient will use her CPAP tonight.         Objective data     Order Settings for PAP  CPAP min     CPAP max              Device settings from machine CPAP min 6     CPAP max 16                      Assessment: Nightly usage most nights under four hours       Patient has the following upcoming sleep appts:  Future Sleep Appointments         Provider Department    3/17/2025 2:30 PM (Arrive by 2:15 PM) Galina Razo PA [facility] [place]            Replacement device: No  STM ordered by provider: Yes     Total time spent on accessing and  interpreting remote patient PAP therapy data  10 minutes    Total time spent counseling, coaching  and reviewing PAP therapy data with patient  4 minutes    [id number] no

## 2024-10-24 ENCOUNTER — DOCUMENTATION ONLY (OUTPATIENT)
Dept: SLEEP MEDICINE | Facility: CLINIC | Age: 49
End: 2024-10-24
Payer: COMMERCIAL

## 2024-10-24 NOTE — PROGRESS NOTES
14 day Sleep therapy management telephone visit    Diagnostic AHI:   PS.1         SUBJECTIVE: Spoke with pts caregiver. She says Alicia hates the CPAP and refuses to put it on.       Objective measures: 14 day rolling measures   COMPLIANCE LEAK AHI AVERAGE USE IN MINUTES   28 % 26.16 8.73 266   GOAL >70% GOAL < 24 LPM GOAL <5 GOAL >240        Device settings:  CPAP MIN CPAP MAX EPR RESMED SOFT RESPONSE SETTING   6.0 cm  H20 16.0 cm  H20 TWO OFF         Patient has the following upcoming sleep appts:  Future Sleep Appointments         Provider Department    3/17/2025 2:30 PM (Arrive by 2:15 PM) Galina Razo PA [facility] [place]            Replacement device: No  STM ordered by provider: Yes     Total time spent on accessing and  interpreting remote patient PAP therapy data  10 minutes    Total time spent counseling, coaching  and reviewing PAP therapy data with patient  3 minutes

## 2024-11-02 ENCOUNTER — HEALTH MAINTENANCE LETTER (OUTPATIENT)
Age: 49
End: 2024-11-02

## 2024-11-13 ENCOUNTER — DOCUMENTATION ONLY (OUTPATIENT)
Dept: SLEEP MEDICINE | Facility: CLINIC | Age: 49
End: 2024-11-13
Payer: COMMERCIAL

## 2024-11-13 NOTE — PROGRESS NOTES
30 day Sleep therapy management telephone visit    Diagnostic AHI:   PS.1         DATA ONLY: PT HAS NO RECENT CPAP USE. PER LAST STM NOTE PT REFUSES TO WEAR CPAP MASK.     Device settings:  CPAP MIN CPAP MAX EPR RESMED SOFT RESPONSE SETTING   6.0 cm  H20 16.0 cm  H20 TWO OFF         Patient has the following upcoming sleep appts:  Future Sleep Appointments         Provider Department    2024 11:00 AM (Arrive by 10:45 AM) Wilver Bates DO [facility] [place]            Replacement device: No  STM ordered by provider: Yes     Total time spent on accessing and  interpreting remote patient PAP therapy data  10 minutes    Total time spent counseling, coaching  and reviewing PAP therapy data with patient  0 minutes

## 2025-03-27 DIAGNOSIS — E55.9 VITAMIN D DEFICIENCY: ICD-10-CM

## 2025-03-27 DIAGNOSIS — N18.32 STAGE 3B CHRONIC KIDNEY DISEASE (H): Primary | ICD-10-CM

## 2025-04-13 ENCOUNTER — HEALTH MAINTENANCE LETTER (OUTPATIENT)
Age: 50
End: 2025-04-13

## 2025-05-01 NOTE — PROGRESS NOTES
ENT Consultation    Alicia Penaloza who is a 49 year old female seen in consultation at the request of self.      History of Present Illness - Alicia Penaloza is a 49 year old female presents with the several issues.  1 is her nose and her eyes.  She gets itchy watery eyes lately now seasonally nasal congestion with very some side-to-side nonseasonal she has never allergy tested.  Flonase helps a little.  She has not tried any nasal or oral antihistamines.  Sense of smell and taste appear to be intact.  Patient also was diagnosed with moderate obstructive sleep apnea AHI of 24.1 a year ago.  There was some associated hypoxemia mild.  There was no positional predominance based on the sleep study.  Patient has not initiated any therapy nor has discussed the results of his sleep study.  Patient has history of CVA anxiety hypertension.      Body mass index is 33.66 kg/m .    Weight management plan: Patient was referred to their PCP to discuss a diet and exercise plan.    BP Readings from Last 1 Encounters:   01/03/25 119/80       BP noted to be well controlled today in office.     Alicia IS NOT a smoker/uses chewing tobacco.        Past Medical History -   Past Medical History:   Diagnosis Date    Anxiety     Chronic kidney disease     Depressive disorder     Hypertension     Stroke (H) 08/07/2018       Current Medications -   Current Outpatient Medications:     acetaminophen (TYLENOL) 325 MG tablet, Take 650 mg by mouth every 4 hours as needed for pain or fever Do not exceed 4000mg in 24 hours (factor acetaminophen from all sources), Disp: , Rfl:     aspirin 325 MG tablet, Take 325 mg by mouth daily , Disp: , Rfl:     atorvastatin (LIPITOR) 80 MG tablet, Take 80 mg by mouth daily., Disp: , Rfl:     BELSOMRA 15 MG tablet, Take 1 tablet by mouth at bedtime., Disp: , Rfl:     benzocaine-menthol (CHLORASEPTIC) 6-10 MG lozenge, Place 1 lozenge inside cheek every 2 hours as needed for moderate pain, Disp: , Rfl:      BISMATROL 262 MG chewable tablet, Take 262-524 mg by mouth 3 times daily as needed for pain In stomach, Disp: , Rfl:     bismuth subsalicylate (PEPTO BISMOL) 262 MG/15ML suspension, Take 20 mLs by mouth every 6 hours as needed for indigestion, Disp: , Rfl:     calcium carbonate (TUMS) 500 MG chewable tablet, Take 1-2 chew tab by mouth 3 times daily as needed for heartburn, Disp: , Rfl:     cetirizine (ZYRTEC) 10 MG tablet, Take 10 mg by mouth daily, Disp: , Rfl:     DEEP SEA NASAL SPRAY 0.65 % nasal spray, Spray 1 spray into both nostrils at bedtime., Disp: , Rfl:     diphenhydrAMINE (DIPHENHIST) 25 MG capsule, Take 2 capsules (50 mg) by mouth daily (Patient taking differently: Take 25 mg by mouth daily. Give 2 tabs (50 mg) by mouth every 6 hours as needed), Disp: 30 capsule, Rfl: 0    divalproex sodium extended-release (DEPAKOTE ER) 250 MG 24 hr tablet, Take 250 mg by mouth daily. Take one tablet by mouth at bedtime (along with 500 mg tablet for a total dose of 750 mg)., Disp: , Rfl:     divalproex sodium extended-release (DEPAKOTE ER) 500 MG 24 hr tablet, Take 1 tablet (500 mg) by mouth daily (Patient taking differently: Take 500 mg by mouth daily. Take one tablet by mouth at bedtime (along with 250 mg tablet for a total dose of 750 mg).), Disp: 90 tablet, Rfl: 0    docusate sodium (COLACE) 100 MG capsule, Take 100 mg by mouth daily as needed for constipation, Disp: , Rfl:     doxepin (SINEQUAN) 10 MG capsule, Take 1 capsule by mouth at bedtime., Disp: , Rfl:     famotidine (PEPCID) 20 MG tablet, Take 1 tablet by mouth 2 times daily., Disp: , Rfl:     fluticasone (FLONASE) 50 MCG/ACT nasal spray, Spray 2 sprays into both nostrils daily , Disp: , Rfl:     gabapentin (NEURONTIN) 300 MG capsule, Take 300 mg by mouth 2 times daily., Disp: , Rfl:     guaiFENesin (ROBITUSSIN) 100 MG/5ML SYRP, Take 20 mLs by mouth every 4 hours as needed for cough, Disp: , Rfl:     hydrOXYzine HCl (ATARAX) 50 MG tablet, Take 50 mg by  mouth at bedtime., Disp: , Rfl:     hypromellose (ARTIFICIAL TEARS) 0.5 % SOLN ophthalmic solution, Place 1-2 drops into both eyes every hour as needed for dry eyes, Disp: , Rfl:     levonorgestrel (MIRENA) 20 MCG/24HR IUD, 20 mcg by Intrauterine route, Disp: , Rfl:     lisinopril (ZESTRIL) 30 MG tablet, Take 30 mg by mouth daily., Disp: , Rfl:     LORazepam (ATIVAN) 0.5 MG tablet, Take 0.5 mg by mouth 2 times daily as needed for anxiety., Disp: , Rfl:     magnesium hydroxide (MILK OF MAGNESIA) 400 MG/5ML suspension, Take 60 mLs by mouth daily as needed, Disp: , Rfl:     magnesium oxide (MAG-OX) 400 (241.3 Mg) MG tablet, Take 400 mg by mouth daily, Disp: , Rfl:     melatonin 5 MG PO tablet, Take 5 mg by mouth at bedtime. Chew 2 gummies by mouth every night at bedtime, Disp: , Rfl:     menthol (COUGH DROP) 7 MG LOZG, Take 1 lozenge by mouth every hour as needed for cough, Disp: , Rfl:     Menthol, Topical Analgesic, 4 % GEL, Apply 1 Application. topically 3 times daily. To back and shoulders, Disp: , Rfl:     methocarbamol (ROBAXIN) 500 MG tablet, Take 1,000 mg by mouth at bedtime., Disp: , Rfl:     Multiple Vitamins-Minerals (ICAPS AREDS FORMULA) TABS, Take 1 tablet by mouth 2 times daily 10am and 8pm, Disp: , Rfl:     omeprazole (PRILOSEC) 20 MG DR capsule, Take 20 mg by mouth 2 times daily., Disp: , Rfl:     ondansetron (ZOFRAN-ODT) 4 MG ODT tab, Take 1-2 tablets (4-8 mg) by mouth every 8 hours as needed for nausea Dissolve ON the tongue., Disp: 10 tablet, Rfl: 3    polyethylene glycol (MIRALAX/GLYCOLAX) packet, Take 1 packet by mouth daily as needed for constipation Mixed with 8 ounces of water, Disp: , Rfl:     psyllium (METAMUCIL/KONSYL) 58.6 % powder, Take 1 teaspoonful by mouth 3 times daily as needed for constipation In 6-8 oz juice or water, Disp: , Rfl:     Riboflavin 100 MG TABS, Take 1 tablet by mouth daily., Disp: , Rfl:     sertraline (ZOLOFT) 100 MG tablet, Take 1 tablet (100 mg) by mouth daily  "(Patient taking differently: Take 200 mg by mouth daily. Take two 100 zq=155 mg capsules by mouth once daily), Disp: 90 tablet, Rfl: 0    triamcinolone (KENALOG) 0.1 % external cream, Apply topically 2 times daily, Disp: , Rfl:     Vitamin D, Cholecalciferol, 1000 units TABS, Take 1,000 Units by mouth daily, Disp: , Rfl:     Allergies - No Known Allergies    Social History -   Social History     Socioeconomic History    Marital status: Single   Tobacco Use    Smoking status: Former     Current packs/day: 0.00     Types: Cigarettes     Quit date: 2018     Years since quittin.7     Passive exposure: Never    Smokeless tobacco: Never   Vaping Use    Vaping status: Never Used   Substance and Sexual Activity    Alcohol use: No    Drug use: No    Sexual activity: Not Currently       Family History - No family history on file.    Review of Systems - As per HPI and PMHx, otherwise review of system review of the head and neck negative. Otherwise 10+ review of system is negative    Physical Exam  Ht 1.6 m (5' 3\")   Wt 86.2 kg (190 lb)   LMP 2018   BMI 33.66 kg/m    BMI: Body mass index is 33.66 kg/m .    General - The patient is well nourished and well developed, and appears to have good nutritional status.  Alert and oriented to person and place, answers questions and cooperates with examination appropriately.    SKIN - No suspicious lesions or rashes.  Respiration - No respiratory distress.  Head and Face - Normocephalic and atraumatic, with no gross asymmetry noted of the contour of the facial features.  The facial nerve is intact, with strong symmetric movements.    Voice and Breathing - The patient was breathing comfortably without the use of accessory muscles. The patients voice was clear and strong, and had appropriate pitch and quality.    Ears - Bilateral pinna and EACs with normal appearing overlying skin. Tympanic membrane intact with good mobility on pneumatic otoscopy bilaterally. Bony landmarks " of the ossicular chain are normal. The tympanic membranes are normal in appearance. No retraction, perforation, or masses.  No fluid or purulence was seen in the external canal or the middle ear.     Eyes - Extraocular movements intact.  Sclera were not icteric or injected, conjunctiva were pink and moist.    Mouth - Examination of the oral cavity showed pink, healthy oral mucosa. No lesions or ulcerations noted.  The tongue was mobile and midline, and the dentition were in good condition.      Throat - The walls of the oropharynx were smooth, pink, moist, symmetric, and had no lesions or ulcerations.  The tonsillar pillars and soft palate were symmetric.  The uvula was midline on elevation.  [place]land tongue position 2 with small papilloma seen at the very tip of the uvula.  Class I occlusion mild overbite noted.    Neck - Normal midline excursion of the laryngotracheal complex during swallowing.  Full range of motion on passive movement.  Palpation of the occipital, submental, submandibular, internal jugular chain, and supraclavicular nodes did not demonstrate any abnormal lymph nodes or masses.  The carotid pulse was palpable bilaterally.  Palpation of the thyroid was soft and smooth, with no nodules or goiter appreciated.  The trachea was mobile and midline.    Nose - External contour is symmetric, no gross deflection or scars.  Nasal mucosa is pale and moist with no abnormal mucus.  The septum was  deviated to the left and obstructive, turbinates of enlarged size and position.  No polyps, masses, or purulence noted on examination.    Neuro - Nonfocal neuro exam is normal, CN 2 through 12 intact, normal gait and muscle tone.      Performed in clinic today:  No procedures preformed in clinic today      A/P - Alicialee Penaloza is a 49 year old female patient with 2 major issues 1 is moderate obstructive sleep apnea.  Not previously treated.  We discussed treatment options: Obstructive sleep apnea.  We discussed  potential use of CPAP versus oral appliance versus other alternative options.  In regard to her obesity she will work with her primary care provider towards weight loss program.  Patient would like to try oral appliance since she does not see herself being able to tolerate CPAP very well.  Proper referral was given.  We counseled patient on proper sleep hygiene and avoidance of drowsy driving.    In regard to his nose he has she does have some structural issues but they are also allergic issues that have not been properly incompletely addressed.  Therefore allergy evaluation will be set up via referral to allergist as well as patient will be given azelastine to try in conjunction with fluticasone.  Azelastine will be used twice daily.  Patient will follow-up in a couple of months to discuss her trial of oral appliance and potentially in the future schedule home sleep apnea testing if successful trial.  Also will see how she is doing with her nose and decide on further care especially after allergy evaluation.      Casimiro Charles MD

## 2025-05-08 ENCOUNTER — VIRTUAL VISIT (OUTPATIENT)
Dept: SLEEP MEDICINE | Facility: CLINIC | Age: 50
End: 2025-05-08
Payer: COMMERCIAL

## 2025-05-08 VITALS — HEIGHT: 63 IN | WEIGHT: 190 LBS | BODY MASS INDEX: 33.66 KG/M2

## 2025-05-08 DIAGNOSIS — G47.33 OSA (OBSTRUCTIVE SLEEP APNEA): ICD-10-CM

## 2025-05-08 DIAGNOSIS — F51.04 CHRONIC INSOMNIA: Primary | ICD-10-CM

## 2025-05-08 ASSESSMENT — SLEEP AND FATIGUE QUESTIONNAIRES
HOW LIKELY ARE YOU TO NOD OFF OR FALL ASLEEP WHILE SITTING AND READING: SLIGHT CHANCE OF DOZING
HOW LIKELY ARE YOU TO NOD OFF OR FALL ASLEEP WHILE SITTING INACTIVE IN A PUBLIC PLACE: WOULD NEVER DOZE
HOW LIKELY ARE YOU TO NOD OFF OR FALL ASLEEP WHILE SITTING AND TALKING TO SOMEONE: WOULD NEVER DOZE
HOW LIKELY ARE YOU TO NOD OFF OR FALL ASLEEP IN A CAR, WHILE STOPPED FOR A FEW MINUTES IN TRAFFIC: WOULD NEVER DOZE
HOW LIKELY ARE YOU TO NOD OFF OR FALL ASLEEP WHILE LYING DOWN TO REST IN THE AFTERNOON WHEN CIRCUMSTANCES PERMIT: WOULD NEVER DOZE
HOW LIKELY ARE YOU TO NOD OFF OR FALL ASLEEP WHILE SITTING QUIETLY AFTER LUNCH WITHOUT ALCOHOL: WOULD NEVER DOZE
HOW LIKELY ARE YOU TO NOD OFF OR FALL ASLEEP WHILE WATCHING TV: MODERATE CHANCE OF DOZING
HOW LIKELY ARE YOU TO NOD OFF OR FALL ASLEEP WHEN YOU ARE A PASSENGER IN A CAR FOR AN HOUR WITHOUT A BREAK: WOULD NEVER DOZE

## 2025-05-08 ASSESSMENT — PAIN SCALES - GENERAL: PAINLEVEL_OUTOF10: SEVERE PAIN (7)

## 2025-05-08 NOTE — NURSING NOTE
Current patient location: [address] [place] [id number]    Is the patient currently in the state of [place]? YES    Visit mode: VIDEO    If the visit is dropped, the patient can be reconnected by:VIDEO VISIT: Text to cell phone:   No relevant phone numbers on file.       Will anyone else be joining the visit? NO  (If patient encounters technical issues they should call [phone]    Are changes needed to the allergy or medication list? No    Are refills needed on medications prescribed by this physician? NO    Rooming Documentation:  Questionnaire(s) completed    Reason for visit: JOSH HAMMERF

## 2025-05-12 ENCOUNTER — OFFICE VISIT (OUTPATIENT)
Dept: OTOLARYNGOLOGY | Facility: CLINIC | Age: 50
End: 2025-05-12
Payer: COMMERCIAL

## 2025-05-12 VITALS
TEMPERATURE: 97.5 F | HEIGHT: 63 IN | HEART RATE: 90 BPM | WEIGHT: 190 LBS | DIASTOLIC BLOOD PRESSURE: 78 MMHG | BODY MASS INDEX: 33.66 KG/M2 | SYSTOLIC BLOOD PRESSURE: 112 MMHG

## 2025-05-12 DIAGNOSIS — J30.89 NON-SEASONAL ALLERGIC RHINITIS DUE TO OTHER ALLERGIC TRIGGER: Primary | ICD-10-CM

## 2025-05-12 DIAGNOSIS — G47.33 OBSTRUCTIVE SLEEP APNEA SYNDROME: ICD-10-CM

## 2025-05-12 DIAGNOSIS — J34.2 DEVIATED NASAL SEPTUM: ICD-10-CM

## 2025-05-12 PROCEDURE — 3074F SYST BP LT 130 MM HG: CPT | Performed by: OTOLARYNGOLOGY

## 2025-05-12 PROCEDURE — 99204 OFFICE O/P NEW MOD 45 MIN: CPT | Performed by: OTOLARYNGOLOGY

## 2025-05-12 PROCEDURE — 3078F DIAST BP <80 MM HG: CPT | Performed by: OTOLARYNGOLOGY

## 2025-05-12 PROCEDURE — 1125F AMNT PAIN NOTED PAIN PRSNT: CPT | Performed by: OTOLARYNGOLOGY

## 2025-05-12 RX ORDER — AZELASTINE 1 MG/ML
2 SPRAY, METERED NASAL 2 TIMES DAILY
Qty: 30 ML | Refills: 2 | Status: SHIPPED | OUTPATIENT
Start: 2025-05-12

## 2025-05-12 ASSESSMENT — PAIN SCALES - GENERAL: PAINLEVEL_OUTOF10: SEVERE PAIN (8)

## 2025-05-12 NOTE — LETTER
5/12/2025      Alicia Penaloza  [address] [address] [facility] [address]      Dear Colleague,    Thank you for referring your patient, Alicia Penaloza, to the [facility]. Please see a copy of my visit note below.    ENT Consultation    Alicia Penaloza who is a 49 year old female seen in consultation at the request of self.      History of Present Illness - Alicia Penaloza is a 49 year old female presents with the several issues.  1 is her nose and her eyes.  She gets itchy watery eyes lately now seasonally nasal congestion with very some side-to-side nonseasonal she has never allergy tested.  Flonase helps a little.  She has not tried any nasal or oral antihistamines.  Sense of smell and taste appear to be intact.  Patient also was diagnosed with moderate obstructive sleep apnea AHI of 24.1 a year ago.  There was some associated hypoxemia mild.  There was no positional predominance based on the sleep study.  Patient has not initiated any therapy nor has discussed the results of his sleep study.  Patient has history of CVA anxiety hypertension.      Body mass index is 33.66 kg/m .    Weight management plan: Patient was referred to their PCP to discuss a diet and exercise plan.    BP Readings from Last 1 Encounters:   01/03/25 119/80       BP noted to be well controlled today in office.     Alicia IS NOT a smoker/uses chewing tobacco.        Past Medical History -   Past Medical History:   Diagnosis Date     Anxiety      Chronic kidney disease      Depressive disorder      Hypertension      Stroke (H) 08/07/2018       Current Medications -   Current Outpatient Medications:      acetaminophen (TYLENOL) 325 MG tablet, Take 650 mg by mouth every 4 hours as needed for pain or fever Do not exceed 4000mg in 24 hours (factor acetaminophen from all sources), Disp: , Rfl:      aspirin 325 MG tablet, Take 325 mg by mouth daily , Disp: , Rfl:      atorvastatin (LIPITOR) 80 MG tablet, Take 80 mg by mouth  daily., Disp: , Rfl:      BELSOMRA 15 MG tablet, Take 1 tablet by mouth at bedtime., Disp: , Rfl:      benzocaine-menthol (CHLORASEPTIC) 6-10 MG lozenge, Place 1 lozenge inside cheek every 2 hours as needed for moderate pain, Disp: , Rfl:      BISMATROL 262 MG chewable tablet, Take 262-524 mg by mouth 3 times daily as needed for pain In stomach, Disp: , Rfl:      bismuth subsalicylate (PEPTO BISMOL) 262 MG/15ML suspension, Take 20 mLs by mouth every 6 hours as needed for indigestion, Disp: , Rfl:      calcium carbonate (TUMS) 500 MG chewable tablet, Take 1-2 chew tab by mouth 3 times daily as needed for heartburn, Disp: , Rfl:      cetirizine (ZYRTEC) 10 MG tablet, Take 10 mg by mouth daily, Disp: , Rfl:      DEEP SEA NASAL SPRAY 0.65 % nasal spray, Spray 1 spray into both nostrils at bedtime., Disp: , Rfl:      diphenhydrAMINE (DIPHENHIST) 25 MG capsule, Take 2 capsules (50 mg) by mouth daily (Patient taking differently: Take 25 mg by mouth daily. Give 2 tabs (50 mg) by mouth every 6 hours as needed), Disp: 30 capsule, Rfl: 0     divalproex sodium extended-release (DEPAKOTE ER) 250 MG 24 hr tablet, Take 250 mg by mouth daily. Take one tablet by mouth at bedtime (along with 500 mg tablet for a total dose of 750 mg)., Disp: , Rfl:      divalproex sodium extended-release (DEPAKOTE ER) 500 MG 24 hr tablet, Take 1 tablet (500 mg) by mouth daily (Patient taking differently: Take 500 mg by mouth daily. Take one tablet by mouth at bedtime (along with 250 mg tablet for a total dose of 750 mg).), Disp: 90 tablet, Rfl: 0     docusate sodium (COLACE) 100 MG capsule, Take 100 mg by mouth daily as needed for constipation, Disp: , Rfl:      doxepin (SINEQUAN) 10 MG capsule, Take 1 capsule by mouth at bedtime., Disp: , Rfl:      famotidine (PEPCID) 20 MG tablet, Take 1 tablet by mouth 2 times daily., Disp: , Rfl:      fluticasone (FLONASE) 50 MCG/ACT nasal spray, Spray 2 sprays into both nostrils daily , Disp: , Rfl:       gabapentin (NEURONTIN) 300 MG capsule, Take 300 mg by mouth 2 times daily., Disp: , Rfl:      guaiFENesin (ROBITUSSIN) 100 MG/5ML SYRP, Take 20 mLs by mouth every 4 hours as needed for cough, Disp: , Rfl:      hydrOXYzine HCl (ATARAX) 50 MG tablet, Take 50 mg by mouth at bedtime., Disp: , Rfl:      hypromellose (ARTIFICIAL TEARS) 0.5 % SOLN ophthalmic solution, Place 1-2 drops into both eyes every hour as needed for dry eyes, Disp: , Rfl:      levonorgestrel (MIRENA) 20 MCG/24HR IUD, 20 mcg by Intrauterine route, Disp: , Rfl:      lisinopril (ZESTRIL) 30 MG tablet, Take 30 mg by mouth daily., Disp: , Rfl:      LORazepam (ATIVAN) 0.5 MG tablet, Take 0.5 mg by mouth 2 times daily as needed for anxiety., Disp: , Rfl:      magnesium hydroxide (MILK OF MAGNESIA) 400 MG/5ML suspension, Take 60 mLs by mouth daily as needed, Disp: , Rfl:      magnesium oxide (MAG-OX) 400 (241.3 Mg) MG tablet, Take 400 mg by mouth daily, Disp: , Rfl:      melatonin 5 MG PO tablet, Take 5 mg by mouth at bedtime. Chew 2 gummies by mouth every night at bedtime, Disp: , Rfl:      menthol (COUGH DROP) 7 MG LOZG, Take 1 lozenge by mouth every hour as needed for cough, Disp: , Rfl:      Menthol, Topical Analgesic, 4 % GEL, Apply 1 Application. topically 3 times daily. To back and shoulders, Disp: , Rfl:      methocarbamol (ROBAXIN) 500 MG tablet, Take 1,000 mg by mouth at bedtime., Disp: , Rfl:      Multiple Vitamins-Minerals (ICAPS AREDS FORMULA) TABS, Take 1 tablet by mouth 2 times daily 10am and 8pm, Disp: , Rfl:      omeprazole (PRILOSEC) 20 MG DR capsule, Take 20 mg by mouth 2 times daily., Disp: , Rfl:      ondansetron (ZOFRAN-ODT) 4 MG ODT tab, Take 1-2 tablets (4-8 mg) by mouth every 8 hours as needed for nausea Dissolve ON the tongue., Disp: 10 tablet, Rfl: 3     polyethylene glycol (MIRALAX/GLYCOLAX) packet, Take 1 packet by mouth daily as needed for constipation Mixed with 8 ounces of water, Disp: , Rfl:      psyllium (METAMUCIL/KONSYL)  "58.6 % powder, Take 1 teaspoonful by mouth 3 times daily as needed for constipation In 6-8 oz juice or water, Disp: , Rfl:      Riboflavin 100 MG TABS, Take 1 tablet by mouth daily., Disp: , Rfl:      sertraline (ZOLOFT) 100 MG tablet, Take 1 tablet (100 mg) by mouth daily (Patient taking differently: Take 200 mg by mouth daily. Take two 100 dg=815 mg capsules by mouth once daily), Disp: 90 tablet, Rfl: 0     triamcinolone (KENALOG) 0.1 % external cream, Apply topically 2 times daily, Disp: , Rfl:      Vitamin D, Cholecalciferol, 1000 units TABS, Take 1,000 Units by mouth daily, Disp: , Rfl:     Allergies - No Known Allergies    Social History -   Social History     Socioeconomic History     Marital status: Single   Tobacco Use     Smoking status: Former     Current packs/day: 0.00     Types: Cigarettes     Quit date: 2018     Years since quittin.7     Passive exposure: Never     Smokeless tobacco: Never   Vaping Use     Vaping status: Never Used   Substance and Sexual Activity     Alcohol use: No     Drug use: No     Sexual activity: Not Currently       Family History - No family history on file.    Review of Systems - As per HPI and PMHx, otherwise review of system review of the head and neck negative. Otherwise 10+ review of system is negative    Physical Exam  Ht 1.6 m (5' 3\")   Wt 86.2 kg (190 lb)   LMP 2018   BMI 33.66 kg/m    BMI: Body mass index is 33.66 kg/m .    General - The patient is well nourished and well developed, and appears to have good nutritional status.  Alert and oriented to person and place, answers questions and cooperates with examination appropriately.    SKIN - No suspicious lesions or rashes.  Respiration - No respiratory distress.  Head and Face - Normocephalic and atraumatic, with no gross asymmetry noted of the contour of the facial features.  The facial nerve is intact, with strong symmetric movements.    Voice and Breathing - The patient was breathing comfortably " without the use of accessory muscles. The patients voice was clear and strong, and had appropriate pitch and quality.    Ears - Bilateral pinna and EACs with normal appearing overlying skin. Tympanic membrane intact with good mobility on pneumatic otoscopy bilaterally. Bony landmarks of the ossicular chain are normal. The tympanic membranes are normal in appearance. No retraction, perforation, or masses.  No fluid or purulence was seen in the external canal or the middle ear.     Eyes - Extraocular movements intact.  Sclera were not icteric or injected, conjunctiva were pink and moist.    Mouth - Examination of the oral cavity showed pink, healthy oral mucosa. No lesions or ulcerations noted.  The tongue was mobile and midline, and the dentition were in good condition.      Throat - The walls of the oropharynx were smooth, pink, moist, symmetric, and had no lesions or ulcerations.  The tonsillar pillars and soft palate were symmetric.  The uvula was midline on elevation.  [place] tongue position 2 with small papilloma seen at the very tip of the uvula.  Class I occlusion mild overbite noted.    Neck - Normal midline excursion of the laryngotracheal complex during swallowing.  Full range of motion on passive movement.  Palpation of the occipital, submental, submandibular, internal jugular chain, and supraclavicular nodes did not demonstrate any abnormal lymph nodes or masses.  The carotid pulse was palpable bilaterally.  Palpation of the thyroid was soft and smooth, with no nodules or goiter appreciated.  The trachea was mobile and midline.    Nose - External contour is symmetric, no gross deflection or scars.  Nasal mucosa is pale and moist with no abnormal mucus.  The septum was  deviated to the left and obstructive, turbinates of enlarged size and position.  No polyps, masses, or purulence noted on examination.    Neuro - Nonfocal neuro exam is normal, CN 2 through 12 intact, normal gait and muscle  tone.      Performed in clinic today:  No procedures preformed in clinic today      A/P - Alicia Penaloza is a 49 year old female patient with 2 major issues 1 is moderate obstructive sleep apnea.  Not previously treated.  We discussed treatment options: Obstructive sleep apnea.  We discussed potential use of CPAP versus oral appliance versus other alternative options.  In regard to her obesity she will work with her primary care provider towards weight loss program.  Patient would like to try oral appliance since she does not see herself being able to tolerate CPAP very well.  Proper referral was given.  We counseled patient on proper sleep hygiene and avoidance of drowsy driving.    In regard to his nose he has she does have some structural issues but they are also allergic issues that have not been properly incompletely addressed.  Therefore allergy evaluation will be set up via referral to allergist as well as patient will be given azelastine to try in conjunction with fluticasone.  Azelastine will be used twice daily.  Patient will follow-up in a couple of months to discuss her trial of oral appliance and potentially in the future schedule home sleep apnea testing if successful trial.  Also will see how she is doing with her nose and decide on further care especially after allergy evaluation.      Casimiro Charles MD     Again, thank you for allowing me to participate in the care of your patient.        Sincerely,        Casimiro Charles MD, MD    Electronically signed

## 2025-05-13 ENCOUNTER — PATIENT OUTREACH (OUTPATIENT)
Dept: CARE COORDINATION | Facility: CLINIC | Age: 50
End: 2025-05-13
Payer: COMMERCIAL

## 2025-05-15 ENCOUNTER — PATIENT OUTREACH (OUTPATIENT)
Dept: CARE COORDINATION | Facility: CLINIC | Age: 50
End: 2025-05-15
Payer: COMMERCIAL

## 2025-05-20 ENCOUNTER — LAB (OUTPATIENT)
Dept: LAB | Facility: CLINIC | Age: 50
End: 2025-05-20
Payer: COMMERCIAL

## 2025-05-20 DIAGNOSIS — E55.9 VITAMIN D DEFICIENCY: ICD-10-CM

## 2025-05-20 DIAGNOSIS — N18.32 STAGE 3B CHRONIC KIDNEY DISEASE (H): ICD-10-CM

## 2025-05-20 LAB
ALBUMIN MFR UR ELPH: 8.1 MG/DL
ANION GAP SERPL CALCULATED.3IONS-SCNC: 12 MMOL/L (ref 7–15)
BUN SERPL-MCNC: 23 MG/DL (ref 6–20)
CALCIUM SERPL-MCNC: 9.1 MG/DL (ref 8.8–10.4)
CHLORIDE SERPL-SCNC: 101 MMOL/L (ref 98–107)
CREAT SERPL-MCNC: 1.53 MG/DL (ref 0.51–0.95)
CREAT UR-MCNC: 116.1 MG/DL
CREAT UR-MCNC: 118.9 MG/DL
EGFRCR SERPLBLD CKD-EPI 2021: 41 ML/MIN/1.73M2
GLUCOSE SERPL-MCNC: 86 MG/DL (ref 70–99)
HCO3 SERPL-SCNC: 24 MMOL/L (ref 22–29)
HGB BLD-MCNC: 13.1 G/DL (ref 11.7–15.7)
MCV RBC AUTO: 90 FL (ref 78–100)
MICROALBUMIN UR-MCNC: <12 MG/L
MICROALBUMIN/CREAT UR: NORMAL MG/G{CREAT}
PHOSPHATE SERPL-MCNC: 3.2 MG/DL (ref 2.5–4.5)
POTASSIUM SERPL-SCNC: 5 MMOL/L (ref 3.4–5.3)
PROT/CREAT 24H UR: 0.07 MG/MG CR (ref 0–0.2)
PTH-INTACT SERPL-MCNC: 48 PG/ML (ref 15–65)
SODIUM SERPL-SCNC: 137 MMOL/L (ref 135–145)

## 2025-05-20 PROCEDURE — 36415 COLL VENOUS BLD VENIPUNCTURE: CPT

## 2025-05-20 PROCEDURE — 84100 ASSAY OF PHOSPHORUS: CPT

## 2025-05-20 PROCEDURE — 84156 ASSAY OF PROTEIN URINE: CPT

## 2025-05-20 PROCEDURE — 83970 ASSAY OF PARATHORMONE: CPT

## 2025-05-20 PROCEDURE — 82043 UR ALBUMIN QUANTITATIVE: CPT

## 2025-05-20 PROCEDURE — 82570 ASSAY OF URINE CREATININE: CPT

## 2025-05-20 PROCEDURE — 80048 BASIC METABOLIC PNL TOTAL CA: CPT

## 2025-05-20 PROCEDURE — 85018 HEMOGLOBIN: CPT

## 2025-07-16 ENCOUNTER — TRANSCRIBE ORDERS (OUTPATIENT)
Dept: OTHER | Age: 50
End: 2025-07-16

## 2025-07-16 DIAGNOSIS — M79.11 MYALGIA OF MASTICATION MUSCLE: Primary | ICD-10-CM

## (undated) DEVICE — ESU SUCTION/IRRIGATION SYSTEM PISTOL GRIP

## (undated) DEVICE — ESU GROUND PAD UNIVERSAL W/O CORD

## (undated) DEVICE — ESU CORD MONOPOLAR HIGH FREQUENCY 26006M-D/10

## (undated) DEVICE — STOCKING SLEEVE COMPRESSION CALF LG

## (undated) DEVICE — SYR 10ML PREFILLED 0.9% NACL INJ NOT STERILE 306500

## (undated) DEVICE — SU PDS II 1 CT-1 MONOFIL Z347H

## (undated) DEVICE — NDL INSUFFLATION 13GA 120MM C2201

## (undated) DEVICE — SOL NACL 0.9% INJ 1000ML BAG 07983-09

## (undated) DEVICE — ENDO POUCH GOLD 10MM ECATCH 173050G

## (undated) DEVICE — GLOVE PROTEXIS W/NEU-THERA 7.5  2D73TE75

## (undated) DEVICE — ENDO TROCAR FIRST ENTRY KII FIOS Z-THRD 05X100MM CTF03

## (undated) DEVICE — ESU HOOK TIP 5MM CONMED

## (undated) DEVICE — ENDO TROCAR SHIELDED BLADED KII Z-THRD 12X100MM CTB73

## (undated) DEVICE — ENDO TROCAR SHIELDED BLADED KII Z-THRD 05X100MM CTB03

## (undated) DEVICE — PACK GENERAL LAPAOSCOPY

## (undated) RX ORDER — FENTANYL CITRATE 50 UG/ML
INJECTION, SOLUTION INTRAMUSCULAR; INTRAVENOUS
Status: DISPENSED
Start: 2018-08-27